# Patient Record
Sex: FEMALE | Race: BLACK OR AFRICAN AMERICAN | Employment: UNEMPLOYED | URBAN - METROPOLITAN AREA
[De-identification: names, ages, dates, MRNs, and addresses within clinical notes are randomized per-mention and may not be internally consistent; named-entity substitution may affect disease eponyms.]

---

## 2017-10-10 ENCOUNTER — HOSPITAL ENCOUNTER (OUTPATIENT)
Dept: MAMMOGRAPHY | Age: 57
Discharge: HOME | End: 2017-10-10
Attending: FAMILY MEDICINE
Payer: COMMERCIAL

## 2017-10-10 DIAGNOSIS — Z12.31 VISIT FOR SCREENING MAMMOGRAM: ICD-10-CM

## 2017-10-10 PROCEDURE — 77067 SCR MAMMO BI INCL CAD: CPT

## 2017-10-20 ENCOUNTER — HOSPITAL ENCOUNTER (OUTPATIENT)
Dept: ULTRASOUND IMAGING | Age: 57
Discharge: HOME | End: 2017-10-20
Attending: FAMILY MEDICINE
Payer: COMMERCIAL

## 2017-10-20 DIAGNOSIS — D11.9: ICD-10-CM

## 2017-10-20 PROCEDURE — 76536 US EXAM OF HEAD AND NECK: CPT

## 2019-12-16 ENCOUNTER — HOSPITAL ENCOUNTER (OUTPATIENT)
Dept: MAMMOGRAPHY | Age: 59
Discharge: HOME | End: 2019-12-16
Attending: FAMILY MEDICINE
Payer: COMMERCIAL

## 2019-12-16 DIAGNOSIS — Z12.31 VISIT FOR SCREENING MAMMOGRAM: ICD-10-CM

## 2019-12-16 PROCEDURE — 77067 SCR MAMMO BI INCL CAD: CPT

## 2019-12-26 ENCOUNTER — HOSPITAL ENCOUNTER (OUTPATIENT)
Dept: MAMMOGRAPHY | Age: 59
Discharge: HOME | End: 2019-12-26
Attending: FAMILY MEDICINE
Payer: COMMERCIAL

## 2019-12-26 DIAGNOSIS — R92.8 ABNORMAL MAMMOGRAM: ICD-10-CM

## 2019-12-26 PROCEDURE — 77065 DX MAMMO INCL CAD UNI: CPT

## 2020-07-16 ENCOUNTER — HOSPITAL ENCOUNTER (OUTPATIENT)
Dept: ULTRASOUND IMAGING | Age: 60
Discharge: HOME | End: 2020-07-16
Attending: OTOLARYNGOLOGY
Payer: MEDICAID

## 2020-07-16 DIAGNOSIS — R22.0 HEAD SWELLING: ICD-10-CM

## 2020-07-16 DIAGNOSIS — D49.0 PAROTID TUMOR: ICD-10-CM

## 2020-07-16 PROCEDURE — 10005 FNA BX W/US GDN 1ST LES: CPT

## 2020-07-16 PROCEDURE — 88305 TISSUE EXAM BY PATHOLOGIST: CPT

## 2020-07-16 PROCEDURE — 76536 US EXAM OF HEAD AND NECK: CPT

## 2020-07-16 PROCEDURE — 88173 CYTOPATH EVAL FNA REPORT: CPT

## 2020-07-16 PROCEDURE — 88172 CYTP DX EVAL FNA 1ST EA SITE: CPT

## 2020-07-16 RX ORDER — LIDOCAINE HYDROCHLORIDE 10 MG/ML
8 INJECTION, SOLUTION EPIDURAL; INFILTRATION; INTRACAUDAL; PERINEURAL
Status: DISCONTINUED | OUTPATIENT
Start: 2020-07-16 | End: 2020-07-17 | Stop reason: HOSPADM

## 2020-08-09 ENCOUNTER — HOSPITAL ENCOUNTER (OUTPATIENT)
Dept: PREADMISSION TESTING | Age: 60
Discharge: HOME | End: 2020-08-09
Payer: MEDICAID

## 2020-08-09 DIAGNOSIS — Z20.822 ENCOUNTER FOR LABORATORY TESTING FOR COVID-19 VIRUS: ICD-10-CM

## 2020-08-09 PROCEDURE — 87635 SARS-COV-2 COVID-19 AMP PRB: CPT

## 2020-08-10 LAB — SARS-COV-2, COV2NT: NOT DETECTED

## 2020-08-12 ENCOUNTER — ANESTHESIA EVENT (OUTPATIENT)
Dept: MEDSURG UNIT | Age: 60
End: 2020-08-12
Payer: MEDICAID

## 2020-08-12 RX ORDER — GUAIFENESIN 100 MG/5ML
81 LIQUID (ML) ORAL DAILY
COMMUNITY

## 2020-08-12 RX ORDER — ALPRAZOLAM 0.25 MG/1
0.25 TABLET ORAL
Status: ON HOLD | COMMUNITY
End: 2022-05-05

## 2020-08-12 RX ORDER — LOSARTAN POTASSIUM 100 MG/1
100 TABLET ORAL DAILY
COMMUNITY

## 2020-08-12 RX ORDER — SIMVASTATIN 20 MG/1
20 TABLET, FILM COATED ORAL
COMMUNITY

## 2020-08-12 RX ORDER — ESCITALOPRAM OXALATE 20 MG/1
20 TABLET ORAL DAILY
COMMUNITY

## 2020-08-12 RX ORDER — METFORMIN HYDROCHLORIDE 850 MG/1
850 TABLET ORAL 2 TIMES DAILY WITH MEALS
COMMUNITY

## 2020-08-13 ENCOUNTER — HOSPITAL ENCOUNTER (OUTPATIENT)
Age: 60
Setting detail: OUTPATIENT SURGERY
Discharge: HOME | End: 2020-08-13
Attending: OTOLARYNGOLOGY | Admitting: OTOLARYNGOLOGY
Payer: MEDICAID

## 2020-08-13 ENCOUNTER — ANESTHESIA (OUTPATIENT)
Dept: MEDSURG UNIT | Age: 60
End: 2020-08-13
Payer: MEDICAID

## 2020-08-13 VITALS
TEMPERATURE: 98.2 F | RESPIRATION RATE: 22 BRPM | BODY MASS INDEX: 34.86 KG/M2 | HEIGHT: 68 IN | DIASTOLIC BLOOD PRESSURE: 69 MMHG | OXYGEN SATURATION: 94 % | WEIGHT: 230 LBS | SYSTOLIC BLOOD PRESSURE: 136 MMHG | HEART RATE: 73 BPM

## 2020-08-13 DIAGNOSIS — L76.82 PAIN AT SURGICAL INCISION: Primary | ICD-10-CM

## 2020-08-13 LAB
GLUCOSE BLD STRIP.AUTO-MCNC: 120 MG/DL (ref 65–100)
GLUCOSE BLD STRIP.AUTO-MCNC: 178 MG/DL (ref 65–100)
SERVICE CMNT-IMP: ABNORMAL
SERVICE CMNT-IMP: ABNORMAL

## 2020-08-13 PROCEDURE — 76060000067 HC AMB SURG ANES 3.5 TO 4 HR: Performed by: OTOLARYNGOLOGY

## 2020-08-13 PROCEDURE — 77030019600 HC DRN EAR POPE MEDT -A: Performed by: OTOLARYNGOLOGY

## 2020-08-13 PROCEDURE — 77030040356 HC CORD BPLR FRCP COVD -A: Performed by: OTOLARYNGOLOGY

## 2020-08-13 PROCEDURE — 76030000024 HC AMB SURG 3.5 TO 4 HR INTENSV-TIER 1: Performed by: OTOLARYNGOLOGY

## 2020-08-13 PROCEDURE — 74011250636 HC RX REV CODE- 250/636: Performed by: NURSE ANESTHETIST, CERTIFIED REGISTERED

## 2020-08-13 PROCEDURE — 77030040503 HC DRN WND PENRS MDII -A: Performed by: OTOLARYNGOLOGY

## 2020-08-13 PROCEDURE — 77030031139 HC SUT VCRL2 J&J -A: Performed by: OTOLARYNGOLOGY

## 2020-08-13 PROCEDURE — 77030008684 HC TU ET CUF COVD -B: Performed by: ANESTHESIOLOGY

## 2020-08-13 PROCEDURE — 77030011640 HC PAD GRND REM COVD -A: Performed by: OTOLARYNGOLOGY

## 2020-08-13 PROCEDURE — 82962 GLUCOSE BLOOD TEST: CPT

## 2020-08-13 PROCEDURE — 77030002996 HC SUT SLK J&J -A: Performed by: OTOLARYNGOLOGY

## 2020-08-13 PROCEDURE — 77030040361 HC SLV COMPR DVT MDII -B: Performed by: OTOLARYNGOLOGY

## 2020-08-13 PROCEDURE — 77030040506 HC DRN WND MDII -A: Performed by: OTOLARYNGOLOGY

## 2020-08-13 PROCEDURE — 74011000258 HC RX REV CODE- 258: Performed by: NURSE ANESTHETIST, CERTIFIED REGISTERED

## 2020-08-13 PROCEDURE — 88307 TISSUE EXAM BY PATHOLOGIST: CPT

## 2020-08-13 PROCEDURE — 74011000250 HC RX REV CODE- 250: Performed by: NURSE ANESTHETIST, CERTIFIED REGISTERED

## 2020-08-13 PROCEDURE — 77030040922 HC BLNKT HYPOTHRM STRY -A

## 2020-08-13 PROCEDURE — 76210000037 HC AMBSU PH I REC 2 TO 2.5 HR: Performed by: OTOLARYNGOLOGY

## 2020-08-13 PROCEDURE — 77030010516 HC APPL HEMA CLP TELE -B: Performed by: OTOLARYNGOLOGY

## 2020-08-13 PROCEDURE — 74011250637 HC RX REV CODE- 250/637: Performed by: ANESTHESIOLOGY

## 2020-08-13 PROCEDURE — 77030002974 HC SUT PLN J&J -A: Performed by: OTOLARYNGOLOGY

## 2020-08-13 PROCEDURE — 77030019615 HC ELCTRD EMG NDL MEDT -B: Performed by: OTOLARYNGOLOGY

## 2020-08-13 PROCEDURE — 77030002916 HC SUT ETHLN J&J -A: Performed by: OTOLARYNGOLOGY

## 2020-08-13 PROCEDURE — 77030008462 HC STPLR SKN PROX J&J -A: Performed by: OTOLARYNGOLOGY

## 2020-08-13 PROCEDURE — 77030018836 HC SOL IRR NACL ICUM -A: Performed by: OTOLARYNGOLOGY

## 2020-08-13 PROCEDURE — 74011250637 HC RX REV CODE- 250/637: Performed by: OTOLARYNGOLOGY

## 2020-08-13 PROCEDURE — 77030011266 HC ELECTRD BLD INSL COVD -A: Performed by: OTOLARYNGOLOGY

## 2020-08-13 PROCEDURE — 77030014012 HC STIM NRV DISP ADLR -B: Performed by: OTOLARYNGOLOGY

## 2020-08-13 PROCEDURE — 74011000250 HC RX REV CODE- 250: Performed by: OTOLARYNGOLOGY

## 2020-08-13 RX ORDER — MIDAZOLAM HYDROCHLORIDE 1 MG/ML
0.5 INJECTION, SOLUTION INTRAMUSCULAR; INTRAVENOUS
Status: DISCONTINUED | OUTPATIENT
Start: 2020-08-13 | End: 2020-08-13 | Stop reason: HOSPADM

## 2020-08-13 RX ORDER — GLYCOPYRROLATE 0.2 MG/ML
0.2 INJECTION INTRAMUSCULAR; INTRAVENOUS
Status: DISCONTINUED | OUTPATIENT
Start: 2020-08-13 | End: 2020-08-13 | Stop reason: HOSPADM

## 2020-08-13 RX ORDER — ONDANSETRON 2 MG/ML
INJECTION INTRAMUSCULAR; INTRAVENOUS AS NEEDED
Status: DISCONTINUED | OUTPATIENT
Start: 2020-08-13 | End: 2020-08-13 | Stop reason: HOSPADM

## 2020-08-13 RX ORDER — CEPHALEXIN 500 MG/1
500 CAPSULE ORAL 2 TIMES DAILY
Qty: 20 CAP | Refills: 2 | Status: SHIPPED | OUTPATIENT
Start: 2020-08-13 | End: 2020-08-23

## 2020-08-13 RX ORDER — ROPIVACAINE HYDROCHLORIDE 5 MG/ML
30 INJECTION, SOLUTION EPIDURAL; INFILTRATION; PERINEURAL AS NEEDED
Status: DISCONTINUED | OUTPATIENT
Start: 2020-08-13 | End: 2020-08-13 | Stop reason: HOSPADM

## 2020-08-13 RX ORDER — SODIUM CHLORIDE, SODIUM LACTATE, POTASSIUM CHLORIDE, CALCIUM CHLORIDE 600; 310; 30; 20 MG/100ML; MG/100ML; MG/100ML; MG/100ML
INJECTION, SOLUTION INTRAVENOUS
Status: DISCONTINUED | OUTPATIENT
Start: 2020-08-13 | End: 2020-08-13 | Stop reason: HOSPADM

## 2020-08-13 RX ORDER — LIDOCAINE HYDROCHLORIDE 10 MG/ML
0.1 INJECTION, SOLUTION EPIDURAL; INFILTRATION; INTRACAUDAL; PERINEURAL AS NEEDED
Status: DISCONTINUED | OUTPATIENT
Start: 2020-08-13 | End: 2020-08-13 | Stop reason: HOSPADM

## 2020-08-13 RX ORDER — MIDAZOLAM HYDROCHLORIDE 1 MG/ML
1 INJECTION, SOLUTION INTRAMUSCULAR; INTRAVENOUS AS NEEDED
Status: DISCONTINUED | OUTPATIENT
Start: 2020-08-13 | End: 2020-08-13 | Stop reason: HOSPADM

## 2020-08-13 RX ORDER — SODIUM CHLORIDE, SODIUM LACTATE, POTASSIUM CHLORIDE, CALCIUM CHLORIDE 600; 310; 30; 20 MG/100ML; MG/100ML; MG/100ML; MG/100ML
1000 INJECTION, SOLUTION INTRAVENOUS CONTINUOUS
Status: DISCONTINUED | OUTPATIENT
Start: 2020-08-13 | End: 2020-08-13 | Stop reason: HOSPADM

## 2020-08-13 RX ORDER — FENTANYL CITRATE 50 UG/ML
INJECTION, SOLUTION INTRAMUSCULAR; INTRAVENOUS AS NEEDED
Status: DISCONTINUED | OUTPATIENT
Start: 2020-08-13 | End: 2020-08-13 | Stop reason: HOSPADM

## 2020-08-13 RX ORDER — ALBUTEROL SULFATE 0.83 MG/ML
2.5 SOLUTION RESPIRATORY (INHALATION) AS NEEDED
Status: DISCONTINUED | OUTPATIENT
Start: 2020-08-13 | End: 2020-08-13 | Stop reason: HOSPADM

## 2020-08-13 RX ORDER — LIDOCAINE HYDROCHLORIDE 20 MG/ML
INJECTION, SOLUTION EPIDURAL; INFILTRATION; INTRACAUDAL; PERINEURAL AS NEEDED
Status: DISCONTINUED | OUTPATIENT
Start: 2020-08-13 | End: 2020-08-13 | Stop reason: HOSPADM

## 2020-08-13 RX ORDER — ROCURONIUM BROMIDE 10 MG/ML
INJECTION, SOLUTION INTRAVENOUS AS NEEDED
Status: DISCONTINUED | OUTPATIENT
Start: 2020-08-13 | End: 2020-08-13 | Stop reason: HOSPADM

## 2020-08-13 RX ORDER — FENTANYL CITRATE 50 UG/ML
50 INJECTION, SOLUTION INTRAMUSCULAR; INTRAVENOUS AS NEEDED
Status: DISCONTINUED | OUTPATIENT
Start: 2020-08-13 | End: 2020-08-13 | Stop reason: HOSPADM

## 2020-08-13 RX ORDER — DIPHENHYDRAMINE HYDROCHLORIDE 50 MG/ML
12.5 INJECTION, SOLUTION INTRAMUSCULAR; INTRAVENOUS AS NEEDED
Status: DISCONTINUED | OUTPATIENT
Start: 2020-08-13 | End: 2020-08-13 | Stop reason: HOSPADM

## 2020-08-13 RX ORDER — EPHEDRINE SULFATE/0.9% NACL/PF 50 MG/5 ML
SYRINGE (ML) INTRAVENOUS AS NEEDED
Status: DISCONTINUED | OUTPATIENT
Start: 2020-08-13 | End: 2020-08-13 | Stop reason: HOSPADM

## 2020-08-13 RX ORDER — EPINEPHRINE NASAL SOLUTION 1 MG/ML
SOLUTION NASAL AS NEEDED
Status: DISCONTINUED | OUTPATIENT
Start: 2020-08-13 | End: 2020-08-13 | Stop reason: HOSPADM

## 2020-08-13 RX ORDER — HYDROCODONE BITARTRATE AND ACETAMINOPHEN 5; 325 MG/1; MG/1
1 TABLET ORAL AS NEEDED
Status: DISCONTINUED | OUTPATIENT
Start: 2020-08-13 | End: 2020-08-13 | Stop reason: HOSPADM

## 2020-08-13 RX ORDER — SUCCINYLCHOLINE CHLORIDE 20 MG/ML
INJECTION INTRAMUSCULAR; INTRAVENOUS AS NEEDED
Status: DISCONTINUED | OUTPATIENT
Start: 2020-08-13 | End: 2020-08-13 | Stop reason: HOSPADM

## 2020-08-13 RX ORDER — CEPHALEXIN 500 MG/1
500 CAPSULE ORAL 2 TIMES DAILY
Qty: 20 CAP | Refills: 1 | Status: SHIPPED | OUTPATIENT
Start: 2020-08-13 | End: 2020-08-23

## 2020-08-13 RX ORDER — SODIUM CHLORIDE 9 MG/ML
25 INJECTION, SOLUTION INTRAVENOUS CONTINUOUS
Status: DISCONTINUED | OUTPATIENT
Start: 2020-08-13 | End: 2020-08-13 | Stop reason: HOSPADM

## 2020-08-13 RX ORDER — MIDAZOLAM HYDROCHLORIDE 1 MG/ML
INJECTION, SOLUTION INTRAMUSCULAR; INTRAVENOUS AS NEEDED
Status: DISCONTINUED | OUTPATIENT
Start: 2020-08-13 | End: 2020-08-13 | Stop reason: HOSPADM

## 2020-08-13 RX ORDER — LIDOCAINE HYDROCHLORIDE AND EPINEPHRINE 10; 10 MG/ML; UG/ML
INJECTION, SOLUTION INFILTRATION; PERINEURAL AS NEEDED
Status: DISCONTINUED | OUTPATIENT
Start: 2020-08-13 | End: 2020-08-13 | Stop reason: HOSPADM

## 2020-08-13 RX ORDER — HYDROMORPHONE HYDROCHLORIDE 1 MG/ML
0.2 INJECTION, SOLUTION INTRAMUSCULAR; INTRAVENOUS; SUBCUTANEOUS
Status: DISCONTINUED | OUTPATIENT
Start: 2020-08-13 | End: 2020-08-13 | Stop reason: HOSPADM

## 2020-08-13 RX ORDER — ONDANSETRON 2 MG/ML
4 INJECTION INTRAMUSCULAR; INTRAVENOUS AS NEEDED
Status: DISCONTINUED | OUTPATIENT
Start: 2020-08-13 | End: 2020-08-13 | Stop reason: HOSPADM

## 2020-08-13 RX ORDER — FENTANYL CITRATE 50 UG/ML
25 INJECTION, SOLUTION INTRAMUSCULAR; INTRAVENOUS
Status: DISCONTINUED | OUTPATIENT
Start: 2020-08-13 | End: 2020-08-13 | Stop reason: HOSPADM

## 2020-08-13 RX ORDER — CEFAZOLIN SODIUM 1 G/3ML
INJECTION, POWDER, FOR SOLUTION INTRAMUSCULAR; INTRAVENOUS AS NEEDED
Status: DISCONTINUED | OUTPATIENT
Start: 2020-08-13 | End: 2020-08-13 | Stop reason: HOSPADM

## 2020-08-13 RX ORDER — GLYCOPYRROLATE 0.2 MG/ML
INJECTION INTRAMUSCULAR; INTRAVENOUS AS NEEDED
Status: DISCONTINUED | OUTPATIENT
Start: 2020-08-13 | End: 2020-08-13 | Stop reason: HOSPADM

## 2020-08-13 RX ORDER — ACETAMINOPHEN 325 MG/1
650 TABLET ORAL ONCE
Status: COMPLETED | OUTPATIENT
Start: 2020-08-13 | End: 2020-08-13

## 2020-08-13 RX ORDER — PHENYLEPHRINE HCL IN 0.9% NACL 0.4MG/10ML
SYRINGE (ML) INTRAVENOUS AS NEEDED
Status: DISCONTINUED | OUTPATIENT
Start: 2020-08-13 | End: 2020-08-13 | Stop reason: HOSPADM

## 2020-08-13 RX ORDER — ONDANSETRON 8 MG/1
8 TABLET, ORALLY DISINTEGRATING ORAL
Qty: 8 TAB | Refills: 1 | Status: SHIPPED | OUTPATIENT
Start: 2020-08-13 | End: 2020-08-20

## 2020-08-13 RX ORDER — METHYLPREDNISOLONE 4 MG/1
TABLET ORAL
Qty: 1 DOSE PACK | Refills: 1 | Status: ON HOLD | OUTPATIENT
Start: 2020-08-13 | End: 2022-05-05

## 2020-08-13 RX ORDER — DEXAMETHASONE SODIUM PHOSPHATE 4 MG/ML
INJECTION, SOLUTION INTRA-ARTICULAR; INTRALESIONAL; INTRAMUSCULAR; INTRAVENOUS; SOFT TISSUE AS NEEDED
Status: DISCONTINUED | OUTPATIENT
Start: 2020-08-13 | End: 2020-08-13 | Stop reason: HOSPADM

## 2020-08-13 RX ORDER — PROPOFOL 10 MG/ML
INJECTION, EMULSION INTRAVENOUS AS NEEDED
Status: DISCONTINUED | OUTPATIENT
Start: 2020-08-13 | End: 2020-08-13 | Stop reason: HOSPADM

## 2020-08-13 RX ORDER — MORPHINE SULFATE 10 MG/ML
2 INJECTION, SOLUTION INTRAMUSCULAR; INTRAVENOUS
Status: DISCONTINUED | OUTPATIENT
Start: 2020-08-13 | End: 2020-08-13 | Stop reason: HOSPADM

## 2020-08-13 RX ORDER — HYDROCODONE BITARTRATE AND ACETAMINOPHEN 7.5; 325 MG/1; MG/1
1-2 TABLET ORAL
Qty: 28 TAB | Refills: 0 | Status: SHIPPED | OUTPATIENT
Start: 2020-08-13 | End: 2020-08-20

## 2020-08-13 RX ADMIN — Medication 80 MCG: at 09:17

## 2020-08-13 RX ADMIN — SUCCINYLCHOLINE CHLORIDE 140 MG: 20 INJECTION, SOLUTION INTRAMUSCULAR; INTRAVENOUS at 07:47

## 2020-08-13 RX ADMIN — Medication 10 MG: at 09:14

## 2020-08-13 RX ADMIN — DEXMEDETOMIDINE HYDROCHLORIDE 5 MCG: 100 INJECTION, SOLUTION, CONCENTRATE INTRAVENOUS at 10:27

## 2020-08-13 RX ADMIN — MEPERIDINE HYDROCHLORIDE 10 MG: 50 INJECTION INTRAMUSCULAR; INTRAVENOUS; SUBCUTANEOUS at 10:57

## 2020-08-13 RX ADMIN — FENTANYL CITRATE 100 MCG: 50 INJECTION, SOLUTION INTRAMUSCULAR; INTRAVENOUS at 08:19

## 2020-08-13 RX ADMIN — MIDAZOLAM 2 MG: 1 INJECTION INTRAMUSCULAR; INTRAVENOUS at 07:37

## 2020-08-13 RX ADMIN — GLYCOPYRROLATE 0.3 MG: 0.2 INJECTION, SOLUTION INTRAMUSCULAR; INTRAVENOUS at 08:32

## 2020-08-13 RX ADMIN — Medication 10 MG: at 08:28

## 2020-08-13 RX ADMIN — SODIUM CHLORIDE, POTASSIUM CHLORIDE, SODIUM LACTATE AND CALCIUM CHLORIDE: 600; 310; 30; 20 INJECTION, SOLUTION INTRAVENOUS at 10:54

## 2020-08-13 RX ADMIN — DEXMEDETOMIDINE HYDROCHLORIDE 5 MCG: 100 INJECTION, SOLUTION, CONCENTRATE INTRAVENOUS at 09:28

## 2020-08-13 RX ADMIN — ACETAMINOPHEN 650 MG: 325 TABLET ORAL at 06:54

## 2020-08-13 RX ADMIN — DEXMEDETOMIDINE HYDROCHLORIDE 5 MCG: 100 INJECTION, SOLUTION, CONCENTRATE INTRAVENOUS at 08:20

## 2020-08-13 RX ADMIN — TRANEXAMIC ACID 1 G: 100 INJECTION, SOLUTION INTRAVENOUS at 10:27

## 2020-08-13 RX ADMIN — LIDOCAINE HYDROCHLORIDE 50 MG: 20 INJECTION, SOLUTION EPIDURAL; INFILTRATION; INTRACAUDAL; PERINEURAL at 07:45

## 2020-08-13 RX ADMIN — ONDANSETRON HYDROCHLORIDE 4 MG: 2 INJECTION, SOLUTION INTRAMUSCULAR; INTRAVENOUS at 10:50

## 2020-08-13 RX ADMIN — CEFAZOLIN 2 G: 330 INJECTION, POWDER, FOR SOLUTION INTRAMUSCULAR; INTRAVENOUS at 08:10

## 2020-08-13 RX ADMIN — PROPOFOL 80 MG: 10 INJECTION, EMULSION INTRAVENOUS at 09:25

## 2020-08-13 RX ADMIN — Medication 10 MG: at 08:15

## 2020-08-13 RX ADMIN — DEXMEDETOMIDINE HYDROCHLORIDE 5 MCG: 100 INJECTION, SOLUTION, CONCENTRATE INTRAVENOUS at 09:31

## 2020-08-13 RX ADMIN — DEXAMETHASONE SODIUM PHOSPHATE 8 MG: 4 INJECTION, SOLUTION INTRAMUSCULAR; INTRAVENOUS at 08:12

## 2020-08-13 RX ADMIN — SODIUM CHLORIDE, POTASSIUM CHLORIDE, SODIUM LACTATE AND CALCIUM CHLORIDE: 600; 310; 30; 20 INJECTION, SOLUTION INTRAVENOUS at 07:36

## 2020-08-13 RX ADMIN — PROPOFOL 120 MG: 10 INJECTION, EMULSION INTRAVENOUS at 07:45

## 2020-08-13 RX ADMIN — ROCURONIUM BROMIDE 5 MG: 10 SOLUTION INTRAVENOUS at 07:43

## 2020-08-13 RX ADMIN — FENTANYL CITRATE 100 MCG: 50 INJECTION, SOLUTION INTRAMUSCULAR; INTRAVENOUS at 07:45

## 2020-08-13 RX ADMIN — PROPOFOL 30 MG: 10 INJECTION, EMULSION INTRAVENOUS at 07:37

## 2020-08-13 RX ADMIN — PROPOFOL 100 MG: 10 INJECTION, EMULSION INTRAVENOUS at 08:16

## 2020-08-13 NOTE — ROUTINE PROCESS
Patient: Nirmala Yañez MRN: [id number]  SSN: [id number]   YOB: 1960  Age: 63 y.o. Sex: female     Patient is status post Procedure(s):  REMOVAL RIGHT PAROTID GLAND/TUMOR WITH FACIAL NERVE MONITORING.     Surgeon(s) and Role:     * Helen Rubalcava MD - Primary     * Shelby Anguiano MD - Assisting    Local/Dose/Irrigation:  See mar                  Peripheral IV 08/13/20 Left Wrist (Active)          Rodriguez-Pat Drain 08/13/20 Right Other (comment) (Active)      Airway - Endotracheal Tube 08/13/20 Oral (Active)                   Dressing/Packing:  Wound Face Right-Dressing Type: 4 x 4;Fluffs (08/13/20 0900)    Splint/Cast: Wound Face Right-Splint Type/Material: Other(Comment)(jobst facial support)]    Other: none

## 2020-08-13 NOTE — DISCHARGE INSTRUCTIONS
[facility]    Head and Neck Post Operative Instructions  To right sided parotid tumor removal    Follow up Friday with dr Macarena Raza at 9 am [place] office at [facility] office   1. DIET  Start a soft diet and progress to usual diet as tolerated, unless otherwise directed. It is important to remember that good overall diet and health promotes healing. 2.  ACTIVITY  No heavy exertion or heavy lifting for 10 days. Light activities are permitted but, avoid any stretching or bending of the neck for 10 days. 3.  WOUND CARE          * leave stocking on as tolerated till 24 hour follow up , after start below   A. Apply bacitracinantibiotic ointment twice daily to suture or staple sites and continue for 2-3 days following removal. And same to the drain site   B. May shower or bathe following surgery but make sure to apply antibiotic ointment prior to doing so and do not submerge the area in water. C. If you have a paper tape dressing, make every attempt to keep it dry until it is removed. 4.  THINGS TO BE CONCERNED ABOUT  Please call the office for any of these changes  A. Increasing pain  B. New red discoloration  C. New drainage of any description  D. Increasing warmth of wound    5. DRAIN MANAGEMENT  A. Follow nursing instructions  B. Generally, your doctor will plan to have drains removed between 1 and 4 days. 6. LONG-TERM WOUND HEALING  A. Expect the wound to have a slight ridge for up to 6 weeks. This is usually by design for optimal wound healing. B. Local numbness is usual around wound sites, and can last up to and beyond 3 months. C. To minimize the prolonged redness or pigmentation around a wound, we recommend use of sunscreen for 3 months or longer on healed wounds.       If you have any questions or concerns following your surgery, do not hesitate to contact our office at    Office Phone:  [phone] office hours are 8: 00 a.m. to 4:30 p.m. You should be able to reach us after hours by calling the regular office number. If for some reason you are not able to reach our [address] [place] [address] service through this main number you may call them directly at [phone]. DISCHARGE SUMMARY from Nurse    You received 650 mg of tylenol (acetaminophen) during your procedure today at 7:00 AM. Please do not have any additional tylenol (acetaminophen) or tylenol containing products for 6 hours, or no sooner than 1:00 PM.    PATIENT INSTRUCTIONS:    After general anesthesia or intravenous sedation, for 24 hours or while taking prescription Narcotics:  · Limit your activities  · Do not drive and operate hazardous machinery  · Do not make important personal or business decisions  · Do  not drink alcoholic beverages  · If you have not urinated within 8 hours after discharge, please contact your surgeon on call. Report the following to your surgeon:  · Excessive pain, swelling, redness or odor of or around the surgical area  · Temperature over 100.5  · Nausea and vomiting lasting longer than 4 hours or if unable to take medications  · Any signs of decreased circulation or nerve impairment to extremity: change in color, persistent  numbness, tingling, coldness or increase pain  · Any questions    What to do at Home:  Recommended activity: See surgical instructions. If you experience any of the following symptoms as noted above, please follow up with Dr. Bailee Banegas. *  Please give a list of your current medications to your Primary Care Provider. *  Please update this list whenever your medications are discontinued, doses are      changed, or new medications (including over-the-counter products) are added. *  Please carry medication information at all times in case of emergency situations.     These are general instructions for a healthy lifestyle:    No smoking/ No tobacco products/ Avoid exposure to second hand smoke  Surgeon General's Warning: Quitting smoking now greatly reduces serious risk to your health. Obesity, smoking, and sedentary lifestyle greatly increases your risk for illness    A healthy diet, regular physical exercise & weight monitoring are important for maintaining a healthy lifestyle    You may be retaining fluid if you have a history of heart failure or if you experience any of the following symptoms:  Weight gain of 3 pounds or more overnight or 5 pounds in a week, increased swelling in our hands or feet or shortness of breath while lying flat in bed. Please call your doctor as soon as you notice any of these symptoms; do not wait until your next office visit. The discharge information has been reviewed with the patient and caregiver. The patient and caregiver verbalized understanding. Discharge medications reviewed with the patient and caregiver and appropriate educational materials and side effects teaching were provided.   ___________________________________________________________________________________________________________________________________

## 2020-08-13 NOTE — OP NOTES
Patient Name: Libia Charles  MRN: [id number]  : 1960  DOS: 20    OPERATIVE REPORT     PREOPERATIVE DIAGNOSIS:   1. Rt sided  parotid mass    POSTOPERATIVE DIAGNOSIS:   1. Right sided  parotid mass    PROCEDURE:  1. Right sided  superficial parotidectomy  2. EMG NIM monitoring 3.0 hours     ATTENDING SURGEON: Omer Roberts MD     Assistant : dr Rajan Blackman MD     ANESTHESIA: Dr UCHE Valenzuela Mon: None    ESTIMATED BLOOD LOSS: Minimal    FINDINGS:  1.  A large  mass was removed from the right  parotid as part of a superficial parotidectomy specimen. 2. The facial nerve was identified at the main trunk and followed distally. It was preserved. and checked with stimulation probe during and at end of case     SPECIMENS: one large parotid tumor est size 8cm by 5 cm and node or attached mass     INDICATIONS: This a 63 y.o. female who has a right  parotid mass. FNA has demonstrated Warthin's tumor like . The risks including bleeding, infection, facial nerve paresis/paralysis, gustatory sweating, and first bite syndrome were discussed with the patient and they have agreed to proceed. PROCEDURE IN DETAIL: The patient was identified in the preoperative area and informed consent was obtained. The patient was brought into the operating room and laid in the supine position. General anesthesia was induced without the use of long-acting paralytic. The patient was then prepped and draped in the standard sterile fashion. A surgeon-initiated pre-procedural time out was then performed. A modified Sandip incision was marked out extending preauricularly, inferior to the lobule and into a transverse neck crease. The marked incision was incised and the dissection was deepened through the platysma anteriorly. Superior and inferior subplatysmal flaps were elevated, this plane of dissection was continued superiorly and a sub-SMAS and supra-parotid fascia flap was elevated over the face.  The anterior aspect of the sternocleidomastoid muscle was skeletonized up to the mastoid. The posterior branch of the great auricular nerve was preserved. The external jugular vein was ligated and divided. The posterior belly of the digastric was skeletonized up to the sternocleidomastoid. The plane between cartilage of the ear and parotid was developed down to a level slightly lateral to the digastric. At this point, fine dissection was used to divide the fascial bands between the tympanomastoid suture and digastric until the main trunk of the facial nerve was identified. The nerve was noted to be thin mostly   This facial nerve was traced out to the pes anserinus which had been pushed superiorly by the tumor . The branches of the facial nerve were then sequentially dissected from inferior to superior with removal of the overlying or nearby  parotid gland and mass. At this point the mass was removed as part of a superficial parotidectomy specimen. Hemostasis was obtained. The wound was irrigated. A 10Fr round PARMINDER drain was placed and secured. The parotid fascia was then closed to the SCM fascia with 3-0 vicryl. The platysma was then reapproximated with 3-0 vicryl. The subcutaneous tissues  And skin were brought together with a running 5 -0 fast absorbing chromic. The patient tolerated the procedure well. Sponge needle and instrument count was correct . End of case stimulation was good for all branches of the nerve . Next, The patient was turned over to anesthesia for awakening.

## 2020-08-13 NOTE — ANESTHESIA PREPROCEDURE EVALUATION
Relevant Problems   No relevant active problems       Anesthetic History   No history of anesthetic complications            Review of Systems / Medical History  Patient summary reviewed, nursing notes reviewed and pertinent labs reviewed    Pulmonary        Sleep apnea  Undiagnosed apnea         Neuro/Psych   Within defined limits           Cardiovascular    Hypertension: well controlled              Exercise tolerance: >4 METS     GI/Hepatic/Renal  Within defined limits              Endo/Other    Diabetes: well controlled, type 2         Other Findings            Physical Exam    Airway  Mallampati: II  TM Distance: > 6 cm  Neck ROM: normal range of motion   Mouth opening: Normal     Cardiovascular  Regular rate and rhythm,  S1 and S2 normal,  no murmur, click, rub, or gallop             Dental  No notable dental hx       Pulmonary  Breath sounds clear to auscultation               Abdominal  GI exam deferred       Other Findings            Anesthetic Plan    ASA: 2  Anesthesia type: general          Induction: Intravenous  Anesthetic plan and risks discussed with: Patient

## 2020-08-13 NOTE — ANESTHESIA POSTPROCEDURE EVALUATION
Post-Anesthesia Evaluation and Assessment    Patient: Rodney Pelaez MRN: [id number]  SSN: [id number]    YOB: 1960  Age: 63 y.o. Sex: female      I have evaluated the patient and they are stable and ready for discharge from the PACU. Cardiovascular Function/Vital Signs  Visit Vitals  /69   Pulse 76   Temp 36.8 °C (98.2 °F)   Resp 23   Ht 5' 7.5\" (1.715 m)   Wt 104.3 kg (230 lb)   SpO2 98%   BMI 35.49 kg/m²       Patient is status post General anesthesia for Procedure(s):  REMOVAL RIGHT PAROTID GLAND/TUMOR WITH FACIAL NERVE MONITORING. Nausea/Vomiting: None    Postoperative hydration reviewed and adequate. Pain:  Pain Scale 1: Numeric (0 - 10) (08/13/20 1119)  Pain Intensity 1: 0 (08/13/20 1119)   Managed    Neurological Status:   Neuro (WDL): Exceptions to WDL (08/13/20 1119)  Neuro  Neurologic State: Anesthetized (08/13/20 1119)   At baseline    Mental Status, Level of Consciousness: Alert and  oriented to person, place, and time    Pulmonary Status:   O2 Device: CO2 nasal cannula (08/13/20 1124)   Adequate oxygenation and airway patent    Complications related to anesthesia: None    Post-anesthesia assessment completed. No concerns    Signed By: Janet Seth MD     August 13, 2020              Procedure(s):  REMOVAL RIGHT PAROTID GLAND/TUMOR WITH FACIAL NERVE MONITORING. general    <BSHSIANPOST>    INITIAL Post-op Vital signs:   Vitals Value Taken Time   /69 8/13/2020 11:30 AM   Temp 36.8 °C (98.2 °F) 8/13/2020 11:24 AM   Pulse 78 8/13/2020 11:36 AM   Resp 23 8/13/2020 11:36 AM   SpO2 99 % 8/13/2020 11:36 AM   Vitals shown include unvalidated device data.

## 2020-08-13 NOTE — H&P
[facility]      The history and physical is reviewed by me and updated today. There are no changes from the previous history and physical.  This file should be an external document in the notes section or could be in the media portion of the chart. Here for rt parotid tumor removal . See office eval and treatment rec's The risks of the procedure including nerve injury to facial and auricular branches in particular , temporary or permanent , lucy syndrome , pain , salivary leakage and in general , bleeding, infection, problems with anesthesia, need for further procedures, and death have been discussed with the patient. We also discussed the fact that symptoms may not improve or potentially could worsen. Also discussed the alternatives of continued medical management. The patient desires to proceed.     Fred Hayden MD

## 2020-08-17 NOTE — OP NOTES
[address]   OPERATIVE REPORT    Name:  Jhonny Brandon  MR#:  [id number]  :  1960  ACCOUNT #:  [de-identified]  DATE OF SERVICE:  2020    PREOPERATIVE DIAGNOSIS:  Right parotid tumor. POSTOPERATIVE DIAGNOSIS:  Right parotid tumor. PROCEDURE PERFORMED:  Electromyography monitoring of the facial nerve on the right side from 07:40 a.m. to 11:10 a.m. SURGEON:  Mag Mendez MD    ASSISTANT:  Marilu Cardoso MD    ANESTHESIA:  General endotracheal.    COMPLICATIONS:  None. SPECIMENS REMOVED:  Parotid mass. IMPLANTS:  None. ESTIMATED BLOOD LOSS:  See Dr. Michell Thomas operative note for surgery. FINDINGS:  The patient has a large 8-cm parotid tumor on the right side. She had continuous EMG monitoring of her facial nerve, the stimulation is low. Bridge of facial nerve stimulated with identification at the conclusion of the operation. INDICATIONS FOR OPERATION:  This patient is a 78-year-old lady with a gradually enlarging parotid tumor, FNA has .demonstrated Warthin's tumor  She presents for surgery of continuous EMG monitoring of facial nerve. PROCEDURE:  The patient was brought back to the operating room and placed in supine position on the operative table. After induction of anesthesia, the NIMs EMG needles were inserted and secured into orbicularis oris and orbicularis oculi muscles. Two additional  grounding and stimulating electrodes were placed in the shoulder and the chest.  They were connected to the [employer] NIMs monitoring with good placement of it. Continuous EMG monitoring was done throughout the operation for the time indicated above.       MD PEGGY Solomon/[email or web address]  D:  2020 21:14  T:  2020 4:19  JOB #:  [id number]

## 2020-12-15 ENCOUNTER — TRANSCRIBE ORDER (OUTPATIENT)
Dept: SCHEDULING | Age: 60
End: 2020-12-15

## 2020-12-15 DIAGNOSIS — Z12.31 VISIT FOR SCREENING MAMMOGRAM: Primary | ICD-10-CM

## 2021-01-23 ENCOUNTER — HOSPITAL ENCOUNTER (OUTPATIENT)
Dept: PREADMISSION TESTING | Age: 61
Discharge: HOME | End: 2021-01-23
Attending: INTERNAL MEDICINE
Payer: MEDICAID

## 2021-01-23 ENCOUNTER — TRANSCRIBE ORDER (OUTPATIENT)
Dept: REGISTRATION | Age: 61
End: 2021-01-23

## 2021-01-23 DIAGNOSIS — Z01.812 PRE-PROCEDURE LAB EXAM: Primary | ICD-10-CM

## 2021-01-23 DIAGNOSIS — Z01.812 PRE-PROCEDURE LAB EXAM: ICD-10-CM

## 2021-01-23 PROCEDURE — U0003 INFECTIOUS AGENT DETECTION BY NUCLEIC ACID (DNA OR RNA); SEVERE ACUTE RESPIRATORY SYNDROME CORONAVIRUS 2 (SARS-COV-2) (CORONAVIRUS DISEASE [COVID-19]), AMPLIFIED PROBE TECHNIQUE, MAKING USE OF HIGH THROUGHPUT TECHNOLOGIES AS DESCRIBED BY CMS-2020-01-R: HCPCS

## 2021-01-24 LAB — SARS-COV-2, COV2NT: NOT DETECTED

## 2021-02-20 ENCOUNTER — HOSPITAL ENCOUNTER (OUTPATIENT)
Dept: PREADMISSION TESTING | Age: 61
Discharge: HOME | End: 2021-02-20
Attending: INTERNAL MEDICINE
Payer: MEDICAID

## 2021-02-20 ENCOUNTER — TRANSCRIBE ORDER (OUTPATIENT)
Dept: REGISTRATION | Age: 61
End: 2021-02-20

## 2021-02-20 DIAGNOSIS — Z01.812 PRE-PROCEDURE LAB EXAM: Primary | ICD-10-CM

## 2021-02-20 DIAGNOSIS — Z01.812 PRE-PROCEDURE LAB EXAM: ICD-10-CM

## 2021-02-20 PROCEDURE — U0003 INFECTIOUS AGENT DETECTION BY NUCLEIC ACID (DNA OR RNA); SEVERE ACUTE RESPIRATORY SYNDROME CORONAVIRUS 2 (SARS-COV-2) (CORONAVIRUS DISEASE [COVID-19]), AMPLIFIED PROBE TECHNIQUE, MAKING USE OF HIGH THROUGHPUT TECHNOLOGIES AS DESCRIBED BY CMS-2020-01-R: HCPCS

## 2021-02-21 LAB — SARS-COV-2, COV2NT: NOT DETECTED

## 2021-02-24 ENCOUNTER — HOSPITAL ENCOUNTER (OUTPATIENT)
Age: 61
Setting detail: OUTPATIENT SURGERY
Discharge: HOME | End: 2021-02-24
Attending: INTERNAL MEDICINE | Admitting: INTERNAL MEDICINE
Payer: MEDICAID

## 2021-02-24 ENCOUNTER — ANESTHESIA (OUTPATIENT)
Dept: ENDOSCOPY | Age: 61
End: 2021-02-24
Payer: MEDICAID

## 2021-02-24 ENCOUNTER — ANESTHESIA EVENT (OUTPATIENT)
Dept: ENDOSCOPY | Age: 61
End: 2021-02-24
Payer: MEDICAID

## 2021-02-24 VITALS
TEMPERATURE: 97.8 F | DIASTOLIC BLOOD PRESSURE: 79 MMHG | WEIGHT: 237 LBS | RESPIRATION RATE: 21 BRPM | HEART RATE: 58 BPM | HEIGHT: 68 IN | OXYGEN SATURATION: 98 % | SYSTOLIC BLOOD PRESSURE: 124 MMHG | BODY MASS INDEX: 35.92 KG/M2

## 2021-02-24 PROCEDURE — 76040000019: Performed by: INTERNAL MEDICINE

## 2021-02-24 PROCEDURE — 77030013992 HC SNR POLYP ENDOSC BSC -B: Performed by: INTERNAL MEDICINE

## 2021-02-24 PROCEDURE — 74011250636 HC RX REV CODE- 250/636: Performed by: NURSE ANESTHETIST, CERTIFIED REGISTERED

## 2021-02-24 PROCEDURE — 2709999900 HC NON-CHARGEABLE SUPPLY: Performed by: INTERNAL MEDICINE

## 2021-02-24 PROCEDURE — 76060000031 HC ANESTHESIA FIRST 0.5 HR: Performed by: INTERNAL MEDICINE

## 2021-02-24 PROCEDURE — 88305 TISSUE EXAM BY PATHOLOGIST: CPT

## 2021-02-24 RX ORDER — SODIUM CHLORIDE 9 MG/ML
75 INJECTION, SOLUTION INTRAVENOUS CONTINUOUS
Status: DISCONTINUED | OUTPATIENT
Start: 2021-02-24 | End: 2021-02-24 | Stop reason: HOSPADM

## 2021-02-24 RX ORDER — DEXTROMETHORPHAN/PSEUDOEPHED 2.5-7.5/.8
1.2 DROPS ORAL
Status: DISCONTINUED | OUTPATIENT
Start: 2021-02-24 | End: 2021-02-24 | Stop reason: HOSPADM

## 2021-02-24 RX ORDER — SODIUM CHLORIDE 9 MG/ML
INJECTION, SOLUTION INTRAVENOUS
Status: DISCONTINUED | OUTPATIENT
Start: 2021-02-24 | End: 2021-02-24 | Stop reason: HOSPADM

## 2021-02-24 RX ORDER — PROPOFOL 10 MG/ML
INJECTION, EMULSION INTRAVENOUS AS NEEDED
Status: DISCONTINUED | OUTPATIENT
Start: 2021-02-24 | End: 2021-02-24 | Stop reason: HOSPADM

## 2021-02-24 RX ORDER — FENTANYL CITRATE 50 UG/ML
12.5-2 INJECTION, SOLUTION INTRAMUSCULAR; INTRAVENOUS
Status: DISCONTINUED | OUTPATIENT
Start: 2021-02-24 | End: 2021-02-24 | Stop reason: HOSPADM

## 2021-02-24 RX ORDER — SODIUM CHLORIDE 0.9 % (FLUSH) 0.9 %
5-40 SYRINGE (ML) INJECTION AS NEEDED
Status: DISCONTINUED | OUTPATIENT
Start: 2021-02-24 | End: 2021-02-24 | Stop reason: HOSPADM

## 2021-02-24 RX ORDER — NALOXONE HYDROCHLORIDE 0.4 MG/ML
0.4 INJECTION, SOLUTION INTRAMUSCULAR; INTRAVENOUS; SUBCUTANEOUS
Status: DISCONTINUED | OUTPATIENT
Start: 2021-02-24 | End: 2021-02-24 | Stop reason: HOSPADM

## 2021-02-24 RX ORDER — FLUMAZENIL 0.1 MG/ML
0.2 INJECTION INTRAVENOUS
Status: DISCONTINUED | OUTPATIENT
Start: 2021-02-24 | End: 2021-02-24 | Stop reason: HOSPADM

## 2021-02-24 RX ORDER — EPINEPHRINE 0.1 MG/ML
1 INJECTION INTRACARDIAC; INTRAVENOUS
Status: DISCONTINUED | OUTPATIENT
Start: 2021-02-24 | End: 2021-02-24 | Stop reason: HOSPADM

## 2021-02-24 RX ORDER — ATROPINE SULFATE 0.1 MG/ML
0.5 INJECTION INTRAVENOUS
Status: DISCONTINUED | OUTPATIENT
Start: 2021-02-24 | End: 2021-02-24 | Stop reason: HOSPADM

## 2021-02-24 RX ORDER — SODIUM CHLORIDE 0.9 % (FLUSH) 0.9 %
5-40 SYRINGE (ML) INJECTION EVERY 8 HOURS
Status: DISCONTINUED | OUTPATIENT
Start: 2021-02-24 | End: 2021-02-24 | Stop reason: HOSPADM

## 2021-02-24 RX ORDER — MIDAZOLAM HYDROCHLORIDE 1 MG/ML
.25-5 INJECTION, SOLUTION INTRAMUSCULAR; INTRAVENOUS
Status: DISCONTINUED | OUTPATIENT
Start: 2021-02-24 | End: 2021-02-24 | Stop reason: HOSPADM

## 2021-02-24 RX ADMIN — PROPOFOL 100 MG: 10 INJECTION, EMULSION INTRAVENOUS at 10:01

## 2021-02-24 RX ADMIN — PROPOFOL 50 MG: 10 INJECTION, EMULSION INTRAVENOUS at 10:14

## 2021-02-24 RX ADMIN — PROPOFOL 50 MG: 10 INJECTION, EMULSION INTRAVENOUS at 10:06

## 2021-02-24 RX ADMIN — PROPOFOL 50 MG: 10 INJECTION, EMULSION INTRAVENOUS at 10:18

## 2021-02-24 RX ADMIN — SODIUM CHLORIDE: 900 INJECTION, SOLUTION INTRAVENOUS at 09:55

## 2021-02-24 RX ADMIN — PROPOFOL 50 MG: 10 INJECTION, EMULSION INTRAVENOUS at 10:10

## 2021-02-24 RX ADMIN — PROPOFOL 50 MG: 10 INJECTION, EMULSION INTRAVENOUS at 10:03

## 2021-02-24 NOTE — H&P
[address]  [address]  Andrew Subramanian  [phone]        History and Physical     NAME: Yonny Arias   :  1960   MRN:  [id number]         HPI:  Yonny Arias is a 63 y.o. female here for screening colonoscopy. Last colonoscopy 10 years back. Denies any complaints today. Past Surgical History:   Procedure Laterality Date    HX ACL RECONSTRUCTION      HX COLONOSCOPY      HX HYSTERECTOMY       Past Medical History:   Diagnosis Date    Diabetes mellitus type 2, noninsulin dependent ([facility] [address]     Generalized anxiety disorder     Hypercholesterolemia     Hypertension     Neoplasm of major salivary gland      Social History     Tobacco Use    Smoking status: Current Every Day Smoker     Packs/day: 0.50     Years: 20.00     Pack years: 10.00    Smokeless tobacco: Never Used   Substance Use Topics    Alcohol use: Yes     Comment: occ    Drug use: Not on file     Allergies   Allergen Reactions    Erythromycin Other (comments)     Stomach cramps    Penicillins Hives     History reviewed. No pertinent family history.   Current Facility-Administered Medications   Medication Dose Route Frequency    0.9% sodium chloride infusion  75 mL/hr IntraVENous CONTINUOUS    sodium chloride (NS) flush 5-40 mL  5-40 mL IntraVENous Q8H    sodium chloride (NS) flush 5-40 mL  5-40 mL IntraVENous PRN    midazolam (VERSED) injection 0.25-5 mg  0.25-5 mg IntraVENous Multiple    fentaNYL citrate (PF) injection 12.5-200 mcg  12.5-200 mcg IntraVENous Multiple    naloxone (NARCAN) injection 0.4 mg  0.4 mg IntraVENous Multiple    flumazeniL (ROMAZICON) 0.1 mg/mL injection 0.2 mg  0.2 mg IntraVENous Multiple    simethicone (MYLICON) 23DY/1.4ND oral drops 80 mg  1.2 mL Oral Multiple    atropine injection 0.5 mg  0.5 mg IntraVENous ONCE PRN    EPINEPHrine (ADRENALIN) 0.1 mg/mL syringe 1 mg  1 mg Endoscopically ONCE PRN     Facility-Administered Medications Ordered in Other Encounters   Medication Dose Route Frequency    0.9% sodium chloride infusion   IntraVENous CONTINUOUS       PHYSICAL EXAM:    /72   Pulse 66   Temp 97.8 °F (36.6 °C)   Resp 22   Ht 5' 7.5\" (1.715 m)   Wt 107.5 kg (237 lb)   SpO2 96%   BMI 36.57 kg/m²      General: WD, WN. Alert, cooperative, no acute distress    HEENT: NC, Atraumatic. PERRLA, EOMI. Anicteric sclerae. Lungs:  CTA Bilaterally. No Wheezing/Rhonchi/Rales. Heart:  Regular  rhythm,  No murmur, No Rubs, No Gallops  Abdomen: Soft, Non distended, Non tender. +Bowel sounds, no HSM  Extremities: No c/c/e  Neurologic:  CN 2-12 gi, Alert and oriented X 3. No acute neurological distress   Psych:   Good insight. Not anxious nor agitated.        Assessment:   · Average risk screening, last colonoscopy 10 yrs back    Plan:   · Endoscopic procedure: Colonoscopy  · Anesthesia plan: Monitored Anesthesia Care

## 2021-02-24 NOTE — ROUTINE PROCESS
Yonny Arias 1960 
[id number] Situation: 
Verbal report received from: Somareubenlindsey Procedure: Procedure(s): 
. COLONOSCOPY  :- 
ENDOSCOPIC POLYPECTOMY Background: 
 
Preoperative diagnosis: SCREENING Postoperative diagnosis: Colon Polyp :  Dr. Raul Lopez Assistant(s): Endoscopy Technician-1: Agustin Dawkins 
Endoscopy RN-1: Myrtle Moore RN Specimens:  
ID Type Source Tests Collected by Time Destination 1 : ascending colon polyp Preservative Colon, Ascending  Akilah Deal MD 2/24/2021 1016 Pathology H. Pylori  no Assessment: 
Intra-procedure medications Anesthesia gave intra-procedure sedation and medications, see anesthesia flow sheet yes Intravenous fluids: NS@ Karen Gills Vital signs stable Abdominal assessment: round and soft Recommendation: 
Discharge patient per MD order. Family or Friend spouse Permission to share finding with family or friend yes

## 2021-02-24 NOTE — PROCEDURES
[address]  [address]  [phone]               Colonoscopy Operative Report      Indications:  Average risk screening, negative colonoscopy 10 yrs back    :  Kristian Riojas MD    Staff: Endoscopy Technician-1: Jose Mcgee  Endoscopy RN-1: Michelle Evans RN     Referring Provider: Nadeen Gann MD    Sedation:  MAC anesthesia    Procedure Details:  After informed consent was obtained with all risks and benefits of procedure explained and preoperative exam completed, the patient was taken to the endoscopy suite and placed in the left lateral decubitus position. Upon sequential sedation as per above, a digital rectal exam was performed  And was normal.  The Olympus videocolonoscope  was inserted in the rectum and carefully advanced to the cecum, which was identified by the ileocecal valve and appendiceal orifice. The quality of preparation was good. The colonoscope was slowly withdrawn with careful evaluation between folds. Retroflexion in the rectum was performed and was normal..     Findings:   Rectum: normal  Sigmoid: normal  Descending Colon: normal  Transverse Colon: normal  Ascending Colon: 1  Sessile polyp(s), the largest 10 mm in size;  Cecum: normal  Terminal Ileum: not intubated    Interventions:  1 complete polypectomy were performed using hot snare  and the polyps were  retrieved    Specimen Removed:   ID Type Source Tests Collected by Time Destination   1 : ascending colon polyp Preservative Colon, Ascending  Mary Hoang MD 2/24/2021 1016 Pathology       Complications: None. EBL:  None. Impression:  -Single 10 mm ascending colon polyp, removed and sent for pathology  -Otherwise normal exam.     Recommendations:   -Await pathology. -If adenoma is present, repeat colonoscopy in 3 years.  -Resume regular diet.  -Follow up with primary care physician. Resume normal medication(s).      Discharge Disposition: Home in the company of a  when able to ambulate.     Michell Emerson MD  2/24/2021  10:23 AM

## 2021-02-24 NOTE — PERIOP NOTES

## 2021-02-24 NOTE — DISCHARGE INSTRUCTIONS
[address]  [address] [facility], [address]  [id number]  1960    COLON DISCHARGE INSTRUCTIONS    DISCOMFORT:  Redness at IV site- apply warm compress to area; if redness or soreness persist- contact your physician  There may be a slight amount of blood passed from the rectum  Gaseous discomfort- walking, belching will help relieve any discomfort    DIET:   Regular diet. ACTIVITY:  You may resume your normal daily activities it is recommended that you spend the remainder of the day resting -  avoid any strenuous activity. You may not operate a vehicle for 12 hours  You may not engage in an occupation involving machinery or appliances for rest of today  You may not drink alcoholic beverages for at least 12 hours  Avoid making any critical decisions for at least 24 hour    CALL M.D. ANY SIGN OF:   Increasing pain, nausea, vomiting  Abdominal distension (swelling)  New increased bleeding (oral or rectal)  Fever (chills)  Pain in chest area  Bloody discharge from nose or mouth  Shortness of breath     Follow-up Instructions:   Call Dr. Breanna Franklin for any questions or problems. Telephone # [phone]  Biopsy results will be available in  5 to 7 days    Impression:  -Single 10 mm ascending colon polyp, removed and sent for pathology  -Otherwise normal exam.     Recommendations:   -Await pathology. -If adenoma is present, repeat colonoscopy in 3 years.  -Resume regular diet.  -Follow up with primary care physician. Resume normal medication(s). Breanna Franklin MD      Learning About Coronavirus [phone])  Coronavirus [phone]): Overview  What is coronavirus (COVID-19)? The coronavirus disease (COVID-19) is caused by a virus. It is an illness that was first found in [place], [place], in December 2019. It has since spread worldwide. The virus can cause fever, cough, and trouble breathing.  In severe cases, it can cause pneumonia and make it hard to breathe without help. It can cause death. Coronaviruses are a large group of viruses. They cause the common cold. They also cause more serious illnesses like Middle East respiratory syndrome (MERS) and severe acute respiratory syndrome (SARS). COVID-19 is caused by a novel coronavirus. That means it's a new type that has not been seen in people before. This virus spreads person-to-person through droplets from coughing and sneezing. It can also spread when you are close to someone who is infected. And it can spread when you touch something that has the virus on it, such as a doorknob or a tabletop. What can you do to protect yourself from coronavirus (COVID-19)? The best way to protect yourself from getting sick is to:  · Avoid areas where there is an outbreak. · Avoid contact with people who may be infected. · Wash your hands often with soap or alcohol-based hand sanitizers. · Avoid crowds and try to stay at least 6 feet away from other people. · Wash your hands often, especially after you cough or sneeze. Use soap and water, and scrub for at least 20 seconds. If soap and water aren't available, use an alcohol-based hand . · Avoid touching your mouth, nose, and eyes. What can you do to avoid spreading the virus to others? To help avoid spreading the virus to others:  · Cover your mouth with a tissue when you cough or sneeze. Then throw the tissue in the trash. · Use a disinfectant to clean things that you touch often. · Stay home if you are sick or have been exposed to the virus. Don't go to school, work, or public areas. And don't use public transportation. · If you are sick:  ? Leave your home only if you need to get medical care. But call the doctor's office first so they know you're coming. And wear a face mask, if you have one.  ? If you have a face mask, wear it whenever you're around other people. It can help stop the spread of the virus when you cough or sneeze. ?  Clean and disinfect your home every day. Use household  and disinfectant wipes or sprays. Take special care to clean things that you grab with your hands. These include doorknobs, remote controls, phones, and handles on your refrigerator and microwave. And don't forget countertops, tabletops, bathrooms, and computer keyboards. When to call for help  Call 911 anytime you think you may need emergency care. For example, call if:  · You have severe trouble breathing. (You can't talk at all.)  · You have constant chest pain or pressure. · You are severely dizzy or lightheaded. · You are confused or can't think clearly. · Your face and lips have a blue color. · You pass out (lose consciousness) or are very hard to wake up. Call your doctor now if you develop symptoms such as:  · Shortness of breath. · Fever. · Cough. If you need to get care, call ahead to the doctor's office for instructions before you go. Make sure you wear a face mask, if you have one, to prevent exposing other people to the virus. Where can you get the latest information? The following health organizations are tracking and studying this virus. Their websites contain the most up-to-date information. Jair Tejada also learn what to do if you think you may have been exposed to the virus. · U.S. Centers for Disease Control and Prevention (CDC): The CDC provides updated news about the disease and travel advice. The website also tells you how to prevent the spread of infection. www.cdc.gov  · World Health Organization [facility]): WHO offers information about the virus outbreaks. WHO also has travel advice. www.who.int  Current as of: April 1, 2020               Content Version: 12.4  © 4010-4449 [employer], [employer]orporated.    Care instructions adapted under license by your healthcare professional. If you have questions about a medical condition or this instruction, always ask your healthcare professional. Jeanie Marina any warranty or liability for your use of this information.

## 2021-02-24 NOTE — ANESTHESIA POSTPROCEDURE EVALUATION
Post-Anesthesia Evaluation and Assessment    Patient: Yee Griffith MRN: [id number]  SSN: [id number]    YOB: 1960  Age: 63 y.o. Sex: female      I have evaluated the patient and they are stable and ready for discharge from the PACU. Cardiovascular Function/Vital Signs  Visit Vitals  /80   Pulse 68   Temp 36.5 °C (97.7 °F)   Resp 24   Ht 5' 7.5\" (1.715 m)   Wt 107.5 kg (237 lb)   SpO2 96%   BMI 36.57 kg/m²       Patient is status post MAC anesthesia for Procedure(s):  . COLONOSCOPY  :-  ENDOSCOPIC POLYPECTOMY. Nausea/Vomiting: None    Postoperative hydration reviewed and adequate. Pain:  Pain Scale 1: Numeric (0 - 10) (02/24/21 0975)  Pain Intensity 1: 0 (02/24/21 2835)   Managed    Neurological Status: At baseline    Mental Status, Level of Consciousness: Alert and  oriented to person, place, and time    Pulmonary Status:   O2 Device: Nasal cannula (02/24/21 1021)   Adequate oxygenation and airway patent    Complications related to anesthesia: None    Post-anesthesia assessment completed. No concerns    Signed By: Drake Martinez MD     February 24, 2021              Procedure(s):  . COLONOSCOPY  :-  ENDOSCOPIC POLYPECTOMY. MAC    <BSHSIANPOST>    INITIAL Post-op Vital signs:   Vitals Value Taken Time   BP     Temp     Pulse 65 02/24/21 1027   Resp 9 02/24/21 1027   SpO2 98 % 02/24/21 1027   Vitals shown include unvalidated device data.

## 2021-08-24 ENCOUNTER — TRANSCRIBE ORDER (OUTPATIENT)
Dept: SCHEDULING | Age: 61
End: 2021-08-24

## 2021-08-24 DIAGNOSIS — Z12.31 SCREENING MAMMOGRAM FOR HIGH-RISK PATIENT: Primary | ICD-10-CM

## 2021-09-07 ENCOUNTER — HOSPITAL ENCOUNTER (OUTPATIENT)
Dept: MAMMOGRAPHY | Age: 61
Discharge: HOME | End: 2021-09-07
Attending: FAMILY MEDICINE
Payer: MEDICAID

## 2021-09-07 DIAGNOSIS — Z12.31 SCREENING MAMMOGRAM FOR HIGH-RISK PATIENT: ICD-10-CM

## 2021-09-07 PROCEDURE — 77067 SCR MAMMO BI INCL CAD: CPT

## 2022-05-04 ENCOUNTER — APPOINTMENT (OUTPATIENT)
Dept: GENERAL RADIOLOGY | Age: 62
DRG: 143 | End: 2022-05-04
Attending: EMERGENCY MEDICINE
Payer: MEDICAID

## 2022-05-04 ENCOUNTER — APPOINTMENT (OUTPATIENT)
Dept: CT IMAGING | Age: 62
DRG: 143 | End: 2022-05-04
Attending: EMERGENCY MEDICINE
Payer: MEDICAID

## 2022-05-04 ENCOUNTER — APPOINTMENT (OUTPATIENT)
Dept: ULTRASOUND IMAGING | Age: 62
DRG: 143 | End: 2022-05-04
Attending: EMERGENCY MEDICINE
Payer: MEDICAID

## 2022-05-04 ENCOUNTER — HOSPITAL ENCOUNTER (INPATIENT)
Age: 62
LOS: 2 days | Discharge: HOME | DRG: 143 | End: 2022-05-06
Attending: EMERGENCY MEDICINE | Admitting: FAMILY MEDICINE
Payer: MEDICAID

## 2022-05-04 DIAGNOSIS — R06.02 SOB (SHORTNESS OF BREATH): ICD-10-CM

## 2022-05-04 DIAGNOSIS — R91.8 MASS OF UPPER LOBE OF LEFT LUNG: Primary | ICD-10-CM

## 2022-05-04 DIAGNOSIS — J90 PLEURAL EFFUSION, LEFT: ICD-10-CM

## 2022-05-04 LAB
ALBUMIN FLD-MCNC: 2.8 G/DL
ALBUMIN SERPL-MCNC: 3.6 G/DL (ref 3.5–5)
ALBUMIN/GLOB SERPL: 0.9 {RATIO} (ref 1.1–2.2)
ALP SERPL-CCNC: 81 U/L (ref 45–117)
ALT SERPL-CCNC: 16 U/L (ref 12–78)
ANION GAP SERPL CALC-SCNC: 6 MMOL/L (ref 5–15)
APPEARANCE FLD: ABNORMAL
AST SERPL-CCNC: 14 U/L (ref 15–37)
ATRIAL RATE: 79 BPM
BASOPHILS # BLD: 0 K/UL (ref 0–0.1)
BASOPHILS NFR BLD: 1 % (ref 0–1)
BILIRUB SERPL-MCNC: 1.1 MG/DL (ref 0.2–1)
BNP SERPL-MCNC: 22 PG/ML
BODY FLD TYPE: NORMAL
BUN SERPL-MCNC: 9 MG/DL (ref 6–20)
BUN/CREAT SERPL: 13 (ref 12–20)
CALCIUM SERPL-MCNC: 9 MG/DL (ref 8.5–10.1)
CALCULATED P AXIS, ECG09: 52 DEGREES
CALCULATED R AXIS, ECG10: 20 DEGREES
CALCULATED T AXIS, ECG11: 43 DEGREES
CHLORIDE SERPL-SCNC: 108 MMOL/L (ref 97–108)
CO2 SERPL-SCNC: 26 MMOL/L (ref 21–32)
COLOR FLD: YELLOW
COMMENT, HOLDF: NORMAL
CREAT FLD-MCNC: 0.54 MG/DL
CREAT SERPL-MCNC: 0.72 MG/DL (ref 0.55–1.02)
D DIMER PPP FEU-MCNC: 1.85 MG/L FEU (ref 0–0.65)
DIAGNOSIS, 93000: NORMAL
DIFFERENTIAL METHOD BLD: NORMAL
EOSINOPHIL # BLD: 0.2 K/UL (ref 0–0.4)
EOSINOPHIL NFR BLD: 3 % (ref 0–7)
ERYTHROCYTE [DISTWIDTH] IN BLOOD BY AUTOMATED COUNT: 14 % (ref 11.5–14.5)
EST. AVERAGE GLUCOSE BLD GHB EST-MCNC: 128 MG/DL
GLOBULIN SER CALC-MCNC: 4.2 G/DL (ref 2–4)
GLUCOSE BLD STRIP.AUTO-MCNC: 102 MG/DL (ref 65–117)
GLUCOSE BLD STRIP.AUTO-MCNC: 117 MG/DL (ref 65–117)
GLUCOSE FLD-MCNC: 84 MG/DL
GLUCOSE SERPL-MCNC: 103 MG/DL (ref 65–100)
HBA1C MFR BLD: 6.1 % (ref 4–5.6)
HCT VFR BLD AUTO: 41.8 % (ref 35–47)
HGB BLD-MCNC: 13.8 G/DL (ref 11.5–16)
IMM GRANULOCYTES # BLD AUTO: 0 K/UL (ref 0–0.04)
IMM GRANULOCYTES NFR BLD AUTO: 0 % (ref 0–0.5)
LACTATE SERPL-SCNC: 1.1 MMOL/L (ref 0.4–2)
LDH FLD L TO P-CCNC: 233 U/L
LDH SERPL L TO P-CCNC: 444 U/L (ref 81–246)
LYMPHOCYTES # BLD: 1.2 K/UL (ref 0.8–3.5)
LYMPHOCYTES NFR BLD: 19 % (ref 12–49)
LYMPHOCYTES NFR FLD: 10 %
MAGNESIUM SERPL-MCNC: 1.8 MG/DL (ref 1.6–2.4)
MCH RBC QN AUTO: 30 PG (ref 26–34)
MCHC RBC AUTO-ENTMCNC: 33 G/DL (ref 30–36.5)
MCV RBC AUTO: 90.9 FL (ref 80–99)
MESOTHL CELL NFR FLD: 16 %
MONOCYTES # BLD: 0.5 K/UL (ref 0–1)
MONOCYTES NFR BLD: 7 % (ref 5–13)
MONOS+MACROS NFR FLD: 66 %
NEUTROPHILS NFR FLD: 8 %
NEUTS SEG # BLD: 4.7 K/UL (ref 1.8–8)
NEUTS SEG NFR BLD: 70 % (ref 32–75)
NRBC # BLD: 0 K/UL (ref 0–0.01)
NRBC BLD-RTO: 0 PER 100 WBC
NUC CELL # FLD: 2644 /CU MM
P-R INTERVAL, ECG05: 144 MS
PH FLD: 6.6 [PH]
PLATELET # BLD AUTO: 312 K/UL (ref 150–400)
PMV BLD AUTO: 9.7 FL (ref 8.9–12.9)
POTASSIUM SERPL-SCNC: 3.6 MMOL/L (ref 3.5–5.1)
PROT FLD-MCNC: 5.3 G/DL
PROT SERPL-MCNC: 7.8 G/DL (ref 6.4–8.2)
Q-T INTERVAL, ECG07: 380 MS
QRS DURATION, ECG06: 80 MS
QTC CALCULATION (BEZET), ECG08: 435 MS
RBC # BLD AUTO: 4.6 M/UL (ref 3.8–5.2)
RBC # FLD: >100 /CU MM
SAMPLES BEING HELD,HOLD: NORMAL
SERVICE CMNT-IMP: NORMAL
SERVICE CMNT-IMP: NORMAL
SODIUM SERPL-SCNC: 140 MMOL/L (ref 136–145)
SPECIMEN SOURCE FLD: ABNORMAL
SPECIMEN SOURCE FLD: NORMAL
TROPONIN-HIGH SENSITIVITY: 4 NG/L (ref 0–51)
VENTRICULAR RATE, ECG03: 79 BPM
WBC # BLD AUTO: 6.6 K/UL (ref 3.6–11)

## 2022-05-04 PROCEDURE — 74011000250 HC RX REV CODE- 250: Performed by: FAMILY MEDICINE

## 2022-05-04 PROCEDURE — 99285 EMERGENCY DEPT VISIT HI MDM: CPT

## 2022-05-04 PROCEDURE — 83880 ASSAY OF NATRIURETIC PEPTIDE: CPT

## 2022-05-04 PROCEDURE — 88360 TUMOR IMMUNOHISTOCHEM/MANUAL: CPT

## 2022-05-04 PROCEDURE — 65270000046 HC RM TELEMETRY

## 2022-05-04 PROCEDURE — 74011250636 HC RX REV CODE- 250/636: Performed by: NURSE PRACTITIONER

## 2022-05-04 PROCEDURE — 93005 ELECTROCARDIOGRAM TRACING: CPT

## 2022-05-04 PROCEDURE — 74011250637 HC RX REV CODE- 250/637: Performed by: EMERGENCY MEDICINE

## 2022-05-04 PROCEDURE — 83605 ASSAY OF LACTIC ACID: CPT

## 2022-05-04 PROCEDURE — 88305 TISSUE EXAM BY PATHOLOGIST: CPT

## 2022-05-04 PROCEDURE — 84484 ASSAY OF TROPONIN QUANT: CPT

## 2022-05-04 PROCEDURE — 88341 IMHCHEM/IMCYTCHM EA ADD ANTB: CPT

## 2022-05-04 PROCEDURE — 74011000250 HC RX REV CODE- 250: Performed by: RADIOLOGY

## 2022-05-04 PROCEDURE — 80053 COMPREHEN METABOLIC PANEL: CPT

## 2022-05-04 PROCEDURE — 88377 M/PHMTRC ALYS ISHQUANT/SEMIQ: CPT

## 2022-05-04 PROCEDURE — 81210 BRAF GENE: CPT

## 2022-05-04 PROCEDURE — 88112 CYTOPATH CELL ENHANCE TECH: CPT

## 2022-05-04 PROCEDURE — 0W9B3ZX DRAINAGE OF LEFT PLEURAL CAVITY, PERCUTANEOUS APPROACH, DIAGNOSTIC: ICD-10-PCS | Performed by: RADIOLOGY

## 2022-05-04 PROCEDURE — 71275 CT ANGIOGRAPHY CHEST: CPT

## 2022-05-04 PROCEDURE — 85379 FIBRIN DEGRADATION QUANT: CPT

## 2022-05-04 PROCEDURE — 74011000636 HC RX REV CODE- 636: Performed by: EMERGENCY MEDICINE

## 2022-05-04 PROCEDURE — 89050 BODY FLUID CELL COUNT: CPT

## 2022-05-04 PROCEDURE — 88374 M/PHMTRC ALYS ISHQUANT/SEMIQ: CPT

## 2022-05-04 PROCEDURE — 94640 AIRWAY INHALATION TREATMENT: CPT

## 2022-05-04 PROCEDURE — 87040 BLOOD CULTURE FOR BACTERIA: CPT

## 2022-05-04 PROCEDURE — 82945 GLUCOSE OTHER FLUID: CPT

## 2022-05-04 PROCEDURE — 81235 EGFR GENE COM VARIANTS: CPT

## 2022-05-04 PROCEDURE — 32555 ASPIRATE PLEURA W/ IMAGING: CPT

## 2022-05-04 PROCEDURE — 82042 OTHER SOURCE ALBUMIN QUAN EA: CPT

## 2022-05-04 PROCEDURE — 88342 IMHCHEM/IMCYTCHM 1ST ANTB: CPT

## 2022-05-04 PROCEDURE — 82570 ASSAY OF URINE CREATININE: CPT

## 2022-05-04 PROCEDURE — 83036 HEMOGLOBIN GLYCOSYLATED A1C: CPT

## 2022-05-04 PROCEDURE — 83615 LACTATE (LD) (LDH) ENZYME: CPT

## 2022-05-04 PROCEDURE — 74011250637 HC RX REV CODE- 250/637: Performed by: FAMILY MEDICINE

## 2022-05-04 PROCEDURE — 83986 ASSAY PH BODY FLUID NOS: CPT

## 2022-05-04 PROCEDURE — 87205 SMEAR GRAM STAIN: CPT

## 2022-05-04 PROCEDURE — 74011000250 HC RX REV CODE- 250: Performed by: NURSE PRACTITIONER

## 2022-05-04 PROCEDURE — 83735 ASSAY OF MAGNESIUM: CPT

## 2022-05-04 PROCEDURE — 85025 COMPLETE CBC W/AUTO DIFF WBC: CPT

## 2022-05-04 PROCEDURE — 71046 X-RAY EXAM CHEST 2 VIEWS: CPT

## 2022-05-04 PROCEDURE — 82962 GLUCOSE BLOOD TEST: CPT

## 2022-05-04 PROCEDURE — 87015 SPECIMEN INFECT AGNT CONCNTJ: CPT

## 2022-05-04 PROCEDURE — 81479 UNLISTED MOLECULAR PATHOLOGY: CPT

## 2022-05-04 PROCEDURE — 84157 ASSAY OF PROTEIN OTHER: CPT

## 2022-05-04 PROCEDURE — 36415 COLL VENOUS BLD VENIPUNCTURE: CPT

## 2022-05-04 PROCEDURE — 74011250636 HC RX REV CODE- 250/636: Performed by: FAMILY MEDICINE

## 2022-05-04 PROCEDURE — 74011000250 HC RX REV CODE- 250

## 2022-05-04 RX ORDER — IPRATROPIUM BROMIDE AND ALBUTEROL SULFATE 2.5; .5 MG/3ML; MG/3ML
3 SOLUTION RESPIRATORY (INHALATION)
Status: DISCONTINUED | OUTPATIENT
Start: 2022-05-04 | End: 2022-05-06 | Stop reason: HOSPADM

## 2022-05-04 RX ORDER — HEPARIN SODIUM 5000 [USP'U]/ML
5000 INJECTION, SOLUTION INTRAVENOUS; SUBCUTANEOUS EVERY 8 HOURS
Status: DISCONTINUED | OUTPATIENT
Start: 2022-05-04 | End: 2022-05-06 | Stop reason: HOSPADM

## 2022-05-04 RX ORDER — ALPRAZOLAM 0.25 MG/1
0.25 TABLET ORAL
Status: DISCONTINUED | OUTPATIENT
Start: 2022-05-04 | End: 2022-05-06 | Stop reason: HOSPADM

## 2022-05-04 RX ORDER — ATORVASTATIN CALCIUM 10 MG/1
10 TABLET, FILM COATED ORAL
Status: DISCONTINUED | OUTPATIENT
Start: 2022-05-04 | End: 2022-05-06 | Stop reason: HOSPADM

## 2022-05-04 RX ORDER — LEVOFLOXACIN 5 MG/ML
750 INJECTION, SOLUTION INTRAVENOUS ONCE
Status: COMPLETED | OUTPATIENT
Start: 2022-05-04 | End: 2022-05-04

## 2022-05-04 RX ORDER — SODIUM CHLORIDE 0.9 % (FLUSH) 0.9 %
5-40 SYRINGE (ML) INJECTION EVERY 8 HOURS
Status: DISCONTINUED | OUTPATIENT
Start: 2022-05-04 | End: 2022-05-06 | Stop reason: HOSPADM

## 2022-05-04 RX ORDER — SODIUM BICARBONATE 42 MG/ML
2 INJECTION, SOLUTION INTRAVENOUS
Status: COMPLETED | OUTPATIENT
Start: 2022-05-04 | End: 2022-05-04

## 2022-05-04 RX ORDER — MAGNESIUM SULFATE 100 %
4 CRYSTALS MISCELLANEOUS AS NEEDED
Status: DISCONTINUED | OUTPATIENT
Start: 2022-05-04 | End: 2022-05-06 | Stop reason: HOSPADM

## 2022-05-04 RX ORDER — LOSARTAN POTASSIUM 50 MG/1
100 TABLET ORAL DAILY
Status: DISCONTINUED | OUTPATIENT
Start: 2022-05-05 | End: 2022-05-06 | Stop reason: HOSPADM

## 2022-05-04 RX ORDER — INSULIN LISPRO 100 [IU]/ML
INJECTION, SOLUTION INTRAVENOUS; SUBCUTANEOUS
Status: DISCONTINUED | OUTPATIENT
Start: 2022-05-04 | End: 2022-05-06 | Stop reason: HOSPADM

## 2022-05-04 RX ORDER — ACETAMINOPHEN 325 MG/1
650 TABLET ORAL
Status: DISCONTINUED | OUTPATIENT
Start: 2022-05-04 | End: 2022-05-06 | Stop reason: HOSPADM

## 2022-05-04 RX ORDER — SODIUM CHLORIDE 9 MG/ML
75 INJECTION, SOLUTION INTRAVENOUS CONTINUOUS
Status: DISCONTINUED | OUTPATIENT
Start: 2022-05-04 | End: 2022-05-06

## 2022-05-04 RX ORDER — LEVOFLOXACIN 5 MG/ML
750 INJECTION, SOLUTION INTRAVENOUS EVERY 24 HOURS
Status: DISCONTINUED | OUTPATIENT
Start: 2022-05-05 | End: 2022-05-05

## 2022-05-04 RX ORDER — ALBUTEROL SULFATE 90 UG/1
4 AEROSOL, METERED RESPIRATORY (INHALATION)
Status: COMPLETED | OUTPATIENT
Start: 2022-05-04 | End: 2022-05-04

## 2022-05-04 RX ORDER — LIDOCAINE HYDROCHLORIDE 10 MG/ML
10 INJECTION, SOLUTION EPIDURAL; INFILTRATION; INTRACAUDAL; PERINEURAL
Status: ACTIVE | OUTPATIENT
Start: 2022-05-04 | End: 2022-05-05

## 2022-05-04 RX ORDER — ESCITALOPRAM OXALATE 10 MG/1
20 TABLET ORAL DAILY
Status: DISCONTINUED | OUTPATIENT
Start: 2022-05-05 | End: 2022-05-06 | Stop reason: HOSPADM

## 2022-05-04 RX ORDER — LIDOCAINE HYDROCHLORIDE 10 MG/ML
INJECTION INFILTRATION; PERINEURAL
Status: COMPLETED
Start: 2022-05-04 | End: 2022-05-04

## 2022-05-04 RX ORDER — GUAIFENESIN 100 MG/5ML
81 LIQUID (ML) ORAL DAILY
Status: DISCONTINUED | OUTPATIENT
Start: 2022-05-05 | End: 2022-05-06 | Stop reason: HOSPADM

## 2022-05-04 RX ORDER — SODIUM CHLORIDE 0.9 % (FLUSH) 0.9 %
5-40 SYRINGE (ML) INJECTION AS NEEDED
Status: DISCONTINUED | OUTPATIENT
Start: 2022-05-04 | End: 2022-05-06 | Stop reason: HOSPADM

## 2022-05-04 RX ORDER — DIPHENHYDRAMINE HYDROCHLORIDE 50 MG/ML
25 INJECTION, SOLUTION INTRAMUSCULAR; INTRAVENOUS ONCE
Status: COMPLETED | OUTPATIENT
Start: 2022-05-04 | End: 2022-05-04

## 2022-05-04 RX ADMIN — SODIUM CHLORIDE 500 ML: 9 INJECTION, SOLUTION INTRAVENOUS at 19:53

## 2022-05-04 RX ADMIN — ALBUTEROL SULFATE 4 PUFF: 90 AEROSOL, METERED RESPIRATORY (INHALATION) at 11:14

## 2022-05-04 RX ADMIN — LEVOFLOXACIN 750 MG: 5 INJECTION, SOLUTION INTRAVENOUS at 18:53

## 2022-05-04 RX ADMIN — SODIUM CHLORIDE, PRESERVATIVE FREE 10 ML: 5 INJECTION INTRAVENOUS at 18:54

## 2022-05-04 RX ADMIN — SODIUM CHLORIDE, PRESERVATIVE FREE 20 MG: 5 INJECTION INTRAVENOUS at 19:52

## 2022-05-04 RX ADMIN — LIDOCAINE HYDROCHLORIDE 10 ML: 10 INJECTION, SOLUTION INFILTRATION; PERINEURAL at 15:47

## 2022-05-04 RX ADMIN — SODIUM BICARBONATE 2 MG: 42 INJECTION, SOLUTION INTRAVENOUS at 15:48

## 2022-05-04 RX ADMIN — IOPAMIDOL 90 ML: 755 INJECTION, SOLUTION INTRAVENOUS at 12:09

## 2022-05-04 RX ADMIN — ATORVASTATIN CALCIUM 10 MG: 10 TABLET, FILM COATED ORAL at 21:12

## 2022-05-04 RX ADMIN — DIPHENHYDRAMINE HYDROCHLORIDE 25 MG: 50 INJECTION INTRAMUSCULAR; INTRAVENOUS at 19:52

## 2022-05-04 RX ADMIN — SODIUM CHLORIDE 75 ML/HR: 9 INJECTION, SOLUTION INTRAVENOUS at 18:17

## 2022-05-04 RX ADMIN — SODIUM CHLORIDE, PRESERVATIVE FREE 10 ML: 5 INJECTION INTRAVENOUS at 21:12

## 2022-05-04 NOTE — H&P
[address] [facility] Adult  Hospitalist Group  History and Physical    Date of Service:  5/4/2022  Primary Care Provider: Tiff Nolan MD  Source of information: The patient and Chart review    Chief Complaint: Shortness of Breath      History of Presenting Illness:   Young Mansfield is a 57 y.o. female who presents with shortness of breath    History was probably obtained from the patient clinically patient reports that she has been getting worsening shortness of breath that started a few weeks back, patient reports that shortness of breath has gotten worse in the last few days, has mild cough, patient is a lifelong smoker, will reports that she got concerned and decided to come to the hospital, in the ER patient was found to have large pleural effusion and was requested to be admitted to the hospitalist service, patient denies any other complaints or problems. The patient denies any headache, blurry vision, sore throat, trouble swallowing, trouble with speech, chest pain,  fever, chills, N/V/D, abd pain, urinary symptoms, constipation, recent travels, sick contacts, focal or generalized neurological symptoms, falls, injuries, rashes, contact with COVID-19 diagnosed patients, hematemesis, melena, hemoptysis, hematuria, rashes, denies starting any new medications and denies any other concerns or problems besides as mentioned above. REVIEW OF SYSTEMS:  A comprehensive review of systems was negative except for that written in the History of Present Illness. Past Medical History:   Diagnosis Date    Diabetes mellitus type 2, noninsulin dependent ([address]     Generalized anxiety disorder     Hypercholesterolemia     Hypertension     Neoplasm of major salivary gland       Past Surgical History:   Procedure Laterality Date    COLONOSCOPY N/A 2/24/2021    . COLONOSCOPY  :- [facility]COPY    HX ACL RECONSTRUCTION      HX COLONOSCOPY      HX HYSTERECTOMY       Prior to Admission medications    Medication Sig Start Date End Date Taking? Authorizing Provider   methylPREDNISolone (MEDROL DOSEPACK) 4 mg tablet Take one 6 day dose pack as directed 8/13/20   Braulio Rubio MD   metFORMIN (GLUCOPHAGE) 850 mg tablet Take 850 mg by mouth two (2) times daily (with meals). Provider, Historical   ALPRAZolam (XANAX) 0.25 mg tablet Take 0.25 mg by mouth. Provider, Historical   escitalopram oxalate (Lexapro) 20 mg tablet Take 20 mg by mouth daily. Provider, Historical   simvastatin (ZOCOR) 20 mg tablet Take 20 mg by mouth nightly. Provider, Historical   losartan (COZAAR) 100 mg tablet Take 100 mg by mouth daily. Provider, Historical   aspirin 81 mg chewable tablet Take 81 mg by mouth daily. Provider, Historical   acetaminophen (TYLENOL PO) Take 500 mg by mouth. Provider, Historical     Allergies   Allergen Reactions    Erythromycin Other (comments)     Stomach cramps    Penicillins Hives      No family history on file. Social History:  reports that she has been smoking. She has a 10.00 pack-year smoking history. She has never used smokeless tobacco. She reports current alcohol use. Family and social history were personally reviewed, all pertinent and relevant details are outlined as above.     Objective:     Visit Vitals  BP (!) 114/101   Pulse 91   Temp 98.7 °F (37.1 °C)   Resp 24   SpO2 100%      O2 Device: None (Room air)    PHYSICAL EXAM:   General: Alert x oriented x 3, awake, no acute distress, resting in bed, pleasant /female, appears to be stated age  HEENT: PEERL, EOMI, moist mucus membranes  Neck: Supple, no JVD, no meningeal signs  Chest: Decreased basal breath sounds right greater than left  CVS: RRR, S1 S2 heard, no murmurs/rubs/gallops  Abd: Soft, non-tender, non-distended, +bowel sounds   Ext: No clubbing, no cyanosis, no edema  Neuro/Psych: No focal neurological deficit  Cap refill: Brisk, less than 3 seconds  Pulses: 2+, symmetric in all extremities  Skin: Warm, dry, without rashes or lesions    Data Review: All diagnostic labs and studies have been reviewed. Abnormal Labs Reviewed   METABOLIC PANEL, COMPREHENSIVE - Abnormal; Notable for the following components:       Result Value    Glucose 103 (*)     Bilirubin, total 1.1 (*)     AST (SGOT) 14 (*)     Globulin 4.2 (*)     A-G Ratio 0.9 (*)     All other components within normal limits   D DIMER - Abnormal; Notable for the following components:    D-dimer 1.85 (*)     All other components within normal limits       All Micro Results     Procedure Component Value Units Date/Time    CULTURE, BODY FLUID [[id number]]     Order Status: Sent Specimen: Body Fluid from Pleural Fluid           IMAGING:   CTA CHEST W OR W WO CONT   Final Result   There is no pulmonary embolism. There is no aortic aneurysm or dissection. Moderate to large left-sided pleural effusion with left apical mass or masslike   lesion measuring at least 28 x 20 mm in size. There are small pulmonary nodules demonstrated on the right. Possible hepatic hypodensity. Neoplastic etiology is a primary differential consideration. Thoracentesis with sampling for cytology is recommended. Contrast enhanced CT scan of abdomen and pelvis when clinically feasible to   evaluate for evidence of neoplastic process in the peritoneum also recommended. XR CHEST PA LAT   Final Result      Large left pleural effusion with left basilar opacity and nodular opacity in the   left lung apex suspicious for a lung nodule.          [address]    (Results Pending)        ECG/ECHO:    Results for orders placed or performed during the hospital encounter of 05/04/22   EKG, 12 LEAD, INITIAL   Result Value Ref Range    Ventricular Rate 79 BPM    Atrial Rate 79 BPM    P-R Interval 144 ms    QRS Duration 80 ms    Q-T Interval 380 ms    QTC Calculation (Bezet) 435 ms    Calculated P Axis 52 degrees Calculated R Axis 20 degrees    Calculated T Axis 43 degrees    Diagnosis       Normal sinus rhythm  Nonspecific ST abnormality    No previous ECGs available  Confirmed by Nazario Shelton M.D., [facility] ([place] ([id number]) on 5/4/2022 3:10:46 PM          Assessment:   Given the patient's current clinical presentation, there is a high level of concern for decompensation if discharged from the emergency department. Complex decision making was performed, which includes reviewing the patient's available past medical records, laboratory results, and imaging studies. Left-sided pleural effusion  Pulmonary mass  Hypertension  Diabetes mellitus type 2  Tobacco abuse  Plan:   Patient will be admitted on a telemetry bed, thoracentesis in progress, fluid for culture and cytology, empirical IV antibiotics, pulmonary consult, may need oncology consult, oxygen support, pulse ox monitoring, further intervention per hospital course  Ordered blood pressure control  Sliding-scale insulin, Accu-Cheks, diet control, close monitoring  Patient was counseled        DIET: No diet orders on file   ISOLATION PRECAUTIONS: There are currently no Active Isolations  CODE STATUS: No Order   DVT PROPHYLAXIS: Heparin  FUNCTIONAL STATUS PRIOR TO HOSPITALIZATION: Fully active and ambulatory; able to carry on all self-care without restriction. EARLY MOBILITY ASSESSMENT: Recommend a consult to the Mobility Team  ANTICIPATED DISCHARGE: 24-48 hours. Signed By: Alyssa Camarillo MD     May 4, 2022         Please note that this dictation may have been completed with Dragon, the computer voice recognition software. Quite often unanticipated grammatical, syntax, homophones, and other interpretive errors are inadvertently transcribed by the computer software. Please disregard these errors. Please excuse any errors that have escaped final proofreading.

## 2022-05-04 NOTE — ED NOTES
TRANSFER - OUT REPORT:    Verbal report given to Margie(name) on [address]  being transferred to (unit) for routine progression of care       Report consisted of patients Situation, Background, Assessment and   Recommendations(SBAR). Information from the following report(s) SBAR, Kardex, ED Summary and MAR was reviewed with the receiving nurse. Lines:   Peripheral IV 05/04/22 Right Antecubital (Active)        Opportunity for questions and clarification was provided.

## 2022-05-04 NOTE — ED PROVIDER NOTES
The history is provided by the patient. Shortness of Breath  This is a new problem. The average episode lasts 10 days. The problem occurs continuously. The current episode started more than 1 week ago. The problem has been gradually worsening. Pertinent negatives include no fever, no cough, no chest pain, no abdominal pain and no leg swelling. It is unknown what precipitated the problem. She has tried nothing for the symptoms. She has had no prior hospitalizations. Past Medical History:   Diagnosis Date    Diabetes mellitus type 2, noninsulin dependent ([address]     Generalized anxiety disorder     Hypercholesterolemia     Hypertension     Neoplasm of major salivary gland        Past Surgical History:   Procedure Laterality Date    COLONOSCOPY N/A 2/24/2021    . COLONOSCOPY  :- [facility]COPY    HX ACL RECONSTRUCTION      HX COLONOSCOPY      HX HYSTERECTOMY           No family history on file. Social History     Socioeconomic History    Marital status:      Spouse name: Not on file    Number of children: Not on file    Years of education: Not on file    Highest education level: Not on file   Occupational History    Not on file   Tobacco Use    Smoking status: Current Every Day Smoker     Packs/day: 0.50     Years: 20.00     Pack years: 10.00    Smokeless tobacco: Never Used   Substance and Sexual Activity    Alcohol use: Yes     Comment: occ    Drug use: Not on file    Sexual activity: Not on file   Other Topics Concern    Not on file   Social History Narrative    Not on file     Social Determinants of Health     Financial Resource Strain:     Difficulty of Paying Living Expenses: Not on file   Food Insecurity:     Worried About Running Out of Food in the Last Year: Not on file    Cheli of Food in the Last Year: Not on file   Transportation Needs:     Lack of Transportation (Medical): Not on file    Lack of Transportation (Non-Medical):  Not on file   Physical Activity:     Days of Exercise per Week: Not on file    Minutes of Exercise per Session: Not on file   Stress:     Feeling of Stress : Not on file   Social Connections:     Frequency of Communication with Friends and Family: Not on file    Frequency of Social Gatherings with Friends and Family: Not on file    Attends Religion Services: Not on file    Active Member of [address] [place] [address] or Organizations: Not on file    Attends Club or Organization Meetings: Not on file    Marital Status: Not on file   Intimate Partner Violence:     Fear of Current or Ex-Partner: Not on file    Emotionally Abused: Not on file    Physically Abused: Not on file    Sexually Abused: Not on file   Housing Stability:     Unable to Pay for Housing in the Last Year: Not on file    Number of [place] in the Last Year: Not on file    Unstable Housing in the Last Year: Not on file         ALLERGIES: Erythromycin and Penicillins    Review of Systems   Constitutional: Negative for fever. Respiratory: Positive for shortness of breath. Negative for cough. Cardiovascular: Negative for chest pain and leg swelling. Gastrointestinal: Negative for abdominal pain. All other systems reviewed and are negative. Vitals:    05/04/22 0942 05/04/22 1041 05/04/22 1130 05/04/22 1251   BP: (!) 146/84 131/84 127/76 (!) 155/107   Pulse: 82 87 70 81   Resp: 26 15 26    Temp: 98.1 °F (36.7 °C)      SpO2: 93% 94% 93%             Physical Exam  Vitals and nursing note reviewed. Constitutional:       General: She is not in acute distress. Appearance: She is well-developed. HENT:      Head: Normocephalic and atraumatic. Eyes:      Conjunctiva/sclera: Conjunctivae normal.   Cardiovascular:      Rate and Rhythm: Normal rate and regular rhythm. Heart sounds: Normal heart sounds. Pulmonary:      Effort: Pulmonary effort is normal. No respiratory distress.       Breath sounds: Examination of the left-lower field reveals decreased breath sounds. Decreased breath sounds present. Abdominal:      General: There is no distension. Musculoskeletal:         General: No deformity. Normal range of motion. Cervical back: Neck supple. Skin:     General: Skin is warm and dry. Neurological:      Mental Status: She is alert. Cranial Nerves: No cranial nerve deficit. Psychiatric:         Behavior: Behavior normal.          MDM  Number of Diagnoses or Management Options    ED Course as of 05/04/22 1257   Wed May 04, 2022   0954 EKG 0951: Rate 79, Normal sinus rhythm, No ST segment or T wave abnormalities. Poor R wave progression. Low voltage. Otherwise normal EKG. No previous tracings available for review. [DK]      ED Course User Index  [DK] Wild Amador MD     14-year-old female presents with a week and a half of shortness of breath that came on suddenly and progressively worsened including today. She has notable large left pleural effusion with underlying apical mass likely relating to her extensive smoking history. She did quit smoking 2 weeks ago. Will need diagnostic/therapeutic thoracentesis and needs to establish care with oncology to complete staging. I explained findings to patient who expressed understanding and need to establish ongoing care. Hospitalist was consulted for admission and will see the patient in the emergency department. Procedures    Perfect Serve Consult for Admission  12:58 PM    ED Room Number: ER09/09  Patient Name and age:  Gerhardt Kaufmann 57 y.o.  female  Working Diagnosis:   1. Mass of upper lobe of left lung    2. Pleural effusion, left    3.  SOB (shortness of breath)        COVID-19 Suspicion:  no  Sepsis present:  no  Reassessment needed: no  Code Status:  Full Code  Readmission: no  Isolation Requirements:  no  Recommended Level of Care:  med/surg  Department:[facility] Adult ED - [phone]

## 2022-05-05 ENCOUNTER — APPOINTMENT (OUTPATIENT)
Dept: CT IMAGING | Age: 62
DRG: 143 | End: 2022-05-05
Attending: HOSPITALIST
Payer: MEDICAID

## 2022-05-05 LAB
ALBUMIN SERPL-MCNC: 3 G/DL (ref 3.5–5)
ALBUMIN/GLOB SERPL: 0.9 {RATIO} (ref 1.1–2.2)
ALP SERPL-CCNC: 73 U/L (ref 45–117)
ALT SERPL-CCNC: 11 U/L (ref 12–78)
ANION GAP SERPL CALC-SCNC: 8 MMOL/L (ref 5–15)
AST SERPL-CCNC: 12 U/L (ref 15–37)
BASOPHILS # BLD: 0 K/UL (ref 0–0.1)
BASOPHILS NFR BLD: 0 % (ref 0–1)
BILIRUB SERPL-MCNC: 1.1 MG/DL (ref 0.2–1)
BUN SERPL-MCNC: 6 MG/DL (ref 6–20)
BUN/CREAT SERPL: 11 (ref 12–20)
CALCIUM SERPL-MCNC: 8 MG/DL (ref 8.5–10.1)
CHLORIDE SERPL-SCNC: 108 MMOL/L (ref 97–108)
CO2 SERPL-SCNC: 24 MMOL/L (ref 21–32)
CREAT SERPL-MCNC: 0.55 MG/DL (ref 0.55–1.02)
DIFFERENTIAL METHOD BLD: ABNORMAL
EOSINOPHIL # BLD: 0.1 K/UL (ref 0–0.4)
EOSINOPHIL NFR BLD: 1 % (ref 0–7)
ERYTHROCYTE [DISTWIDTH] IN BLOOD BY AUTOMATED COUNT: 13.7 % (ref 11.5–14.5)
GLOBULIN SER CALC-MCNC: 3.5 G/DL (ref 2–4)
GLUCOSE BLD STRIP.AUTO-MCNC: 116 MG/DL (ref 65–117)
GLUCOSE BLD STRIP.AUTO-MCNC: 88 MG/DL (ref 65–117)
GLUCOSE BLD STRIP.AUTO-MCNC: 92 MG/DL (ref 65–117)
GLUCOSE BLD STRIP.AUTO-MCNC: 98 MG/DL (ref 65–117)
GLUCOSE SERPL-MCNC: 92 MG/DL (ref 65–100)
HCT VFR BLD AUTO: 39 % (ref 35–47)
HGB BLD-MCNC: 13 G/DL (ref 11.5–16)
IMM GRANULOCYTES # BLD AUTO: 0 K/UL (ref 0–0.04)
IMM GRANULOCYTES NFR BLD AUTO: 0 % (ref 0–0.5)
LYMPHOCYTES # BLD: 1.2 K/UL (ref 0.8–3.5)
LYMPHOCYTES NFR BLD: 16 % (ref 12–49)
MCH RBC QN AUTO: 29.9 PG (ref 26–34)
MCHC RBC AUTO-ENTMCNC: 33.3 G/DL (ref 30–36.5)
MCV RBC AUTO: 89.7 FL (ref 80–99)
MONOCYTES # BLD: 0.4 K/UL (ref 0–1)
MONOCYTES NFR BLD: 6 % (ref 5–13)
NEUTS SEG # BLD: 5.6 K/UL (ref 1.8–8)
NEUTS SEG NFR BLD: 77 % (ref 32–75)
NRBC # BLD: 0 K/UL (ref 0–0.01)
NRBC BLD-RTO: 0 PER 100 WBC
PLATELET # BLD AUTO: 279 K/UL (ref 150–400)
PMV BLD AUTO: 10 FL (ref 8.9–12.9)
POTASSIUM SERPL-SCNC: 3.6 MMOL/L (ref 3.5–5.1)
PROT SERPL-MCNC: 6.5 G/DL (ref 6.4–8.2)
RBC # BLD AUTO: 4.35 M/UL (ref 3.8–5.2)
SERVICE CMNT-IMP: NORMAL
SODIUM SERPL-SCNC: 140 MMOL/L (ref 136–145)
WBC # BLD AUTO: 7.4 K/UL (ref 3.6–11)

## 2022-05-05 PROCEDURE — 74011000636 HC RX REV CODE- 636: Performed by: RADIOLOGY

## 2022-05-05 PROCEDURE — 74011250636 HC RX REV CODE- 250/636: Performed by: FAMILY MEDICINE

## 2022-05-05 PROCEDURE — 85025 COMPLETE CBC W/AUTO DIFF WBC: CPT

## 2022-05-05 PROCEDURE — 71260 CT THORAX DX C+: CPT

## 2022-05-05 PROCEDURE — 74011000250 HC RX REV CODE- 250: Performed by: FAMILY MEDICINE

## 2022-05-05 PROCEDURE — 82962 GLUCOSE BLOOD TEST: CPT

## 2022-05-05 PROCEDURE — 80053 COMPREHEN METABOLIC PANEL: CPT

## 2022-05-05 PROCEDURE — 74011000250 HC RX REV CODE- 250: Performed by: NURSE PRACTITIONER

## 2022-05-05 PROCEDURE — 65270000046 HC RM TELEMETRY

## 2022-05-05 PROCEDURE — 74011250636 HC RX REV CODE- 250/636: Performed by: NURSE PRACTITIONER

## 2022-05-05 PROCEDURE — 74011250637 HC RX REV CODE- 250/637: Performed by: FAMILY MEDICINE

## 2022-05-05 PROCEDURE — 36415 COLL VENOUS BLD VENIPUNCTURE: CPT

## 2022-05-05 RX ADMIN — LOSARTAN POTASSIUM 100 MG: 50 TABLET, FILM COATED ORAL at 08:45

## 2022-05-05 RX ADMIN — SODIUM CHLORIDE, PRESERVATIVE FREE 20 MG: 5 INJECTION INTRAVENOUS at 21:20

## 2022-05-05 RX ADMIN — SODIUM CHLORIDE 75 ML/HR: 9 INJECTION, SOLUTION INTRAVENOUS at 00:46

## 2022-05-05 RX ADMIN — ESCITALOPRAM OXALATE 20 MG: 10 TABLET ORAL at 08:45

## 2022-05-05 RX ADMIN — SODIUM CHLORIDE, PRESERVATIVE FREE 20 MG: 5 INJECTION INTRAVENOUS at 09:37

## 2022-05-05 RX ADMIN — IOPAMIDOL 100 ML: 755 INJECTION, SOLUTION INTRAVENOUS at 11:52

## 2022-05-05 RX ADMIN — SODIUM CHLORIDE, PRESERVATIVE FREE 10 ML: 5 INJECTION INTRAVENOUS at 05:00

## 2022-05-05 RX ADMIN — ATORVASTATIN CALCIUM 10 MG: 10 TABLET, FILM COATED ORAL at 21:20

## 2022-05-05 RX ADMIN — SODIUM CHLORIDE 75 ML/HR: 9 INJECTION, SOLUTION INTRAVENOUS at 16:12

## 2022-05-05 RX ADMIN — ASPIRIN 81 MG 81 MG: 81 TABLET ORAL at 11:24

## 2022-05-05 NOTE — PROGRESS NOTES
[facility]  Hospitalist Group                                                                                          Hospitalist Progress Note  Krish Meade MD  Answering service: [phone] from in house phone        Date of Service:  2022  NAME:  Chasity Downey  :  1960  MRN:  [id number]      Admission Summary:     Chasity Downey is a 57 y.o. female with PMH of T2DM and HTN who presents with shortness of breath. Shortness of breath that started a few weeks back, patient reports that shortness of breath has gotten worse in the last few days, has mild cough, patient is a lifelong smoker, will reports that she got concerned and decided to come to the hospital, in the ER patient was found to have large pleural effusion and was requested to be admitted to the hospitalist service, patient denies any other complaints or problems. The patient denies any headache, blurry vision, sore throat, trouble swallowing, trouble with speech, chest pain,  fever, chills, N/V/D, abd pain, urinary symptoms, constipation, recent travels, sick contacts, focal or generalized neurological symptoms, falls, injuries, rashes, contact with COVID-19 diagnosed patients, hematemesis, melena, hemoptysis, hematuria, rashes, denies starting any new medications and denies any other concerns or problems besides as mentioned above. Interval history / Subjective:     She said she feels the same, no left side chest pain     Assessment & Plan:     Left-sided pleural effusion  -s/p thoracentesis and removed 1.3 L on  by IR  -pleural fluid exudative, culture no growth so far, cytology result pending   -afebrile, no leukocytosis   -SpO2 99% on RA  -CT chest diminished left sided pleural effusion    Left apical mass, pleural carcinomatosis, peritoneal carcinomatosis on CT  -CT chest abdomen pelvis mass lesion demonstrated at the left apex is not amenable to percutaneous sampling. There are numerous additional nodular densities along the pleural margin consistent with pleural carcinomatosis. There are additional small nodular densities demonstrated in the peritoneum, these most consistent with peritoneal carcinomatosis. Small adrenal nodule on the left likely an additional metastatic focus  -Pulmonologist on board    HTN  -BP normal, on losartan, and monitor BP    T2DM  -check A1c 6.1  -continue sliding scale, monitor finger stick glucose    Tobacco abuse  -has stopped 2 weeks  -advised to continue stopping smoking           Code status: Full Code   Prophylaxis: SCD  Care Plan discussed with: Patient, Nurse and CM  Anticipated Disposition: Home with family      Hospital Problems  Never Reviewed          Codes Class Noted POA    Lung mass ICD-10-CM: R91.8  ICD-9-CM: 786.6  5/4/2022 Unknown                Vital Signs:    Last 24hrs VS reviewed since prior progress note. Most recent are:  Visit Vitals  /73 (BP 1 Location: Left upper arm, BP Patient Position: At rest)   Pulse 70   Temp 98.6 °F (37 °C)   Resp 18   Ht 5' 7\" (1.702 m)   Wt 101.3 kg (223 lb 5.2 oz)   SpO2 99%   BMI 34.98 kg/m²         Intake/Output Summary (Last 24 hours) at 5/5/2022 1518  Last data filed at 5/5/2022 0745  Gross per 24 hour   Intake 1010 ml   Output --   Net 1010 ml        Physical Examination:     I had a face to face encounter with this patient and independently examined them on 5/5/2022 as outlined below:          Constitutional:  No acute distress, cooperative, pleasant    ENT:  Oral mucosa moist, oropharynx benign. Resp:  Decrease bronchial breath sound bilaterally. No wheezing/rhonchi/rales. No accessory muscle use. CV:  Regular rhythm, normal rate, no murmurs, gallops, rubs    GI:  Soft, non distended, non tender. normoactive bowel sounds, no hepatosplenomegaly     Musculoskeletal:  No edema     Neurologic:  Moves all extremities.   AAOx3, CN II-XII reviewed            Data Review:    Review and/or order of clinical lab test  Review and/or order of tests in the radiology section of CPT  Review and/or order of tests in the medicine section of CPT      Labs:     Recent Labs     05/05/22  0506 05/04/22  1006   WBC 7.4 6.6   HGB 13.0 13.8   HCT 39.0 41.8    312     Recent Labs     05/05/22  0506 05/04/22  1006    140   K 3.6 3.6    108   CO2 24 26   BUN 6 9   CREA 0.55 0.72   GLU 92 103*   CA 8.0* 9.0   MG  --  1.8     Recent Labs     05/05/22  0506 05/04/22  1006   ALT 11* 16   AP 73 81   TBILI 1.1* 1.1*   TP 6.5 7.8   ALB 3.0* 3.6   GLOB 3.5 4.2*     No results for input(s): INR, PTP, APTT, INREXT in the last 72 hours. No results for input(s): FE, TIBC, PSAT, FERR in the last 72 hours. No results found for: FOL, RBCF   No results for input(s): PH, PCO2, PO2 in the last 72 hours. No results for input(s): CPK, CKNDX, TROIQ in the last 72 hours.     No lab exists for component: CPKMB  No results found for: CHOL, CHOLX, CHLST, CHOLV, HDL, HDLP, LDL, LDLC, DLDLP, TGLX, TRIGL, TRIGP, CHHD, CHHDX  Lab Results   Component Value Date/Time    Glucose (POC) 116 05/05/2022 03:03 PM    Glucose (POC) 92 05/05/2022 10:57 AM    Glucose (POC) 88 05/05/2022 06:50 AM    Glucose (POC) 102 05/04/2022 09:11 PM    Glucose (POC) 117 05/04/2022 06:21 PM     No results found for: COLOR, APPRN, SPGRU, REFSG, IFTIKHAR, PROTU, GLUCU, KETU, BILU, UROU, JENNIE, LEUKU, GLUKE, EPSU, BACTU, WBCU, RBCU, CASTS, UCRY      Medications Reviewed:     Current Facility-Administered Medications   Medication Dose Route Frequency    ALPRAZolam (XANAX) tablet 0.25 mg  0.25 mg Oral QHS PRN    aspirin chewable tablet 81 mg  81 mg Oral DAILY    escitalopram oxalate (LEXAPRO) tablet 20 mg  20 mg Oral DAILY    losartan (COZAAR) tablet 100 mg  100 mg Oral DAILY    atorvastatin (LIPITOR) tablet 10 mg  10 mg Oral QHS    sodium chloride (NS) flush 5-40 mL  5-40 mL IntraVENous Q8H    sodium chloride (NS) flush 5-40 mL  5-40 mL IntraVENous PRN    0.9% sodium chloride infusion  75 mL/hr IntraVENous CONTINUOUS    levoFLOXacin (LEVAQUIN) 750 mg in D5W IVPB  750 mg IntraVENous Q24H    acetaminophen (TYLENOL) tablet 650 mg  650 mg Oral Q4H PRN    [Held by provider] heparin (porcine) injection 5,000 Units  5,000 Units SubCUTAneous Q8H    albuterol-ipratropium (DUO-NEB) 2.5 MG-0.5 MG/3 ML  3 mL Nebulization Q4H PRN    glucose chewable tablet 16 g  4 Tablet Oral PRN    dextrose 10 % infusion 0-250 mL  0-250 mL IntraVENous PRN    glucagon (GLUCAGEN) injection 1 mg  1 mg IntraMUSCular PRN    insulin lispro (HUMALOG) injection   SubCUTAneous AC&HS    famotidine (PF) (PEPCID) 20 mg in sodium chloride (NS) 10 mL injection  20 mg IntraVENous Q12H     ______________________________________________________________________  EXPECTED LENGTH OF STAY: - - -  ACTUAL LENGTH OF STAY:          1                 Loy Whyte MD

## 2022-05-05 NOTE — CONSULTS
Pulmonary, Critical Care, and Sleep Medicine~Consult Note    Name: Juliano Lin MRN: [id number]   : 1960 Hospital: . [facility]    Date: 2022 10:14 AM Admission: 2022     Impression Plan   1. Acute on chronic dyspnea  2. Left pleural effusion, 1.3L of exudative effusion removed   3. 2.8cm left apical lesion with right lung nodules, largest 1cm. Possible left hepatic hypodensity and left adrenal lesion, likely an adenoma   4. Active smoker until recently. No formal dx of COPD. Has not see pulmonologist   5. DM II 1. Since the left apical lesion is peripheral we will seek CT FNA bx   2. suspect the pleural effusion will not yield the suspect dx. We discussed this and she was in agreement with moving forward with CT FNA  3. Will need outpatient PFTs  4. PRN duonebs  5. O2 titration above 90%   6. Encouraged smoking cessation   7. She is aware of the suspicion of neoplasia. Daily Progression:    Consult Note requested by hospitalist.    Patient presented for admission secondary to worsening dyspnea over the past few weeks that progressed to a point that she came to the ER for further evaluation. Imaging revealed a Left apical mass and left pleural effusion. No prior images for comparison. The effusion was drained and she is breathing better today. She has a family hx of several different types of cancers. Denies hemoptysis or unexplainable weight loss. No over dyspnea prior to these recent events. No signs of infection or bronchospasms. I have reviewed the labs and previous days notes. Pertinent items are noted in HPI. Past Medical History:   Diagnosis Date    Diabetes mellitus type 2, noninsulin dependent ([facility] [address]     Generalized anxiety disorder     Hypercholesterolemia     Hypertension     Neoplasm of major salivary gland       Past Surgical History:   Procedure Laterality Date    COLONOSCOPY N/A 2021    . COLONOSCOPY  :- performed by Melina Michaels [facility] at [facility] ENDOSCOPY    HX ACL RECONSTRUCTION      HX COLONOSCOPY      HX HYSTERECTOMY        Prior to Admission medications    Medication Sig Start Date End Date Taking? Authorizing Provider   methylPREDNISolone (MEDROL DOSEPACK) 4 mg tablet Take one 6 day dose pack as directed 20   Marlys Shah MD   metFORMIN (GLUCOPHAGE) 850 mg tablet Take 850 mg by mouth two (2) times daily (with meals). Provider, Historical   ALPRAZolam (XANAX) 0.25 mg tablet Take 0.25 mg by mouth. Provider, Historical   escitalopram oxalate (Lexapro) 20 mg tablet Take 20 mg by mouth daily. Provider, Historical   simvastatin (ZOCOR) 20 mg tablet Take 20 mg by mouth nightly. Provider, Historical   losartan (COZAAR) 100 mg tablet Take 100 mg by mouth daily. Provider, Historical   aspirin 81 mg chewable tablet Take 81 mg by mouth daily. Provider, Historical   acetaminophen (TYLENOL PO) Take 500 mg by mouth. Provider, Historical     Allergies   Allergen Reactions    Erythromycin Other (comments)     Stomach cramps    Penicillins Hives      Social History     Tobacco Use    Smoking status: Current Every Day Smoker     Packs/day: 0.50     Years: 20.00     Pack years: 10.00    Smokeless tobacco: Never Used   Substance Use Topics    Alcohol use: Yes     Comment: occ      No family history on file.   OBJECTIVE:     Vital Signs:       Visit Vitals  /82 (BP 1 Location: Left arm, BP Patient Position: At rest)   Pulse 73   Temp 99.3 °F (37.4 °C)   Resp 18   Ht 5' 7\" (1.702 m)   Wt 101.3 kg (223 lb 5.2 oz)   SpO2 92%   BMI 34.98 kg/m²      Temp (24hrs), Av.8 °F (37.1 °C), Min:98 °F (36.7 °C), Max:99.8 °F (37.7 °C)     Intake/Output:     Last shift: 701 - 1900  In: 581.3 [I.V.:581.3]  Out: -     Last 3 shifts: 1901 -  07  In: 428.8 [I.V.:428.8]  Out: -           Intake/Output Summary (Last 24 hours) at 2022 1014  Last data filed at 2022 0745  Gross per 24 hour Intake 1010 ml   Output --   Net 1010 ml       Physical Exam:                                        Exam Findings Other   General: No resp distress noted, appears stated age    [de-identified]:  No ulcers, JVD not elevated, no cervical LAD    Chest: No pectus deformity, normal chest rise b/l    HEART:  RRR, no murmurs/rubs/gallops    Lungs:  CTA b/l, no rhonchi/crackles/wheeze, diminished BS at bases    ABD: Soft/NT, non rigid mildly distended    EXT: No cyanosis/clubbing/edema, normal peripheral pulses    Skin: No rashes or ulcers, no mottling    Neuro: A/O x 3        Medications:  Current Facility-Administered Medications   Medication Dose Route Frequency    ALPRAZolam (XANAX) tablet 0.25 mg  0.25 mg Oral QHS PRN    aspirin chewable tablet 81 mg  81 mg Oral DAILY    escitalopram oxalate (LEXAPRO) tablet 20 mg  20 mg Oral DAILY    losartan (COZAAR) tablet 100 mg  100 mg Oral DAILY    atorvastatin (LIPITOR) tablet 10 mg  10 mg Oral QHS    sodium chloride (NS) flush 5-40 mL  5-40 mL IntraVENous Q8H    sodium chloride (NS) flush 5-40 mL  5-40 mL IntraVENous PRN    0.9% sodium chloride infusion  75 mL/hr IntraVENous CONTINUOUS    levoFLOXacin (LEVAQUIN) 750 mg in D5W IVPB  750 mg IntraVENous Q24H    acetaminophen (TYLENOL) tablet 650 mg  650 mg Oral Q4H PRN    [Held by provider] heparin (porcine) injection 5,000 Units  5,000 Units SubCUTAneous Q8H    albuterol-ipratropium (DUO-NEB) 2.5 MG-0.5 MG/3 ML  3 mL Nebulization Q4H PRN    glucose chewable tablet 16 g  4 Tablet Oral PRN    dextrose 10 % infusion 0-250 mL  0-250 mL IntraVENous PRN    glucagon (GLUCAGEN) injection 1 mg  1 mg IntraMUSCular PRN    insulin lispro (HUMALOG) injection   SubCUTAneous AC&HS    famotidine (PF) (PEPCID) 20 mg in sodium chloride (NS) 10 mL injection  20 mg IntraVENous Q12H       Labs:  ABG No results for input(s): PHI, PCO2I, PO2I, HCO3I, SO2I, FIO2I in the last 72 hours.      CBC Recent Labs     05/05/22  0506 05/04/22  1006   WBC 7.4 6.6   HGB 13.0 13.8   HCT 39.0 41.8    312   MCV 89.7 90.9   MCH 29.9 46.8        Metabolic  Panel Recent Labs     05/05/22  0506 05/04/22  1006    140   K 3.6 3.6    108   CO2 24 26   GLU 92 103*   BUN 6 9   CREA 0.55 0.72   CA 8.0* 9.0   MG  --  1.8   ALB 3.0* 3.6   ALT 11* 16        Pertinent Labs                Leopoldo Smith PA-C  5/5/2022

## 2022-05-06 ENCOUNTER — TELEPHONE (OUTPATIENT)
Dept: ONCOLOGY | Age: 62
End: 2022-05-06

## 2022-05-06 VITALS
HEART RATE: 74 BPM | BODY MASS INDEX: 35.05 KG/M2 | TEMPERATURE: 97.9 F | OXYGEN SATURATION: 98 % | WEIGHT: 223.33 LBS | DIASTOLIC BLOOD PRESSURE: 89 MMHG | SYSTOLIC BLOOD PRESSURE: 130 MMHG | RESPIRATION RATE: 18 BRPM | HEIGHT: 67 IN

## 2022-05-06 LAB
ALBUMIN SERPL-MCNC: 3 G/DL (ref 3.5–5)
ALBUMIN/GLOB SERPL: 0.8 {RATIO} (ref 1.1–2.2)
ALP SERPL-CCNC: 64 U/L (ref 45–117)
ALT SERPL-CCNC: 12 U/L (ref 12–78)
ANION GAP SERPL CALC-SCNC: 9 MMOL/L (ref 5–15)
AST SERPL-CCNC: 15 U/L (ref 15–37)
BILIRUB SERPL-MCNC: 0.8 MG/DL (ref 0.2–1)
BUN SERPL-MCNC: 6 MG/DL (ref 6–20)
BUN/CREAT SERPL: 10 (ref 12–20)
CALCIUM SERPL-MCNC: 8.6 MG/DL (ref 8.5–10.1)
CHLORIDE SERPL-SCNC: 109 MMOL/L (ref 97–108)
CO2 SERPL-SCNC: 22 MMOL/L (ref 21–32)
CREAT SERPL-MCNC: 0.6 MG/DL (ref 0.55–1.02)
ERYTHROCYTE [DISTWIDTH] IN BLOOD BY AUTOMATED COUNT: 13.9 % (ref 11.5–14.5)
GLOBULIN SER CALC-MCNC: 3.9 G/DL (ref 2–4)
GLUCOSE BLD STRIP.AUTO-MCNC: 106 MG/DL (ref 65–117)
GLUCOSE BLD STRIP.AUTO-MCNC: 107 MG/DL (ref 65–117)
GLUCOSE SERPL-MCNC: 71 MG/DL (ref 65–100)
HCT VFR BLD AUTO: 41.9 % (ref 35–47)
HGB BLD-MCNC: 13.4 G/DL (ref 11.5–16)
MAGNESIUM SERPL-MCNC: 1.9 MG/DL (ref 1.6–2.4)
MCH RBC QN AUTO: 29.8 PG (ref 26–34)
MCHC RBC AUTO-ENTMCNC: 32 G/DL (ref 30–36.5)
MCV RBC AUTO: 93.3 FL (ref 80–99)
NRBC # BLD: 0 K/UL (ref 0–0.01)
NRBC BLD-RTO: 0 PER 100 WBC
PLATELET # BLD AUTO: 263 K/UL (ref 150–400)
PMV BLD AUTO: 9.9 FL (ref 8.9–12.9)
POTASSIUM SERPL-SCNC: 3.4 MMOL/L (ref 3.5–5.1)
PROT SERPL-MCNC: 6.9 G/DL (ref 6.4–8.2)
RBC # BLD AUTO: 4.49 M/UL (ref 3.8–5.2)
SERVICE CMNT-IMP: NORMAL
SERVICE CMNT-IMP: NORMAL
SODIUM SERPL-SCNC: 140 MMOL/L (ref 136–145)
WBC # BLD AUTO: 5.8 K/UL (ref 3.6–11)

## 2022-05-06 PROCEDURE — 74011000250 HC RX REV CODE- 250: Performed by: NURSE PRACTITIONER

## 2022-05-06 PROCEDURE — 74011250637 HC RX REV CODE- 250/637: Performed by: FAMILY MEDICINE

## 2022-05-06 PROCEDURE — 74011250636 HC RX REV CODE- 250/636: Performed by: FAMILY MEDICINE

## 2022-05-06 PROCEDURE — 82962 GLUCOSE BLOOD TEST: CPT

## 2022-05-06 PROCEDURE — 80053 COMPREHEN METABOLIC PANEL: CPT

## 2022-05-06 PROCEDURE — 85027 COMPLETE CBC AUTOMATED: CPT

## 2022-05-06 PROCEDURE — 74011250636 HC RX REV CODE- 250/636: Performed by: NURSE PRACTITIONER

## 2022-05-06 PROCEDURE — 36415 COLL VENOUS BLD VENIPUNCTURE: CPT

## 2022-05-06 PROCEDURE — 99222 1ST HOSP IP/OBS MODERATE 55: CPT | Performed by: INTERNAL MEDICINE

## 2022-05-06 PROCEDURE — 83735 ASSAY OF MAGNESIUM: CPT

## 2022-05-06 RX ADMIN — ESCITALOPRAM OXALATE 20 MG: 10 TABLET ORAL at 09:36

## 2022-05-06 RX ADMIN — SODIUM CHLORIDE, PRESERVATIVE FREE 20 MG: 5 INJECTION INTRAVENOUS at 09:39

## 2022-05-06 RX ADMIN — SODIUM CHLORIDE 75 ML/HR: 9 INJECTION, SOLUTION INTRAVENOUS at 04:33

## 2022-05-06 RX ADMIN — ASPIRIN 81 MG 81 MG: 81 TABLET ORAL at 09:36

## 2022-05-06 RX ADMIN — LOSARTAN POTASSIUM 100 MG: 50 TABLET, FILM COATED ORAL at 09:37

## 2022-05-06 NOTE — PROGRESS NOTES
[address] [facility]  Hospitalist Group                                                                                          Hospitalist Progress Note  Elio Meigs, MD  Answering service: [phone] from in house phone        Date of Service:  2022  NAME:  Chase Figueroa  :  1960  MRN:  [id number]      Admission Summary:     Chase Figueroa is a 57 y.o. female with PMH of T2DM and HTN who presents with shortness of breath. Shortness of breath that started a few weeks back, patient reports that shortness of breath has gotten worse in the last few days, has mild cough, patient is a lifelong smoker, will reports that she got concerned and decided to come to the hospital, in the ER patient was found to have large pleural effusion and was requested to be admitted to the hospitalist service, patient denies any other complaints or problems. The patient denies any headache, blurry vision, sore throat, trouble swallowing, trouble with speech, chest pain,  fever, chills, N/V/D, abd pain, urinary symptoms, constipation, recent travels, sick contacts, focal or generalized neurological symptoms, falls, injuries, rashes, contact with COVID-19 diagnosed patients, hematemesis, melena, hemoptysis, hematuria, rashes, denies starting any new medications and denies any other concerns or problems besides as mentioned above.          Interval history / Subjective:     She said she feels the same, no left side chest pain     Assessment & Plan:     Left-sided pleural effusion  -s/p thoracentesis and removed 1.3 L on  by IR  -pleural fluid exudative, culture no growth so far, cytology result pending   -afebrile, no leukocytosis   -SpO2 99% on RA  -CT chest diminished left sided pleural effusion    Left apical mass, pleural carcinomatosis, peritoneal carcinomatosis on CT  Possible metastatic lesion  -CT chest abdomen pelvis mass lesion demonstrated at the left apex is not amenable to percutaneous sampling. There are numerous additional nodular densities along the pleural margin consistent with pleural carcinomatosis. There are additional small nodular densities demonstrated in the peritoneum, these most consistent with peritoneal carcinomatosis. Small adrenal nodule on the left likely an additional metastatic focus  -per radiologist left apex is not amenable to percutaneous sampling.   -Pulmonologist on board, planning outpatient navigation bronchoscopy  -Seen by oncologist, outpatient follow up    HTN  -BP normal, on losartan, and monitor BP    T2DM  -check A1c 6.1  -continue sliding scale, monitor finger stick glucose    Tobacco abuse  -has stopped 2 weeks  -advised to continue stopping smoking           Code status: Full Code   Prophylaxis: SCD  Care Plan discussed with: Patient, Nurse and CM  Anticipated Disposition: Home with family      Hospital Problems  Never Reviewed          Codes Class Noted POA    Lung mass ICD-10-CM: R91.8  ICD-9-CM: 786.6  5/4/2022 Unknown                Vital Signs:    Last 24hrs VS reviewed since prior progress note. Most recent are:  Visit Vitals  /81 (BP 1 Location: Left upper arm, BP Patient Position: Sitting)   Pulse 73   Temp 98.3 °F (36.8 °C)   Resp 18   Ht 5' 7\" (1.702 m)   Wt 101.3 kg (223 lb 5.2 oz)   SpO2 100%   BMI 34.98 kg/m²       No intake or output data in the 24 hours ending 05/06/22 1058     Physical Examination:     I had a face to face encounter with this patient and independently examined them on 5/6/2022 as outlined below:          Constitutional:  No acute distress, cooperative, pleasant    ENT:  Oral mucosa moist, oropharynx benign. Resp:  Decrease bronchial breath sound bilaterally. No wheezing/rhonchi/rales. No accessory muscle use. CV:  Regular rhythm, normal rate, no murmurs, gallops, rubs    GI:  Soft, non distended, non tender.  normoactive bowel sounds, no hepatosplenomegaly     Musculoskeletal:  No edema     Neurologic: Moves all extremities. AAOx3, CN II-XII reviewed            Data Review:    Review and/or order of clinical lab test  Review and/or order of tests in the radiology section of CPT  Review and/or order of tests in the medicine section of CPT      Labs:     Recent Labs     05/05/22  0506 05/04/22  1006   WBC 7.4 6.6   HGB 13.0 13.8   HCT 39.0 41.8    312     Recent Labs     05/05/22  0506 05/04/22  1006    140   K 3.6 3.6    108   CO2 24 26   BUN 6 9   CREA 0.55 0.72   GLU 92 103*   CA 8.0* 9.0   MG  --  1.8     Recent Labs     05/05/22  0506 05/04/22  1006   ALT 11* 16   AP 73 81   TBILI 1.1* 1.1*   TP 6.5 7.8   ALB 3.0* 3.6   GLOB 3.5 4.2*     No results for input(s): INR, PTP, APTT, INREXT, INREXT in the last 72 hours. No results for input(s): FE, TIBC, PSAT, FERR in the last 72 hours. No results found for: FOL, RBCF   No results for input(s): PH, PCO2, PO2 in the last 72 hours. No results for input(s): CPK, CKNDX, TROIQ in the last 72 hours.     No lab exists for component: CPKMB  No results found for: CHOL, CHOLX, CHLST, CHOLV, HDL, HDLP, LDL, LDLC, DLDLP, TGLX, TRIGL, TRIGP, CHHD, CHHDX  Lab Results   Component Value Date/Time    Glucose (POC) 107 05/06/2022 06:51 AM    Glucose (POC) 98 05/05/2022 09:20 PM    Glucose (POC) 116 05/05/2022 03:03 PM    Glucose (POC) 92 05/05/2022 10:57 AM    Glucose (POC) 88 05/05/2022 06:50 AM     No results found for: COLOR, APPRN, SPGRU, REFSG, IFTIKHAR, PROTU, GLUCU, KETU, BILU, UROU, JENNIE, LEUKU, GLUKE, EPSU, BACTU, WBCU, RBCU, CASTS, UCRY      Medications Reviewed:     Current Facility-Administered Medications   Medication Dose Route Frequency    ALPRAZolam (XANAX) tablet 0.25 mg  0.25 mg Oral QHS PRN    aspirin chewable tablet 81 mg  81 mg Oral DAILY    escitalopram oxalate (LEXAPRO) tablet 20 mg  20 mg Oral DAILY    losartan (COZAAR) tablet 100 mg  100 mg Oral DAILY    atorvastatin (LIPITOR) tablet 10 mg  10 mg Oral QHS    sodium chloride (NS) flush 5-40 mL  5-40 mL IntraVENous Q8H    sodium chloride (NS) flush 5-40 mL  5-40 mL IntraVENous PRN    acetaminophen (TYLENOL) tablet 650 mg  650 mg Oral Q4H PRN    [Held by provider] heparin (porcine) injection 5,000 Units  5,000 Units SubCUTAneous Q8H    albuterol-ipratropium (DUO-NEB) 2.5 MG-0.5 MG/3 ML  3 mL Nebulization Q4H PRN    glucose chewable tablet 16 g  4 Tablet Oral PRN    dextrose 10 % infusion 0-250 mL  0-250 mL IntraVENous PRN    glucagon (GLUCAGEN) injection 1 mg  1 mg IntraMUSCular PRN    insulin lispro (HUMALOG) injection   SubCUTAneous AC&HS     ______________________________________________________________________  EXPECTED LENGTH OF STAY: - - -  ACTUAL LENGTH OF STAY:          2                 Elizabeth Rowe MD

## 2022-05-06 NOTE — DISCHARGE SUMMARY
Discharge Summary       PATIENT ID: Bailey Larson  MRN: [id number]   YOB: 1960    DATE OF ADMISSION: 5/4/2022  9:47 AM    DATE OF DISCHARGE: 5/6/2022   PRIMARY CARE PROVIDER: Ventura Wall MD     ATTENDING PHYSICIAN: Mac Downey MD   DISCHARGING PROVIDER: Melissa Reed MD    To contact this individual call [phone] and ask the  to page. If unavailable ask to be transferred the Adult Hospitalist Department. CONSULTATIONS: IP CONSULT TO PULMONOLOGY  IP CONSULT TO ONCOLOGY  IP CONSULT TO ONCOLOGY    PROCEDURES/SURGERIES: * No surgery found *    ADMISSION SUMMARY AND HOSPITAL COURSE:     Waldo Dakin Covington is a 57 y. o. female with PMH of T2DM and HTN who presents with shortness of breath.  Shortness of breath that started a few weeks back, patient reports that shortness of breath has gotten worse in the last few days, has mild cough, patient is a lifelong smoker, will reports that she got concerned and decided to come to the hospital, in the ER patient was found to have large pleural effusion and was requested to be admitted to the hospitalist service, patient denies any other complaints or problems.      The patient denies any headache, blurry vision, sore throat, trouble swallowing, trouble with speech, chest pain,  fever, chills, N/V/D, abd pain, urinary symptoms, constipation, recent travels, sick contacts, focal or generalized neurological symptoms, falls, injuries, rashes, contact with COVID-19 diagnosed patients, hematemesis, melena, hemoptysis, hematuria, rashes, denies starting any new medications and denies any other concerns or problems besides as mentioned above.       Left-sided pleural effusion  -s/p thoracentesis and removed 1.3 L on 5/4 by IR  -pleural fluid exudative, culture no growth so far,   -cytology result show adenocarcinoma  -afebrile, no leukocytosis   -SpO2 99% on RA  -CT chest diminished left sided pleural effusion  Left apical mass, pleural carcinomatosis, peritoneal carcinomatosis on CT  Metastatic lesion  -CT chest abdomen pelvis mass lesion demonstrated at the left apex is not amenable to percutaneous sampling. There are numerous additional nodular densities along the pleural margin consistent with pleural carcinomatosis. There are additional small nodular densities demonstrated in the peritoneum, these most consistent with peritoneal carcinomatosis. Small adrenal nodule on the left likely an additional metastatic focus  -per radiologist left apex is not amenable to percutaneous sampling.   -outpatient follow up with pulmonologist   -outpatient follow up with oncologist  HTN  -BP normal, continue on losartan, and monitor BP  T2DM  -A1c 6.1  -continue diabetic diet, continue sliding scale, monitor finger stick glucose  Tobacco abuse  -has stopped 2 weeks  -advised to continue stopping smoking            Code status: Full Code   Prophylaxis: SCD    DISCHARGE DIAGNOSES / PLAN:      Left-sided pleural effusion possible malignant effusion  -s/p thoracentesis and removed 1.3 L on 5/4 by IR  -cytology result show adenocarcinoma  -SpO2 99% on RA  Left apical mass, pleural carcinomatosis, peritoneal carcinomatosis on CT  Metastatic lesion  -outpatient follow up with pulmonologist and oncologist  HTN  -continue on losartan, and monitor BP  T2DM  -continue diabetic diet   Tobacco abuse  -has stopped 2 weeks  -advised to continue stopping smoking        NOTIFY YOUR PHYSICIAN FOR ANY OF THE FOLLOWING:   Fever over 101 degrees for 24 hours. Chest pain, shortness of breath, fever, chills, nausea, vomiting, diarrhea, change in mentation, falling, weakness, bleeding. Severe pain or pain not relieved by medications, as well as any other signs or symptoms that you may have questions about. FOLLOW UP APPOINTMENTS:    Follow-up Information     Follow up With Specialties Details Why Contact Info   1.  Robbie Lake MD Family Medicine On 5/11/2022 Hospital follow up primary care appointment Wednesday, 5/11/22 at 12:00 p.m.  [address] Rosangela Casey [address] [place] [address] [phone]        2. Shantelle Garcia MD, Hematology/Oncology in 1-2 weeks  3. Tessa Wall MD, Pulmonologist in 1-2 weeks  DIET: Diabetic Diet    ACTIVITY: Activity as tolerated    EQUIPMENT needed: None    DISCHARGE MEDICATIONS:  Discharge Medication List as of 5/6/2022  3:11 PM      CONTINUE these medications which have NOT CHANGED    Details   metFORMIN (GLUCOPHAGE) 850 mg tablet Take 850 mg by mouth two (2) times daily (with meals). , Historical Med      escitalopram oxalate (Lexapro) 20 mg tablet Take 20 mg by mouth daily. , Historical Med      simvastatin (ZOCOR) 20 mg tablet Take 20 mg by mouth nightly., Historical Med      losartan (COZAAR) 100 mg tablet Take 100 mg by mouth daily. , Historical Med      aspirin 81 mg chewable tablet Take 81 mg by mouth daily. , Historical Med      acetaminophen (TYLENOL PO) Take 500 mg by mouth every six (6) hours as needed for Pain., Historical Med         STOP taking these medications       ALPRAZolam (XANAX) 0.25 mg tablet Comments:   Reason for Stopping:               DISPOSITION:    Home With:   OT  PT  HH  RN       Long term SNF/Inpatient Rehab   x Independent/assisted living    Hospice    Other:       PATIENT CONDITION AT DISCHARGE:     Functional status    Poor     Deconditioned    x Independent      Cognition   x  Lucid     Forgetful     Dementia      Catheters/lines (plus indication)    [place]     PICC     PEG    x None      Code status   x  Full code     DNR      PHYSICAL EXAMINATION AT DISCHARGE:  Patient Vitals for the past 24 hrs:   Temp Pulse Resp BP SpO2   05/06/22 1400 -- 74 -- -- --   05/06/22 1226 97.9 °F (36.6 °C) 66 18 130/89 98 %   05/06/22 1200 -- 74 -- -- --   05/06/22 1000 -- 73 -- -- --   05/06/22 0910 98.3 °F (36.8 °C) 78 18 138/81 100 %   05/06/22 0424 98.7 °F (37.1 °C) 63 18 (!) 143/83 92 %   05/06/22 0101 98.4 °F (36.9 °C) 67 19 136/84 92 %   05/05/22 2035 98.4 °F (36.9 °C) 67 18 137/83 93 %   05/05/22 2005 -- -- 16 -- --     General:          Alert, cooperative, no distress, appears stated age. HEENT:           Atraumatic, anicteric sclerae, pink conjunctivae                          No oral ulcers, mucosa moist, throat clear, dentition fair  Neck:               Supple, symmetrical  Lungs:             Clear to auscultation bilaterally. No Wheezing or Rhonchi. No rales. Chest wall:      No tenderness  No Accessory muscle use. Heart:              Regular  rhythm,  No  murmur   No edema  Abdomen:        Soft, non-tender. Not distended. Bowel sounds normal  Extremities:     No cyanosis. No clubbing,                            Skin turgor normal, Capillary refill normal  Skin:                Not pale. Not Jaundiced  No rashes   Psych:             Not anxious or agitated.   Neurologic:      Alert, moves all extremities, answers questions appropriately and responds to commands       Recent Results (from the past 24 hour(s))   GLUCOSE, POC    Collection Time: 05/05/22  9:20 PM   Result Value Ref Range    Glucose (POC) 98 65 - 117 mg/dL    Performed by NEENA PYLE    CBC W/O DIFF    Collection Time: 05/06/22  3:27 AM   Result Value Ref Range    WBC 5.8 3.6 - 11.0 K/uL    RBC 4.49 3.80 - 5.20 M/uL    HGB 13.4 11.5 - 16.0 g/dL    HCT 41.9 35.0 - 47.0 %    MCV 93.3 80.0 - 99.0 FL    MCH 29.8 26.0 - 34.0 PG    MCHC 32.0 30.0 - 36.5 g/dL    RDW 13.9 11.5 - 14.5 %    PLATELET 464 666 - 880 K/uL    MPV 9.9 8.9 - 12.9 FL    NRBC 0.0 0  WBC    ABSOLUTE NRBC 0.00 0.00 - 2.18 K/uL   METABOLIC PANEL, COMPREHENSIVE    Collection Time: 05/06/22  3:27 AM   Result Value Ref Range    Sodium 140 136 - 145 mmol/L    Potassium 3.4 (L) 3.5 - 5.1 mmol/L    Chloride 109 (H) 97 - 108 mmol/L    CO2 22 21 - 32 mmol/L    Anion gap 9 5 - 15 mmol/L    Glucose 71 65 - 100 mg/dL    BUN 6 6 - 20 MG/DL    Creatinine 0.60 0.55 - 1.02 MG/DL BUN/Creatinine ratio 10 (L) 12 - 20      GFR est AA >60 >60 ml/min/1.73m2    GFR est non-AA >60 >60 ml/min/1.73m2    Calcium 8.6 8.5 - 10.1 MG/DL    Bilirubin, total 0.8 0.2 - 1.0 MG/DL    ALT (SGPT) 12 12 - 78 U/L    AST (SGOT) 15 15 - 37 U/L    Alk.  phosphatase 64 45 - 117 U/L    Protein, total 6.9 6.4 - 8.2 g/dL    Albumin 3.0 (L) 3.5 - 5.0 g/dL    Globulin 3.9 2.0 - 4.0 g/dL    A-G Ratio 0.8 (L) 1.1 - 2.2     MAGNESIUM    Collection Time: 05/06/22  3:27 AM   Result Value Ref Range    Magnesium 1.9 1.6 - 2.4 mg/dL   GLUCOSE, POC    Collection Time: 05/06/22  6:51 AM   Result Value Ref Range    Glucose (POC) 107 65 - 117 mg/dL    Performed by Rosaline Grimaldo, POC    Collection Time: 05/06/22 12:03 PM   Result Value Ref Range    Glucose (POC) 106 65 - 117 mg/dL    Performed by [id number] [place] [address] Drive[address]:  Problem List as of 5/6/2022 Never Reviewed          Codes Class Noted - Resolved    Lung mass ICD-10-CM: R91.8  ICD-9-CM: 786.6  5/4/2022 - Present              Greater than 45 minutes were spent with the patient on counseling and coordination of care    Signed:   Monty Guzman MD  5/6/2022  2:27 PM

## 2022-05-06 NOTE — PROGRESS NOTES
Clinical Pharmacy Note: Stress Ulcer Prophylaxis Protocol (Non-ICU patients)    Please note that stress ulcer prophylaxis is not indicated for patients outside of the ICU. Sawyer Pelletier has an order for Pepcid (famotidine) 20 mg IV Q12H that has been ordered with an indication of stress ulcer prophylaxis.   No other indication for this medication has been noted in the patients chart and therefore it has been discontinued per the [facility] Stress Ulcer Prophylaxis Protocol for eligible patients. Please call with any questions.

## 2022-05-06 NOTE — PROGRESS NOTES
0800 Bedside shift change report given to   Adelaida Presley(oncoming nurse) by John Sparks (offgoing nurse).  Report included the following information SBAR, Kardex, ED Summary, , Procedure Summary, Intake/Output, MAR,, Recent Results, Med Rec Status, and Cardiac Rhythm SR

## 2022-05-06 NOTE — PROGRESS NOTES
Pulmonary, Critical Care, and Sleep Medicine~Progress Note    Name: Nadeem Arellano MRN: [id number]   : 1960 Hospital: . [address]   Date: 2022 10:14 AM Admission: 2022     Impression Plan   1. Acute on chronic dyspnea  2. Left pleural effusion, 1.3L of exudative effusion removed   3. 2.8cm left apical lesion with right lung nodules, largest 1cm. Possible left hepatic hypodensity and left adrenal lesion, likely an adenoma   4. Active smoker until recently. No formal dx of COPD. Has not see pulmonologist   5. DM II 1. Noted radiologist's opinion that the left apex lesion is not amenable to CT fna   2. We will await pleural fluid path; further decisions will be directed as an outpatient basis  3. I discussed with Dr Rosangela Woodard MA and we will follow up next week; discussed in detail with hospitalist and patient. 4. Will need outpatient PFTs  5. PRN duonebs  6. O2 titration above 90%   7. Encouraged smoking cessation   8. She is aware of the suspicion of neoplasia. 9. Can go home from pul aspect      Daily Progression:     CT abdomen  IMPRESSION  Diminished left-sided pleural effusion, status post thoracentesis with stable mass lesion at the left apex. Mass lesion demonstrated at the left apex is not amenable to percutaneous sampling. There are numerous additional nodular densities along the pleural margin consistent with pleural carcinomatosis. There are additional small nodular densities demonstrated in the peritoneum, these most consistent with peritoneal carcinomatosis. Small adrenal nodule on the left likely an additional metastatic focus. No definitive target for percutaneous sampling. Currently awaiting results of pleural fluid analysis/cytology and cellblock.       Consult Note requested by hospitalist.    Patient presented for admission secondary to worsening dyspnea over the past few weeks that progressed to a point that she came to the ER for further evaluation. Imaging revealed a Left apical mass and left pleural effusion. No prior images for comparison. The effusion was drained and she is breathing better today. She has a family hx of several different types of cancers. Denies hemoptysis or unexplainable weight loss. No over dyspnea prior to these recent events. No signs of infection or bronchospasms. I have reviewed the labs and previous days notes. Pertinent items are noted in HPI. Past Medical History:   Diagnosis Date    Diabetes mellitus type 2, noninsulin dependent ([address]     Generalized anxiety disorder     Hypercholesterolemia     Hypertension     Neoplasm of major salivary gland       Past Surgical History:   Procedure Laterality Date    COLONOSCOPY N/A 2/24/2021    . COLONOSCOPY  :- [facility] Mel[facility]els [facility] ENDOSCOPY    HX ACL RECONSTRUCTION      HX COLONOSCOPY      HX HYSTERECTOMY        Prior to Admission medications    Medication Sig Start Date End Date Taking? Authorizing Provider   metFORMIN (GLUCOPHAGE) 850 mg tablet Take 850 mg by mouth two (2) times daily (with meals). Yes Provider, Historical   escitalopram oxalate (Lexapro) 20 mg tablet Take 20 mg by mouth daily. Yes Provider, Historical   simvastatin (ZOCOR) 20 mg tablet Take 20 mg by mouth nightly. Yes Provider, Historical   losartan (COZAAR) 100 mg tablet Take 100 mg by mouth daily. Yes Provider, Historical   aspirin 81 mg chewable tablet Take 81 mg by mouth daily. Yes Provider, Historical   acetaminophen (TYLENOL PO) Take 500 mg by mouth every six (6) hours as needed for Pain. Yes Provider, Historical     Allergies   Allergen Reactions    Erythromycin Other (comments)     Stomach cramps    Penicillins Hives      Social History     Tobacco Use    Smoking status: Current Every Day Smoker     Packs/day: 0.50     Years: 20.00     Pack years: 10.00    Smokeless tobacco: Never Used   Substance Use Topics    Alcohol use:  Yes Comment: occ      No family history on file. OBJECTIVE:     Vital Signs:       Visit Vitals  /81 (BP 1 Location: Left upper arm, BP Patient Position: Sitting)   Pulse 73   Temp 98.3 °F (36.8 °C)   Resp 18   Ht 5' 7\" (1.702 m)   Wt 101.3 kg (223 lb 5.2 oz)   SpO2 100%   BMI 34.98 kg/m²      Temp (24hrs), Av.5 °F (36.9 °C), Min:98.3 °F (36.8 °C), Max:98.7 °F (37.1 °C)     Intake/Output:     Last shift: No intake/output data recorded.     Last 3 shifts:  1901 -  0700  In: 1010 [I.V.:1010]  Out: -         No intake or output data in the 24 hours ending 22 1052    Physical Exam:                                        Exam Findings Other   General: No resp distress noted, appears stated age    [de-identified]:  No ulcers, JVD not elevated, no cervical LAD    Chest: No pectus deformity, normal chest rise b/l    HEART:  RRR, no murmurs/rubs/gallops    Lungs:  CTA b/l, no rhonchi/crackles/wheeze, diminished BS at bases    ABD: Soft/NT, non rigid mildly distended    EXT: No cyanosis/clubbing/edema, normal peripheral pulses    Skin: No rashes or ulcers, no mottling    Neuro: A/O x 3        Medications:  Current Facility-Administered Medications   Medication Dose Route Frequency    ALPRAZolam (XANAX) tablet 0.25 mg  0.25 mg Oral QHS PRN    aspirin chewable tablet 81 mg  81 mg Oral DAILY    escitalopram oxalate (LEXAPRO) tablet 20 mg  20 mg Oral DAILY    losartan (COZAAR) tablet 100 mg  100 mg Oral DAILY    atorvastatin (LIPITOR) tablet 10 mg  10 mg Oral QHS    sodium chloride (NS) flush 5-40 mL  5-40 mL IntraVENous Q8H    sodium chloride (NS) flush 5-40 mL  5-40 mL IntraVENous PRN    acetaminophen (TYLENOL) tablet 650 mg  650 mg Oral Q4H PRN    [Held by provider] heparin (porcine) injection 5,000 Units  5,000 Units SubCUTAneous Q8H    albuterol-ipratropium (DUO-NEB) 2.5 MG-0.5 MG/3 ML  3 mL Nebulization Q4H PRN    glucose chewable tablet 16 g  4 Tablet Oral PRN    dextrose 10 % infusion 0-250 mL 0-250 mL IntraVENous PRN    glucagon (GLUCAGEN) injection 1 mg  1 mg IntraMUSCular PRN    insulin lispro (HUMALOG) injection   SubCUTAneous AC&HS       Labs:  ABG No results for input(s): PHI, PCO2I, PO2I, HCO3I, SO2I, FIO2I in the last 72 hours.      CBC Recent Labs     05/05/22  0506 05/04/22  1006   WBC 7.4 6.6   HGB 13.0 13.8   HCT 39.0 41.8    312   MCV 89.7 90.9   MCH 29.9 98.9        Metabolic  Panel Recent Labs     05/05/22  0506 05/04/22  1006    140   K 3.6 3.6    108   CO2 24 26   GLU 92 103*   BUN 6 9   CREA 0.55 0.72   CA 8.0* 9.0   MG  --  1.8   ALB 3.0* 3.6   ALT 11* 16        Pertinent Labs                Leopoldo Reynoso PA-C  5/6/2022

## 2022-05-06 NOTE — CONSULTS
[facility] [place] at [address], [address] [place], [address]  W: [phone]  F: [phone]    Reason for e   Chris Witt is a 57 y.o. female who is seen in consultation at the request of Dr. Garrick Olson for evaluation of Metastatic malignancy- probable     Seen by synchronous (real-time) audio-video technology on 5/4/2022     Treatment History:   · 5/4/2022: Left thoracentecis    History of Present Illness:   Patient is a 59-year-old female with diabetes mellitus, hypertension who presented to the [facility] emergency room on 5/4/2022 with complaints of shortness of breath which has been progressively getting worse over several weeks with mild cough. Denies any headaches, vision changes, falls trouble swallowing, chest pain, fevers, weight loss, hemoptysis. CTA on admission showed no evidence of pulmonary embolism but she was noted to have a moderate to large left-sided pleural effusion with a left apical mass measuring 28 x 20 mm, she had small pulmonary nodules on the right, possible hepatic hypodensity. She had ultrasound-guided thoracentesis of the left side on 5/4/2022 and 1300 cc of fluid was aspirated. This was sent for cytology which is pending. Pleural fluid cytology showed more than 100 RBCs, total protein of 5.3, albumin 2.8, . Labs were noted for a bilirubin of 1.1  She states that sob was worse over the last 10-14 days. She has a long h/o smoking. Has tried to quit and stopped 3 weeks ago. She is uptodate with colonoscopy and mammograms. She has no abdominal pain, has some nausea, no HA, she has improved SOB, no hemoptysis. Past Medical History:   Diagnosis Date    Diabetes mellitus type 2, noninsulin dependent [address] [address]     Generalized anxiety disorder     Hypercholesterolemia     Hypertension     Neoplasm of major salivary gland       Past Surgical History:   Procedure Laterality Date    COLONOSCOPY N/A 2/24/2021    . COLONOSCOPY  :- [facility] at [facility] ENDOSCOPY    HX ACL RECONSTRUCTION      HX COLONOSCOPY      HX HYSTERECTOMY        Social History     Tobacco Use    Smoking status: Current Every Day Smoker     Packs/day: 0.50     Years: 20.00     Pack years: 10.00    Smokeless tobacco: Never Used   Substance Use Topics    Alcohol use: Yes     Comment: occ      No family history on file. Current Facility-Administered Medications   Medication Dose Route Frequency    ALPRAZolam (XANAX) tablet 0.25 mg  0.25 mg Oral QHS PRN    aspirin chewable tablet 81 mg  81 mg Oral DAILY    escitalopram oxalate (LEXAPRO) tablet 20 mg  20 mg Oral DAILY    losartan (COZAAR) tablet 100 mg  100 mg Oral DAILY    atorvastatin (LIPITOR) tablet 10 mg  10 mg Oral QHS    sodium chloride (NS) flush 5-40 mL  5-40 mL IntraVENous Q8H    sodium chloride (NS) flush 5-40 mL  5-40 mL IntraVENous PRN    acetaminophen (TYLENOL) tablet 650 mg  650 mg Oral Q4H PRN    [Held by provider] heparin (porcine) injection 5,000 Units  5,000 Units SubCUTAneous Q8H    albuterol-ipratropium (DUO-NEB) 2.5 MG-0.5 MG/3 ML  3 mL Nebulization Q4H PRN    glucose chewable tablet 16 g  4 Tablet Oral PRN    dextrose 10 % infusion 0-250 mL  0-250 mL IntraVENous PRN    glucagon (GLUCAGEN) injection 1 mg  1 mg IntraMUSCular PRN    insulin lispro (HUMALOG) injection   SubCUTAneous AC&HS      Allergies   Allergen Reactions    Erythromycin Other (comments)     Stomach cramps    Penicillins Hives        Review of Systems: A complete review of systems was obtained, negative except as described above.     Physical Exam:     Visit Vitals  /81 (BP 1 Location: Left upper arm, BP Patient Position: Sitting)   Pulse 73   Temp 98.3 °F (36.8 °C)   Resp 18   Ht 5' 7\" (1.702 m)   Wt 223 lb 5.2 oz (101.3 kg)   SpO2 100%   BMI 34.98 kg/m²     ECOG PS: 1  General: No distress  Eyes: PERRLA, anicteric sclerae  HENT: Atraumatic  Neck: Supple  Lymphatic: No visible neck nodes  Respiratory:  normal respiratory effort  CV: no peripheral edema  GI:nondistended  Skin: No rashes  Psych: Alert, oriented,     Results:     Lab Results   Component Value Date/Time    WBC 7.4 05/05/2022 05:06 AM    HGB 13.0 05/05/2022 05:06 AM    HCT 39.0 05/05/2022 05:06 AM    PLATELET 945 52/33/1309 05:06 AM    MCV 89.7 05/05/2022 05:06 AM    ABS. NEUTROPHILS 5.6 05/05/2022 05:06 AM     Lab Results   Component Value Date/Time    Sodium 140 05/05/2022 05:06 AM    Potassium 3.6 05/05/2022 05:06 AM    Chloride 108 05/05/2022 05:06 AM    CO2 24 05/05/2022 05:06 AM    Glucose 92 05/05/2022 05:06 AM    BUN 6 05/05/2022 05:06 AM    Creatinine 0.55 05/05/2022 05:06 AM    GFR est AA >60 05/05/2022 05:06 AM    GFR est non-AA >60 05/05/2022 05:06 AM    Calcium 8.0 (L) 05/05/2022 05:06 AM    Glucose (POC) 107 05/06/2022 06:51 AM    Creatinine (POC) 0.9 12/28/2012 06:39 AM     Lab Results   Component Value Date/Time    Bilirubin, total 1.1 (H) 05/05/2022 05:06 AM    ALT (SGPT) 11 (L) 05/05/2022 05:06 AM    Alk. phosphatase 73 05/05/2022 05:06 AM    Protein, total 6.5 05/05/2022 05:06 AM    Albumin 3.0 (L) 05/05/2022 05:06 AM    Globulin 3.5 05/05/2022 05:06 AM         Records reviewed and summarized above. Pathology report(s) reviewed above. Radiology report(s) reviewed above. CT chest abdomen pelvis 5/5/2022    IMPRESSION  Diminished left-sided pleural effusion, status post thoracentesis with stable  mass lesion at the left apex. Mass lesion demonstrated at the left apex is not amenable to percutaneous  sampling. There are numerous additional nodular densities along the pleural margin  consistent with pleural carcinomatosis. There are additional small nodular densities demonstrated in the peritoneum,  these most consistent with peritoneal carcinomatosis. Small adrenal nodule on  the left likely an additional metastatic focus. No definitive target for percutaneous sampling.  Currently awaiting results of  pleural fluid analysis/cytology and cellblock. Assessment:   1) Metastatic malignancy  CT chest abdomen and pelvis from 5/5/2022 was reviewed. Notable for large left-sided pleural effusion, concern for pleural carcinomatosis, left apical lung nodule measuring 20 x 26 mm, left adrenal nodule 19 x 18 mm, peritoneal nodularity concerning for carcinomatosis. Status post left-sided thoracentesis with pending cytology  Bloody effusion  H/o Smoking    Suspicious for Lung cancer  Cytology pending  Other sites not amenable to a bx  Will get an OP PET/\Molecular studies  Understands that this looks like advanced malignancy and treatments will be palliative          2) Left pleural effusion  S/p thoracentesis    3) H/P R parotidectomy for Warthin's tumor     4) Depression  On Lexapro    Plan:     · Await Cytology  · OP PET  · OK to d/c  · Will follow in clinic with me next week    I appreciate the opportunity to participate in Ms. Addi Canada's care. Signed By: Ariel Turner MD      The patient was evaluated through a synchronous (real-time) audio-video encounter. The patient (or guardian if applicable) is aware that this is a billable service, which includes applicable co-pays. This Virtual Visit was conducted with patient's (and/or legal guardian's) consent. The visit was conducted pursuant to the emergency declaration under the [facility], [address] [place] [address] waiver authority and the [employer] General Act. Patient identification was verified, and a caregiver was present when appropriate. The patient was located in a state where the provider was licensed to provide care.

## 2022-05-06 NOTE — PROGRESS NOTES
Hospital follow-up PCP transitional care appointment has been scheduled with Dr. Catarina Lucio for Wednesday, 5/11/22 at 12:00 p.m. Pending patient discharge. Isabella Gaines, Care Management Assistant.

## 2022-05-06 NOTE — DISCHARGE INSTRUCTIONS
Discharge Instructions       PATIENT ID: Kelby Barrientos  MRN: [id number]   YOB: 1960    DATE OF ADMISSION: 5/4/2022  9:47 AM    DATE OF DISCHARGE: 5/6/2022    PRIMARY CARE PROVIDER: Laura Dwyer MD     ATTENDING PHYSICIAN: Niels Ibarra MD  DISCHARGING PROVIDER: Esperanza Spaulding MD    To contact this individual call [phone] and ask the  to page. If unavailable ask to be transferred the Adult Hospitalist Department. DISCHARGE DIAGNOSES   Left-sided pleural effusion  Left apical mass, pleural carcinomatosis, peritoneal carcinomatosis on CT  HTN  T2DM  Tobacco abuse    CONSULTATIONS: IP CONSULT TO PULMONOLOGY  IP CONSULT TO ONCOLOGY  IP CONSULT TO ONCOLOGY    PROCEDURES/SURGERIES: * No surgery found *    PENDING TEST RESULTS:   At the time of discharge the following test results are still pending: Pleural fluid cytology    FOLLOW UP APPOINTMENTS:   Follow-up Information     Follow up With Specialties Details Why Contact Info   1. Laura Dwyer MD Family Medicine In one week  [address] [place] [address] [phone]        2. Miesha Orlando MD, [facility] [place] on 5/11/2022 at 2:30 pm  3. Follow up with Antoinette Espitia MD, Hematology/Oncology    ADDITIONAL CARE RECOMMENDATIONS:     DIET: Diabetic Diet     ACTIVITY: Activity as tolerated    WOUND CARE: None    EQUIPMENT needed: None        DISCHARGE MEDICATIONS:   See Medication Reconciliation Form    · It is important that you take the medication exactly as they are prescribed. · Keep your medication in the bottles provided by the pharmacist and keep a list of the medication names, dosages, and times to be taken in your wallet. · Do not take other medications without consulting your doctor. NOTIFY YOUR PHYSICIAN FOR ANY OF THE FOLLOWING:   Fever over 101 degrees for 24 hours.    Chest pain, shortness of breath, fever, chills, nausea, vomiting, diarrhea, change in mentation, falling, weakness, bleeding. Severe pain or pain not relieved by medications. Or, any other signs or symptoms that you may have questions about.       DISPOSITION:    Home With:   OT  PT  HH  RN       SNF/Inpatient Rehab/LTAC   x Independent/assisted living    Hospice    Other:     CDMP Checked:   Yes x     PROBLEM LIST Updated:  Yes x       Signed:   Deanna Avalos MD  5/6/2022  2:23 PM

## 2022-05-08 LAB
BACTERIA SPEC CULT: NORMAL
GRAM STN SPEC: NORMAL
GRAM STN SPEC: NORMAL
SERVICE CMNT-IMP: NORMAL

## 2022-05-09 LAB
BACTERIA SPEC CULT: NORMAL
SERVICE CMNT-IMP: NORMAL

## 2022-05-10 ENCOUNTER — TELEPHONE (OUTPATIENT)
Dept: ONCOLOGY | Age: 62
End: 2022-05-10

## 2022-05-13 ENCOUNTER — OFFICE VISIT (OUTPATIENT)
Dept: ONCOLOGY | Age: 62
End: 2022-05-13
Payer: MEDICAID

## 2022-05-13 ENCOUNTER — HOSPITAL ENCOUNTER (OUTPATIENT)
Dept: INFUSION THERAPY | Age: 62
Discharge: HOME | End: 2022-05-13
Payer: MEDICAID

## 2022-05-13 VITALS
DIASTOLIC BLOOD PRESSURE: 81 MMHG | SYSTOLIC BLOOD PRESSURE: 135 MMHG | HEART RATE: 98 BPM | OXYGEN SATURATION: 96 % | TEMPERATURE: 98.3 F | WEIGHT: 228 LBS | RESPIRATION RATE: 22 BRPM | BODY MASS INDEX: 35.71 KG/M2

## 2022-05-13 VITALS
SYSTOLIC BLOOD PRESSURE: 135 MMHG | HEART RATE: 98 BPM | DIASTOLIC BLOOD PRESSURE: 81 MMHG | RESPIRATION RATE: 22 BRPM | TEMPERATURE: 98.3 F

## 2022-05-13 DIAGNOSIS — I31.31 MALIGNANT PERICARDIAL EFFUSION: ICD-10-CM

## 2022-05-13 DIAGNOSIS — C34.90 NSCLC METASTATIC TO ADRENAL GLAND (HCC): Primary | ICD-10-CM

## 2022-05-13 DIAGNOSIS — C79.70 NSCLC METASTATIC TO ADRENAL GLAND (HCC): Primary | ICD-10-CM

## 2022-05-13 PROCEDURE — 99215 OFFICE O/P EST HI 40 MIN: CPT | Performed by: INTERNAL MEDICINE

## 2022-05-13 PROCEDURE — 36415 COLL VENOUS BLD VENIPUNCTURE: CPT

## 2022-05-13 RX ORDER — AMLODIPINE BESYLATE 5 MG/1
5 TABLET ORAL DAILY
COMMUNITY

## 2022-05-13 RX ORDER — ALPRAZOLAM 0.25 MG/1
TABLET ORAL
COMMUNITY

## 2022-05-13 NOTE — PROGRESS NOTES
[facility] Progress Note    Date: May 13, 2022        1515: Pt arrived ambulatory to [facility] for Lab Work in stable condition. Accompanied by Salima Gardner RN. Labs drawn peripherally from right [facility] and sent for processing. Patient Vitals for the past 12 hrs:   Temp Pulse Resp BP   05/13/22 1520 98.3 °F (36.8 °C) 98 22 135/81       1530: 2x2 and coban placed. D/Cd from [facility] ambulatory and in no distress.      Future Appointments   Date Time Provider Raymnod Gonzales   6/2/2022  9:45 AM Jose James  N Anju Fonseca BS AMB           Nam Araiza, RN  May 13, 2022

## 2022-05-13 NOTE — PROGRESS NOTES
Pablo Platt is a 57 y.o. female    Chief Complaint   Patient presents with    New Patient    Lung Cancer     1. Have you been to the ER, urgent care clinic since your last visit? Hospitalized since your last visit? No    2. Have you seen or consulted any other health care providers outside of the [address] [place] [address] since your last visit? Include any pap smears or colon screening.  No

## 2022-05-13 NOTE — PROGRESS NOTES
[facility], [id number] [place] [address] Road, [address] [place] [address]  W: [phone]  F: [phone]    Reason for visit   Mariela Galo is a 57 y.o. female who is seen as a new patient for stage IV NSCLC      Treatment History:   · 5/4/2022: Left thoracentecis- Adenocarcinoma     History of Present Illness:   Patient is a 62-year-old female with diabetes mellitus, hypertension who presented to the [facility] emergency room on 5/4/2022 with complaints of shortness of breath x 14 days which has been progressively getting worse over several weeks with mild cough. Denies any headaches, vision changes, falls trouble swallowing, chest pain, fevers, weight loss, hemoptysis. CTA on admission showed no evidence of pulmonary embolism but she was noted to have a moderate to large left-sided pleural effusion with a left apical mass measuring 28 x 20 mm, she had small pulmonary nodules on the right, possible hepatic hypodensity. She had ultrasound-guided thoracentesis of the left side on 5/4/2022 and 1300 cc of fluid was aspirated. This was sent for cytology which is pending. Pleural fluid cytology showed more than 100 RBCs, total protein of 5.3, albumin 2.8, . Labs were noted for a bilirubin of 1.1    She has a long h/o smoking. Has tried to quit and stopped 3 weeks ago. She is uptodate with colonoscopy and mammograms. Jay her  is with her. She states that she has had some evening coughing spells. She has SOB with moderate activity but this is markedly improved. She has seen her Pulmonologist Dr. Cris Saucedo recently who told her the diagnosis. She used to work for [employer].        Past Medical History:   Diagnosis Date    Diabetes mellitus type 2, noninsulin dependent ([facility] [address]     Generalized anxiety disorder     Hypercholesterolemia     Hypertension     Neoplasm of major salivary gland       Past Surgical History:   Procedure Laterality Date    COLONOSCOPY N/A 2/24/2021    . COLONOSCOPY  :- performed by Alex Manriquez MD at [facility] ENDOSCOPY    HX ACL RECONSTRUCTION      HX COLONOSCOPY      HX HYSTERECTOMY        Social History     Tobacco Use    Smoking status: Current Every Day Smoker     Packs/day: 0.50     Years: 20.00     Pack years: 10.00    Smokeless tobacco: Never Used   Substance Use Topics    Alcohol use: Yes     Comment: occ      No family history on file. Current Outpatient Medications   Medication Sig    metFORMIN (GLUCOPHAGE) 850 mg tablet Take 850 mg by mouth two (2) times daily (with meals).  escitalopram oxalate (Lexapro) 20 mg tablet Take 20 mg by mouth daily.  simvastatin (ZOCOR) 20 mg tablet Take 20 mg by mouth nightly.  losartan (COZAAR) 100 mg tablet Take 100 mg by mouth daily.  aspirin 81 mg chewable tablet Take 81 mg by mouth daily.  acetaminophen (TYLENOL PO) Take 500 mg by mouth every six (6) hours as needed for Pain. No current facility-administered medications for this visit. Allergies   Allergen Reactions    Erythromycin Other (comments)     Stomach cramps    Penicillins Hives        Review of Systems: A complete review of systems was obtained, negative except as described above. Physical Exam:     Visit Vitals  /81 (BP 1 Location: Right upper arm)   Pulse 98   Temp 98.3 °F (36.8 °C) (Temporal)   Resp 22   Wt 228 lb (103.4 kg)   SpO2 96%   BMI 35.71 kg/m²     ECOG PS: 1  General: No distress  Eyes: PERRLA, anicteric sclerae  HENT: Atraumatic  Neck: Supple  Lymphatic: No visible neck nodes  Respiratory:  normal respiratory effort  CV: no peripheral edema  GI:nondistended  Skin: No rashes  Psych: Alert, oriented,     Results:     Lab Results   Component Value Date/Time    WBC 5.8 05/06/2022 03:27 AM    HGB 13.4 05/06/2022 03:27 AM    HCT 41.9 05/06/2022 03:27 AM    PLATELET 956 63/76/6981 03:27 AM    MCV 93.3 05/06/2022 03:27 AM    ABS.  NEUTROPHILS 5.6 05/05/2022 05:06 AM     Lab Results   Component Value Date/Time    Sodium 140 05/06/2022 03:27 AM    Potassium 3.4 (L) 05/06/2022 03:27 AM    Chloride 109 (H) 05/06/2022 03:27 AM    CO2 22 05/06/2022 03:27 AM    Glucose 71 05/06/2022 03:27 AM    BUN 6 05/06/2022 03:27 AM    Creatinine 0.60 05/06/2022 03:27 AM    GFR est AA >60 05/06/2022 03:27 AM    GFR est non-AA >60 05/06/2022 03:27 AM    Calcium 8.6 05/06/2022 03:27 AM    Glucose (POC) 106 05/06/2022 12:03 PM    Creatinine (POC) 0.9 12/28/2012 06:39 AM     Lab Results   Component Value Date/Time    Bilirubin, total 0.8 05/06/2022 03:27 AM    ALT (SGPT) 12 05/06/2022 03:27 AM    Alk. phosphatase 64 05/06/2022 03:27 AM    Protein, total 6.9 05/06/2022 03:27 AM    Albumin 3.0 (L) 05/06/2022 03:27 AM    Globulin 3.9 05/06/2022 03:27 AM         Records reviewed and summarized above. Pathology report(s) reviewed above. Radiology report(s) reviewed above. CT chest abdomen pelvis 5/5/2022    IMPRESSION  Diminished left-sided pleural effusion, status post thoracentesis with stable  mass lesion at the left apex. Mass lesion demonstrated at the left apex is not amenable to percutaneous  sampling. There are numerous additional nodular densities along the pleural margin  consistent with pleural carcinomatosis. There are additional small nodular densities demonstrated in the peritoneum,  these most consistent with peritoneal carcinomatosis. Small adrenal nodule on  the left likely an additional metastatic focus. No definitive target for percutaneous sampling. Currently awaiting results of  pleural fluid analysis/cytology and cellblock. Assessment:   1) Non small cell Lung cancer- adenocarcinoma stage IV  CT chest abdomen and pelvis from 5/5/2022 was reviewed. Notable for large left-sided pleural effusion, concern for pleural carcinomatosis, left apical lung nodule measuring 20 x 26 mm, left adrenal nodule 19 x 18 mm, peritoneal nodularity concerning for carcinomatosis.   Status post left-sided thoracentesis H/o Smoking    Understands that this looks like advanced malignancy and treatments will be palliative  Dictated my molecular studies which are pending  Will get an MRI and obtain a liquid bx today        2) Left pleural effusion  S/p thoracentesis  Counseled on symptoms to call for    3) H/P R parotidectomy for Warthin's tumor     4) Depression  On Lexapro    5) Psychosocial  Coping well   Supportive      Plan:     · Await Moleculars  · Guardant today   · MRI brain , PET CT   RTC 2 weeks  I appreciate the opportunity to participate in Ms. Pat[facility]'s care.     Signed By: Lewis Quesada MD

## 2022-05-19 ENCOUNTER — APPOINTMENT (OUTPATIENT)
Dept: GENERAL RADIOLOGY | Age: 62
DRG: 136 | End: 2022-05-19
Attending: EMERGENCY MEDICINE
Payer: MEDICAID

## 2022-05-19 ENCOUNTER — HOSPITAL ENCOUNTER (INPATIENT)
Age: 62
LOS: 5 days | Discharge: HOME | DRG: 136 | End: 2022-05-24
Attending: EMERGENCY MEDICINE | Admitting: STUDENT IN AN ORGANIZED HEALTH CARE EDUCATION/TRAINING PROGRAM
Payer: MEDICAID

## 2022-05-19 ENCOUNTER — APPOINTMENT (OUTPATIENT)
Dept: CT IMAGING | Age: 62
DRG: 136 | End: 2022-05-19
Attending: EMERGENCY MEDICINE
Payer: MEDICAID

## 2022-05-19 ENCOUNTER — TELEPHONE (OUTPATIENT)
Dept: ONCOLOGY | Age: 62
End: 2022-05-19

## 2022-05-19 DIAGNOSIS — C34.90 NSCLC METASTATIC TO ADRENAL GLAND (HCC): ICD-10-CM

## 2022-05-19 DIAGNOSIS — J96.01 ACUTE RESPIRATORY FAILURE WITH HYPOXIA (HCC): Primary | ICD-10-CM

## 2022-05-19 DIAGNOSIS — J90 RECURRENT PLEURAL EFFUSION ON LEFT: ICD-10-CM

## 2022-05-19 DIAGNOSIS — R06.00 ACUTE DYSPNEA: ICD-10-CM

## 2022-05-19 DIAGNOSIS — C34.90 NON-SMALL CELL LUNG CANCER, UNSPECIFIED LATERALITY (HCC): ICD-10-CM

## 2022-05-19 DIAGNOSIS — C79.70 NSCLC METASTATIC TO ADRENAL GLAND (HCC): ICD-10-CM

## 2022-05-19 DIAGNOSIS — I31.31 MALIGNANT PERICARDIAL EFFUSION: ICD-10-CM

## 2022-05-19 DIAGNOSIS — R91.8 LUNG MASS: ICD-10-CM

## 2022-05-19 LAB
ALBUMIN SERPL-MCNC: 3.3 G/DL (ref 3.5–5)
ALBUMIN/GLOB SERPL: 0.8 {RATIO} (ref 1.1–2.2)
ALP SERPL-CCNC: 93 U/L (ref 45–117)
ALT SERPL-CCNC: 26 U/L (ref 12–78)
ANION GAP SERPL CALC-SCNC: 5 MMOL/L (ref 5–15)
AST SERPL-CCNC: 22 U/L (ref 15–37)
BASOPHILS # BLD: 0.1 K/UL (ref 0–0.1)
BASOPHILS NFR BLD: 1 % (ref 0–1)
BILIRUB SERPL-MCNC: 0.9 MG/DL (ref 0.2–1)
BNP SERPL-MCNC: 33 PG/ML
BUN SERPL-MCNC: 12 MG/DL (ref 6–20)
BUN/CREAT SERPL: 14 (ref 12–20)
CALCIUM SERPL-MCNC: 8.9 MG/DL (ref 8.5–10.1)
CHLORIDE SERPL-SCNC: 108 MMOL/L (ref 97–108)
CO2 SERPL-SCNC: 27 MMOL/L (ref 21–32)
COVID-19 RAPID TEST, COVR: NOT DETECTED
CREAT SERPL-MCNC: 0.86 MG/DL (ref 0.55–1.02)
DIFFERENTIAL METHOD BLD: NORMAL
EOSINOPHIL # BLD: 0.2 K/UL (ref 0–0.4)
EOSINOPHIL NFR BLD: 2 % (ref 0–7)
ERYTHROCYTE [DISTWIDTH] IN BLOOD BY AUTOMATED COUNT: 13.6 % (ref 11.5–14.5)
GLOBULIN SER CALC-MCNC: 4.4 G/DL (ref 2–4)
GLUCOSE SERPL-MCNC: 120 MG/DL (ref 65–100)
HCT VFR BLD AUTO: 40.4 % (ref 35–47)
HGB BLD-MCNC: 13.1 G/DL (ref 11.5–16)
IMM GRANULOCYTES # BLD AUTO: 0 K/UL (ref 0–0.04)
IMM GRANULOCYTES NFR BLD AUTO: 0 % (ref 0–0.5)
LYMPHOCYTES # BLD: 1.4 K/UL (ref 0.8–3.5)
LYMPHOCYTES NFR BLD: 17 % (ref 12–49)
MCH RBC QN AUTO: 29.8 PG (ref 26–34)
MCHC RBC AUTO-ENTMCNC: 32.4 G/DL (ref 30–36.5)
MCV RBC AUTO: 91.8 FL (ref 80–99)
MONOCYTES # BLD: 0.7 K/UL (ref 0–1)
MONOCYTES NFR BLD: 8 % (ref 5–13)
NEUTS SEG # BLD: 5.9 K/UL (ref 1.8–8)
NEUTS SEG NFR BLD: 72 % (ref 32–75)
NRBC # BLD: 0 K/UL (ref 0–0.01)
NRBC BLD-RTO: 0 PER 100 WBC
PLATELET # BLD AUTO: 329 K/UL (ref 150–400)
PMV BLD AUTO: 9.9 FL (ref 8.9–12.9)
POTASSIUM SERPL-SCNC: 3.7 MMOL/L (ref 3.5–5.1)
PROT SERPL-MCNC: 7.7 G/DL (ref 6.4–8.2)
RBC # BLD AUTO: 4.4 M/UL (ref 3.8–5.2)
SODIUM SERPL-SCNC: 140 MMOL/L (ref 136–145)
SOURCE, COVRS: NORMAL
TROPONIN-HIGH SENSITIVITY: 5 NG/L (ref 0–51)
WBC # BLD AUTO: 8.3 K/UL (ref 3.6–11)

## 2022-05-19 PROCEDURE — 71275 CT ANGIOGRAPHY CHEST: CPT

## 2022-05-19 PROCEDURE — 65270000046 HC RM TELEMETRY

## 2022-05-19 PROCEDURE — 93005 ELECTROCARDIOGRAM TRACING: CPT

## 2022-05-19 PROCEDURE — 74011000636 HC RX REV CODE- 636: Performed by: EMERGENCY MEDICINE

## 2022-05-19 PROCEDURE — 77010033678 HC OXYGEN DAILY

## 2022-05-19 PROCEDURE — 84484 ASSAY OF TROPONIN QUANT: CPT

## 2022-05-19 PROCEDURE — 94761 N-INVAS EAR/PLS OXIMETRY MLT: CPT

## 2022-05-19 PROCEDURE — 80053 COMPREHEN METABOLIC PANEL: CPT

## 2022-05-19 PROCEDURE — 83880 ASSAY OF NATRIURETIC PEPTIDE: CPT

## 2022-05-19 PROCEDURE — 85025 COMPLETE CBC W/AUTO DIFF WBC: CPT

## 2022-05-19 PROCEDURE — 87635 SARS-COV-2 COVID-19 AMP PRB: CPT

## 2022-05-19 PROCEDURE — 99285 EMERGENCY DEPT VISIT HI MDM: CPT

## 2022-05-19 PROCEDURE — 36415 COLL VENOUS BLD VENIPUNCTURE: CPT

## 2022-05-19 PROCEDURE — 71046 X-RAY EXAM CHEST 2 VIEWS: CPT

## 2022-05-19 RX ORDER — INSULIN LISPRO 100 [IU]/ML
INJECTION, SOLUTION INTRAVENOUS; SUBCUTANEOUS
Status: DISCONTINUED | OUTPATIENT
Start: 2022-05-19 | End: 2022-05-24 | Stop reason: HOSPADM

## 2022-05-19 RX ORDER — DEXTROSE MONOHYDRATE 100 MG/ML
0-250 INJECTION, SOLUTION INTRAVENOUS AS NEEDED
Status: DISCONTINUED | OUTPATIENT
Start: 2022-05-19 | End: 2022-05-24 | Stop reason: HOSPADM

## 2022-05-19 RX ORDER — LOSARTAN POTASSIUM 100 MG/1
100 TABLET ORAL DAILY
Status: DISCONTINUED | OUTPATIENT
Start: 2022-05-20 | End: 2022-05-24 | Stop reason: HOSPADM

## 2022-05-19 RX ORDER — ONDANSETRON 4 MG/1
4 TABLET, ORALLY DISINTEGRATING ORAL
Status: DISCONTINUED | OUTPATIENT
Start: 2022-05-19 | End: 2022-05-24 | Stop reason: HOSPADM

## 2022-05-19 RX ORDER — ONDANSETRON 2 MG/ML
4 INJECTION INTRAMUSCULAR; INTRAVENOUS
Status: DISCONTINUED | OUTPATIENT
Start: 2022-05-19 | End: 2022-05-24 | Stop reason: HOSPADM

## 2022-05-19 RX ORDER — AMLODIPINE BESYLATE 5 MG/1
5 TABLET ORAL DAILY
Status: DISCONTINUED | OUTPATIENT
Start: 2022-05-20 | End: 2022-05-24 | Stop reason: HOSPADM

## 2022-05-19 RX ORDER — SODIUM CHLORIDE 0.9 % (FLUSH) 0.9 %
5-40 SYRINGE (ML) INJECTION AS NEEDED
Status: DISCONTINUED | OUTPATIENT
Start: 2022-05-19 | End: 2022-05-24 | Stop reason: HOSPADM

## 2022-05-19 RX ORDER — ATORVASTATIN CALCIUM 10 MG/1
10 TABLET, FILM COATED ORAL DAILY
Status: DISCONTINUED | OUTPATIENT
Start: 2022-05-20 | End: 2022-05-19

## 2022-05-19 RX ORDER — ACETAMINOPHEN 325 MG/1
650 TABLET ORAL
Status: DISCONTINUED | OUTPATIENT
Start: 2022-05-19 | End: 2022-05-24 | Stop reason: HOSPADM

## 2022-05-19 RX ORDER — SODIUM CHLORIDE 0.9 % (FLUSH) 0.9 %
5-40 SYRINGE (ML) INJECTION EVERY 8 HOURS
Status: DISCONTINUED | OUTPATIENT
Start: 2022-05-19 | End: 2022-05-24 | Stop reason: HOSPADM

## 2022-05-19 RX ORDER — ESCITALOPRAM OXALATE 10 MG/1
20 TABLET ORAL DAILY
Status: DISCONTINUED | OUTPATIENT
Start: 2022-05-20 | End: 2022-05-24 | Stop reason: HOSPADM

## 2022-05-19 RX ORDER — ATORVASTATIN CALCIUM 10 MG/1
10 TABLET, FILM COATED ORAL
Status: DISCONTINUED | OUTPATIENT
Start: 2022-05-19 | End: 2022-05-24 | Stop reason: HOSPADM

## 2022-05-19 RX ORDER — MAGNESIUM SULFATE 100 %
4 CRYSTALS MISCELLANEOUS AS NEEDED
Status: DISCONTINUED | OUTPATIENT
Start: 2022-05-19 | End: 2022-05-24 | Stop reason: HOSPADM

## 2022-05-19 RX ORDER — ACETAMINOPHEN 650 MG/1
650 SUPPOSITORY RECTAL
Status: DISCONTINUED | OUTPATIENT
Start: 2022-05-19 | End: 2022-05-24 | Stop reason: HOSPADM

## 2022-05-19 RX ORDER — POLYETHYLENE GLYCOL 3350 17 G/17G
17 POWDER, FOR SOLUTION ORAL DAILY PRN
Status: DISCONTINUED | OUTPATIENT
Start: 2022-05-19 | End: 2022-05-24 | Stop reason: HOSPADM

## 2022-05-19 RX ADMIN — IOPAMIDOL 100 ML: 755 INJECTION, SOLUTION INTRAVENOUS at 18:09

## 2022-05-19 NOTE — TELEPHONE ENCOUNTER
Patient left voicemail that she is experiencing shortness of breath:  Walking from room to room and up & down stairs. Please follow up with the patient.

## 2022-05-19 NOTE — ED PROVIDER NOTES
EMERGENCY DEPARTMENT HISTORY AND PHYSICAL EXAM      Please note that this dictation was completed with the assistance of \"Dragon\", the computer voice recognition software. Quite often unanticipated grammatical, syntax, homophones, and other interpretive errors are inadvertently transcribed by the computer software. Please disregard these errors and any errors that have escaped final proofreading. Thank you. Patient: Zuly Cruz  DOS: 22  : 1960  MRN: [id number]  History of Presenting Illness     Chief Complaint   Patient presents with    Shortness of Breath     Pt ambulatory into triage with cc of SOB, pt dx with stage 4 lung CA on friday     History Provided By: Patient/family/EMS (if applicable)    HPI: Zuly Cruz, 57 y.o. female with past medical history as documented below presents to the ED with c/o of acute severe SOB for the past few days. Pt was recently admitted - and underwent left sided pleural thoracentesis with cytology showing adenocarcinoma. Pt states she's been having dry cough ever since since. Upon ED arrival, pt was hypoxia and placed on oxygen. Pt denies any other exacerbating or ameliorating factors. Additionally, pt specifically denies any recent fever, chills, headache, nausea, vomiting, abdominal pain, CP, lightheadedness, dizziness, numbness, weakness, lower extremity swelling, heart palpitations, urinary sxs, diarrhea, constipation, melena, hematochezia. There are no other complaints, changes or physical findings pertinent to the HPI at this time.     PCP: Janelle Whitney MD  Past History   Past Medical History:  Past Medical History:   Diagnosis Date    Diabetes mellitus type 2, noninsulin dependent ([facility] [address]     Generalized anxiety disorder     Hypercholesterolemia     Hypertension     Neoplasm of major salivary gland        Past Surgical History:  Past Surgical History:   Procedure Laterality Date    COLONOSCOPY N/A 2021 Mark[facility] Glad[facility]ume COLONOSCOPY  :- performed by Smiley Hurt MD at [facility] ENDOSCOPY    HX ACL RECONSTRUCTION      HX COLONOSCOPY      HX HYSTERECTOMY         Family History:   Family history reviewed and was non-contributory, unless specified below:  No family history on file. Social History:  Social History     Tobacco Use    Smoking status: Current Every Day Smoker     Packs/day: 0.50     Years: 20.00     Pack years: 10.00    Smokeless tobacco: Never Used   Substance Use Topics    Alcohol use: Yes     Comment: occ    Drug use: Not on file       Allergies: Allergies   Allergen Reactions    Erythromycin Other (comments)     Stomach cramps    Penicillins Hives       Current Medications:  No current facility-administered medications on file prior to encounter. Current Outpatient Medications on File Prior to Encounter   Medication Sig Dispense Refill    amLODIPine (NORVASC) 5 mg tablet Take 5 mg by mouth daily.  ALPRAZolam (XANAX) 0.25 mg tablet Take  by mouth.  metFORMIN (GLUCOPHAGE) 850 mg tablet Take 850 mg by mouth two (2) times daily (with meals).  escitalopram oxalate (Lexapro) 20 mg tablet Take 20 mg by mouth daily.  simvastatin (ZOCOR) 20 mg tablet Take 20 mg by mouth nightly.  losartan (COZAAR) 100 mg tablet Take 100 mg by mouth daily.  aspirin 81 mg chewable tablet Take 81 mg by mouth daily.  acetaminophen (TYLENOL PO) Take 500 mg by mouth every six (6) hours as needed for Pain. Review of Systems   A complete ROS was reviewed by me today and all other systems negative, unless otherwise specified below:  Review of Systems   Constitutional: Negative. Negative for chills and fever. HENT: Negative. Negative for congestion and sore throat. Eyes: Negative. Respiratory: Positive for cough and shortness of breath. Negative for chest tightness and wheezing. Cardiovascular: Negative. Negative for chest pain, palpitations and leg swelling.    Gastrointestinal: Negative. Negative for abdominal distention, abdominal pain, blood in stool, constipation, diarrhea, nausea and vomiting. Endocrine: Negative. Genitourinary: Negative. Negative for dysuria, flank pain, frequency, hematuria and urgency. Musculoskeletal: Negative. Negative for arthralgias, back pain and myalgias. Skin: Negative. Negative for color change and rash. Neurological: Negative. Negative for dizziness, syncope, speech difficulty, weakness, light-headedness, numbness and headaches. Hematological: Negative. Psychiatric/Behavioral: Negative. Negative for confusion and self-injury. The patient is not nervous/anxious. All other systems reviewed and are negative. Physical Exam   Physical Exam  Vitals and nursing note reviewed. Constitutional:       General: She is in acute distress. Appearance: She is well-developed. She is ill-appearing, toxic-appearing and diaphoretic. HENT:      Head: Normocephalic and atraumatic. Mouth/Throat:      Pharynx: No oropharyngeal exudate. Eyes:      Conjunctiva/sclera: Conjunctivae normal.   Cardiovascular:      Rate and Rhythm: Normal rate and regular rhythm. Heart sounds: Normal heart sounds. Pulmonary:      Effort: Tachypnea, accessory muscle usage and respiratory distress present. Breath sounds: Examination of the left-upper field reveals rales. Examination of the left-middle field reveals rales. Examination of the left-lower field reveals rales. Rales present. No wheezing. Chest:      Chest wall: No tenderness. Abdominal:      General: Bowel sounds are normal. There is no distension. Palpations: Abdomen is soft. There is no mass. Tenderness: There is no abdominal tenderness. There is no guarding or rebound. Musculoskeletal:         General: Normal range of motion. Cervical back: Normal range of motion. Skin:     General: Skin is warm.    Neurological:      Mental Status: She is alert and oriented to person, place, and time. Cranial Nerves: No cranial nerve deficit. Motor: No abnormal muscle tone. Diagnostic Study Results     Laboratory Data  I have personally reviewed and interpreted all available laboratory results. Recent Results (from the past 24 hour(s))   CBC WITH AUTOMATED DIFF    Collection Time: 05/19/22  3:23 PM   Result Value Ref Range    WBC 8.3 3.6 - 11.0 K/uL    RBC 4.40 3.80 - 5.20 M/uL    HGB 13.1 11.5 - 16.0 g/dL    HCT 40.4 35.0 - 47.0 %    MCV 91.8 80.0 - 99.0 FL    MCH 29.8 26.0 - 34.0 PG    MCHC 32.4 30.0 - 36.5 g/dL    RDW 13.6 11.5 - 14.5 %    PLATELET 370 748 - 330 K/uL    MPV 9.9 8.9 - 12.9 FL    NRBC 0.0 0  WBC    ABSOLUTE NRBC 0.00 0.00 - 0.01 K/uL    NEUTROPHILS 72 32 - 75 %    LYMPHOCYTES 17 12 - 49 %    MONOCYTES 8 5 - 13 %    EOSINOPHILS 2 0 - 7 %    BASOPHILS 1 0 - 1 %    IMMATURE GRANULOCYTES 0 0.0 - 0.5 %    ABS. NEUTROPHILS 5.9 1.8 - 8.0 K/UL    ABS. LYMPHOCYTES 1.4 0.8 - 3.5 K/UL    ABS. MONOCYTES 0.7 0.0 - 1.0 K/UL    ABS. EOSINOPHILS 0.2 0.0 - 0.4 K/UL    ABS. BASOPHILS 0.1 0.0 - 0.1 K/UL    ABS. IMM. GRANS. 0.0 0.00 - 0.04 K/UL    DF AUTOMATED     METABOLIC PANEL, COMPREHENSIVE    Collection Time: 05/19/22  3:23 PM   Result Value Ref Range    Sodium 140 136 - 145 mmol/L    Potassium 3.7 3.5 - 5.1 mmol/L    Chloride 108 97 - 108 mmol/L    CO2 27 21 - 32 mmol/L    Anion gap 5 5 - 15 mmol/L    Glucose 120 (H) 65 - 100 mg/dL    BUN 12 6 - 20 MG/DL    Creatinine 0.86 0.55 - 1.02 MG/DL    BUN/Creatinine ratio 14 12 - 20      GFR est AA >60 >60 ml/min/1.73m2    GFR est non-AA >60 >60 ml/min/1.73m2    Calcium 8.9 8.5 - 10.1 MG/DL    Bilirubin, total 0.9 0.2 - 1.0 MG/DL    ALT (SGPT) 26 12 - 78 U/L    AST (SGOT) 22 15 - 37 U/L    Alk.  phosphatase 93 45 - 117 U/L    Protein, total 7.7 6.4 - 8.2 g/dL    Albumin 3.3 (L) 3.5 - 5.0 g/dL    Globulin 4.4 (H) 2.0 - 4.0 g/dL    A-G Ratio 0.8 (L) 1.1 - 2.2     TROPONIN-HIGH SENSITIVITY    Collection Time: 05/19/22 3:23 PM   Result Value Ref Range    Troponin-High Sensitivity 5 0 - 51 ng/L   NT-PRO BNP    Collection Time: 05/19/22  3:23 PM   Result Value Ref Range    NT pro-BNP 33 <125 PG/ML   EKG, 12 LEAD, INITIAL    Collection Time: 05/19/22  3:25 PM   Result Value Ref Range    Ventricular Rate 88 BPM    Atrial Rate 88 BPM    P-R Interval 142 ms    QRS Duration 76 ms    Q-T Interval 396 ms    QTC Calculation (Bezet) 479 ms    Calculated P Axis 53 degrees    Calculated R Axis 32 degrees    Calculated T Axis 27 degrees    Diagnosis       Normal sinus rhythm  Septal infarct (cited on or before 19-MAY-2022)  When compared with ECG of 04-MAY-2022 09:51,  Questionable change in initial forces of Septal leads     COVID-19 RAPID TEST    Collection Time: 05/19/22  4:58 PM   Result Value Ref Range    Specimen source Nasopharyngeal      COVID-19 rapid test Not detected NOTD         Radiologic Studies   I have personally reviewed and interpreted all available imaging studies and agree with radiology interpretation. CTA CHEST W OR W WO CONT   Final Result      1. Interval enlargement of large left pleural effusion. Left apical mass   unchanged. No acute pulmonary embolus      XR CHEST PA LAT   Final Result   Left mid-lung and left basilar consolidation and/or left pleural   effusion. Cardiomegaly. IR THORACENTESIS CATH W IMAGE    (Results Pending)     CT Results  (Last 48 hours)               05/19/22 1809  CTA CHEST W OR W WO CONT Final result    Impression:      1. Interval enlargement of large left pleural effusion. Left apical mass   unchanged. No acute pulmonary embolus       Narrative:  INDICATION: Lung cancer. Hypoxia       COMPARISON: Earlier same day and 5/5/2022       TECHNIQUE:  2.5 mm axial images were obtained from the bases to the lung apices   after the intravenous administration of 100 cc of Isovue-370. Three-dimensional   postprocessing was performed by the technologist with MIP reconstructions.  CT   dose reduction was achieved through use of a standardized protocol tailored for   this examination and automatic exposure control for dose modulation. FINDINGS:       THYROID: No nodule. MEDIASTINUM: No mass or lymphadenopathy. Borderline right axillary nodes. WHIT: No mass or lymphadenopathy. THORACIC AORTA: No dissection or aneurysm. MAIN PULMONARY ARTERY: No acute pulmonary embolus   TRACHEA/BRONCHI: Patent. ESOPHAGUS: No wall thickening or dilatation. HEART: Normal in size. Small pericardial effusion   PLEURA: Large left pleural effusion has enlarged in the interval   LUNGS: Near-complete collapse of the left lung. Left upper lobe mass measuring   2.4 x 3.2 cm unchanged   INCIDENTALLY IMAGED UPPER ABDOMEN: Bilateral adrenal nodules partially imaged   BONES: No destructive bone lesion. CXR Results  (Last 48 hours)               05/19/22 1546  XR CHEST PA LAT Final result    Impression:  Left mid-lung and left basilar consolidation and/or left pleural   effusion. Cardiomegaly. Narrative: Indication:  Shortness of Breath        Exam: PA and lateral views of the chest.       Direct comparison is made to prior CXR dated 5/4/2022. Findings: Cardiomediastinal silhouette is enlarged but partially obscured by   left mid-lung and left basilar opacification consistent with consolidation   and/or left pleural effusion. There is no pneumothorax. Right lung is clear. Medical Decision Making   I am the first and primary ED physician for this patient's ED visit today. I reviewed our electronic medical record system for any past medical records that may contribute to the patient's current condition, including their past medical history, surgical history, social and family history. This also includes their most recent ED visits, previous hospitalizations and prior diagnostic data. I have reviewed and summarized the most pertinent findings in my HPI and MDM.     Vital Signs Reviewed:  Patient Vitals for the past 24 hrs:   Temp Pulse Resp BP SpO2   05/19/22 1818 -- 71 26 123/83 100 %   05/19/22 1520 -- -- -- -- 97 %   05/19/22 1518 98 °F (36.7 °C) (!) 107 30 (!) 143/70 91 %     Pulse Oximetry Analysis: 91% on 2L NC    Cardiac Monitor:   Rate: 107 bpm  The cardiac monitor revealed the following rhythm as interpreted by me: sinus tachycardia  Cardiac monitoring was ordered to monitor patient for signs of cardiac dysrhythmia, which they are at risk for based on their history and/or risk for cardiovascular disease and/or metabolic abnormalities. EKG interpretation:   Billable EKG reviewed by ED Physician in the absence of a cardiologist: Yes  Rhythm: normal sinus rhythm; and regular . Rate (approx.): 88; Axis: normal; P wave: normal; QRS interval: normal ; ST/T wave: normal; Other findings: normal. This EKG was interpreted by Apryl Chandra MD    Records Reviewed: Nursing Notes, Old Medical Records, Previous electrocardiograms, Previous Radiology Studies and Previous Laboratory Studies, EMS reports    Provider Notes (Medical Decision Making):   Patient presents with acute dyspnea. DDx: asthma, copd, pna, pulmonary edema, acute bronchitis, ACS, ptx, pna. Will obtain EKG, labs, CXR, provide O2 as needed for hypoxia, treat symptomatically and reassess. Will continue to monitor closely in ED. ED Course:   Initial assessment performed. I discussed presenting problems and concerns, and my formulated plan for today's visit with the patient and any available family members. I have encouraged them to ask questions as they arise throughout the visit.    Social History     Tobacco Use    Smoking status: Current Every Day Smoker     Packs/day: 0.50     Years: 20.00     Pack years: 10.00    Smokeless tobacco: Never Used   Substance Use Topics    Alcohol use: Yes     Comment: occ    Drug use: Not on file     TOBACCO COUNSELING:  Upon evaluation, pt expressed that they are a current tobacco user. For approximately 3-5 mins, pt has been counseled on the dangers of smoking and was encouraged to quit as soon as possible in order to decrease further risks to their health. Pt has conveyed their understanding of the risks involved should they continue to use tobacco products. ED Orders Placed:  Orders Placed This Encounter    COVID-19 RAPID TEST    XR CHEST PA LAT    CTA CHEST W OR W WO CONT    IR THORACENTESIS CATH W IMAGE    CBC WITH AUTOMATED DIFF    METABOLIC PANEL, COMPREHENSIVE    TROPONIN-HIGH SENSITIVITY    NT-PRO BNP    METABOLIC PANEL, BASIC    CBC W/O DIFF    DIET NPO    SOB PANEL TRACKING (DO NOT DESELECT)    OXYGEN CANNULA (To maintain O2 sat greater than 92%) Consult MD if Hx. of COPD    PULSE OXIMETRY SPOT CHECK    CARDIAC MONITORING    VITAL SIGNS    ACTIVITY AS TOLERATED W/ASSIST    APPLY/MAINTAIN SEQUENTIAL COMPRESSION DEVICE    FULL CODE    IP CONSULT TO ONCOLOGY    EKG, 12 LEAD, INITIAL    SALINE LOCK IV ONE TIME STAT    INSERT PERIPHERAL IV ONE TIME STAT    iopamidoL (ISOVUE-370) 76 % injection 100 mL    amLODIPine (NORVASC) tablet 5 mg    escitalopram oxalate (LEXAPRO) tablet 20 mg    losartan (COZAAR) tablet 100 mg    DISCONTD: atorvastatin (LIPITOR) tablet 10 mg    sodium chloride (NS) flush 5-40 mL    sodium chloride (NS) flush 5-40 mL    OR Linked Order Group     acetaminophen (TYLENOL) tablet 650 mg     acetaminophen (TYLENOL) suppository 650 mg    polyethylene glycol (MIRALAX) packet 17 g    OR Linked Order Group     ondansetron (ZOFRAN ODT) tablet 4 mg     ondansetron (ZOFRAN) injection 4 mg    insulin lispro (HUMALOG) injection    glucose chewable tablet 16 g    glucagon (GLUCAGEN) injection 1 mg    dextrose 10% infusion 0-250 mL    atorvastatin (LIPITOR) tablet 10 mg    IP CONSULT TO PULMONOLOGY    INITIAL PHYSICIAN ORDER: INPATIENT Remote Telemetry; Yes; 3.  Patient receiving treatment that can only be provided in an inpatient setting (further clarification in H&P documentation)       ED Medications Administered During ED Course:  Medications   amLODIPine (NORVASC) tablet 5 mg (has no administration in time range)   escitalopram oxalate (LEXAPRO) tablet 20 mg (has no administration in time range)   losartan (COZAAR) tablet 100 mg (has no administration in time range)   sodium chloride (NS) flush 5-40 mL (has no administration in time range)   sodium chloride (NS) flush 5-40 mL (has no administration in time range)   acetaminophen (TYLENOL) tablet 650 mg (has no administration in time range)     Or   acetaminophen (TYLENOL) suppository 650 mg (has no administration in time range)   polyethylene glycol (MIRALAX) packet 17 g (has no administration in time range)   ondansetron (ZOFRAN ODT) tablet 4 mg (has no administration in time range)     Or   ondansetron (ZOFRAN) injection 4 mg (has no administration in time range)   insulin lispro (HUMALOG) injection (has no administration in time range)   glucose chewable tablet 16 g (has no administration in time range)   glucagon (GLUCAGEN) injection 1 mg (has no administration in time range)   dextrose 10% infusion 0-250 mL (has no administration in time range)   atorvastatin (LIPITOR) tablet 10 mg (has no administration in time range)   iopamidoL (ISOVUE-370) 76 % injection 100 mL (100 mL IntraVENous Given 5/19/22 1809)         Progress Note:  Concern for recurrent effusion; CXR with evidence of large left sided pleural effusion    Progress Note:  Will consult Pulmonary, may need repeat thoracentesis    Progress Note:  Sats remain 88% on RA, continuing airway management    Progress Note:  Trop 5, BNP 33, CTA with large left pleural effusion    Progress Note:  I have just re-evaluated the patient. Patient reports improvement of sx's.   I have reviewed Her vital signs and determined there is currently no worsening in their condition or physical exam. Results have been reviewed with them and their questions have been answered. We will continue to review further results as they come available. Consult Note:  Paula Oleary MD spoke with Veryl Lesches, MD  Specialty: Pulmonary  Discussed pt's hx, disposition, and available diagnostic and imaging results. Reviewed care plans. Agree with management and plan thus far. Consultant will evaluate pt. Agree we can hold off on thoracentesis til AM; may need drain placed. Consult Note:  Paula Oleary MD spoke with Dr. Amber Nugent  Specialty: Hospitalist  Discussed pt's hx, disposition, and available diagnostic and imaging results. Reviewed care plans. Agree with management and plan thus far. Consultant will evaluate pt. CRITICAL CARE NOTE :  IMPENDING DETERIORATION -Airway, Respiratory, Cardiovascular, Metabolic and Renal  ASSOCIATED RISK FACTORS - Hypotension, Shock, Hypoxia, Dysrhythmia, Metabolic changes and Dehydration  MANAGEMENT- Bedside Assessment and Supervision of Care  INTERPRETATION -  Xrays, CT Scan, Blood Gases, ECG and Blood Pressure  INTERVENTIONS - hemodynamic mngmt and Metobolic interventions  CASE REVIEW - Hospitalist/Intensivist, Medical Sub-Specialist, Nursing and Family  TREATMENT RESPONSE -Improved  PERFORMED BY - Self    NOTES   :  I personally spent 55 minutes of critical care time with this patient. This is time spent at this critically ill patient's bedside actively involved in patient care as well as the coordination of care and discussions with the patient's family. This includes time involved in ordering and reviewing of laboratory studies, pulse oximetry, re-evaluation of patient's condition, examination of patient, evaluation of patient's response to treatment, ordering and performing treatments and interventions, review of old charts, consultations with specialist, discussions with family regarding pertinent collateral history and plan of care, bedside attention and documentation.  During this entire length of time I was immediately available to the patient. This does not include time spent on separately reported billable procedures. Critical Care: The reason for providing this level of medical care for this critically-ill patient was due to a critical illness that impaired one or more vital organ systems, such that there was a high probability of imminent or life-threatening deterioration in the patient's condition. This care involved the highest level of preparedness to intervene urgently. This care involved high complexity decision making to assess, manipulate, and support vital system functions, to treat this degree of vital organ system failure, and to prevent further life threatening deterioration of the patients condition requiring frequent assessments and interventions. Tyra Kaur MD    ADMIT  Given the patient's current clinical presentation, I have a high level of concern for decompensation if discharged from the emergency department. Patient is being admitted to the hospital.  The results of their tests and reasons for their admission have been discussed with them and/or available family. They convey agreement and understanding for the need to be admitted and for their admission diagnosis. Consultation has been made with the inpatient physician specialist for hospitalization. Diagnosis   Clinical Impression:  1. Acute respiratory failure with hypoxia ([address]    2. Recurrent pleural effusion on left    3. 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 Lung mass    5. Non-small cell lung cancer, unspecified laterality ([address]    6. Acute dyspnea      Attestation:  Theo Graves MD, am the attending of record for this patient. I personally performed the services described in this documentation on this date, 5/19/2022 for patient, Leida Christiansen. I have reviewed the chart and verified that the record is accurate and complete.

## 2022-05-19 NOTE — TELEPHONE ENCOUNTER
1358  Returned pts call, HIPAA verified x2. Pt stated that she has been experiencing SOB with activity such as going up and down stairs and walking from room to room. Pt stated it has been more noticeable since yesterday. She also has a cough and feels like \"fluid is coming back\". Denies fever, body aches/chills. Stated that there is no pain, but discomfort around the left breast/rib area. I spoke to the team and we recommend pt go to the ER to be evaluated. Pt voiced understanding and has not further questions or concerns at this time.

## 2022-05-20 ENCOUNTER — APPOINTMENT (OUTPATIENT)
Dept: INTERVENTIONAL RADIOLOGY/VASCULAR | Age: 62
DRG: 136 | End: 2022-05-20
Attending: INTERNAL MEDICINE
Payer: MEDICAID

## 2022-05-20 ENCOUNTER — APPOINTMENT (OUTPATIENT)
Dept: ULTRASOUND IMAGING | Age: 62
DRG: 136 | End: 2022-05-20
Attending: INTERNAL MEDICINE
Payer: MEDICAID

## 2022-05-20 ENCOUNTER — APPOINTMENT (OUTPATIENT)
Dept: GENERAL RADIOLOGY | Age: 62
DRG: 136 | End: 2022-05-20
Attending: STUDENT IN AN ORGANIZED HEALTH CARE EDUCATION/TRAINING PROGRAM
Payer: MEDICAID

## 2022-05-20 DIAGNOSIS — C34.90 NSCLC METASTATIC TO ADRENAL GLAND (HCC): Primary | ICD-10-CM

## 2022-05-20 DIAGNOSIS — C79.70 NSCLC METASTATIC TO ADRENAL GLAND (HCC): Primary | ICD-10-CM

## 2022-05-20 LAB
ANION GAP SERPL CALC-SCNC: 4 MMOL/L (ref 5–15)
ATRIAL RATE: 88 BPM
BUN SERPL-MCNC: 10 MG/DL (ref 6–20)
BUN/CREAT SERPL: 15 (ref 12–20)
CALCIUM SERPL-MCNC: 8.8 MG/DL (ref 8.5–10.1)
CALCULATED P AXIS, ECG09: 53 DEGREES
CALCULATED R AXIS, ECG10: 32 DEGREES
CALCULATED T AXIS, ECG11: 27 DEGREES
CHLORIDE SERPL-SCNC: 108 MMOL/L (ref 97–108)
CO2 SERPL-SCNC: 28 MMOL/L (ref 21–32)
COMMENT, HOLDF: NORMAL
CREAT SERPL-MCNC: 0.65 MG/DL (ref 0.55–1.02)
DIAGNOSIS, 93000: NORMAL
ERYTHROCYTE [DISTWIDTH] IN BLOOD BY AUTOMATED COUNT: 13.8 % (ref 11.5–14.5)
GLUCOSE BLD STRIP.AUTO-MCNC: 113 MG/DL (ref 65–117)
GLUCOSE BLD STRIP.AUTO-MCNC: 148 MG/DL (ref 65–117)
GLUCOSE BLD STRIP.AUTO-MCNC: 98 MG/DL (ref 65–117)
GLUCOSE SERPL-MCNC: 116 MG/DL (ref 65–100)
HCT VFR BLD AUTO: 38.4 % (ref 35–47)
HGB BLD-MCNC: 12.4 G/DL (ref 11.5–16)
MCH RBC QN AUTO: 29.4 PG (ref 26–34)
MCHC RBC AUTO-ENTMCNC: 32.3 G/DL (ref 30–36.5)
MCV RBC AUTO: 91 FL (ref 80–99)
NRBC # BLD: 0 K/UL (ref 0–0.01)
NRBC BLD-RTO: 0 PER 100 WBC
P-R INTERVAL, ECG05: 142 MS
PLATELET # BLD AUTO: 296 K/UL (ref 150–400)
PMV BLD AUTO: 9.7 FL (ref 8.9–12.9)
POTASSIUM SERPL-SCNC: 3.6 MMOL/L (ref 3.5–5.1)
Q-T INTERVAL, ECG07: 396 MS
QRS DURATION, ECG06: 76 MS
QTC CALCULATION (BEZET), ECG08: 479 MS
RBC # BLD AUTO: 4.22 M/UL (ref 3.8–5.2)
SAMPLES BEING HELD,HOLD: NORMAL
SERVICE CMNT-IMP: ABNORMAL
SERVICE CMNT-IMP: NORMAL
SERVICE CMNT-IMP: NORMAL
SODIUM SERPL-SCNC: 140 MMOL/L (ref 136–145)
VENTRICULAR RATE, ECG03: 88 BPM
WBC # BLD AUTO: 7.4 K/UL (ref 3.6–11)

## 2022-05-20 PROCEDURE — 0W9B30Z DRAINAGE OF LEFT PLEURAL CAVITY WITH DRAINAGE DEVICE, PERCUTANEOUS APPROACH: ICD-10-PCS | Performed by: STUDENT IN AN ORGANIZED HEALTH CARE EDUCATION/TRAINING PROGRAM

## 2022-05-20 PROCEDURE — 65270000029 HC RM PRIVATE

## 2022-05-20 PROCEDURE — 77010033678 HC OXYGEN DAILY

## 2022-05-20 PROCEDURE — 2709999900 HC NON-CHARGEABLE SUPPLY

## 2022-05-20 PROCEDURE — 99223 1ST HOSP IP/OBS HIGH 75: CPT | Performed by: INTERNAL MEDICINE

## 2022-05-20 PROCEDURE — 82962 GLUCOSE BLOOD TEST: CPT

## 2022-05-20 PROCEDURE — 74011000250 HC RX REV CODE- 250: Performed by: INTERNAL MEDICINE

## 2022-05-20 PROCEDURE — 74011000250 HC RX REV CODE- 250

## 2022-05-20 PROCEDURE — 74011250637 HC RX REV CODE- 250/637: Performed by: STUDENT IN AN ORGANIZED HEALTH CARE EDUCATION/TRAINING PROGRAM

## 2022-05-20 PROCEDURE — 36415 COLL VENOUS BLD VENIPUNCTURE: CPT

## 2022-05-20 PROCEDURE — 80048 BASIC METABOLIC PNL TOTAL CA: CPT

## 2022-05-20 PROCEDURE — 74011250636 HC RX REV CODE- 250/636: Performed by: INTERNAL MEDICINE

## 2022-05-20 PROCEDURE — 85027 COMPLETE CBC AUTOMATED: CPT

## 2022-05-20 PROCEDURE — 74011250636 HC RX REV CODE- 250/636: Performed by: NURSE PRACTITIONER

## 2022-05-20 PROCEDURE — 74011250637 HC RX REV CODE- 250/637: Performed by: NURSE PRACTITIONER

## 2022-05-20 PROCEDURE — 74011250637 HC RX REV CODE- 250/637: Performed by: INTERNAL MEDICINE

## 2022-05-20 PROCEDURE — 77030018870 US THORACENTESIS LT NDL W IMAGE

## 2022-05-20 PROCEDURE — 71045 X-RAY EXAM CHEST 1 VIEW: CPT

## 2022-05-20 RX ORDER — LIDOCAINE HYDROCHLORIDE 20 MG/ML
10 INJECTION, SOLUTION INFILTRATION; PERINEURAL ONCE
Status: ACTIVE | OUTPATIENT
Start: 2022-05-20 | End: 2022-05-20

## 2022-05-20 RX ORDER — HEPARIN SODIUM 200 [USP'U]/100ML
400 INJECTION, SOLUTION INTRAVENOUS ONCE
Status: ACTIVE | OUTPATIENT
Start: 2022-05-20 | End: 2022-05-20

## 2022-05-20 RX ORDER — LIDOCAINE HYDROCHLORIDE 10 MG/ML
INJECTION INFILTRATION; PERINEURAL
Status: COMPLETED
Start: 2022-05-20 | End: 2022-05-20

## 2022-05-20 RX ORDER — LIDOCAINE HYDROCHLORIDE 10 MG/ML
20 INJECTION, SOLUTION EPIDURAL; INFILTRATION; INTRACAUDAL; PERINEURAL
Status: ACTIVE | OUTPATIENT
Start: 2022-05-20 | End: 2022-05-20

## 2022-05-20 RX ORDER — BENZONATATE 100 MG/1
100 CAPSULE ORAL
Status: DISCONTINUED | OUTPATIENT
Start: 2022-05-20 | End: 2022-05-24 | Stop reason: HOSPADM

## 2022-05-20 RX ORDER — MORPHINE SULFATE 2 MG/ML
2 INJECTION, SOLUTION INTRAMUSCULAR; INTRAVENOUS
Status: DISCONTINUED | OUTPATIENT
Start: 2022-05-20 | End: 2022-05-24 | Stop reason: HOSPADM

## 2022-05-20 RX ADMIN — ACETAMINOPHEN 650 MG: 325 TABLET ORAL at 15:54

## 2022-05-20 RX ADMIN — SODIUM CHLORIDE, PRESERVATIVE FREE 10 ML: 5 INJECTION INTRAVENOUS at 15:55

## 2022-05-20 RX ADMIN — LIDOCAINE HYDROCHLORIDE 5 ML: 10 INJECTION, SOLUTION INFILTRATION; PERINEURAL at 09:11

## 2022-05-20 RX ADMIN — MORPHINE SULFATE 2 MG: 2 INJECTION, SOLUTION INTRAMUSCULAR; INTRAVENOUS at 22:42

## 2022-05-20 RX ADMIN — BENZONATATE 100 MG: 100 CAPSULE ORAL at 16:01

## 2022-05-20 RX ADMIN — ONDANSETRON 4 MG: 2 INJECTION INTRAMUSCULAR; INTRAVENOUS at 11:13

## 2022-05-20 RX ADMIN — ATORVASTATIN CALCIUM 10 MG: 10 TABLET, FILM COATED ORAL at 22:35

## 2022-05-20 RX ADMIN — SODIUM CHLORIDE, PRESERVATIVE FREE 10 ML: 5 INJECTION INTRAVENOUS at 22:36

## 2022-05-20 RX ADMIN — LOSARTAN POTASSIUM 100 MG: 100 TABLET, FILM COATED ORAL at 12:49

## 2022-05-20 RX ADMIN — AMLODIPINE BESYLATE 5 MG: 5 TABLET ORAL at 11:13

## 2022-05-20 RX ADMIN — BENZONATATE 100 MG: 100 CAPSULE ORAL at 22:35

## 2022-05-20 RX ADMIN — ESCITALOPRAM OXALATE 20 MG: 10 TABLET ORAL at 16:01

## 2022-05-20 NOTE — PROGRESS NOTES
Transition of Care Plan:    RUR: 13% low risk for readmission. Pt is a readmit. Disposition: Home w/ spouse  Follow up appointments: PCP, Pulmonologist, Oncologist?  DME needed: Pt owns no DME. Pt currently on O2, will need to evaluate to determine if home O2 is needed for d/c  Transportation at Discharge: Pt's spouse  Keys or means to access home: Pt has access       IM Medicare Letter: N/A; Medicaid coverage only   Is patient a BCPI-A Bundle:N/A         If yes, was Bundle Letter given?:    Is patient a [place] and connected with the [address]? N/A                If yes, was [employer] transfer form completed and [facility] notified? Caregiver Contact: Pt would like for her sister to be main contact during hospitalization: Deanachadwick Todd [phone]  Discharge Caregiver contacted prior to discharge? To be contacted   Care Conference needed?: Not indicated                    Reason for Readmission:   Metastatic adenocarcinoma, recurrent left-sided pleural effusion due to malignancy            RUR Score/Risk Level:    13% low risk for readmission     PCP: First and Last name:  Mendel Martin   Name of Practice: [facility]   Are you a current patient: Yes/No: Yes   Approximate date of last visit: 4/28/22   Can you participate in a virtual visit with your PCP: No    Is a Care Conference indicated: Not indicated      Did you attend your follow up appointment (s): If not, why not:  Yes, pt attended f/u with pulmonologist and oncologist outpatient        Resources/supports as identified by patient/family: Spouse, family (sister, nieces)         Top Challenges facing patient (as identified by patient/family and CM): Finances/Medication cost? Denies concerns, prefers [address] [place] on [address]. Transportation   Spouse typically transports pt to hospitals      Support system or lack thereof? Family       Living arrangements?    Resides with spouse and mother; pt shares she is her mother's caregiver         Self-care/ADLs/Cognition? Pt is alert and oriented. Pt is independent at baseline with ADLs. Current Advanced Directive/Advance Care Plan:  Advance Care Planning   Healthcare Decision Maker:       Primary Decision Maker: Lucy Conrad - [phone]    Today we documented Decision Maker(s) consistent with Legal Next of Kin hierarchy. Pt has no ACP documents. Education provided re: AMD. Pt is aware that AMD can be completed during hospitalization if desired. Reviewed legal next of kin hierarchy with pt who verbalized understanding. Pt's spouse is listed as primary healthcare decision maker. Plan for utilizing home health: Pending hospital course, no HH hx reported              Transition of Care Plan:    Based on readmission, the patient's previous Plan of Care   has been evaluated and/or modified. The current Transition of Care Plan is: Home with spouse, outpatient follow up appts    CM reviewed chart. CM completed assessment with pt at bedside. CM introduced self, role of CM, verified demographics, and discussed transition of care planning. Pt previously hospitalized at [facility] from 5/4-5/6/2022 for lung mass. Pt returned to hospital due to shortness of breath. Transition plan is for pt to return home with spouse and outpatient follow up. Pt's spouse will transport. Pt established with Pulmonologist - Dr. Yuko Veras ([facility]) and Dr. Wilian Fay [facility]). Pt's sister and two nieces added to emergency contacts list as requested by pt. Pt denies concerns with transition of care at this time. Unit care management will continue to follow for transition of care planning needs. Care Management Interventions  PCP Verified by CM: Yes  Mode of Transport at Discharge:  Other (see comment) (Spouse)  Transition of Care Consult (CM Consult): Discharge Planning  Discharge Durable Medical Equipment: No (No DME)  Physical Therapy Consult: No  Occupational Therapy Consult: No  Speech Therapy Consult: No  Support Systems: Spouse/Significant Other,Parent(s),Other Family Member(s) (Pt identifies her spouse, sister, and two nieces as her support system.  Pt shares that she is the caregiver to her mother who resides with her.)  Confirm Follow Up Transport: Family (Spouse)  Discharge Location  Patient Expects to be Discharged to[de-identified] Home with outpatient services (Home with spouse, outpatient follow up appts)'    Readmission Assessment  Number of days since last admission?: 8-30 days  Previous disposition: Home with Family  Who is being interviewed?: Patient  What was the patient's/caregiver's perception as to why they think they needed to return back to the hospital?: Other (Comment) (Shortness of breath)  Did you visit your Primary Care Physician after you left the hospital, before you returned this time?: No  Why weren't you able to visit your PCP?: Other (Comment) (Had follow up appts with Pulmonologist and oncologist)  Did you see a specialist, such as Cardiac, Pulmonary, Orthopedic Physician, etc. after you left the hospital?: Yes (Pulmonologist - Dr. Mayelin Rae ([facility]); Dr. Joel Rao [facility]))  Who advised the patient to return to the hospital?: Self-referral  Does the patient report anything that got in the way of taking their medications?: No (Pt does have questions about which inhaler will be recommended for her, states she had issues obtaining what pulmonologist previously recommended through her insurance)  In our efforts to provide the best possible care to you and others like you, can you think of anything that we could have done to help you after you left the hospital the first time, so that you might not have needed to return so soon?: Other (Comment) (None voiced, pt returned to hospital for medical evaluation due to shortness of breath)    Laurence Archuleta [address]30, [address]

## 2022-05-20 NOTE — H&P
Hospitalist Admission Note    NAME: Juliano Lin   :  1960   MRN:  [id number]     Date/Time:  2022 10:21 PM    Patient PCP: Pasquale Roberts MD  ________________________________________________________________________    My assessment of this patient's clinical condition and my plan of care is as follows. Assessment / Plan:  Metastatic adenocarcinoma  Recurrent left-sided pleural effusion due to malignancy  -Consult IR for thoracentesis in AM.  She may need a Pleurx catheter if there is recurrence of effusion. Consult pulmonary. Consult oncology.  -Keep n.p.o. from midnight  -She is currently on 2 L nasal cannula and saturating at about 100%    Diabetes mellitus type 2  -Start insulin sliding scale with blood sugar checks. Hold home metformin. Hypertension  Depression  Dyslipidemia  -Continue home Norvasc, Lexapro, Lipitor          Code Status: Full code  Surrogate Decision Maker: Spouse Alexis    DVT Prophylaxis: Lovenox  GI Prophylaxis: not indicated    Baseline: From home, independent of ADLs        Subjective:   CHIEF COMPLAINT: Shortness of breath    HISTORY OF PRESENT ILLNESS:     Juliano Lin is a 57 y.o.   female who presents with recent diagnosis of metastatic adenocarcinoma, hypertension, diabetes mellitus is coming the hospital chief complaint of shortness of breath. Patient was recently admitted to the hospital and had an abnormal CT and was noted to have pleural effusion for which he underwent thoracentesis and cytology showed evidence of adenocarcinoma and was advised to have outpatient follow-up with oncology and also pulmonary. Patient reports continued to have shortness of breath along with some cough and also some chest pain for which he presented back to the emergency department. Does not report any fever or chills. On arrival to ED, vital signs are normal.  On labs CBC is normal.  BMP is normal.  LFTs are normal.  Troponin is 5. proBNP is 33. CTA shows large left pleural effusion. We were asked to admit for work up and evaluation of the above problems. Past Medical History:   Diagnosis Date    Diabetes mellitus type 2, noninsulin dependent ([address]     Generalized anxiety disorder     Hypercholesterolemia     Hypertension     Neoplasm of major salivary gland         Past Surgical History:   Procedure Laterality Date    COLONOSCOPY N/A 2/24/2021    . COLONOSCOPY  :- performed by Lennox Ort, [facility] at [facility] END[facility]COPY    HX ACL RECONSTRUCTION      HX COLONOSCOPY      HX HYSTERECTOMY         Social History     Tobacco Use    Smoking status: Current Every Day Smoker     Packs/day: 0.50     Years: 20.00     Pack years: 10.00    Smokeless tobacco: Never Used   Substance Use Topics    Alcohol use: Yes     Comment: occ        Family history  No CAD in the family    Allergies   Allergen Reactions    Erythromycin Other (comments)     Stomach cramps    Penicillins Hives        Prior to Admission medications    Medication Sig Start Date End Date Taking? Authorizing Provider   amLODIPine (NORVASC) 5 mg tablet Take 5 mg by mouth daily. Provider, Historical   ALPRAZolam (XANAX) 0.25 mg tablet Take  by mouth. Provider, Historical   metFORMIN (GLUCOPHAGE) 850 mg tablet Take 850 mg by mouth two (2) times daily (with meals). Provider, Historical   escitalopram oxalate (Lexapro) 20 mg tablet Take 20 mg by mouth daily. Provider, Historical   simvastatin (ZOCOR) 20 mg tablet Take 20 mg by mouth nightly. Provider, Historical   losartan (COZAAR) 100 mg tablet Take 100 mg by mouth daily. Provider, Historical   aspirin 81 mg chewable tablet Take 81 mg by mouth daily. Provider, Historical   acetaminophen (TYLENOL PO) Take 500 mg by mouth every six (6) hours as needed for Pain. Provider, Historical       REVIEW OF SYSTEMS:     I am not able to complete the review of systems because:    The patient is intubated and sedated    The patient has altered mental status due to his acute medical problems    The patient has baseline aphasia from prior stroke(s)    The patient has baseline dementia and is not reliable historian    The patient is in acute medical distress and unable to provide information           Total of 12 systems reviewed as follows:       POSITIVE= underlined text  Negative = text not underlined  General:  fever, chills, sweats, generalized weakness, weight loss/gain,      loss of appetite   Eyes:    blurred vision, eye pain, loss of vision, double vision  ENT:    rhinorrhea, pharyngitis   Respiratory:   cough, sputum production, SOB, CASTILLO, wheezing, pleuritic pain   Cardiology:   chest pain, palpitations, orthopnea, PND, edema, syncope   Gastrointestinal:  abdominal pain , N/V, diarrhea, dysphagia, constipation, bleeding   Genitourinary:  frequency, urgency, dysuria, hematuria, incontinence   Muskuloskeletal :  arthralgia, myalgia, back pain  Hematology:  easy bruising, nose or gum bleeding, lymphadenopathy   Dermatological: rash, ulceration, pruritis, color change / jaundice  Endocrine:   hot flashes or polydipsia   Neurological:  headache, dizziness, confusion, focal weakness, paresthesia,     Speech difficulties, memory loss, gait difficulty  Psychological: Feelings of anxiety, depression, agitation    Objective:   VITALS:    Visit Vitals  /83   Pulse 71   Temp 98 °F (36.7 °C)   Resp 26   Ht 5' 7\" (1.702 m)   Wt 103.4 kg (228 lb)   SpO2 100%   BMI 35.71 kg/m²       PHYSICAL EXAM:    General:    Alert, cooperative, no distress, appears stated age. HEENT: Atraumatic, anicteric sclerae, pink conjunctivae     No oral ulcers, mucosa moist, throat clear, dentition fair  Neck:  Supple, symmetrical,  thyroid: non tender  Lungs:   Air entry is diminished on the left, crackles present, no wheezing  Chest wall:  No tenderness  No Accessory muscle use.   Heart:   Regular  rhythm,  No  murmur   No edema  Abdomen:   Soft, non-tender. Not distended. Bowel sounds normal  Extremities: No cyanosis. No clubbing,      Skin turgor normal, Capillary refill normal, Radial dial pulse 2+  Skin:     Not pale. Not Jaundiced  No rashes   Psych:  Not anxious or agitated. Neurologic: EOMs intact. No facial asymmetry. No aphasia or slurred speech. Symmetrical strength, Sensation grossly intact. Alert and oriented X 4.     _______________________________________________________________________  Care Plan discussed with:    Comments   Patient y    Family      RN y    Care Manager                    Consultant:      _______________________________________________________________________  Expected  Disposition:   Home with Family y   HH/PT/OT/RN    SNF/LTC    MAYUR    ________________________________________________________________________  TOTAL TIME: 61 Minutes    Critical Care Provided     Minutes non procedure based      Comments    y Reviewed previous records   >50% of visit spent in counseling and coordination of care y Discussion with patient and/or family and questions answered       ________________________________________________________________________  Signed: Cherylene Police, MD    Procedures: see electronic medical records for all procedures/Xrays and details which were not copied into this note but were reviewed prior to creation of Plan.     LAB DATA REVIEWED:    Recent Results (from the past 24 hour(s))   CBC WITH AUTOMATED DIFF    Collection Time: 05/19/22  3:23 PM   Result Value Ref Range    WBC 8.3 3.6 - 11.0 K/uL    RBC 4.40 3.80 - 5.20 M/uL    HGB 13.1 11.5 - 16.0 g/dL    HCT 40.4 35.0 - 47.0 %    MCV 91.8 80.0 - 99.0 FL    MCH 29.8 26.0 - 34.0 PG    MCHC 32.4 30.0 - 36.5 g/dL    RDW 13.6 11.5 - 14.5 %    PLATELET 586 298 - 104 K/uL    MPV 9.9 8.9 - 12.9 FL    NRBC 0.0 0  WBC    ABSOLUTE NRBC 0.00 0.00 - 0.01 K/uL    NEUTROPHILS 72 32 - 75 %    LYMPHOCYTES 17 12 - 49 %    MONOCYTES 8 5 - 13 %    EOSINOPHILS 2 0 - 7 % BASOPHILS 1 0 - 1 %    IMMATURE GRANULOCYTES 0 0.0 - 0.5 %    ABS. NEUTROPHILS 5.9 1.8 - 8.0 K/UL    ABS. LYMPHOCYTES 1.4 0.8 - 3.5 K/UL    ABS. MONOCYTES 0.7 0.0 - 1.0 K/UL    ABS. EOSINOPHILS 0.2 0.0 - 0.4 K/UL    ABS. BASOPHILS 0.1 0.0 - 0.1 K/UL    ABS. IMM. GRANS. 0.0 0.00 - 0.04 K/UL    DF AUTOMATED     METABOLIC PANEL, COMPREHENSIVE    Collection Time: 05/19/22  3:23 PM   Result Value Ref Range    Sodium 140 136 - 145 mmol/L    Potassium 3.7 3.5 - 5.1 mmol/L    Chloride 108 97 - 108 mmol/L    CO2 27 21 - 32 mmol/L    Anion gap 5 5 - 15 mmol/L    Glucose 120 (H) 65 - 100 mg/dL    BUN 12 6 - 20 MG/DL    Creatinine 0.86 0.55 - 1.02 MG/DL    BUN/Creatinine ratio 14 12 - 20      GFR est AA >60 >60 ml/min/1.73m2    GFR est non-AA >60 >60 ml/min/1.73m2    Calcium 8.9 8.5 - 10.1 MG/DL    Bilirubin, total 0.9 0.2 - 1.0 MG/DL    ALT (SGPT) 26 12 - 78 U/L    AST (SGOT) 22 15 - 37 U/L    Alk.  phosphatase 93 45 - 117 U/L    Protein, total 7.7 6.4 - 8.2 g/dL    Albumin 3.3 (L) 3.5 - 5.0 g/dL    Globulin 4.4 (H) 2.0 - 4.0 g/dL    A-G Ratio 0.8 (L) 1.1 - 2.2     TROPONIN-HIGH SENSITIVITY    Collection Time: 05/19/22  3:23 PM   Result Value Ref Range    Troponin-High Sensitivity 5 0 - 51 ng/L   NT-PRO BNP    Collection Time: 05/19/22  3:23 PM   Result Value Ref Range    NT pro-BNP 33 <125 PG/ML   EKG, 12 LEAD, INITIAL    Collection Time: 05/19/22  3:25 PM   Result Value Ref Range    Ventricular Rate 88 BPM    Atrial Rate 88 BPM    P-R Interval 142 ms    QRS Duration 76 ms    Q-T Interval 396 ms    QTC Calculation (Bezet) 479 ms    Calculated P Axis 53 degrees    Calculated R Axis 32 degrees    Calculated T Axis 27 degrees    Diagnosis       Normal sinus rhythm  Septal infarct (cited on or before 19-MAY-2022)  When compared with ECG of 04-MAY-2022 09:51,  Questionable change in initial forces of Septal leads     COVID-19 RAPID TEST    Collection Time: 05/19/22  4:58 PM   Result Value Ref Range    Specimen source Nasopharyngeal COVID-19 rapid test Not detected NOTD

## 2022-05-20 NOTE — ED NOTES
Pt called nurse and asked her regarding what would happen next; if she's going to get admitted and when will be her expected discharge. Nurse spent 15 minutes answering pt's questions and let her know that she is going to get admitted, hospitalist will see patient, and that it will be up to the pulmonologist when she is ok to go home. Pt verbalized understanding of nurse update/education and states no further questions.

## 2022-05-20 NOTE — CONSULTS
[facility] [place] [facility] [place] [address] [address], [address]  W: [phone]  F: [phone]    Reason for visit   Young Mansfield is a 57 y.o. female who is seen as a new patient for stage IV NSCLC      Treatment History:   · 5/4/2022: Left thoracentecis- Adenocarcinoma     History of Present Illness:   Patient is a 25-year-old female with diabetes mellitus, hypertension who presented to the [facility] emergency room on 5/4/2022 with complaints of shortness of breath x 14 days which has been progressively getting worse over several weeks with mild cough. Denies any headaches, vision changes, falls trouble swallowing, chest pain, fevers, weight loss, hemoptysis. CTA on admission showed no evidence of pulmonary embolism but she was noted to have a moderate to large left-sided pleural effusion with a left apical mass measuring 28 x 20 mm, she had small pulmonary nodules on the right, possible hepatic hypodensity. She had ultrasound-guided thoracentesis of the left side on 5/4/2022 and 1300 cc of fluid was aspirated. This was sent for cytology which is pending. Pleural fluid cytology showed more than 100 RBCs, total protein of 5.3, albumin 2.8, . Labs were noted for a bilirubin of 1.1    She has a long h/o smoking. Has tried to quit and stopped 3 weeks ago. She is uptodate with colonoscopy and mammograms. Rondy her  is with her. She states that she has had some evening coughing spells. She has SOB with moderate activity but this is markedly improved. She has seen her Pulmonologist Dr. Pastor Robbins recently who told her the diagnosis. She used to work for [employer]. Interval History:     5/20/2022 Patient was admitted to [facility] for recurrent pleural effusion and respiratory failure. She current has a CT. Patient seen at bedside with  in the ED.  She has been following OP with Dr. Yanique Stevenson for new diagnosis of NSCLC and has not started treatment yet.       Past Medical History:   Diagnosis Date    Diabetes mellitus type 2, noninsulin dependent ([address]     Generalized anxiety disorder     Hypercholesterolemia     Hypertension     Neoplasm of major salivary gland       Past Surgical History:   Procedure Laterality Date    COLONOSCOPY N/A 2/24/2021    . COLONOSCOPY  :- performed by Angeles [facility] at [facility] ENDOSCOPY    HX ACL RECONSTRUCTION      HX COLONOSCOPY      HX HYSTERECTOMY        Social History     Tobacco Use    Smoking status: Current Every Day Smoker     Packs/day: 0.50     Years: 20.00     Pack years: 10.00    Smokeless tobacco: Never Used   Substance Use Topics    Alcohol use: Yes     Comment: occ      No family history on file.   Current Facility-Administered Medications   Medication Dose Route Frequency    heparinized saline 2 units/mL infusion 800 Units  400 mL Irrigation ONCE    lidocaine (XYLOCAINE) 20 mg/mL (2 %) injection 200 mg  10 mL SubCUTAneous ONCE    lidocaine (PF) (XYLOCAINE) 10 mg/mL (1 %) injection 20 mL  20 mL SubCUTAneous RAD ONCE    morphine injection 2 mg  2 mg IntraVENous Q4H PRN    benzonatate (TESSALON) capsule 100 mg  100 mg Oral TID PRN    amLODIPine (NORVASC) tablet 5 mg  5 mg Oral DAILY    escitalopram oxalate (LEXAPRO) tablet 20 mg  20 mg Oral DAILY    losartan (COZAAR) tablet 100 mg  100 mg Oral DAILY    sodium chloride (NS) flush 5-40 mL  5-40 mL IntraVENous Q8H    sodium chloride (NS) flush 5-40 mL  5-40 mL IntraVENous PRN    acetaminophen (TYLENOL) tablet 650 mg  650 mg Oral Q6H PRN    Or    acetaminophen (TYLENOL) suppository 650 mg  650 mg Rectal Q6H PRN    polyethylene glycol (MIRALAX) packet 17 g  17 g Oral DAILY PRN    ondansetron (ZOFRAN ODT) tablet 4 mg  4 mg Oral Q8H PRN    Or    ondansetron (ZOFRAN) injection 4 mg  4 mg IntraVENous Q6H PRN    insulin lispro (HUMALOG) injection   SubCUTAneous AC&HS    glucose chewable tablet 16 g  4 Tablet Oral PRN    glucagon (GLUCAGEN) injection 1 mg  1 mg IntraMUSCular PRN    dextrose 10% infusion 0-250 mL  0-250 mL IntraVENous PRN    atorvastatin (LIPITOR) tablet 10 mg  10 mg Oral QHS     Current Outpatient Medications   Medication Sig    amLODIPine (NORVASC) 5 mg tablet Take 5 mg by mouth daily.  ALPRAZolam (XANAX) 0.25 mg tablet Take  by mouth.  metFORMIN (GLUCOPHAGE) 850 mg tablet Take 850 mg by mouth two (2) times daily (with meals).  escitalopram oxalate (Lexapro) 20 mg tablet Take 20 mg by mouth daily.  simvastatin (ZOCOR) 20 mg tablet Take 20 mg by mouth nightly.  losartan (COZAAR) 100 mg tablet Take 100 mg by mouth daily.  aspirin 81 mg chewable tablet Take 81 mg by mouth daily.  acetaminophen (TYLENOL PO) Take 500 mg by mouth every six (6) hours as needed for Pain. Allergies   Allergen Reactions    Erythromycin Other (comments)     Stomach cramps    Penicillins Hives        Review of Systems: A complete review of systems was obtained, negative except as described above. Physical Exam:     Visit Vitals  /85   Pulse 80   Temp 98.8 °F (37.1 °C)   Resp 24   Ht 5' 7\" (1.702 m)   Wt 228 lb (103.4 kg)   SpO2 90%   BMI 35.71 kg/m²     ECOG PS: 1  General: No distress  Eyes: PERRLA, anicteric sclerae  HENT: Atraumatic  Neck: Supple  Lymphatic: No visible neck nodes  Respiratory: On 2LPM NC, SOB, cough, LS diminished   CV: no peripheral edema  GI:nondistended  Skin: No rashes  Psych: Alert, oriented,     Results:     Lab Results   Component Value Date/Time    WBC 7.4 05/20/2022 03:09 AM    HGB 12.4 05/20/2022 03:09 AM    HCT 38.4 05/20/2022 03:09 AM    PLATELET 067 45/52/0071 03:09 AM    MCV 91.0 05/20/2022 03:09 AM    ABS.  NEUTROPHILS 5.9 05/19/2022 03:23 PM     Lab Results   Component Value Date/Time    Sodium 140 05/20/2022 03:09 AM    Potassium 3.6 05/20/2022 03:09 AM    Chloride 108 05/20/2022 03:09 AM    CO2 28 05/20/2022 03:09 AM    Glucose 116 (H) 05/20/2022 03:09 AM    BUN 10 05/20/2022 03:09 AM    Creatinine 0.65 05/20/2022 03:09 AM    GFR est AA >60 05/20/2022 03:09 AM    GFR est non-AA >60 05/20/2022 03:09 AM    Calcium 8.8 05/20/2022 03:09 AM    Glucose (POC) 98 05/20/2022 11:17 AM    Creatinine (POC) 0.9 12/28/2012 06:39 AM     Lab Results   Component Value Date/Time    Bilirubin, total 0.9 05/19/2022 03:23 PM    ALT (SGPT) 26 05/19/2022 03:23 PM    Alk. phosphatase 93 05/19/2022 03:23 PM    Protein, total 7.7 05/19/2022 03:23 PM    Albumin 3.3 (L) 05/19/2022 03:23 PM    Globulin 4.4 (H) 05/19/2022 03:23 PM         Records reviewed and summarized above. Pathology report(s) reviewed above. Radiology report(s) reviewed above. CT chest abdomen pelvis 5/5/2022    IMPRESSION  Diminished left-sided pleural effusion, status post thoracentesis with stable  mass lesion at the left apex. Mass lesion demonstrated at the left apex is not amenable to percutaneous  sampling. There are numerous additional nodular densities along the pleural margin  consistent with pleural carcinomatosis. There are additional small nodular densities demonstrated in the peritoneum,  these most consistent with peritoneal carcinomatosis. Small adrenal nodule on  the left likely an additional metastatic focus. No definitive target for percutaneous sampling. Currently awaiting results of  pleural fluid analysis/cytology and cellblock. Assessment:   1) Non small cell Lung cancer- adenocarcinoma stage IV  CT chest abdomen and pelvis from 5/5/2022 was reviewed. Notable for large left-sided pleural effusion, concern for pleural carcinomatosis, left apical lung nodule measuring 20 x 26 mm, left adrenal nodule 19 x 18 mm, peritoneal nodularity concerning for carcinomatosis.   Status post left-sided thoracentesis   H/o Smoking    Understands that this looks like advanced malignancy and treatments will be palliative  Dictated my molecular studies which are pending    2) Recurrent Left pleural effusion  S/p thoracentesis OP  Admitted to hospital for recurrent pleural effusion and SOB   S/p CT insertion  Pulmonology following     3) H/P R parotidectomy for Warthin's tumor     4) Depression  On Lexapro    5) Psychosocial  Coping well   Supportive      Plan:     > Patient is following with Dr. Adrianne Sanders OP for new malignancy work up, she has not started treatment yet pending tumor markers   > Agree with CT   > Appreciate Pulmonology input - Plan to leave in CT over the weekend and reassess on Monday. If lung fully aerates, will pull Chest tube and send to see Thoracic Surgery next week as  outpt to arrange for pleurodesis. > Add morphine and tessalon pearls with cough and pain around CT   > Will need to follow up with primary oncologist at [facility]    I appreciate the opportunity to participate in Ms. Juan M Peña Dread's care. Will see PRN over the weekend.      Signed By: Quinton Mcdermott NP

## 2022-05-20 NOTE — CONSULTS
Pulmonary, Critical Care, and Sleep Medicine      Name: Pablo Platt MRN: [id number]   : 1960 Hospital: [facility]   Date: 2022  Admission date: 2022 Hospital Day: 2       History:   Pt is unstable and acutely ill. Medical records and data reviewed. Pt seen in Consultation    Hospital Problems  Date Reviewed: 2022          Codes Class Noted POA    Pleural effusion ICD-10-CM: J90  ICD-9-CM: 511.9  2022 Unknown        Primary lung adenocarcinoma ([facility] ICD-10-CM: C34.90  ICD-9-CM: 162.9  2022 Unknown        Acute hypoxemic respiratory failure ([facility] ICD-10-CM: J96.01  ICD-9-CM: 518.81  2022 Unknown              IMPRESSION:   1. Acute respiratory failure with Hypoxia   2. Large recurrent left malignant pleural effusion; thoracentesis 22; rapid return and symptomatic  3. Metastatic Adenocarcinoma lung to pleura with Left apical mass; possible adrenal, peritoneal implants? Diagnosed from pleural fluid; PDL1 positive 15% with 2+ intensity; EGFR, ROS1, BRAF not detected; ALK??; seeing Dr. Yamel Parnell; blood biopsies drawn last week1  4. HTN  5. T2 DM  6. Tobacco Use- quit month ago  7. Body mass index is 35.71 kg/m². RECOMMENDATIONS/PLAN:   1. Spoke with Radiology and asked that they place left chest tube to drain dry over the weekend  2. CXR Monday and hope to see full reexpansion  3. If lung fully aerates, will pull Chest tube and send to see Thoracic Surgery next week as  outpt to arrange for pleurodesis  4. I notified Lacy Tai at [address] [employer] and he is on standby  5. Discussed above with patient and she agrees  6. If lung does not fully reinflate, will need to reassess with chest CT   7. Oncology to see  8. Supplemental O2 to keep sats > 90%  9. DVT prophylaxis  10. Smoking cessation counseling done   11.  Prescription drug management with home med reconciliation reviewed          [x] High complexity decision making was performed  [x] See my orders for details      Subjective/Initial History:     I was asked by David Herrera MD to see Leida Christiansen  a 57 y.o.  female in consultation for a chief complaint of large recurrent malignant pleural effusion    Excerpts from admission 5/19/2022 or consult notes as follows:     Leida Christiansen is a 57 y.o. female, recent reformed cigarette smoker with PMH of T2DM and HTN who presented to [facility] 4th with SOB, cough. In the ER patient was found to have large pleural effusion. Pt seen by my partners and had thoracentesis by IR. Over 1.3 L removed 5/4. Pleural fluid exudative. Cytology result showed adenocarcinoma. CT chest post tap showed diminished left sided pleural effusion. Also noted was a lLeft apical mass, pleural carcinomatosis, peritoneal carcinomatosis on CT. CT chest abdomen pelvis mass lesion demonstrated at the left apex is not amenable to percutaneous sampling. There are numerous additional nodular densities along the pleural margin consistent with pleural carcinomatosis. There are additional small nodular densities demonstrated in the peritoneum, these most consistent with peritoneal carcinomatosis. Small adrenal nodule on the left likely an additional metastatic focus    Pt discharged to home on 5/6. Did well until couple days ago. Saw [facility] and more blood tests drawn. Came to ER last night. Placed on O2. Helped her cough and SOB. No pain. Chart reviewed. The patient denies any headache, blurry vision, sore throat, trouble swallowing, trouble with speech, chest pain,  fever, chills, N/V/D, abd pain, urinary symptoms, constipation, recent travels, sick contacts, focal or generalized neurological symptoms, falls, injuries, rashes, contact with COVID-19 diagnosed patients, hematemesis, melena, hemoptysis, hematuria, rashes, denies starting any new medications and denies any other concerns or problems besides as mentioned above.          Allergies   Allergen Reactions  Erythromycin Other (comments)     Stomach cramps    Penicillins Hives        MAR reviewed and pertinent medications noted or modified as needed     Current Facility-Administered Medications   Medication    heparinized saline 2 units/mL infusion 800 Units    lidocaine (XYLOCAINE) 20 mg/mL (2 %) injection 200 mg    lidocaine (PF) (XYLOCAINE) 10 mg/mL (1 %) injection 20 mL    morphine injection 2 mg    benzonatate (TESSALON) capsule 100 mg    amLODIPine (NORVASC) tablet 5 mg    escitalopram oxalate (LEXAPRO) tablet 20 mg    losartan (COZAAR) tablet 100 mg    sodium chloride (NS) flush 5-40 mL    sodium chloride (NS) flush 5-40 mL    acetaminophen (TYLENOL) tablet 650 mg    Or    acetaminophen (TYLENOL) suppository 650 mg    polyethylene glycol (MIRALAX) packet 17 g    ondansetron (ZOFRAN ODT) tablet 4 mg    Or    ondansetron (ZOFRAN) injection 4 mg    insulin lispro (HUMALOG) injection    glucose chewable tablet 16 g    glucagon (GLUCAGEN) injection 1 mg    dextrose 10% infusion 0-250 mL    atorvastatin (LIPITOR) tablet 10 mg     Current Outpatient Medications   Medication Sig    amLODIPine (NORVASC) 5 mg tablet Take 5 mg by mouth daily.  ALPRAZolam (XANAX) 0.25 mg tablet Take  by mouth.  metFORMIN (GLUCOPHAGE) 850 mg tablet Take 850 mg by mouth two (2) times daily (with meals).  escitalopram oxalate (Lexapro) 20 mg tablet Take 20 mg by mouth daily.  simvastatin (ZOCOR) 20 mg tablet Take 20 mg by mouth nightly.  losartan (COZAAR) 100 mg tablet Take 100 mg by mouth daily.  aspirin 81 mg chewable tablet Take 81 mg by mouth daily.  acetaminophen (TYLENOL PO) Take 500 mg by mouth every six (6) hours as needed for Pain. Patient PCP: Vandana Mejias MD  PMH:  has a past medical history of Diabetes mellitus type 2, noninsulin dependent ([address] Generalized anxiety disorder, Hypercholesterolemia, Hypertension, and Neoplasm of major salivary gland.   PSH:   has a past surgical history that includes hx acl reconstruction; hx colonoscopy; hx hysterectomy; and colonoscopy (N/A, 2021). FHX: family history is not on file. SHX:  reports that she has been smoking. She has a 10.00 pack-year smoking history. She has never used smokeless tobacco. She reports current alcohol use. ROS:A comprehensive review of systems was negative except for that written in the HPI. Objective:     Vital Signs: Telemetry:    normal sinus rhythm Intake/Output:   Visit Vitals  BP (!) 140/97   Pulse 82   Temp 98.8 °F (37.1 °C)   Resp 20   Ht 5' 7\" (1.702 m)   Wt 103.4 kg (228 lb)   SpO2 94%   BMI 35.71 kg/m²       Temp (24hrs), Av.6 °F (37 °C), Min:98 °F (36.7 °C), Max:98.9 °F (37.2 °C)        O2 Device: Nasal cannula O2 Flow Rate (L/min): 2 l/min       Wt Readings from Last 4 Encounters:   22 103.4 kg (228 lb)   22 103.4 kg (228 lb)   22 101.3 kg (223 lb 5.2 oz)   21 107.5 kg (237 lb)        No intake or output data in the 24 hours ending 22 1102    Last shift:      No intake/output data recorded. Last 3 shifts: No intake/output data recorded. Physical Exam:   General:  female; alert, oriented times 3, cooperative and moderately ill;  NC;  HEENT: NCAT, good dentition, lips and mucosa dry  Eyes: anicteric; conjunctiva clear  Neck: no nodes, no accessory MM use. Chest: no deformity,   Cardiac: regular rate, rhythm; no murmur  Lungs: no breath sounds on left; no wheezes, no rales, no rhonchi  Abd: soft, NT, hypoactive BS, protuberant,  Ext: no edema; no joint swelling;  No clubbing  : No riggins,   Neuro: fluent,  follows commands;   Psych- no agitation, oriented to person;   Skin: warm, dry, no cyanosis;   Pulses: 1-2+ Bilateral pedal, radial  Capillary: brisk;     Labs:    Recent Labs     22  0309 22  1523   WBC 7.4 8.3   HGB 12.4 13.1    329     Recent Labs     22  0309 22  1523    140   K 3.6 3.7   CL 108 108   CO2 28 27   * 120*   BUN 10 12   CREA 0.65 0.86   CA 8.8 8.9   ALB  --  3.3*   ALT  --  26     No results for input(s): PH, PCO2, PO2, HCO3, FIO2 in the last 72 hours. No results for input(s): CPK, CKNDX, TROIQ in the last 72 hours. No lab exists for component: CPKMB  No results found for: BNPP, BNP   Lab Results   Component Value Date/Time    Culture result: NO GROWTH 4 DAYS 05/04/2022 06:00 PM    Culture result: NO GROWTH 5 DAYS 05/04/2022 04:32 PM   No results found for: TSH, TSHEXT    Imaging:    CXR Results  (Last 48 hours)               05/19/22 1546  XR CHEST PA LAT Final result    Impression:  Left mid-lung and left basilar consolidation and/or left pleural   effusion. Cardiomegaly. Narrative: Indication:  Shortness of Breath        Exam: PA and lateral views of the chest.       Direct comparison is made to prior CXR dated 5/4/2022. Findings: Cardiomediastinal silhouette is enlarged but partially obscured by   left mid-lung and left basilar opacification consistent with consolidation   and/or left pleural effusion. There is no pneumothorax. Right lung is clear. Results from [place] encounter on 05/19/22    CTA CHEST W OR W WO CONT    Narrative  INDICATION: Lung cancer. Hypoxia    COMPARISON: Earlier same day and 5/5/2022    TECHNIQUE:  2.5 mm axial images were obtained from the bases to the lung apices  after the intravenous administration of 100 cc of Isovue-370. Three-dimensional  postprocessing was performed by the technologist with MIP reconstructions. CT  dose reduction was achieved through use of a standardized protocol tailored for  this examination and automatic exposure control for dose modulation. FINDINGS:    THYROID: No nodule. MEDIASTINUM: No mass or lymphadenopathy. Borderline right axillary nodes. WHIT: No mass or lymphadenopathy. THORACIC AORTA: No dissection or aneurysm.   MAIN PULMONARY ARTERY: No acute pulmonary embolus  TRACHEA/BRONCHI: Patent. ESOPHAGUS: No wall thickening or dilatation. HEART: Normal in size. Small pericardial effusion  PLEURA: Large left pleural effusion has enlarged in the interval  LUNGS: Near-complete collapse of the left lung. Left upper lobe mass measuring  2.4 x 3.2 cm unchanged  INCIDENTALLY IMAGED UPPER ABDOMEN: Bilateral adrenal nodules partially imaged  BONES: No destructive bone lesion. Impression  1. Interval enlargement of large left pleural effusion. Left apical mass  unchanged.  No acute pulmonary embolus      This care involved high complexity medical decision making: I personally:  · Reviewed the flowsheet and previous days notes  · Reviewed and summarized records or history from previous days note or discussions with staff, family  · High Risk Drug therapy requiring intensive monitoring for toxicity: eg steroids, antibiotics  · Reviewed and/or ordered Clinical lab tests  · Reviewed images and/or ordered Radiology tests  · Reviewed the patients / Telemetry  · discussed my assessment/management with : Nursing, Hospitalist for coordination of care    Juliette Montalvo MD

## 2022-05-20 NOTE — PROGRESS NOTES
CM reviewed chart, noted readmission to hospital. Pt previously hospitalized at [facility] from 5/4-5/6/2022 for lung mass. Pt returned to hospital due to shortness of breath. CM attempted to meet with pt to complete readmission assessment at bedside, care team currently at bedside, CM will re-attempt. Pt does have inpatient room assignment and is awaiting transport to inpatient unit.     Laurence Iglesias [facility], [address]

## 2022-05-21 ENCOUNTER — APPOINTMENT (OUTPATIENT)
Dept: GENERAL RADIOLOGY | Age: 62
DRG: 136 | End: 2022-05-21
Attending: INTERNAL MEDICINE
Payer: MEDICAID

## 2022-05-21 LAB
GLUCOSE BLD STRIP.AUTO-MCNC: 103 MG/DL (ref 65–117)
GLUCOSE BLD STRIP.AUTO-MCNC: 113 MG/DL (ref 65–117)
GLUCOSE BLD STRIP.AUTO-MCNC: 89 MG/DL (ref 65–117)
GLUCOSE BLD STRIP.AUTO-MCNC: 92 MG/DL (ref 65–117)
SERVICE CMNT-IMP: NORMAL

## 2022-05-21 PROCEDURE — 74011250637 HC RX REV CODE- 250/637: Performed by: INTERNAL MEDICINE

## 2022-05-21 PROCEDURE — 74011250637 HC RX REV CODE- 250/637: Performed by: STUDENT IN AN ORGANIZED HEALTH CARE EDUCATION/TRAINING PROGRAM

## 2022-05-21 PROCEDURE — 82962 GLUCOSE BLOOD TEST: CPT

## 2022-05-21 PROCEDURE — 74011250636 HC RX REV CODE- 250/636: Performed by: NURSE PRACTITIONER

## 2022-05-21 PROCEDURE — 74011000250 HC RX REV CODE- 250: Performed by: INTERNAL MEDICINE

## 2022-05-21 PROCEDURE — 71045 X-RAY EXAM CHEST 1 VIEW: CPT

## 2022-05-21 PROCEDURE — 65270000029 HC RM PRIVATE

## 2022-05-21 RX ADMIN — ESCITALOPRAM OXALATE 20 MG: 10 TABLET ORAL at 09:36

## 2022-05-21 RX ADMIN — ATORVASTATIN CALCIUM 10 MG: 10 TABLET, FILM COATED ORAL at 21:58

## 2022-05-21 RX ADMIN — SODIUM CHLORIDE, PRESERVATIVE FREE 10 ML: 5 INJECTION INTRAVENOUS at 21:59

## 2022-05-21 RX ADMIN — SODIUM CHLORIDE, PRESERVATIVE FREE 10 ML: 5 INJECTION INTRAVENOUS at 06:50

## 2022-05-21 RX ADMIN — SODIUM CHLORIDE, PRESERVATIVE FREE 5 ML: 5 INJECTION INTRAVENOUS at 14:00

## 2022-05-21 RX ADMIN — AMLODIPINE BESYLATE 5 MG: 5 TABLET ORAL at 09:36

## 2022-05-21 RX ADMIN — LOSARTAN POTASSIUM 100 MG: 100 TABLET, FILM COATED ORAL at 09:36

## 2022-05-21 RX ADMIN — ACETAMINOPHEN 650 MG: 325 TABLET ORAL at 09:40

## 2022-05-21 RX ADMIN — MORPHINE SULFATE 2 MG: 2 INJECTION, SOLUTION INTRAMUSCULAR; INTRAVENOUS at 20:15

## 2022-05-21 NOTE — PROGRESS NOTES
Hospitalist Progress Note    NAME: Annabelle December   :  1960   MRN:  [id number]       Assessment / Plan:    Metastatic adenocarcinoma  Recurrent left-sided pleural effusion due to malignancy  -seen by pulm  -getting chest tube  -drain over the weekend. -o2 if needed         Diabetes mellitus type 2  -Start insulin sliding scale with blood sugar checks. Hold home metformin.     Hypertension  Depression  Dyslipidemia  -Continue home Norvasc, Lexapro, Lipitor              Code Status: Full code  Surrogate Decision Maker: Spouse Alexis     DVT Prophylaxis: Lovenox  GI Prophylaxis: not indicated     Baseline: From home, independent of ADLs     Subjective:     Chief Complaint / Reason for Physician Visit  Getting chest tube. Discussed with RN events overnight. Review of Systems:  Symptom Y/N Comments  Symptom Y/N Comments   Fever/Chills    Chest Pain     Poor Appetite    Edema     Cough    Abdominal Pain     Sputum    Joint Pain     SOB/CASTILLO    Pruritis/Rash     Nausea/vomit    Tolerating PT/OT     Diarrhea    Tolerating Diet     Constipation    Other       Could NOT obtain due to:      Objective:     VITALS:   Last 24hrs VS reviewed since prior progress note.  Most recent are:  Patient Vitals for the past 24 hrs:   Temp Pulse Resp BP SpO2   22 2045 98.3 °F (36.8 °C) 78 18 131/74 93 %   22 1600 99.6 °F (37.6 °C) 88 19 127/72 96 %   22 1353 98.9 °F (37.2 °C) 84 22 135/73 92 %   22 1156 98.8 °F (37.1 °C) 80 24 126/85 90 %   22 0824 98.8 °F (37.1 °C) 82 20 (!) 140/97 94 %   22 0400 98.5 °F (36.9 °C) 76 27 (!) 141/88 93 %   22 0117 98.9 °F (37.2 °C) 85 23 (!) 133/90 95 %   22 0045 -- 86 (!) 35 (!) 155/77 91 %   22 0030 -- 83 (!) 32 (!) 158/102 95 %   22 0015 -- 81 26 (!) 150/95 94 %       Intake/Output Summary (Last 24 hours) at 2022  Last data filed at 2022 1220  Gross per 24 hour   Intake --   Output 2000 ml   Net -2000 ml        I had a face to face encounter and independently examined this patient on 5/20/2022, as outlined below:  PHYSICAL EXAM:  General: WD, WN. Alert, cooperative, no acute distress    EENT:  EOMI. Anicteric sclerae. MMM  Resp:  no wheezing or rales. No accessory muscle use  CV:  Regular  rhythm,    GI:  Soft, Non distended, Non tender. +Bowel sounds  Neurologic:  Alert and oriented X 3, normal speech,   Psych:   Good insight. Not anxious nor agitated  Skin:  No rashes. No jaundice    Reviewed most current lab test results and cultures  YES  Reviewed most current radiology test results   YES  Review and summation of old records today    NO  Reviewed patient's current orders and MAR    YES  PMH/SH reviewed - no change compared to H&P  ________________________________________________________________________  Care Plan discussed with:    Comments   Patient     Family      RN x    Care Manager     Consultant                        Multidiciplinary team rounds were held today with , nursing, pharmacist and clinical coordinator. Patient's plan of care was discussed; medications were reviewed and discharge planning was addressed. ________________________________________________________________________  Total NON critical care TIME:  5   Minutes    Total CRITICAL CARE TIME Spent:   Minutes non procedure based      Comments   >50% of visit spent in counseling and coordination of care     ________________________________________________________________________  Dameon Simpson MD     Procedures: see electronic medical records for all procedures/Xrays and details which were not copied into this note but were reviewed prior to creation of Plan. LABS:  I reviewed today's most current labs and imaging studies.   Pertinent labs include:  Recent Labs     05/20/22  0309 05/19/22  1523   WBC 7.4 8.3   HGB 12.4 13.1   HCT 38.4 40.4    329     Recent Labs     05/20/22  0309 05/19/22  1523    140   K 3.6 3.7  108   CO2 28 27   * 120*   BUN 10 12   CREA 0.65 0.86   CA 8.8 8.9   ALB  --  3.3*   TBILI  --  0.9   ALT  --  26       Signed: Jasbir Ybarra MD

## 2022-05-21 NOTE — PROGRESS NOTES
Pulmonary, Critical Care, and Sleep Medicine      Name: Luis Maynard MRN: [id number]   : 1960 Hospital: [facility]   Date: 2022  Admission date: 2022 Hospital Day: 3       History:   Pt is unstable and acutely ill. Medical records and data reviewed. Pt seen in Consultation    Hospital Problems  Date Reviewed: 2022          Codes Class Noted POA    Pleural effusion ICD-10-CM: J90  ICD-9-CM: 511.9  2022 Unknown        Primary lung adenocarcinoma ([facility] ICD-10-CM: C34.90  ICD-9-CM: 162.9  2022 Unknown        Acute hypoxemic respiratory failure ([facility] ICD-10-CM: J96.01  ICD-9-CM: 518.81  2022 Unknown            22 : Dyspnea much improved. Reviewed imaging. Reviewed plans w/ pt.  approx 2L blood tinged. No cough, cp. On o2 2LPM.    IMPRESSION:   1. Acute respiratory failure with Hypoxia   2. Large recurrent left malignant pleural effusion; thoracentesis 22; rapid return and symptomatic  3. Metastatic Adenocarcinoma lung to pleura with Left apical mass; possible adrenal, peritoneal implants? Diagnosed from pleural fluid; PDL1 positive 15% with 2+ intensity; EGFR, ROS1, BRAF not detected; ALK??; seeing Dr. Wiley Flores; blood biopsies drawn last week1  4. HTN  5. T2 DM  6. Tobacco Use- quit month ago  Body mass index is 35.71 kg/m². RECOMMENDATIONS/PLAN:   1. Continue left chest tube  2. Will check back on Monday w/ CXR and hope to see full reexpansion  3. If lung fully aerates, will pull Chest tube and send to see Thoracic Surgery next week as  outpt to arrange for pleurodesis  4. Robinson Glover at [facility] has been notified by Dr. Dexter Officer and he is on standby  5. Discussed above with patient and she agrees  6. If lung does not fully reinflate, will need to reassess with chest CT   7. Oncology to see  8. Supplemental O2 to keep sats > 90%  9. DVT prophylaxis  10. Smoking cessation counseling done   11.  Prescription drug management with home med reconciliation reviewed          [x] High complexity decision making was performed  [x] See my orders for details      Subjective/Initial History:     I was asked by Manuelito Mayer MD to see Mac Barrios  a 57 y.o.  female in consultation for a chief complaint of large recurrent malignant pleural effusion    Excerpts from admission 5/19/2022 or consult notes as follows:     Mac Barrios is a 57 y.o. female, recent reformed cigarette smoker with PMH of T2DM and HTN who presented to [facility] May 4th with SOB, cough. In the ER patient was found to have large pleural effusion. Pt seen by my partners and had thoracentesis by IR. Over 1.3 L removed 5/4. Pleural fluid exudative. Cytology result showed adenocarcinoma. CT chest post tap showed diminished left sided pleural effusion. Also noted was a lLeft apical mass, pleural carcinomatosis, peritoneal carcinomatosis on CT. CT chest abdomen pelvis mass lesion demonstrated at the left apex is not amenable to percutaneous sampling. There are numerous additional nodular densities along the pleural margin consistent with pleural carcinomatosis. There are additional small nodular densities demonstrated in the peritoneum, these most consistent with peritoneal carcinomatosis. Small adrenal nodule on the left likely an additional metastatic focus    Pt discharged to home on 5/6. Did well until couple days ago. Saw Governor Moreira and more blood tests drawn. Came to ER last night. Placed on O2. Helped her cough and SOB. No pain. Chart reviewed.       The patient denies any headache, blurry vision, sore throat, trouble swallowing, trouble with speech, chest pain,  fever, chills, N/V/D, abd pain, urinary symptoms, constipation, recent travels, sick contacts, focal or generalized neurological symptoms, falls, injuries, rashes, contact with COVID-19 diagnosed patients, hematemesis, melena, hemoptysis, hematuria, rashes, denies starting any new medications and denies any other concerns or problems besides as mentioned above. Allergies   Allergen Reactions    Erythromycin Other (comments)     Stomach cramps    Penicillins Hives        MAR reviewed and pertinent medications noted or modified as needed     Current Facility-Administered Medications   Medication    morphine injection 2 mg    benzonatate (TESSALON) capsule 100 mg    amLODIPine (NORVASC) tablet 5 mg    escitalopram oxalate (LEXAPRO) tablet 20 mg    losartan (COZAAR) tablet 100 mg    sodium chloride (NS) flush 5-40 mL    sodium chloride (NS) flush 5-40 mL    acetaminophen (TYLENOL) tablet 650 mg    Or    acetaminophen (TYLENOL) suppository 650 mg    polyethylene glycol (MIRALAX) packet 17 g    ondansetron (ZOFRAN ODT) tablet 4 mg    Or    ondansetron (ZOFRAN) injection 4 mg    insulin lispro (HUMALOG) injection    glucose chewable tablet 16 g    glucagon (GLUCAGEN) injection 1 mg    dextrose 10% infusion 0-250 mL    atorvastatin (LIPITOR) tablet 10 mg      Patient PCP: Gaby Dubose MD  PMH:  has a past medical history of Diabetes mellitus type 2, noninsulin dependent ([address] Generalized anxiety disorder, Hypercholesterolemia, Hypertension, and Neoplasm of major salivary gland. PSH:   has a past surgical history that includes hx acl reconstruction; hx colonoscopy; hx hysterectomy; and colonoscopy (N/A, 2/24/2021). FHX: family history is not on file. SHX:  reports that she has been smoking. She has a 10.00 pack-year smoking history. She has never used smokeless tobacco. She reports current alcohol use. ROS:A comprehensive review of systems was negative except for that written in the HPI.     Objective:     Vital Signs: Telemetry:    normal sinus rhythm Intake/Output:   Visit Vitals  /79 (BP 1 Location: Left upper arm, BP Patient Position: At rest)   Pulse 78   Temp 98.2 °F (36.8 °C)   Resp 18   Ht 5' 7\" (1.702 m)   Wt 103.4 kg (228 lb)   SpO2 91%   BMI 35.71 kg/m²       Temp (24hrs), Av.7 °F (37.1 °C), Min:98.2 °F (36.8 °C), Max:99.6 °F (37.6 °C)        O2 Device: None (Room air) O2 Flow Rate (L/min): 2 l/min       Wt Readings from Last 4 Encounters:   22 103.4 kg (228 lb)   22 103.4 kg (228 lb)   22 101.3 kg (223 lb 5.2 oz)   21 107.5 kg (237 lb)          Intake/Output Summary (Last 24 hours) at 2022 1456  Last data filed at 2022 0650  Gross per 24 hour   Intake --   Output 0 ml   Net - ml       Last shift:      No intake/output data recorded. Last 3 shifts:  190 -  07  In: -   Out:         Physical Exam:   General:  female; alert, oriented times 3, cooperative and moderately ill;  NC;  HEENT: NCAT, good dentition, lips and mucosa dry  Eyes: anicteric; conjunctiva clear  Neck: no nodes, no accessory MM use. Chest: no deformity,   Cardiac: regular rate, rhythm; no murmur  Lungs: no breath sounds on left; no wheezes, no rales, no rhonchi  Abd: soft, NT, hypoactive BS, protuberant,  Ext: no edema; no joint swelling; No clubbing  : No riggins,   Neuro: fluent,  follows commands;   Psych- no agitation, oriented to person;   Skin: warm, dry, no cyanosis;   Pulses: 1-2+ Bilateral pedal, radial  Capillary: brisk;     Labs:    Recent Labs     22  0309 22  1523   WBC 7.4 8.3   HGB 12.4 13.1    329     Recent Labs     22  0309 22  1523    140   K 3.6 3.7    108   CO2 28 27   * 120*   BUN 10 12   CREA 0.65 0.86   CA 8.8 8.9   ALB  --  3.3*   ALT  --  26     No results for input(s): PH, PCO2, PO2, HCO3, FIO2 in the last 72 hours. No results for input(s): CPK, CKNDX, TROIQ in the last 72 hours.     No lab exists for component: CPKMB  No results found for: BNPP, BNP   Lab Results   Component Value Date/Time    Culture result: NO GROWTH 4 DAYS 2022 06:00 PM    Culture result: NO GROWTH 5 DAYS 2022 04:32 PM   No results found for: TSH, TSHEXT, TSHEXT    Imaging:    CXR Results  (Last 48 hours)               22 0518  XR CHEST PORT Final result    Impression:      1. Decreasing left pleural effusion. Air lucency over the left lung base may   reflect a pneumothorax, likely related to chest tube placement. 2.  Grossly unchanged widespread interstitial opacities and patchy opacities   throughout the left lung. Narrative:  EXAM:  XR CHEST PORT       INDICATION: Respiratory failure       COMPARISON: Chest radiograph 2022       TECHNIQUE: Semiupright portable chest AP view       FINDINGS:        Cardiac mediastinal silhouette is stably mildly enlarged. Widespread prominent   interstitial opacities are similar to prior. There is an change patchy and hazy   opacity throughout the left lung. Decrease, now trace, left pleural effusion. An   approximately 7.0 cm x 2.5 cm ovoid air lucency projecting over the left lung   base may reflect a pneumothorax. Left chest tube. Bailey Dumontestefany 22 1145  XR CHEST PORT Final result    Impression:  Technically successful ultrasound guided left pleural drain placement yielding   approximately 100 ml of fluid. EXAM:  XR CHEST PORT, US THORACENTESIS LT NDL W IMAGE       INDICATION:  Left chest tube placement       COMPARISON: 2022. TECHNIQUE: AP portable chest radiograph. FINDINGS: Left pleural catheter in place. Extensive interstitial airspace   opacities left greater than right. No pneumothorax. IMPRESSION: Left pleural catheter placement. Narrative:  PATIENT NAME: Daniel Estevez                                               AGE, : 57 years, 1960   MRN: [id number]   DATE: 2022 12:15 PM       INDICATION:  Left pleural effusion       Supervising Physician: Madyson Cardona MD   Practicing Provider: Raheel Hastings PA-C       EXAM:  ULTRASOUND GUIDED THORACENTESIS       TECHNIQUE: Informed consent was obtained from patient/patient's consenting party for a pleural drain placement. Preliminary ultrasound imaging of the left   posterior thorax demonstrated a large amount of pleural fluid. An appropriate   site for the procedure was marked on the skin. The patient was prepped and draped in sterile fashion. 8cc of 1% lidocaine was   utilized for local anesthesia. A dermatotomy was made. A 8 Estonian centesis   catheter was then inserted into the pleural space using trocar technique. Thin,   yellow fluid was aspirated, and samples were sent as requested. Approximately   100 ml of fluid was obtained. The catheter was secure with 2-0 prolene suture    and connected to a Pleur-evac with waterseal. A sterile dressing was applied. The patient tolerated the procedure well. A postprocedural chest x-ray was   ordered. There were no immediate complications. 05/19/22 1546  XR CHEST PA LAT Final result    Impression:  Left mid-lung and left basilar consolidation and/or left pleural   effusion. Cardiomegaly. Narrative: Indication:  Shortness of Breath        Exam: PA and lateral views of the chest.       Direct comparison is made to prior CXR dated 5/4/2022. Findings: Cardiomediastinal silhouette is enlarged but partially obscured by   left mid-lung and left basilar opacification consistent with consolidation   and/or left pleural effusion. There is no pneumothorax. Right lung is clear. Results from [place] encounter on 05/19/22    CTA CHEST W OR W WO CONT    Narrative  INDICATION: Lung cancer. Hypoxia    COMPARISON: Earlier same day and 5/5/2022    TECHNIQUE:  2.5 mm axial images were obtained from the bases to the lung apices  after the intravenous administration of 100 cc of Isovue-370. Three-dimensional  postprocessing was performed by the technologist with MIP reconstructions.  CT  dose reduction was achieved through use of a standardized protocol tailored for  this examination and automatic exposure control for dose modulation. FINDINGS:    THYROID: No nodule. MEDIASTINUM: No mass or lymphadenopathy. Borderline right axillary nodes. WHIT: No mass or lymphadenopathy. THORACIC AORTA: No dissection or aneurysm. MAIN PULMONARY ARTERY: No acute pulmonary embolus  TRACHEA/BRONCHI: Patent. ESOPHAGUS: No wall thickening or dilatation. HEART: Normal in size. Small pericardial effusion  PLEURA: Large left pleural effusion has enlarged in the interval  LUNGS: Near-complete collapse of the left lung. Left upper lobe mass measuring  2.4 x 3.2 cm unchanged  INCIDENTALLY IMAGED UPPER ABDOMEN: Bilateral adrenal nodules partially imaged  BONES: No destructive bone lesion. Impression  1. Interval enlargement of large left pleural effusion. Left apical mass  unchanged.  No acute pulmonary embolus      This care involved high complexity medical decision making: I personally:  · Reviewed the flowsheet and previous days notes  · Reviewed and summarized records or history from previous days note or discussions with staff, family  · High Risk Drug therapy requiring intensive monitoring for toxicity: eg steroids, antibiotics  · Reviewed and/or ordered Clinical lab tests  · Reviewed images and/or ordered Radiology tests  · Reviewed the patients / Telemetry  · discussed my assessment/management with : Nursing, Hospitalist for coordination of care    Isma Rivera NP

## 2022-05-21 NOTE — PROGRESS NOTES
Hospitalist Progress Note    NAME: Chase Figueroa   :  1960   MRN:  [id number]       Assessment / Plan:    Metastatic adenocarcinoma  Recurrent left-sided pleural effusion due to malignancy  S/p left chest tube     -Continue left chest tube, unclamped  -Chest x-ray reviewed today, concern for mild pneumothorax on chest tube site. -CXR on Monday  -drain over the weekend. -o2 if needed         Diabetes mellitus type 2  -SSI     Hypertension  Depression  Dyslipidemia  -Continue home Norvasc, Lexapro, Lipitor              Code Status: Full code  Surrogate Decision Maker: Spouse Alexis     DVT Prophylaxis: Lovenox  GI Prophylaxis: not indicated     Baseline: From home, independent of ADLs     Subjective:     Chief Complaint / Reason for Physician Visit  Had a chest tube placed on left side yesterday, around 2 L drained so far. .  Reports she feels better    Review of Systems:  Symptom Y/N Comments  Symptom Y/N Comments   Fever/Chills n   Chest Pain n    Poor Appetite n   Edema n    Cough    Abdominal Pain     Sputum    Joint Pain     SOB/CASTILLO    Pruritis/Rash     Nausea/vomit    Tolerating PT/OT     Diarrhea    Tolerating Diet     Constipation    Other       Could NOT obtain due to:      Objective:     VITALS:   Last 24hrs VS reviewed since prior progress note.  Most recent are:  Patient Vitals for the past 24 hrs:   Temp Pulse Resp BP SpO2   22 0736 98.2 °F (36.8 °C) 78 18 136/79 91 %   22 0300 98.4 °F (36.9 °C) 77 18 130/72 94 %   22 2045 98.3 °F (36.8 °C) 78 18 131/74 93 %   22 1600 99.6 °F (37.6 °C) 88 19 127/72 96 %   22 1353 98.9 °F (37.2 °C) 84 22 135/73 92 %   22 1156 98.8 °F (37.1 °C) 80 24 126/85 90 %       Intake/Output Summary (Last 24 hours) at 2022 1038  Last data filed at 2022 7449  Gross per 24 hour   Intake --   Output 2050 ml   Net -2050 ml        I had a face to face encounter and independently examined this patient on 2022, as outlined below:  PHYSICAL EXAM:  General: WD, WN. Alert, cooperative, no acute distress    EENT:  EOMI. Anicteric sclerae. MMM  Resp:  Decreased air entry on left side, right lung clear  CV:  Regular  rhythm,    GI:  Soft, Non distended, Non tender. +Bowel sounds  Neurologic:  Alert and oriented X 3, normal speech,   Psych:   Good insight. Not anxious nor agitated  Skin:  No rashes. No jaundice    Reviewed most current lab test results and cultures  YES  Reviewed most current radiology test results   YES  Review and summation of old records today    NO  Reviewed patient's current orders and MAR    YES  PMH/SH reviewed - no change compared to H&P  ________________________________________________________________________  Care Plan discussed with:    Comments   Patient     Family      RN x    Care Manager     Consultant                        Multidiciplinary team rounds were held today with , nursing, pharmacist and clinical coordinator. Patient's plan of care was discussed; medications were reviewed and discharge planning was addressed. ________________________________________________________________________  Total NON critical care TIME:  12 Minutes    Total CRITICAL CARE TIME Spent:   Minutes non procedure based      Comments   >50% of visit spent in counseling and coordination of care     ________________________________________________________________________  Jeffery Bunn MD     Procedures: see electronic medical records for all procedures/Xrays and details which were not copied into this note but were reviewed prior to creation of Plan. LABS:  I reviewed today's most current labs and imaging studies.   Pertinent labs include:  Recent Labs     05/20/22  0309 05/19/22  1523   WBC 7.4 8.3   HGB 12.4 13.1   HCT 38.4 40.4    329     Recent Labs     05/20/22  0309 05/19/22  1523    140   K 3.6 3.7    108   CO2 28 27   * 120*   BUN 10 12   CREA 0.65 0.86   CA 8.8 8.9 ALB  --  3.3*   TBILI  --  0.9   ALT  --  26       Signed: Annette Daniel MD

## 2022-05-21 NOTE — PROGRESS NOTES
Bedside and Verbal shift change report given to Sil8 Destin Casey (oncoming nurse) by Jaimie Hernandez RN (offgoing nurse). Report included the following information SBAR, Kardex, Intake/Output, MAR, Accordion, Recent Results and Med Rec Status.

## 2022-05-22 LAB
GLUCOSE BLD STRIP.AUTO-MCNC: 102 MG/DL (ref 65–117)
GLUCOSE BLD STRIP.AUTO-MCNC: 118 MG/DL (ref 65–117)
GLUCOSE BLD STRIP.AUTO-MCNC: 125 MG/DL (ref 65–117)
GLUCOSE BLD STRIP.AUTO-MCNC: 204 MG/DL (ref 65–117)
SERVICE CMNT-IMP: ABNORMAL
SERVICE CMNT-IMP: NORMAL

## 2022-05-22 PROCEDURE — 74011000250 HC RX REV CODE- 250: Performed by: INTERNAL MEDICINE

## 2022-05-22 PROCEDURE — 74011636637 HC RX REV CODE- 636/637: Performed by: INTERNAL MEDICINE

## 2022-05-22 PROCEDURE — 65270000029 HC RM PRIVATE

## 2022-05-22 PROCEDURE — 74011250637 HC RX REV CODE- 250/637: Performed by: INTERNAL MEDICINE

## 2022-05-22 PROCEDURE — 82962 GLUCOSE BLOOD TEST: CPT

## 2022-05-22 PROCEDURE — 77010033678 HC OXYGEN DAILY

## 2022-05-22 PROCEDURE — 74011250637 HC RX REV CODE- 250/637: Performed by: STUDENT IN AN ORGANIZED HEALTH CARE EDUCATION/TRAINING PROGRAM

## 2022-05-22 RX ADMIN — ESCITALOPRAM OXALATE 20 MG: 10 TABLET ORAL at 08:26

## 2022-05-22 RX ADMIN — SODIUM CHLORIDE, PRESERVATIVE FREE 10 ML: 5 INJECTION INTRAVENOUS at 14:06

## 2022-05-22 RX ADMIN — SODIUM CHLORIDE, PRESERVATIVE FREE 10 ML: 5 INJECTION INTRAVENOUS at 23:57

## 2022-05-22 RX ADMIN — ATORVASTATIN CALCIUM 10 MG: 10 TABLET, FILM COATED ORAL at 23:57

## 2022-05-22 RX ADMIN — SODIUM CHLORIDE, PRESERVATIVE FREE 10 ML: 5 INJECTION INTRAVENOUS at 07:03

## 2022-05-22 RX ADMIN — Medication 2 UNITS: at 23:57

## 2022-05-22 RX ADMIN — POLYETHYLENE GLYCOL 3350 17 G: 17 POWDER, FOR SOLUTION ORAL at 00:46

## 2022-05-22 RX ADMIN — AMLODIPINE BESYLATE 5 MG: 5 TABLET ORAL at 08:26

## 2022-05-22 RX ADMIN — LOSARTAN POTASSIUM 100 MG: 100 TABLET, FILM COATED ORAL at 08:26

## 2022-05-22 NOTE — PROGRESS NOTES
End of Shift Note    Bedside shift change report given to Chilo (oncoming nurse) by Zayda Allen RN (offgoing nurse).   Report included the following information SBAR, Kardex, Intake/Output, MAR, Accordion, Recent Results and Med Rec Status    Shift worked:  7PM-7AM     Shift summary and any significant changes:     Pt  Given morphine X 1 for  Back pain and miralaxc for constipation      Concerns for physician to address:  As  above     Zone phone for oncoming shift:            Zayda Allen RN

## 2022-05-22 NOTE — PROGRESS NOTES
Hospitalist Progress Note    NAME: Beryle Birmingham   :  1960   MRN:  [id number]       Assessment / Plan:    Metastatic adenocarcinoma  Recurrent left-sided pleural effusion due to malignancy  S/p left chest tube     -Continue left chest tube, unclamped  -Chest x-ray reviewed today, concern for mild pneumothorax on chest tube site. -CXR on Monday  -drain over the weekend. -o2 if needed         Diabetes mellitus type 2  -SSI     Hypertension  Depression  Dyslipidemia  -Continue home Norvasc, Lexapro, Lipitor              Code Status: Full code  Surrogate Decision Maker: Spouse Alexis     DVT Prophylaxis: Lovenox  GI Prophylaxis: not indicated     Baseline: From home, independent of ADLs    -Anticipated discharge -, depending on CXR tomorrow     Subjective:     Chief Complaint / Reason for Physician Visit  As chest tube, unclamped, around 200 fluid output yesterday. She feels okay  Review of Systems:  Symptom Y/N Comments  Symptom Y/N Comments   Fever/Chills n   Chest Pain n    Poor Appetite n   Edema n    Cough    Abdominal Pain     Sputum    Joint Pain     SOB/CASTILLO    Pruritis/Rash     Nausea/vomit    Tolerating PT/OT     Diarrhea    Tolerating Diet     Constipation    Other       Could NOT obtain due to:      Objective:     VITALS:   Last 24hrs VS reviewed since prior progress note.  Most recent are:  Patient Vitals for the past 24 hrs:   Temp Pulse Resp BP SpO2   22 1141 98.1 °F (36.7 °C) 73 16 118/75 94 %   22 0905 98.6 °F (37 °C) 78 16 119/75 94 %   22 2331 98.4 °F (36.9 °C) 80 18 (!) 142/83 98 %   22 1937 98.4 °F (36.9 °C) 76 20 113/69 98 %   22 1500 97.6 °F (36.4 °C) 70 18 124/71 100 %       Intake/Output Summary (Last 24 hours) at 2022 1401  Last data filed at 2022 1500  Gross per 24 hour   Intake --   Output 200 ml   Net -200 ml        I had a face to face encounter and independently examined this patient on 2022, as outlined below:  PHYSICAL EXAM:  General: WD, WN. Alert, cooperative, no acute distress    EENT:  EOMI. Anicteric sclerae. MMM  Resp:  Improved air entry on lung, likely small residual pleural effusion on left  CV:  Regular  rhythm,    GI:  Soft, Non distended, Non tender. +Bowel sounds  Neurologic:  Alert and oriented X 3, normal speech,   Psych:   Good insight. Not anxious nor agitated  Skin:  No rashes. No jaundice    Reviewed most current lab test results and cultures  YES  Reviewed most current radiology test results   YES  Review and summation of old records today    NO  Reviewed patient's current orders and MAR    YES  PMH/SH reviewed - no change compared to H&P  ________________________________________________________________________  Care Plan discussed with:    Comments   Patient x    Family      RN x    Care Manager     Consultant                        Multidiciplinary team rounds were held today with , nursing, pharmacist and clinical coordinator. Patient's plan of care was discussed; medications were reviewed and discharge planning was addressed. ________________________________________________________________________  Total NON critical care TIME:  12 Minutes    Total CRITICAL CARE TIME Spent:   Minutes non procedure based      Comments   >50% of visit spent in counseling and coordination of care     ________________________________________________________________________  Victor Hugo Garner MD     Procedures: see electronic medical records for all procedures/Xrays and details which were not copied into this note but were reviewed prior to creation of Plan. LABS:  I reviewed today's most current labs and imaging studies.   Pertinent labs include:  Recent Labs     05/20/22 0309 05/19/22  1523   WBC 7.4 8.3   HGB 12.4 13.1   HCT 38.4 40.4    329     Recent Labs     05/20/22  0309 05/19/22  1523    140   K 3.6 3.7    108   CO2 28 27   * 120*   BUN 10 12   CREA 0.65 0. 86   CA 8.8 8.9   ALB  --  3.3*   TBILI  --  0.9   ALT  --  26       Signed: Anthony Sharma MD

## 2022-05-23 ENCOUNTER — APPOINTMENT (OUTPATIENT)
Dept: GENERAL RADIOLOGY | Age: 62
DRG: 136 | End: 2022-05-23
Attending: INTERNAL MEDICINE
Payer: MEDICAID

## 2022-05-23 LAB
GLUCOSE BLD STRIP.AUTO-MCNC: 114 MG/DL (ref 65–117)
GLUCOSE BLD STRIP.AUTO-MCNC: 140 MG/DL (ref 65–117)
GLUCOSE BLD STRIP.AUTO-MCNC: 152 MG/DL (ref 65–117)
GLUCOSE BLD STRIP.AUTO-MCNC: 80 MG/DL (ref 65–117)
SERVICE CMNT-IMP: ABNORMAL
SERVICE CMNT-IMP: ABNORMAL
SERVICE CMNT-IMP: NORMAL
SERVICE CMNT-IMP: NORMAL

## 2022-05-23 PROCEDURE — 74011250637 HC RX REV CODE- 250/637: Performed by: STUDENT IN AN ORGANIZED HEALTH CARE EDUCATION/TRAINING PROGRAM

## 2022-05-23 PROCEDURE — 65270000029 HC RM PRIVATE

## 2022-05-23 PROCEDURE — 71045 X-RAY EXAM CHEST 1 VIEW: CPT

## 2022-05-23 PROCEDURE — 99232 SBSQ HOSP IP/OBS MODERATE 35: CPT | Performed by: INTERNAL MEDICINE

## 2022-05-23 PROCEDURE — 74011250637 HC RX REV CODE- 250/637: Performed by: INTERNAL MEDICINE

## 2022-05-23 PROCEDURE — 74011000250 HC RX REV CODE- 250: Performed by: INTERNAL MEDICINE

## 2022-05-23 PROCEDURE — 82962 GLUCOSE BLOOD TEST: CPT

## 2022-05-23 RX ADMIN — AMLODIPINE BESYLATE 5 MG: 5 TABLET ORAL at 11:21

## 2022-05-23 RX ADMIN — LOSARTAN POTASSIUM 100 MG: 100 TABLET, FILM COATED ORAL at 11:20

## 2022-05-23 RX ADMIN — ATORVASTATIN CALCIUM 10 MG: 10 TABLET, FILM COATED ORAL at 21:54

## 2022-05-23 RX ADMIN — SODIUM CHLORIDE, PRESERVATIVE FREE 10 ML: 5 INJECTION INTRAVENOUS at 11:21

## 2022-05-23 RX ADMIN — SODIUM CHLORIDE, PRESERVATIVE FREE 10 ML: 5 INJECTION INTRAVENOUS at 21:54

## 2022-05-23 RX ADMIN — ESCITALOPRAM OXALATE 20 MG: 10 TABLET ORAL at 11:20

## 2022-05-23 NOTE — PROGRESS NOTES
0730  Change of shift report received at this time. 1730  No output chest tube during my shift. No complaints of shortness of breath or pain verbalized. Room air. Dressing intact. End of Shift Note    Bedside shift change report given to (oncoming nurse) by Pancho Gardner RN (offgoing nurse). Report included the following information SBAR, Intake/Output, MAR, Recent Results and Cardiac Rhythm NSr    Shift worked:  1363-1621       Shift summary and any significant changes:     no output from chest tube this shift       Concerns for physician to address:  none       Zone phone for oncoming shift:   [phone]         Activity:  Activity Level: Up with Assistance  Number times ambulated in hallways past shift: 0  Number of times OOB to chair past shift: 0    Cardiac:   Cardiac Monitoring: Yes      Cardiac Rhythm: Sinus Rhythm    Access:   Current line(s): PIV     Genitourinary:   Urinary status: voiding    Respiratory:   O2 Device: Nasal cannula  Chronic home O2 use?: N/A  Incentive spirometer at bedside: NO     GI:  Last Bowel Movement Date: 05/19/22  Current diet:  ADULT DIET Regular; 3 carb choices (45 gm/meal)  DIET ONE TIME MESSAGE  Passing flatus: YES  Tolerating current diet: NO       Pain Management:   Patient states pain is manageable on current regimen: Yes  Skin:  Román Score: 22  Interventions: increase time out of bed    Patient Safety:  Fall Score:  Total Score: 1  Interventions: pt to call before getting OOB       Length of Stay:  Expected LOS: 4d 21h  Actual LOS: 3      Pancho Gardner RN

## 2022-05-23 NOTE — PROGRESS NOTES
Transition of Care Plan:     RUR: 13% low risk for readmission. Pt is a readmit. Disposition: Home w/ spouse  Follow up appointments: PCP, Pulmonologist, Oncologist?  DME needed: Pt owns no DME. Pt currently on O2, will need to evaluate to determine if home O2 is needed for d/c  Transportation at Discharge: Pt's spouse  Keys or means to access home: Pt has access       IM Medicare Letter: N/A; Medicaid coverage only   Is patient a BCPI-A Bundle:N/A                    If yes, was Bundle Letter given?:    Is patient a  and connected with the [place]? N/A                If yes, was [employer] transfer form completed and [facility] notified? Caregiver Contact: Pt would like for her sister to be main contact during hospitalization: Yvon Blount [phone]  Discharge Caregiver contacted prior to discharge? To be contacted   Care Conference needed?: Not indicated       Initial Note: Chart reviewed. Discharge plan discussed during IDR. Pt not medically stable for d/c at this time. MARLON is 5/24. CM will continue to follow for dcp.     JAQUAN Boucher  Care Manager, [address] [place] [address]

## 2022-05-23 NOTE — PROGRESS NOTES
Problem: Falls - Risk of  Goal: *Absence of Falls  Description: Document Roxann Spray Fall Risk and appropriate interventions in the flowsheet.   Outcome: Progressing Towards Goal  Note: Fall Risk Interventions:            Medication Interventions: Patient to call before getting OOB                   Problem: Patient Education: Go to Patient Education Activity  Goal: Patient/Family Education  Outcome: Progressing Towards Goal

## 2022-05-23 NOTE — PROGRESS NOTES
Pulmonary, Critical Care, and Sleep Medicine      Name: Toni Neves MRN: [id number]   : 1960 Hospital: [facility]   Date: 2022  Admission date: 2022 Hospital Day: 5       IMPRESSION:   1. Acute respiratory failure with Hypoxia   2. Large recurrent left malignant pleural effusion; thoracentesis 22; rapid return and symptomatic  3. Metastatic Adenocarcinoma lung to pleura with Left apical mass; possible adrenal, peritoneal implants? Diagnosed from pleural fluid; PDL1 positive 15% with 2+ intensity; EGFR, ROS1, BRAF not detected; ALK??; seeing Dr. Michael Faye; blood biopsies drawn last week1  4. HTN  5. T2 DM  6. Tobacco Use- quit month ago  Body mass index is 35.71 kg/m². RECOMMENDATIONS/PLAN:   1. Continue left chest tube  2. Will place an aspira catheter today or tomorrow  3. Pt will start chemo this week and wants to be able to start it right away  4. Discussed above with patient and she agrees   5. Oncology following  6. Supplemental O2 to keep sats > 90%  7. DVT prophylaxis  8. Smoking cessation counseling done   9. Will ask IR to place Aspira catheter  10.  Will sign off          [x] High complexity decision making was performed  [x] See my orders for details      Subjective/Initial History:     No acute events overnight  No acute distress  No acute complaints   Pt was given the choice to get an Aspira catheter today and start chemo this week or to go to [facility] for possible pleurodesis lat this week maybe and delay chemo until nest week  She clearly stated that she wants to prioritize treatment so chose Aspira catheter placement    MAR reviewed and pertinent medications noted or modified as needed     Current Facility-Administered Medications   Medication    morphine injection 2 mg    benzonatate (TESSALON) capsule 100 mg    amLODIPine (NORVASC) tablet 5 mg    escitalopram oxalate (LEXAPRO) tablet 20 mg    losartan (COZAAR) tablet 100 mg    sodium chloride (NS) flush 5-40 mL    sodium chloride (NS) flush 5-40 mL    acetaminophen (TYLENOL) tablet 650 mg    Or    acetaminophen (TYLENOL) suppository 650 mg    polyethylene glycol (MIRALAX) packet 17 g    ondansetron (ZOFRAN ODT) tablet 4 mg    Or    ondansetron (ZOFRAN) injection 4 mg    insulin lispro (HUMALOG) injection    glucose chewable tablet 16 g    glucagon (GLUCAGEN) injection 1 mg    dextrose 10% infusion 0-250 mL    atorvastatin (LIPITOR) tablet 10 mg      Patient PCP: Molly Bernard MD  PMH:  has a past medical history of Diabetes mellitus type 2, noninsulin dependent ([address] Generalized anxiety disorder, Hypercholesterolemia, Hypertension, and Neoplasm of major salivary gland. PSH:   has a past surgical history that includes hx acl reconstruction; hx colonoscopy; hx hysterectomy; and colonoscopy (N/A, 2021). FHX: family history is not on file. SHX:  reports that she has been smoking. She has a 10.00 pack-year smoking history. She has never used smokeless tobacco. She reports current alcohol use. ROS:A comprehensive review of systems was negative except for that written in the HPI. Objective:     Vital Signs: Telemetry:    normal sinus rhythm Intake/Output:   Visit Vitals  /71   Pulse 77   Temp 98.1 °F (36.7 °C)   Resp 18   Ht 5' 7\" (1.702 m)   Wt 103.4 kg (228 lb)   SpO2 92%   BMI 35.71 kg/m²       Temp (24hrs), Av.3 °F (36.8 °C), Min:98.1 °F (36.7 °C), Max:98.6 °F (37 °C)        O2 Device: Nasal cannula O2 Flow Rate (L/min): 2 l/min       Wt Readings from Last 4 Encounters:   22 103.4 kg (228 lb)   22 103.4 kg (228 lb)   22 101.3 kg (223 lb 5.2 oz)   21 107.5 kg (237 lb)          Intake/Output Summary (Last 24 hours) at 2022 1022  Last data filed at 2022 1554  Gross per 24 hour   Intake --   Output 0 ml   Net 0 ml       Last shift:      No intake/output data recorded. Last 3 shifts: No intake/output data recorded.        Physical Exam:   General:  female; alert, oriented times 3, cooperative and moderately ill;  NC;  HEENT: NCAT, good dentition, lips and mucosa dry  Eyes: anicteric; conjunctiva clear  Neck: no nodes, no accessory MM use. Chest: no deformity,   Cardiac: regular rate, rhythm; no murmur  Lungs: no breath sounds on left; no wheezes, no rales, no rhonchi  Abd: soft, NT, hypoactive BS, protuberant,  Ext: no edema; no joint swelling; No clubbing  : No riggins,   Neuro: fluent,  follows commands;   Psych- no agitation, oriented to person;   Skin: warm, dry, no cyanosis;   Pulses: 1-2+ Bilateral pedal, radial  Capillary: brisk;     Labs:    No results for input(s): WBC, HGB, PLT, INR, APTT, HGBEXT, PLTEXT, INREXT, HGBEXT, PLTEXT, INREXT in the last 72 hours. No lab exists for component: FIB, DDMER  No results for input(s): NA, K, CL, CO2, GLU, BUN, CREA, CA, MG, PHOS, LAC, ALB, TBIL, ALT, AML, LPSE in the last 72 hours. No lab exists for component: SGOT  No results for input(s): PH, PCO2, PO2, HCO3, FIO2 in the last 72 hours. No results for input(s): CPK, CKNDX, TROIQ in the last 72 hours. No lab exists for component: CPKMB  No results found for: BNPP, BNP   Lab Results   Component Value Date/Time    Culture result: NO GROWTH 4 DAYS 05/04/2022 06:00 PM    Culture result: NO GROWTH 5 DAYS 05/04/2022 04:32 PM   No results found for: TSH, TSHEXT, TSHEXT    Imaging:         This care involved high complexity medical decision making: I personally:  · Reviewed the flowsheet and previous days notes  · Reviewed and summarized records or history from previous days note or discussions with staff, family  · High Risk Drug therapy requiring intensive monitoring for toxicity: eg steroids, antibiotics  · Reviewed and/or ordered Clinical lab tests  · Reviewed images and/or ordered Radiology tests  · Reviewed the patients / Telemetry  · discussed my assessment/management with : Nursing, Hospitalist for coordination of naiv Dos Santos MD

## 2022-05-23 NOTE — PROGRESS NOTES
[facility] [place] [address] [address], [address]  W: [phone]  F: [phone]    Reason for visit   Sawyer Pelletier is a 57 y.o. female who is seen as a new patient for stage IV NSCLC      Treatment History:   · 5/4/2022: Left thoracentecis- Adenocarcinoma     History of Present Illness:   Patient is a 39-year-old female with diabetes mellitus, hypertension who presented to the [facility] emergency room on 5/4/2022 with complaints of shortness of breath x 14 days which has been progressively getting worse over several weeks with mild cough. Denies any headaches, vision changes, falls trouble swallowing, chest pain, fevers, weight loss, hemoptysis. CTA on admission showed no evidence of pulmonary embolism but she was noted to have a moderate to large left-sided pleural effusion with a left apical mass measuring 28 x 20 mm, she had small pulmonary nodules on the right, possible hepatic hypodensity. She had ultrasound-guided thoracentesis of the left side on 5/4/2022 and 1300 cc of fluid was aspirated. This was sent for cytology which is pending. Pleural fluid cytology showed more than 100 RBCs, total protein of 5.3, albumin 2.8, . Labs were noted for a bilirubin of 1.1    She has a long h/o smoking. Has tried to quit and stopped 3 weeks ago. She is uptodate with colonoscopy and mammograms. Jay her  is with her. She states that she has had some evening coughing spells. She has SOB with moderate activity but this is markedly improved. She has seen her Pulmonologist Dr. Jt Isaacs recently who told her the diagnosis. She used to work for [employer]. Interval History:     5/20/2022 Patient was admitted to [facility] for recurrent pleural effusion and respiratory failure. She current has a CT. Patient seen at bedside with  in the ED.  She has been following OP with Dr. Rishi Ball for new diagnosis of NSCLC and has not started treatment yet.     5/23/2022 Patient seen at bedside today with Pulmonology. She wishes to have CT converted to drain and DC home. Has OP scans scheduled for 5/25 and f/u with Dr. Madelin Frances on 6/2. She is anxious to start chemotherapy. Past Medical History:   Diagnosis Date    Diabetes mellitus type 2, noninsulin dependent ([address]     Generalized anxiety disorder     Hypercholesterolemia     Hypertension     Neoplasm of major salivary gland       Past Surgical History:   Procedure Laterality Date    COLONOSCOPY N/A 2/24/2021    . COLONOSCOPY  :- performed by [facility] at [facility] ENDOSCOPY    HX ACL RECONSTRUCTION      HX COLONOSCOPY      HX HYSTERECTOMY        Social History     Tobacco Use    Smoking status: Current Every Day Smoker     Packs/day: 0.50     Years: 20.00     Pack years: 10.00    Smokeless tobacco: Never Used   Substance Use Topics    Alcohol use: Yes     Comment: occ      No family history on file.   Current Facility-Administered Medications   Medication Dose Route Frequency    morphine injection 2 mg  2 mg IntraVENous Q4H PRN    benzonatate (TESSALON) capsule 100 mg  100 mg Oral TID PRN    amLODIPine (NORVASC) tablet 5 mg  5 mg Oral DAILY    escitalopram oxalate (LEXAPRO) tablet 20 mg  20 mg Oral DAILY    losartan (COZAAR) tablet 100 mg  100 mg Oral DAILY    sodium chloride (NS) flush 5-40 mL  5-40 mL IntraVENous Q8H    sodium chloride (NS) flush 5-40 mL  5-40 mL IntraVENous PRN    acetaminophen (TYLENOL) tablet 650 mg  650 mg Oral Q6H PRN    Or    acetaminophen (TYLENOL) suppository 650 mg  650 mg Rectal Q6H PRN    polyethylene glycol (MIRALAX) packet 17 g  17 g Oral DAILY PRN    ondansetron (ZOFRAN ODT) tablet 4 mg  4 mg Oral Q8H PRN    Or    ondansetron (ZOFRAN) injection 4 mg  4 mg IntraVENous Q6H PRN    insulin lispro (HUMALOG) injection   SubCUTAneous AC&HS    glucose chewable tablet 16 g  4 Tablet Oral PRN    glucagon (GLUCAGEN) injection 1 mg  1 mg IntraMUSCular PRN    dextrose 10% infusion 0-250 mL  0-250 mL IntraVENous PRN    atorvastatin (LIPITOR) tablet 10 mg  10 mg Oral QHS      Allergies   Allergen Reactions    Erythromycin Other (comments)     Stomach cramps    Penicillins Hives        Review of Systems: A complete review of systems was obtained, negative except as described above. Physical Exam:     Visit Vitals  /77   Pulse 80   Temp 98 °F (36.7 °C)   Resp 18   Ht 5' 7\" (1.702 m)   Wt 228 lb (103.4 kg)   SpO2 94%   BMI 35.71 kg/m²     ECOG PS: 1  General: No distress  Eyes: PERRLA, anicteric sclerae  HENT: Atraumatic  Neck: Supple  Lymphatic: No visible neck nodes  Respiratory: On 2LPM NC, SOB, cough, LS diminished   CV: no peripheral edema  GI:nondistended  Skin: No rashes  Psych: Alert, oriented,     Results:     Lab Results   Component Value Date/Time    WBC 7.4 05/20/2022 03:09 AM    HGB 12.4 05/20/2022 03:09 AM    HCT 38.4 05/20/2022 03:09 AM    PLATELET 985 58/00/6706 03:09 AM    MCV 91.0 05/20/2022 03:09 AM    ABS. NEUTROPHILS 5.9 05/19/2022 03:23 PM     Lab Results   Component Value Date/Time    Sodium 140 05/20/2022 03:09 AM    Potassium 3.6 05/20/2022 03:09 AM    Chloride 108 05/20/2022 03:09 AM    CO2 28 05/20/2022 03:09 AM    Glucose 116 (H) 05/20/2022 03:09 AM    BUN 10 05/20/2022 03:09 AM    Creatinine 0.65 05/20/2022 03:09 AM    GFR est AA >60 05/20/2022 03:09 AM    GFR est non-AA >60 05/20/2022 03:09 AM    Calcium 8.8 05/20/2022 03:09 AM    Glucose (POC) 140 (H) 05/23/2022 10:42 AM    Creatinine (POC) 0.9 12/28/2012 06:39 AM     Lab Results   Component Value Date/Time    Bilirubin, total 0.9 05/19/2022 03:23 PM    ALT (SGPT) 26 05/19/2022 03:23 PM    Alk. phosphatase 93 05/19/2022 03:23 PM    Protein, total 7.7 05/19/2022 03:23 PM    Albumin 3.3 (L) 05/19/2022 03:23 PM    Globulin 4.4 (H) 05/19/2022 03:23 PM         Records reviewed and summarized above. Pathology report(s) reviewed above. Radiology report(s) reviewed above.   CT chest abdomen pelvis 5/5/2022    IMPRESSION  Diminished left-sided pleural effusion, status post thoracentesis with stable  mass lesion at the left apex. Mass lesion demonstrated at the left apex is not amenable to percutaneous  sampling. There are numerous additional nodular densities along the pleural margin  consistent with pleural carcinomatosis. There are additional small nodular densities demonstrated in the peritoneum,  these most consistent with peritoneal carcinomatosis. Small adrenal nodule on  the left likely an additional metastatic focus. No definitive target for percutaneous sampling. Currently awaiting results of  pleural fluid analysis/cytology and cellblock. Assessment:   1) Non small cell Lung cancer- adenocarcinoma stage IV  CT chest abdomen and pelvis from 5/5/2022 was reviewed. Notable for large left-sided pleural effusion, concern for pleural carcinomatosis, left apical lung nodule measuring 20 x 26 mm, left adrenal nodule 19 x 18 mm, peritoneal nodularity concerning for carcinomatosis. Status post left-sided thoracentesis   H/o Smoking    Understands that this looks like advanced malignancy and treatments will be palliative  Dictated my molecular studies which are pending    2) Recurrent Left pleural effusion  S/p thoracentesis OP  Admitted to hospital for recurrent pleural effusion and SOB   S/p CT insertion  Pulmonology following     3) H/P R parotidectomy for Warthin's tumor     4) Depression  On Lexapro    5) Psychosocial  Coping well   Supportive      Plan:     > Patient is following with Dr. Dinorah Lujan OP for new malignancy work up, she has not started treatment yet pending tumor markers. OP staging scans scheduled for 5/25 and f/u in the office on 6/2.   > Agree with CT and conversion to drain today and DC tomorrow   > Appreciate Pulmonology input - Hold off on pleurodesis for now.  Would require hospitalization at [facility] that may interfere with initiation of treatment    > Follow up with primary oncologist at [facility]    I appreciate the opportunity to participate in Ms. Addi Canada's care.      Signed By: Shaila Escamilla NP

## 2022-05-24 ENCOUNTER — APPOINTMENT (OUTPATIENT)
Dept: GENERAL RADIOLOGY | Age: 62
DRG: 136 | End: 2022-05-24
Attending: GENERAL ACUTE CARE HOSPITAL
Payer: MEDICAID

## 2022-05-24 ENCOUNTER — APPOINTMENT (OUTPATIENT)
Dept: INTERVENTIONAL RADIOLOGY/VASCULAR | Age: 62
DRG: 136 | End: 2022-05-24
Attending: NURSE PRACTITIONER
Payer: MEDICAID

## 2022-05-24 ENCOUNTER — TELEPHONE (OUTPATIENT)
Dept: ONCOLOGY | Age: 62
End: 2022-05-24

## 2022-05-24 VITALS
HEART RATE: 80 BPM | SYSTOLIC BLOOD PRESSURE: 115 MMHG | BODY MASS INDEX: 35.79 KG/M2 | RESPIRATION RATE: 16 BRPM | HEIGHT: 67 IN | OXYGEN SATURATION: 97 % | TEMPERATURE: 98.3 F | WEIGHT: 228 LBS | DIASTOLIC BLOOD PRESSURE: 77 MMHG

## 2022-05-24 LAB
GLUCOSE BLD STRIP.AUTO-MCNC: 104 MG/DL (ref 65–117)
GLUCOSE BLD STRIP.AUTO-MCNC: 96 MG/DL (ref 65–117)
SERVICE CMNT-IMP: NORMAL
SERVICE CMNT-IMP: NORMAL

## 2022-05-24 PROCEDURE — 0WPBX0Z REMOVAL OF DRAINAGE DEVICE FROM LEFT PLEURAL CAVITY, EXTERNAL APPROACH: ICD-10-PCS | Performed by: RADIOLOGY

## 2022-05-24 PROCEDURE — 94760 N-INVAS EAR/PLS OXIMETRY 1: CPT

## 2022-05-24 PROCEDURE — 82962 GLUCOSE BLOOD TEST: CPT

## 2022-05-24 PROCEDURE — 77010033678 HC OXYGEN DAILY

## 2022-05-24 PROCEDURE — 32999 UNLISTED PX LUNGS & PLEURA: CPT

## 2022-05-24 PROCEDURE — 71045 X-RAY EXAM CHEST 1 VIEW: CPT

## 2022-05-24 PROCEDURE — 99232 SBSQ HOSP IP/OBS MODERATE 35: CPT | Performed by: INTERNAL MEDICINE

## 2022-05-24 PROCEDURE — 74011000250 HC RX REV CODE- 250: Performed by: INTERNAL MEDICINE

## 2022-05-24 RX ADMIN — SODIUM CHLORIDE, PRESERVATIVE FREE 10 ML: 5 INJECTION INTRAVENOUS at 05:30

## 2022-05-24 NOTE — DISCHARGE SUMMARY
Hospitalist Discharge Summary     Patient ID:  Randell Stone  [id number]  9 y.o.  1960 5/19/2022    PCP on record: Judie Burroughs MD    Admit date: 5/19/2022  Discharge date and time: 5/24/2022    DISCHARGE DIAGNOSIS:  As below     CONSULTATIONS:  IP CONSULT TO PULMONOLOGY  IP CONSULT TO ONCOLOGY    Excerpted HPI from H&P of Doreen Johnson MD:  Randell Stone is a 57 y.o.   female who presents with recent diagnosis of metastatic adenocarcinoma, hypertension, diabetes mellitus is coming the hospital chief complaint of shortness of breath. Patient was recently admitted to the hospital and had an abnormal CT and was noted to have pleural effusion for which he underwent thoracentesis and cytology showed evidence of adenocarcinoma and was advised to have outpatient follow-up with oncology and also pulmonary. Patient reports continued to have shortness of breath along with some cough and also some chest pain for which he presented back to the emergency department. Does not report any fever or chills.     On arrival to ED, vital signs are normal.  On labs CBC is normal.  BMP is normal.  LFTs are normal.  Troponin is 5.  proBNP is 33. CTA shows large left pleural effusion. ____________________________________________________________________  DISCHARGE SUMMARY/HOSPITAL COURSE:  for full details see H&P, daily progress notes, labs, consult notes. Metastatic NSCLC  Recurrent left-sided pleural effusion due to malignancy  S/p left chest tube 5/21  -Chest x-ray, concern for mild pneumothorax on chest tube site. -left chest tube removed on 5/24  -Does not need oxygen on DC  -aspira cath rescheduled to Thursday as no enough pleural effusion for tunneled catheter insertion.  Pt understands that she needs to come sooner if she started having SOB or any unusual symptoms   -pt has staging w/u on 5/25 and needs to f/u with Onc in one week     Diabetes mellitus type 2  -resume home meds     Hypertension  Depression  Dyslipidemia  -Continue home Norvasc, Lexapro, Lipitor      _______________________________________________________________________  Patient seen and examined by me on discharge day. Pertinent Findings:  Gen:    Not in distress  Chest: Clear lungs  CVS:   Regular rhythm. No edema  Abd:  Soft, not distended, not tender  Neuro:  Alert, oriented  _______________________________________________________________________  DISCHARGE MEDICATIONS:   Current Discharge Medication List      CONTINUE these medications which have NOT CHANGED    Details   amLODIPine (NORVASC) 5 mg tablet Take 5 mg by mouth daily. ALPRAZolam (XANAX) 0.25 mg tablet Take  by mouth.      metFORMIN (GLUCOPHAGE) 850 mg tablet Take 850 mg by mouth two (2) times daily (with meals). escitalopram oxalate (Lexapro) 20 mg tablet Take 20 mg by mouth daily. simvastatin (ZOCOR) 20 mg tablet Take 20 mg by mouth nightly. losartan (COZAAR) 100 mg tablet Take 100 mg by mouth daily. aspirin 81 mg chewable tablet Take 81 mg by mouth daily. acetaminophen (TYLENOL PO) Take 500 mg by mouth every six (6) hours as needed for Pain. Patient Follow Up Instructions: Activity: Activity as tolerated  Diet: DIET ONE TIME MESSAGE  ADULT DIET Regular  Wound Care: Keep wound clean and dry  Follow-up with PCP in  1 week.   Follow-up tests/labs none       Follow-up Information    None       ________________________________________________________________    Risk of deterioration: Low    Condition at Discharge:  Stable  __________________________________________________________________    Disposition  Home with family, no needs    ____________________________________________________________________    Code Status: Full Code  ___________________________________________________________________      Total time in minutes spent coordinating this discharge (includes going over instructions, follow-up, prescriptions, and preparing report for sign off to her PCP) :  >30 minutes    Signed:  Yara Li MD

## 2022-05-24 NOTE — PROGRESS NOTES
Problem: Falls - Risk of  Goal: *Absence of Falls  Description: Document Bettye Gerard Fall Risk and appropriate interventions in the flowsheet.   Outcome: Progressing Towards Goal  Note: Fall Risk Interventions:            Medication Interventions: Patient to call before getting OOB,Teach patient to arise slowly

## 2022-05-24 NOTE — PROGRESS NOTES
[facility] [place] [facility] [place] [address] [address], [address]  W: [phone]  F: [phone]    Reason for visit   Danyelle Church is a 57 y.o. female who is seen as a new patient for stage IV NSCLC      Treatment History:   · 5/4/2022: Left thoracentecis- Adenocarcinoma     History of Present Illness:   Patient is a 79-year-old female with diabetes mellitus, hypertension who presented to the [facility] emergency room on 5/4/2022 with complaints of shortness of breath x 14 days which has been progressively getting worse over several weeks with mild cough. Denies any headaches, vision changes, falls trouble swallowing, chest pain, fevers, weight loss, hemoptysis. CTA on admission showed no evidence of pulmonary embolism but she was noted to have a moderate to large left-sided pleural effusion with a left apical mass measuring 28 x 20 mm, she had small pulmonary nodules on the right, possible hepatic hypodensity. She had ultrasound-guided thoracentesis of the left side on 5/4/2022 and 1300 cc of fluid was aspirated. This was sent for cytology which is pending. Pleural fluid cytology showed more than 100 RBCs, total protein of 5.3, albumin 2.8, . Labs were noted for a bilirubin of 1.1    She has a long h/o smoking. Has tried to quit and stopped 3 weeks ago. She is uptodate with colonoscopy and mammograms. Silviody her  is with her. She states that she has had some evening coughing spells. She has SOB with moderate activity but this is markedly improved. She has seen her Pulmonologist Dr. Virginie Lockett recently who told her the diagnosis. She used to work for [employer]. Interval History:     5/20/2022 Patient was admitted to [facility] for recurrent pleural effusion and respiratory failure. She current has a CT. Patient seen at bedside with  in the ED.  She has been following OP with Dr. Jesus Rosen for new diagnosis of NSCLC and has not started treatment yet.     5/23/2022 Patient seen at bedside today with Pulmonology. She wishes to have CT converted to drain and DC home. Has OP scans scheduled for 5/25 and f/u with Dr. Kailash Norris on 6/2. She is anxious to start chemotherapy. 5/24/2022 Patient seen at bedside, awaiting drain placement. Past Medical History:   Diagnosis Date    Diabetes mellitus type 2, noninsulin dependent ([address]     Generalized anxiety disorder     Hypercholesterolemia     Hypertension     Neoplasm of major salivary gland       Past Surgical History:   Procedure Laterality Date    COLONOSCOPY N/A 2/24/2021    . COLONOSCOPY  :- performed by Sean [facility] at [facility] ENDOSCOPY    HX ACL RECONSTRUCTION      HX COLONOSCOPY      HX HYSTERECTOMY        Social History     Tobacco Use    Smoking status: Current Every Day Smoker     Packs/day: 0.50     Years: 20.00     Pack years: 10.00    Smokeless tobacco: Never Used   Substance Use Topics    Alcohol use: Yes     Comment: occ      No family history on file.   Current Facility-Administered Medications   Medication Dose Route Frequency    morphine injection 2 mg  2 mg IntraVENous Q4H PRN    benzonatate (TESSALON) capsule 100 mg  100 mg Oral TID PRN    amLODIPine (NORVASC) tablet 5 mg  5 mg Oral DAILY    escitalopram oxalate (LEXAPRO) tablet 20 mg  20 mg Oral DAILY    losartan (COZAAR) tablet 100 mg  100 mg Oral DAILY    sodium chloride (NS) flush 5-40 mL  5-40 mL IntraVENous Q8H    sodium chloride (NS) flush 5-40 mL  5-40 mL IntraVENous PRN    acetaminophen (TYLENOL) tablet 650 mg  650 mg Oral Q6H PRN    Or    acetaminophen (TYLENOL) suppository 650 mg  650 mg Rectal Q6H PRN    polyethylene glycol (MIRALAX) packet 17 g  17 g Oral DAILY PRN    ondansetron (ZOFRAN ODT) tablet 4 mg  4 mg Oral Q8H PRN    Or    ondansetron (ZOFRAN) injection 4 mg  4 mg IntraVENous Q6H PRN    insulin lispro (HUMALOG) injection   SubCUTAneous AC&HS    glucose chewable tablet 16 g  4 Tablet Oral PRN    glucagon (GLUCAGEN) injection 1 mg  1 mg IntraMUSCular PRN    dextrose 10% infusion 0-250 mL  0-250 mL IntraVENous PRN    atorvastatin (LIPITOR) tablet 10 mg  10 mg Oral QHS      Allergies   Allergen Reactions    Erythromycin Other (comments)     Stomach cramps    Penicillins Hives        Review of Systems: A complete review of systems was obtained, negative except as described above. Physical Exam:     Visit Vitals  /69   Pulse 73   Temp 98.5 °F (36.9 °C)   Resp 16   Ht 5' 7\" (1.702 m)   Wt 228 lb (103.4 kg)   SpO2 99%   BMI 35.71 kg/m²     ECOG PS: 1  General: No distress  Eyes: PERRLA, anicteric sclerae  HENT: Atraumatic  Neck: Supple  Lymphatic: No visible neck nodes  Respiratory: On 2LPM NC, SOB, cough, LS diminished   CV: no peripheral edema  GI:nondistended  Skin: No rashes  Psych: Alert, oriented,     Results:     Lab Results   Component Value Date/Time    WBC 7.4 05/20/2022 03:09 AM    HGB 12.4 05/20/2022 03:09 AM    HCT 38.4 05/20/2022 03:09 AM    PLATELET 998 03/75/8368 03:09 AM    MCV 91.0 05/20/2022 03:09 AM    ABS. NEUTROPHILS 5.9 05/19/2022 03:23 PM     Lab Results   Component Value Date/Time    Sodium 140 05/20/2022 03:09 AM    Potassium 3.6 05/20/2022 03:09 AM    Chloride 108 05/20/2022 03:09 AM    CO2 28 05/20/2022 03:09 AM    Glucose 116 (H) 05/20/2022 03:09 AM    BUN 10 05/20/2022 03:09 AM    Creatinine 0.65 05/20/2022 03:09 AM    GFR est AA >60 05/20/2022 03:09 AM    GFR est non-AA >60 05/20/2022 03:09 AM    Calcium 8.8 05/20/2022 03:09 AM    Glucose (POC) 104 05/24/2022 08:23 AM    Creatinine (POC) 0.9 12/28/2012 06:39 AM     Lab Results   Component Value Date/Time    Bilirubin, total 0.9 05/19/2022 03:23 PM    ALT (SGPT) 26 05/19/2022 03:23 PM    Alk. phosphatase 93 05/19/2022 03:23 PM    Protein, total 7.7 05/19/2022 03:23 PM    Albumin 3.3 (L) 05/19/2022 03:23 PM    Globulin 4.4 (H) 05/19/2022 03:23 PM         Records reviewed and summarized above.   Pathology report(s) reviewed above. Radiology report(s) reviewed above. CT chest abdomen pelvis 5/5/2022    IMPRESSION  Diminished left-sided pleural effusion, status post thoracentesis with stable  mass lesion at the left apex. Mass lesion demonstrated at the left apex is not amenable to percutaneous  sampling. There are numerous additional nodular densities along the pleural margin  consistent with pleural carcinomatosis. There are additional small nodular densities demonstrated in the peritoneum,  these most consistent with peritoneal carcinomatosis. Small adrenal nodule on  the left likely an additional metastatic focus. No definitive target for percutaneous sampling. Currently awaiting results of  pleural fluid analysis/cytology and cellblock. Assessment:   1) Non small cell Lung cancer- adenocarcinoma stage IV  CT chest abdomen and pelvis from 5/5/2022 was reviewed. Notable for large left-sided pleural effusion, concern for pleural carcinomatosis, left apical lung nodule measuring 20 x 26 mm, left adrenal nodule 19 x 18 mm, peritoneal nodularity concerning for carcinomatosis. Status post left-sided thoracentesis   H/o Smoking    Understands that this looks like advanced malignancy and treatments will be palliative  Dictated my molecular studies which are pending    2) Recurrent Left pleural effusion  S/p thoracentesis OP  Admitted to hospital for recurrent pleural effusion and SOB   S/p CT insertion  Pulmonology following     3) H/P R parotidectomy for Warthin's tumor     4) Depression  On Lexapro    5) Psychosocial  Coping well   Supportive      Plan:     > Patient is following with Dr. Adrianne Doyle OP for new malignancy work up, she has not started treatment yet pending tumor markers.  OP staging scans scheduled for 5/25 and f/u in the office on 6/2.   > Hopefully DC later today or first thing in the AM so she can make OP scans scheduled for 11 am.   > Agree with CT and conversion to drain today   > Appreciate Pulmonology input - Hold off on pleurodesis for now. Would require hospitalization at [facility] that may interfere with initiation of treatment    > Follow up with primary oncologist at [place]    I appreciate the opportunity to participate in Ms. Santino Canada's care, she should continue to follow up with Dr. Fredrick Garcia on 6/2.     Signed By: Anny Pitts NP

## 2022-05-24 NOTE — TELEPHONE ENCOUNTER
Dhruv Lux w/[facility] called stating the patient's \"PET Scan\" has been denied. P2P is available w/Case #[id number], please call [phone].

## 2022-05-24 NOTE — PROGRESS NOTES
Transition of Care Plan:     RUR: 13% low risk for readmission. Pt is a readmit. Disposition: Home w/ spouse  Follow up appointments: PCP, Pulmonologist, Oncologist?  DME needed: Pt owns no DME. Pt currently on O2, will need to evaluate to determine if home O2 is needed for d/c  Transportation at Discharge: Pt's spouse  Keys or means to access home: Pt has access       IM Medicare Letter: N/A; Medicaid coverage only   Is patient a BCPI-A Bundle:N/A                    If yes, was Bundle Letter given?:    Is patient a  and connected with the [facility]? N/A                If yes, was [employer] transfer form completed and [facility] notified? Caregiver Contact: Pt would like for her sister to be main contact during hospitalization: Erinn Sanches [phone]  Discharge Caregiver contacted prior to discharge? To be contacted   Care Conference needed?: Not indicated       Initial Note: Chart reviewed. CM aware of discharge order. CM met with pt at bedside to review dc plan. Spouse at bedside to transport. Pt is agreeable to dc plan and expressed no additional questions or concerns. No further CM needs identified. Care Management Interventions  PCP Verified by CM: Yes  Palliative Care Criteria Met (RRAT>21 & CHF Dx)?: No  Mode of Transport at Discharge:  Other (see comment)  Transition of Care Consult (CM Consult): Discharge Planning  Discharge Durable Medical Equipment: No  Physical Therapy Consult: No  Occupational Therapy Consult: No  Speech Therapy Consult: No  Support Systems: Spouse/Significant Other  Confirm Follow Up Transport: Family (Spouse)  The Patient and/or Patient Representative was Provided with a Choice of Provider and Agrees with the Discharge Plan?: Yes  Freedom of Choice List was Provided with Basic Dialogue that Supports the Patient's Individualized Plan of Care/Goals, Treatment Preferences and Shares the Quality Data Associated with the Providers?: No  The [employer] Information Provided?: No  Discharge Location  Patient Expects to be Discharged to[de-identified] Home with family assistance    Freeman Molina, [address], [address] [place] [facility]  [phone]

## 2022-05-24 NOTE — TELEPHONE ENCOUNTER
1640:  Patient made of aware or PET scan denial   Has MRI scheduled for 5/25/22   Patient appreciated update

## 2022-05-24 NOTE — PROGRESS NOTES
Spoke with Karissa Kimball NP about chest tube removal and plan to place Aspira on Thursday as outpatient.

## 2022-05-24 NOTE — TELEPHONE ENCOUNTER
Called #[phone] to schedule P2P for PET scan. P2P scheduled for 330 PM today with Dr. Mary Marquis (23 min).

## 2022-05-24 NOTE — PROGRESS NOTES
Interventional Radiology Procedure Note        5/24/2022    Provider: May Pearl PA-C  Supervising Physician: Robert Joshua MD      Pt has left pleural drain for left malignant pleural effusion. Pt will likely need a tunneled pleural drain as her effusion will likely recur. Unfortunately, despite clamping drain overnight, effusion not large enough for tunneled drain placement today. Left pleural drain removed in radiology today. Plan for outpatient placement of left tunneled Aspira drain as outpatient later this week. Appointment provided to pt by IR nurse. Sedation: none  Specimens removed: none  Complications: none  Recomendations: Return as outpatient when fluid has re-accumulated for Aspira drain placement. Kellen Younger NP aware.     Discharge Disposition: fair        May Pearl PA-C  Interventional Radiology

## 2022-05-24 NOTE — PROGRESS NOTES
Patient has arrived to IR at this time from Med Tele room #6341. MD Lisa Schulz and [address]  at bedside to view pleural space at this time. Per MD Lisa Schulz and patients discussion. Pt will arrive to [facility] on Thursday 5/26/2022 at 09:30 a.m. to have left pleural aspira placed. Pt is aware that if she becomes severely SOB over next couple of days after chest tube is removed today, to come to the emergency room. Pt has approved this at this time. VIANCA Mueller from patients oncology office is aware of this at this time.  Primary Med Tele RN aware of this at this time as well

## 2022-05-24 NOTE — PROGRESS NOTES
Bedside shift change report given to Oliva (oncoming nurse) by Trent Miller (offgoing nurse). Report included the following information SBAR, Kardex, Procedure Summary, Intake/Output and MAR.

## 2022-05-24 NOTE — PROGRESS NOTES
Hospitalist Progress Note    NAME: Juliano Lin   :  1960   MRN:  [id number]       Assessment / Plan:    Metastatic adenocarcinoma  Recurrent left-sided pleural effusion due to malignancy  S/p left chest tube   -Continue left chest tube, unclamped  -Chest x-ray reviewed today, concern for mild pneumothorax on chest tube site. -o2 if needed. Oxygen challenge prior to DC  -aspira cath tmw    Diabetes mellitus type 2  -SSI     Hypertension  Depression  Dyslipidemia  -Continue home Norvasc, Lexapro, Lipitor     Code Status: Full code  Surrogate Decision Maker: Spouse Alexis     DVT Prophylaxis: Lovenox  GI Prophylaxis: not indicated     Baseline: From home, independent of ADLs    -Anticipated discharge -, depending on CXR tomorrow     Subjective:     Chief Complaint / Reason for Physician Visit  No acute events over night  No SOB  On 2 L/M    Review of Systems:  Symptom Y/N Comments  Symptom Y/N Comments   Fever/Chills n   Chest Pain n    Poor Appetite n   Edema n    Cough    Abdominal Pain     Sputum    Joint Pain     SOB/CASTILLO    Pruritis/Rash     Nausea/vomit    Tolerating PT/OT     Diarrhea    Tolerating Diet     Constipation    Other       Could NOT obtain due to:      Objective:     VITALS:   Last 24hrs VS reviewed since prior progress note. Most recent are:  Patient Vitals for the past 24 hrs:   Temp Pulse Resp BP SpO2   22 0007 97.3 °F (36.3 °C) 98 18 124/70 100 %   22 1532 98.3 °F (36.8 °C) 79 18 132/68 94 %   22 1101 98 °F (36.7 °C) 80 18 129/77 94 %       Intake/Output Summary (Last 24 hours) at 2022  Last data filed at 2022  Gross per 24 hour   Intake 350 ml   Output 0 ml   Net 350 ml        I had a face to face encounter and independently examined this patient on 2022, as outlined below:  PHYSICAL EXAM:  General: WD, WN. Alert, cooperative, no acute distress    EENT:  EOMI. Anicteric sclerae.  MMM  Resp:  Improved air entry on lung, likely small residual pleural effusion on left  CV:  Regular  rhythm,    GI:  Soft, Non distended, Non tender. +Bowel sounds  Neurologic:  Alert and oriented X 3, normal speech,   Psych:   Good insight. Not anxious nor agitated  Skin:  No rashes. No jaundice    Reviewed most current lab test results and cultures  YES  Reviewed most current radiology test results   YES  Review and summation of old records today    NO  Reviewed patient's current orders and MAR    YES  PMH/SH reviewed - no change compared to H&P  ________________________________________________________________________  Care Plan discussed with:    Comments   Patient x    Family  x    RN x    Care Manager     Consultant                        Multidiciplinary team rounds were held today with , nursing, pharmacist and clinical coordinator. Patient's plan of care was discussed; medications were reviewed and discharge planning was addressed. ________________________________________________________________________  Total NON critical care TIME: 31 Minutes    Total CRITICAL CARE TIME Spent:   Minutes non procedure based      Comments   >50% of visit spent in counseling and coordination of care     ________________________________________________________________________  Jose Manjarrez MD     Procedures: see electronic medical records for all procedures/Xrays and details which were not copied into this note but were reviewed prior to creation of Plan. LABS:  I reviewed today's most current labs and imaging studies. Pertinent labs include:  No results for input(s): WBC, HGB, HCT, PLT, HGBEXT, HCTEXT, PLTEXT, HGBEXT, HCTEXT, PLTEXT in the last 72 hours. No results for input(s): NA, K, CL, CO2, GLU, BUN, CREA, CA, MG, PHOS, ALB, TBIL, TBILI, ALT, INR, INREXT, INREXT in the last 72 hours.     No lab exists for component: SGOT    Signed: Jose Manjarrez MD

## 2022-05-24 NOTE — PROGRESS NOTES
Problem: Falls - Risk of  Goal: *Absence of Falls  Description: Document Cindia Neigh Fall Risk and appropriate interventions in the flowsheet.   Outcome: Resolved/Met  Note: Fall Risk Interventions:            Medication Interventions: Assess postural VS orthostatic hypotension                   Problem: Patient Education: Go to Patient Education Activity  Goal: Patient/Family Education  Outcome: Resolved/Met

## 2022-05-26 ENCOUNTER — HOSPITAL ENCOUNTER (OUTPATIENT)
Dept: INTERVENTIONAL RADIOLOGY/VASCULAR | Age: 62
Discharge: HOME | End: 2022-05-26
Attending: NURSE PRACTITIONER
Payer: MEDICAID

## 2022-05-26 ENCOUNTER — HOSPITAL ENCOUNTER (OUTPATIENT)
Dept: GENERAL RADIOLOGY | Age: 62
Discharge: HOME | End: 2022-05-26
Attending: PHYSICIAN ASSISTANT
Payer: MEDICAID

## 2022-05-26 VITALS
OXYGEN SATURATION: 97 % | HEART RATE: 70 BPM | DIASTOLIC BLOOD PRESSURE: 65 MMHG | SYSTOLIC BLOOD PRESSURE: 109 MMHG | TEMPERATURE: 98.4 F | WEIGHT: 228 LBS | BODY MASS INDEX: 35.79 KG/M2 | HEIGHT: 67 IN | RESPIRATION RATE: 18 BRPM

## 2022-05-26 PROCEDURE — 77030002996 HC SUT SLK J&J -A

## 2022-05-26 PROCEDURE — C1729 CATH, DRAINAGE: HCPCS

## 2022-05-26 PROCEDURE — 74011250636 HC RX REV CODE- 250/636: Performed by: STUDENT IN AN ORGANIZED HEALTH CARE EDUCATION/TRAINING PROGRAM

## 2022-05-26 PROCEDURE — C1892 INTRO/SHEATH,FIXED,PEEL-AWAY: HCPCS

## 2022-05-26 PROCEDURE — 32550 INSERT PLEURAL CATH: CPT

## 2022-05-26 PROCEDURE — 71045 X-RAY EXAM CHEST 1 VIEW: CPT

## 2022-05-26 PROCEDURE — 2709999900 HC NON-CHARGEABLE SUPPLY

## 2022-05-26 PROCEDURE — 77030010507 HC ADH SKN DERMBND J&J -B

## 2022-05-26 PROCEDURE — 74011000250 HC RX REV CODE- 250: Performed by: STUDENT IN AN ORGANIZED HEALTH CARE EDUCATION/TRAINING PROGRAM

## 2022-05-26 RX ORDER — CLINDAMYCIN PHOSPHATE 900 MG/50ML
900 INJECTION INTRAVENOUS ONCE
Status: COMPLETED | OUTPATIENT
Start: 2022-05-26 | End: 2022-05-26

## 2022-05-26 RX ORDER — FENTANYL CITRATE 50 UG/ML
25-100 INJECTION, SOLUTION INTRAMUSCULAR; INTRAVENOUS
Status: DISCONTINUED | OUTPATIENT
Start: 2022-05-26 | End: 2022-05-26

## 2022-05-26 RX ORDER — SODIUM CHLORIDE 9 MG/ML
25 INJECTION, SOLUTION INTRAVENOUS CONTINUOUS
Status: DISCONTINUED | OUTPATIENT
Start: 2022-05-26 | End: 2022-05-26

## 2022-05-26 RX ORDER — LIDOCAINE HYDROCHLORIDE 20 MG/ML
20 INJECTION, SOLUTION INFILTRATION; PERINEURAL ONCE
Status: COMPLETED | OUTPATIENT
Start: 2022-05-26 | End: 2022-05-26

## 2022-05-26 RX ORDER — MIDAZOLAM HYDROCHLORIDE 1 MG/ML
5 INJECTION, SOLUTION INTRAMUSCULAR; INTRAVENOUS
Status: DISCONTINUED | OUTPATIENT
Start: 2022-05-26 | End: 2022-05-26

## 2022-05-26 RX ORDER — HEPARIN SODIUM 200 [USP'U]/100ML
400 INJECTION, SOLUTION INTRAVENOUS ONCE
Status: COMPLETED | OUTPATIENT
Start: 2022-05-26 | End: 2022-05-26

## 2022-05-26 RX ADMIN — FENTANYL CITRATE 50 MCG: 50 INJECTION, SOLUTION INTRAMUSCULAR; INTRAVENOUS at 10:46

## 2022-05-26 RX ADMIN — CLINDAMYCIN IN 5 PERCENT DEXTROSE 900 MG: 18 INJECTION, SOLUTION INTRAVENOUS at 10:21

## 2022-05-26 RX ADMIN — MIDAZOLAM HYDROCHLORIDE 2 MG: 1 INJECTION, SOLUTION INTRAMUSCULAR; INTRAVENOUS at 10:45

## 2022-05-26 RX ADMIN — MIDAZOLAM HYDROCHLORIDE 1 MG: 1 INJECTION, SOLUTION INTRAMUSCULAR; INTRAVENOUS at 10:56

## 2022-05-26 RX ADMIN — SODIUM CHLORIDE 25 ML/HR: 9 INJECTION, SOLUTION INTRAVENOUS at 09:54

## 2022-05-26 RX ADMIN — HEPARIN SODIUM IN SODIUM CHLORIDE 100 UNITS: 200 INJECTION INTRAVENOUS at 11:30

## 2022-05-26 RX ADMIN — LIDOCAINE HYDROCHLORIDE 400 MG: 20 INJECTION, SOLUTION INFILTRATION; PERINEURAL at 11:00

## 2022-05-26 RX ADMIN — FENTANYL CITRATE 25 MCG: 50 INJECTION, SOLUTION INTRAMUSCULAR; INTRAVENOUS at 11:02

## 2022-05-26 RX ADMIN — FENTANYL CITRATE 25 MCG: 50 INJECTION, SOLUTION INTRAMUSCULAR; INTRAVENOUS at 10:56

## 2022-05-26 NOTE — PROGRESS NOTES
Pt arrives to ambulatory to IR department for scheduled OP pleural aspira placement. Pt is A&Ox4 with no c/o pain. She does report some dyspnea on exertion. She is in NAD, resting comfortably in stretcher, and awaiting procedural consent at this time.

## 2022-05-26 NOTE — PROGRESS NOTES
Name of procedure: Left pleural aspira insertion    Sedation medications given: Yes    Versed: 3 mg    Fentanyl: 100 mcg    Sedation tolerated: Well    Total Sedation time: 30 minutes    Sedation start:  1045  Sedation end:  1115    Vital Signs: Stable    Fluids removed: No    Samples sent to lab: No    Any complications related to procedure: unsuccessful insertion    Post Procedure Care Needed/order sets placed in [facility].

## 2022-05-26 NOTE — PROGRESS NOTES
Interventional Radiology Procedure Note        5/26/2022    Provider: Sahsa De Jesus PA-C  Supervising Physician: Jyoti Hennessy MD      Pre-Procedure Diagnosis: lung cancer, recurrent effusion  Post-Procedure Diagnosis: lung cancer, recurrent effusion    Informed consent obtained    Procedure(s): Unsuccessful attempt at placement of a left tunneled Aspira drain- not enough reacumulated fluid for safe percutaneous placement. CXR shows no pneumothorax. Sedation: moderate    Specimens removed: none    Complications: none    Recomendations: reschedule in approx. 2 weeks to give pleural fluid time to reacumulate.     Discharge Disposition: fair      Full dictated report to follow    Sasha De Jesus PA-C  Interventional Radiology

## 2022-05-26 NOTE — DISCHARGE INSTRUCTIONS
[facility]  Radiology Department  [phone]    Radiologist: Alyssa DURON    Date: 5/26/2022    ASPIRA Catheter Placement      Go home and rest and restrict your activity the next 24 hours. You have been given sedating medications, so do not drive or make important decisions today. Resume your previous diet and prescribed medications. Dressing around the catheter must remain dry and intact. You may take a bath as long as the catheter stays dry, no showers. You have been provided all the necessary supplies to drain at home and complete dressing kits when the site has become wet, soiled or more than a week old. Be sure to inspect your catheter site with each dressing change. Watch for signs of infection:  redness, swelling, pus, drainage, fever or chills. If you have any of these signs and symptoms call your physician immediately    Instructions are included in the supply box and may also be found on-line at [email or web address]    Additional supply kits can be ordered through you physicians office. If you have any questions regarding your procedure today please call and ask to speak to a radiology nurse. Robert Gay

## 2022-05-26 NOTE — H&P
Interventional Radiology History and Physical      Patient: Nii Beaulieu 57 y.o. female  [id number]    Consult Requested by:  Vonnie Lofton NP    Chief Complaint: recurrent malignant pleural effusion    History of Present Illness: 58 yo female with lung cancer and a recurrent left pleural effusion s/p thoracentesis x 2. Referred to IR for tunneled left pleural Aspira drainage catheter. History:  Past Medical History:   Diagnosis Date    Diabetes mellitus type 2, noninsulin dependent ([address]     Generalized anxiety disorder     Hypercholesterolemia     Hypertension     Neoplasm of major salivary gland      History reviewed. No pertinent family history. Social History     Socioeconomic History    Marital status:      Spouse name: Not on file    Number of children: Not on file    Years of education: Not on file    Highest education level: Not on file   Occupational History    Not on file   Tobacco Use    Smoking status: Current Every Day Smoker     Packs/day: 0.50     Years: 20.00     Pack years: 10.00    Smokeless tobacco: Never Used   Substance and Sexual Activity    Alcohol use: Yes     Comment: occ    Drug use: Not on file    Sexual activity: Not on file   Other Topics Concern    Not on file   Social History Narrative    Not on file     Social Determinants of Health     Financial Resource Strain:     Difficulty of Paying Living Expenses: Not on file   Food Insecurity:     Worried About Running Out of Food in the Last Year: Not on file    Cheli of Food in the Last Year: Not on file   Transportation Needs:     Lack of Transportation (Medical): Not on file    Lack of Transportation (Non-Medical):  Not on file   Physical Activity:     Days of Exercise per Week: Not on file    Minutes of Exercise per Session: Not on file   Stress:     Feeling of Stress : Not on file   Social Connections:     Frequency of Communication with Friends and Family: Not on file    Frequency of Social Gatherings with Friends and Family: Not on file    Attends Nondenominational Services: Not on file    Active Member of Clubs or Organizations: Not on file    Attends Club or Organization Meetings: Not on file    Marital Status: Not on file   Intimate Partner Violence:     Fear of Current or Ex-Partner: Not on file    Emotionally Abused: Not on file    Physically Abused: Not on file    Sexually Abused: Not on file   Housing Stability:     Unable to Pay for Housing in the Last Year: Not on file    Number of [place] in the Last Year: Not on file    Unstable Housing in the Last Year: Not on file       Allergies: Allergies   Allergen Reactions    Erythromycin Other (comments)     Stomach cramps    Penicillins Hives       Current Medications:  Current Outpatient Medications   Medication Sig    amLODIPine (NORVASC) 5 mg tablet Take 5 mg by mouth daily.  ALPRAZolam (XANAX) 0.25 mg tablet Take  by mouth.  metFORMIN (GLUCOPHAGE) 850 mg tablet Take 850 mg by mouth two (2) times daily (with meals).  escitalopram oxalate (Lexapro) 20 mg tablet Take 20 mg by mouth daily.  simvastatin (ZOCOR) 20 mg tablet Take 20 mg by mouth nightly.  losartan (COZAAR) 100 mg tablet Take 100 mg by mouth daily.  aspirin 81 mg chewable tablet Take 81 mg by mouth daily.  acetaminophen (TYLENOL PO) Take 500 mg by mouth every six (6) hours as needed for Pain. No current facility-administered medications for this encounter. Review of Systems:  Patient reports some dyspnea on exertion, which is her baseline. Patient denies fever, chills, cough, headache, vision changes, difficulty swallowing, chest pain, abdominal pain, nausea, vomiting, changes in bladder or bowel habits, extremity weakness, numbness, tingling or swelling. The remainder of the review of systems is negative for any additional contributing elements.         Physical Exam:  Blood pressure 115/69, pulse 73, temperature 98.4 °F (36.9 °C), resp. rate 19, height 5' 7\" (1.702 m), weight 103.4 kg (228 lb), SpO2 95 %, not currently breastfeeding. GENERAL: alert, cooperative, no distress, appears stated age,   LUNG: no pulmonary distress, normal work of breathing,   HEART: regular rate and rhythm,   ABDOMEN: soft, non-tender. Bowel sounds normal.   EXTREMITIES:  extremities normal, atraumatic, no cyanosis or edema,   NEUROLOGIC: AOx3. Cranial nerves 2-12 and sensation grossly intact. Laboratory:    No results for input(s): HGB, HGBEXT, HCT, HCTEXT, WBC, PLT, PLTEXT, INR, BUN, CREA, K, CRCLT, HGBEXT, HCTEXT, PLTEXT, INREXT in the last 72 hours. No lab exists for component: PTT, PT    Imaging:  No results found. Impression/Plan:  Patient has been evaluated and deemed an appropriate candidate for intravenous sedation. Based on history and presentation she is a candidate for image-guided tunneled left pleural drain placement. The above procedure was explained to the patient/consenting party. Benefits, risks and alternative therapies reviewed and all questions answered to her satisfaction. At this time she wishes to proceed. Please note that Dr. Naomie Baker participated in the provision of these services. We appreciate the kind consultation and the opportunity to participate in [facility].         Corazon Polk PA-C  Interventional Radiology  [facility].  [facility]  [phone]  [facility] [phone]      CC: Ion Syed NP

## 2022-05-27 ENCOUNTER — HOSPITAL ENCOUNTER (OUTPATIENT)
Dept: MRI IMAGING | Age: 62
Discharge: HOME | End: 2022-05-27
Attending: INTERNAL MEDICINE
Payer: MEDICAID

## 2022-05-27 ENCOUNTER — TELEPHONE (OUTPATIENT)
Dept: ONCOLOGY | Age: 62
End: 2022-05-27

## 2022-05-27 VITALS — WEIGHT: 228 LBS | BODY MASS INDEX: 35.71 KG/M2

## 2022-05-27 DIAGNOSIS — C34.90 NSCLC METASTATIC TO ADRENAL GLAND (HCC): ICD-10-CM

## 2022-05-27 DIAGNOSIS — C79.70 NSCLC METASTATIC TO ADRENAL GLAND (HCC): ICD-10-CM

## 2022-05-27 PROCEDURE — 74011250636 HC RX REV CODE- 250/636: Performed by: INTERNAL MEDICINE

## 2022-05-27 PROCEDURE — 70553 MRI BRAIN STEM W/O & W/DYE: CPT

## 2022-05-27 PROCEDURE — A9575 INJ GADOTERATE MEGLUMI 0.1ML: HCPCS | Performed by: INTERNAL MEDICINE

## 2022-05-27 RX ORDER — GADOTERATE MEGLUMINE 376.9 MG/ML
20 INJECTION INTRAVENOUS
Status: COMPLETED | OUTPATIENT
Start: 2022-05-27 | End: 2022-05-27

## 2022-05-27 RX ORDER — GADOTERATE MEGLUMINE 376.9 MG/ML
0.1 INJECTION INTRAVENOUS
Status: DISPENSED | OUTPATIENT
Start: 2022-05-27 | End: 2022-05-27

## 2022-05-27 RX ADMIN — GADOTERATE MEGLUMINE 20 ML: 376.9 INJECTION INTRAVENOUS at 10:20

## 2022-05-27 NOTE — TELEPHONE ENCOUNTER
1545:    Returned call to patient and confirmed x2 identifiers   Discomfort at site where they placed the tube   Tylenol 1000 mg has been taken and is not helping   Sore and hard to get comfortable   Denies chest pains, fevers, chills, or increased shortness of breath     Contact IR to discuss symptoms     Informed patient of the following plan of care:  Tylenol 1000 mg q8hrs   Can take ibuprofen 600 mg in between tylenol doses   Cold compress to site or use of OTC lidocaine patches   If shortness of breath, chest pain, or fever develops to call office and got ER     Patient verbalized understanding   No new questions or concerns at this time

## 2022-05-27 NOTE — TELEPHONE ENCOUNTER
Patient called stating yesterday's procedure did not completed due to \"unable to take fluid out\". Patient states she is now feeling discomfort. Pressure at the site, radiating to shoulder. Per nurse (AbR), patient advised to go to the hospital if chest pain persists. Patient would still like a nurse to give her a call to discuss what she can take to alleviate the pain. Please follow up with the patient.

## 2022-05-31 NOTE — TELEPHONE ENCOUNTER
5814 Patient identified x2. Informed patient of MRI results that were favorable as per Dr Adrianne Doyle. Patient appreciative of call and is looking forward to appt on Thursday and stated \" I am ready for this fight\".

## 2022-06-01 NOTE — PROGRESS NOTES
[facility] [place] [facility], [id number] [place] [address] Road, [address] [place] [address] Keiko  W: [phone]  F: [phone]    Reason for visit   Purvi Liu is a 57 y.o. female who is seen for follow up of stage IV NSCLC- PDL1- 15%, no  mutations       Treatment History:   · 5/4/2022: Left thoracentecis- Adenocarcinoma     History of Present Illness:   Patient is a 68-year-old female with diabetes mellitus, hypertension who presented to the [facility] emergency room on 5/4/2022 with complaints of shortness of breath x 14 days which has been progressively getting worse over several weeks with mild cough. Denies any headaches, vision changes, falls trouble swallowing, chest pain, fevers, weight loss, hemoptysis. CTA on admission showed no evidence of pulmonary embolism but she was noted to have a moderate to large left-sided pleural effusion with a left apical mass measuring 28 x 20 mm, she had small pulmonary nodules on the right, possible hepatic hypodensity. She had ultrasound-guided thoracentesis of the left side on 5/4/2022 and 1300 cc of fluid was aspirated. This was sent for cytology which was positive. Pleural fluid cytology showed more than 100 RBCs, total protein of 5.3, albumin 2.8, . Labs were noted for a bilirubin of 1.1. Comes to discuss management. She had thoracentesis again on 5/20/2022. Couldn't have a drain placed as there was not enough. Sob much better. Had pleuritic pain- on the left Post procedure. She took Tylenol 1000 mg q8hrs - resolved. She has a long h/o smoking. Has tried to quit and stopped 3 weeks ago. She is uptodate with colonoscopy and mammograms. Jay her  is with her. She used to work for [employer].        Past Medical History:   Diagnosis Date    Diabetes mellitus type 2, noninsulin dependent ([address]     Generalized anxiety disorder     Hypercholesterolemia     Hypertension     Neoplasm of major salivary gland Past Surgical History:   Procedure Laterality Date    COLONOSCOPY N/A 2/24/2021    . COLONOSCOPY  :- performed by [facility] at [facility] ENDOSCOPY    HX ACL RECONSTRUCTION      HX COLONOSCOPY      HX HYSTERECTOMY      IR INSERT CATH PLEURAL INDWELL  5/26/2022    IR REMOVE LUNG/PLEURAL DRAIN / CATHETER  5/24/2022      Social History     Tobacco Use    Smoking status: Current Every Day Smoker     Packs/day: 0.50     Years: 20.00     Pack years: 10.00    Smokeless tobacco: Never Used   Substance Use Topics    Alcohol use: Yes     Comment: occ      No family history on file. Current Outpatient Medications   Medication Sig    amLODIPine (NORVASC) 5 mg tablet Take 5 mg by mouth daily.  ALPRAZolam (XANAX) 0.25 mg tablet Take  by mouth.  metFORMIN (GLUCOPHAGE) 850 mg tablet Take 850 mg by mouth two (2) times daily (with meals).  escitalopram oxalate (Lexapro) 20 mg tablet Take 20 mg by mouth daily.  simvastatin (ZOCOR) 20 mg tablet Take 20 mg by mouth nightly.  losartan (COZAAR) 100 mg tablet Take 100 mg by mouth daily.  aspirin 81 mg chewable tablet Take 81 mg by mouth daily.  acetaminophen (TYLENOL PO) Take 500 mg by mouth every six (6) hours as needed for Pain. No current facility-administered medications for this visit. Allergies   Allergen Reactions    Erythromycin Other (comments)     Stomach cramps    Penicillins Hives        Review of Systems: A complete review of systems was obtained, negative except as described above.     Physical Exam:     Visit Vitals  /78 (BP 1 Location: Right arm, BP Patient Position: Sitting)   Pulse 74   Temp 97.3 °F (36.3 °C)   Resp 18   Wt 225 lb (102.1 kg)   SpO2 94%   BMI 35.24 kg/m²     ECOG PS: 1  General: No distress  Eyes: PERRLA, anicteric sclerae  HENT: Atraumatic  Neck: Supple  Lymphatic: No visible neck nodes  Respiratory:  normal respiratory effort  CV: no peripheral edema  GI:nondistended  Skin: No rashes  Psych: Alert, oriented,     Results:     Lab Results   Component Value Date/Time    WBC 7.4 05/20/2022 03:09 AM    HGB 12.4 05/20/2022 03:09 AM    HCT 38.4 05/20/2022 03:09 AM    PLATELET 914 82/91/6585 03:09 AM    MCV 91.0 05/20/2022 03:09 AM    ABS. NEUTROPHILS 5.9 05/19/2022 03:23 PM     Lab Results   Component Value Date/Time    Sodium 140 05/20/2022 03:09 AM    Potassium 3.6 05/20/2022 03:09 AM    Chloride 108 05/20/2022 03:09 AM    CO2 28 05/20/2022 03:09 AM    Glucose 116 (H) 05/20/2022 03:09 AM    BUN 10 05/20/2022 03:09 AM    Creatinine 0.65 05/20/2022 03:09 AM    GFR est AA >60 05/20/2022 03:09 AM    GFR est non-AA >60 05/20/2022 03:09 AM    Calcium 8.8 05/20/2022 03:09 AM    Glucose (POC) 96 05/24/2022 11:29 AM    Creatinine (POC) 0.9 12/28/2012 06:39 AM     Lab Results   Component Value Date/Time    Bilirubin, total 0.9 05/19/2022 03:23 PM    ALT (SGPT) 26 05/19/2022 03:23 PM    Alk. phosphatase 93 05/19/2022 03:23 PM    Protein, total 7.7 05/19/2022 03:23 PM    Albumin 3.3 (L) 05/19/2022 03:23 PM    Globulin 4.4 (H) 05/19/2022 03:23 PM         Records reviewed and summarized above. Pathology report(s) reviewed above. Radiology report(s) reviewed above. CT chest abdomen pelvis 5/5/2022    IMPRESSION  Diminished left-sided pleural effusion, status post thoracentesis with stable  mass lesion at the left apex. Mass lesion demonstrated at the left apex is not amenable to percutaneous  sampling. There are numerous additional nodular densities along the pleural margin  consistent with pleural carcinomatosis. There are additional small nodular densities demonstrated in the peritoneum,  these most consistent with peritoneal carcinomatosis. Small adrenal nodule on  the left likely an additional metastatic focus. No definitive target for percutaneous sampling. Currently awaiting results of  pleural fluid analysis/cytology and cellblock. MRI brain 5/2022      1. No evidence of intracranial metastases.   2. Minimal chronic microvascular ischemic disease. No acute intracranial  abnormality. 3. Enhancing lesions in the bilateral parotid glands, largest in the left  parotid gland measuring 3.6 cm. Lesions in the right parotid gland were  previously biopsied in 2020 and returned as Warthin tumors. These likely all  represent multiple Warthin tumors  Assessment:   1) Non small cell Lung cancer- adenocarcinoma stage IV    PDL1-15%, ALK/EGFR/ROS1/BRAF negative ( limited panel)  Liquid bx [id number], TMB low  CT chest abdomen and pelvis from 5/5/2022 was reviewed. Notable for large left-sided pleural effusion, concern for pleural carcinomatosis, left apical lung nodule measuring 20 x 26 mm, left adrenal nodule 19 x 18 mm, peritoneal nodularity concerning for carcinomatosis. Status post left-sided thoracentesis and + cytology   H/o Smoking    Understands that this is advanced malignancy and treatments will be palliative  Dictated my molecular studies  With the current molecular studies I have recommended Carboplatin+ Alimta+ Keytruda x 4 followed by Alimta and Keytruda maintenance    We discussed the chemotherapy regimen, it's logistics, and potential toxicities in detail. Potential side effects include, but are not limited to, nausea, vomiting, diarrhea, taste changes, myelosuppression, infection, fatigue, allergic reactions, rash, edema, neuropathy, and rarely, death. The patient asked several well thought out questions which I answered to the best of my ability and to their apparent satisfaction. The patient has given consent for chemotherapy.       If and when there is disease progression repeat expanded NGS is indicated    2) Left pleural effusion  S/p thoracentesis x 2, not enough for a drain  Hold off on Pleurx for now, hoping for resolution on systemic therapy    3) H/P R parotidectomy for Warthin's tumor   Has persistent multiple Warthin's tumors     4) Depression  On Lexapro    5) Encounter for high risk medications like chemotherapy and high risk diseas e    Supportive      Plan:     · Port  · Approval for carboplatin auc 5, alimta 500 mg/m2 and Keytruda every 3 weeks x 4 then alimta and keytruda maintenance  · Antiemetics, steroids, b12, FA per protocol  · Hold off on Pleurx  · Start ASAP    RTC D1 of each cycle   I appreciate the opportunity to participate in Ms. Rajinder Canada's care.     Signed By: Yulissa Lees MD

## 2022-06-02 ENCOUNTER — TELEPHONE (OUTPATIENT)
Dept: ONCOLOGY | Age: 62
End: 2022-06-02

## 2022-06-02 ENCOUNTER — OFFICE VISIT (OUTPATIENT)
Dept: ONCOLOGY | Age: 62
End: 2022-06-02
Payer: MEDICAID

## 2022-06-02 ENCOUNTER — DOCUMENTATION ONLY (OUTPATIENT)
Dept: ONCOLOGY | Age: 62
End: 2022-06-02

## 2022-06-02 VITALS
RESPIRATION RATE: 18 BRPM | BODY MASS INDEX: 35.24 KG/M2 | OXYGEN SATURATION: 94 % | WEIGHT: 225 LBS | TEMPERATURE: 97.3 F | HEART RATE: 74 BPM | SYSTOLIC BLOOD PRESSURE: 125 MMHG | DIASTOLIC BLOOD PRESSURE: 78 MMHG

## 2022-06-02 DIAGNOSIS — C79.70 NSCLC METASTATIC TO ADRENAL GLAND (HCC): Primary | ICD-10-CM

## 2022-06-02 DIAGNOSIS — C34.90 NSCLC METASTATIC TO ADRENAL GLAND (HCC): Primary | ICD-10-CM

## 2022-06-02 PROCEDURE — 99215 OFFICE O/P EST HI 40 MIN: CPT | Performed by: INTERNAL MEDICINE

## 2022-06-02 RX ORDER — HEPARIN 100 UNIT/ML
300-500 SYRINGE INTRAVENOUS AS NEEDED
Status: CANCELLED
Start: 2022-06-09

## 2022-06-02 RX ORDER — DIPHENHYDRAMINE HYDROCHLORIDE 50 MG/ML
50 INJECTION, SOLUTION INTRAMUSCULAR; INTRAVENOUS AS NEEDED
Status: CANCELLED
Start: 2022-06-09

## 2022-06-02 RX ORDER — PALONOSETRON 0.05 MG/ML
0.25 INJECTION, SOLUTION INTRAVENOUS ONCE
Status: CANCELLED | OUTPATIENT
Start: 2022-06-09 | End: 2022-06-09

## 2022-06-02 RX ORDER — SODIUM CHLORIDE 0.9 % (FLUSH) 0.9 %
10 SYRINGE (ML) INJECTION AS NEEDED
Status: CANCELLED | OUTPATIENT
Start: 2022-06-09

## 2022-06-02 RX ORDER — ACETAMINOPHEN 325 MG/1
650 TABLET ORAL AS NEEDED
Status: CANCELLED
Start: 2022-06-09

## 2022-06-02 RX ORDER — SODIUM CHLORIDE 9 MG/ML
10 INJECTION INTRAMUSCULAR; INTRAVENOUS; SUBCUTANEOUS AS NEEDED
Status: CANCELLED | OUTPATIENT
Start: 2022-06-09

## 2022-06-02 RX ORDER — ONDANSETRON 8 MG/1
8 TABLET, ORALLY DISINTEGRATING ORAL
Qty: 60 TABLET | Refills: 2 | Status: SHIPPED | OUTPATIENT
Start: 2022-06-02

## 2022-06-02 RX ORDER — FOLIC ACID 1 MG/1
1 TABLET ORAL DAILY
Qty: 30 TABLET | Refills: 5 | Status: SHIPPED | OUTPATIENT
Start: 2022-06-02 | End: 2022-09-01 | Stop reason: SDUPTHER

## 2022-06-02 RX ORDER — CYANOCOBALAMIN 1000 UG/ML
1000 INJECTION, SOLUTION INTRAMUSCULAR; SUBCUTANEOUS
Status: CANCELLED | OUTPATIENT
Start: 2022-06-09 | End: 2022-06-10

## 2022-06-02 RX ORDER — LIDOCAINE AND PRILOCAINE 25; 25 MG/G; MG/G
CREAM TOPICAL AS NEEDED
Qty: 30 G | Refills: 0 | Status: SHIPPED | OUTPATIENT
Start: 2022-06-02

## 2022-06-02 RX ORDER — SODIUM CHLORIDE 9 MG/ML
25 INJECTION, SOLUTION INTRAVENOUS CONTINUOUS
Status: CANCELLED | OUTPATIENT
Start: 2022-06-09

## 2022-06-02 RX ORDER — PROCHLORPERAZINE MALEATE 5 MG
5 TABLET ORAL
Qty: 30 TABLET | Refills: 1 | Status: SHIPPED | OUTPATIENT
Start: 2022-06-02

## 2022-06-02 RX ORDER — ONDANSETRON 2 MG/ML
8 INJECTION INTRAMUSCULAR; INTRAVENOUS AS NEEDED
Status: CANCELLED | OUTPATIENT
Start: 2022-06-09

## 2022-06-02 RX ORDER — ALBUTEROL SULFATE 0.83 MG/ML
2.5 SOLUTION RESPIRATORY (INHALATION) AS NEEDED
Status: CANCELLED
Start: 2022-06-09

## 2022-06-02 RX ORDER — DEXAMETHASONE 4 MG/1
TABLET ORAL
Qty: 32 TABLET | Refills: 0 | Status: SHIPPED | OUTPATIENT
Start: 2022-06-02 | End: 2022-10-13 | Stop reason: SDUPTHER

## 2022-06-02 RX ORDER — DIPHENHYDRAMINE HYDROCHLORIDE 50 MG/ML
25 INJECTION, SOLUTION INTRAMUSCULAR; INTRAVENOUS AS NEEDED
Status: CANCELLED
Start: 2022-06-09

## 2022-06-02 RX ORDER — HYDROCORTISONE SODIUM SUCCINATE 100 MG/2ML
100 INJECTION, POWDER, FOR SOLUTION INTRAMUSCULAR; INTRAVENOUS AS NEEDED
Status: CANCELLED | OUTPATIENT
Start: 2022-06-09

## 2022-06-02 RX ORDER — EPINEPHRINE 1 MG/ML
0.3 INJECTION, SOLUTION, CONCENTRATE INTRAVENOUS AS NEEDED
Status: CANCELLED | OUTPATIENT
Start: 2022-06-09

## 2022-06-02 NOTE — PROGRESS NOTES
Pharmacy Note- Chemotherapy Education    Young Mansfield is a  57 y. o.female  diagnosed with lung cancer here today for chemotherapy counseling and consent. Ms. Allison Bernard is being treated with CARBOplatin, PEMEtrexed, pembrolizumab. Provided education on CARBOplatin, PEMEtrexed, pembrolizumab. and premedications - fosaprepitant, palonosetron and dexamethasone. Reviewed vitamin support with folic acid and H09. Supportive care medications sent to pharmacy. Side effects of chemotherapy reviewed included s/s infection, anemia, appetite changes, thrombocytopenia, fatigue, hair loss/alopecia, bone pain, skin and nail changes, diarrhea/constipation and kidney changes. Ms. Allison Bernard was provided information regarding the risks of immune-mediated adverse reactions secondary to pembrolizumab that may require interruption/delay of treatment and possible use of corticosteroids. These reactions include, but are not limited to: colitis (diarrhea or severe abdominal pain); hepatitis (jaundice, severe nausea, or vomiting, easy bruising, and/or bleeding); hypophysitis (persistent or unusual headache, extreme weakness, dizziness/fainting, and/or vision changes); dermatitis (skin rash, mouth sores, skin blisters, and/or skin peeling); ocular toxicity (uveitis, iritis, and/or episcleritis); neuropathy (motor or sensory); hyper/hypothyroidism. Patient given ways to manage these side effects and when to contact office. [address] Handout of medications provided to patient. Ms. Allison Bernard verbalized understanding of the information presented and all of the patient's questions were answered. Florence Yin, PharmD, BCPS, BCOP    For Pharmacy Admin Tracking Only     CPA in place:  Yes   Recommendation Provided To: Patient/Caregiver: 2 via In person   Intervention Detail: New Rx: 5, reason: Needs Additional Therapy      Intervention Accepted By: Patient/Caregiver: 2   Time Spent (min): 27

## 2022-06-02 NOTE — PROGRESS NOTES
Chasity Downey is a 57 y.o. female    Chief Complaint   Patient presents with    Follow-up     stage IV NSCLC- PDL1- 15%, no  mutations        1. Have you been to the ER, urgent care clinic since your last visit? Hospitalized since your last visit? Yes, [address]    2. Have you seen or consulted any other health care providers outside of the [address] [place] [address] since your last visit? Include any pap smears or colon screening.  No

## 2022-06-06 ENCOUNTER — HOSPITAL ENCOUNTER (OUTPATIENT)
Dept: INTERVENTIONAL RADIOLOGY/VASCULAR | Age: 62
Discharge: HOME | End: 2022-06-06
Attending: STUDENT IN AN ORGANIZED HEALTH CARE EDUCATION/TRAINING PROGRAM | Admitting: STUDENT IN AN ORGANIZED HEALTH CARE EDUCATION/TRAINING PROGRAM
Payer: MEDICAID

## 2022-06-06 VITALS
OXYGEN SATURATION: 91 % | TEMPERATURE: 99.3 F | WEIGHT: 228 LBS | SYSTOLIC BLOOD PRESSURE: 137 MMHG | RESPIRATION RATE: 29 BRPM | BODY MASS INDEX: 35.79 KG/M2 | DIASTOLIC BLOOD PRESSURE: 78 MMHG | HEIGHT: 67 IN | HEART RATE: 84 BPM

## 2022-06-06 DIAGNOSIS — C79.70 NSCLC METASTATIC TO ADRENAL GLAND (HCC): ICD-10-CM

## 2022-06-06 DIAGNOSIS — C34.90 NSCLC METASTATIC TO ADRENAL GLAND (HCC): ICD-10-CM

## 2022-06-06 PROCEDURE — C1894 INTRO/SHEATH, NON-LASER: HCPCS

## 2022-06-06 PROCEDURE — 99152 MOD SED SAME PHYS/QHP 5/>YRS: CPT

## 2022-06-06 PROCEDURE — 99153 MOD SED SAME PHYS/QHP EA: CPT

## 2022-06-06 PROCEDURE — 77030011893 HC TY CUT DN TRIS -B

## 2022-06-06 PROCEDURE — 74011250636 HC RX REV CODE- 250/636: Performed by: RADIOLOGY

## 2022-06-06 PROCEDURE — 77030031139 HC SUT VCRL2 J&J -A

## 2022-06-06 PROCEDURE — 77030010507 HC ADH SKN DERMBND J&J -B

## 2022-06-06 PROCEDURE — 36561 INSERT TUNNELED CV CATH: CPT

## 2022-06-06 PROCEDURE — 2709999900 HC NON-CHARGEABLE SUPPLY

## 2022-06-06 PROCEDURE — C1788 PORT, INDWELLING, IMP: HCPCS

## 2022-06-06 PROCEDURE — 74011000250 HC RX REV CODE- 250: Performed by: RADIOLOGY

## 2022-06-06 RX ORDER — SODIUM BICARBONATE 42 MG/ML
5 INJECTION, SOLUTION INTRAVENOUS
Status: COMPLETED | OUTPATIENT
Start: 2022-06-06 | End: 2022-06-06

## 2022-06-06 RX ORDER — SODIUM CHLORIDE 9 MG/ML
50 INJECTION, SOLUTION INTRAVENOUS CONTINUOUS
Status: DISCONTINUED | OUTPATIENT
Start: 2022-06-06 | End: 2022-06-06 | Stop reason: ALTCHOICE

## 2022-06-06 RX ORDER — MIDAZOLAM HYDROCHLORIDE 1 MG/ML
5 INJECTION, SOLUTION INTRAMUSCULAR; INTRAVENOUS
Status: DISCONTINUED | OUTPATIENT
Start: 2022-06-06 | End: 2022-06-06 | Stop reason: ALTCHOICE

## 2022-06-06 RX ORDER — LIDOCAINE HYDROCHLORIDE 20 MG/ML
10 INJECTION, SOLUTION INFILTRATION; PERINEURAL ONCE
Status: COMPLETED | OUTPATIENT
Start: 2022-06-06 | End: 2022-06-06

## 2022-06-06 RX ORDER — FENTANYL CITRATE 50 UG/ML
200 INJECTION, SOLUTION INTRAMUSCULAR; INTRAVENOUS
Status: DISCONTINUED | OUTPATIENT
Start: 2022-06-06 | End: 2022-06-06 | Stop reason: ALTCHOICE

## 2022-06-06 RX ORDER — LIDOCAINE HYDROCHLORIDE AND EPINEPHRINE 10; 10 MG/ML; UG/ML
20 INJECTION, SOLUTION INFILTRATION; PERINEURAL ONCE
Status: COMPLETED | OUTPATIENT
Start: 2022-06-06 | End: 2022-06-06

## 2022-06-06 RX ORDER — HEPARIN 100 UNIT/ML
300 SYRINGE INTRAVENOUS
Status: COMPLETED | OUTPATIENT
Start: 2022-06-06 | End: 2022-06-06

## 2022-06-06 RX ORDER — NALOXONE HYDROCHLORIDE 0.4 MG/ML
0.4 INJECTION, SOLUTION INTRAMUSCULAR; INTRAVENOUS; SUBCUTANEOUS AS NEEDED
Status: DISCONTINUED | OUTPATIENT
Start: 2022-06-06 | End: 2022-06-06 | Stop reason: ALTCHOICE

## 2022-06-06 RX ORDER — FLUMAZENIL 0.1 MG/ML
0.5 INJECTION INTRAVENOUS ONCE
Status: DISCONTINUED | OUTPATIENT
Start: 2022-06-06 | End: 2022-06-06 | Stop reason: ALTCHOICE

## 2022-06-06 RX ADMIN — MIDAZOLAM HYDROCHLORIDE 1 MG: 1 INJECTION, SOLUTION INTRAMUSCULAR; INTRAVENOUS at 16:09

## 2022-06-06 RX ADMIN — LIDOCAINE HYDROCHLORIDE,EPINEPHRINE BITARTRATE 8 ML: 10; .01 INJECTION, SOLUTION INFILTRATION; PERINEURAL at 16:33

## 2022-06-06 RX ADMIN — SODIUM BICARBONATE 210 MG: 42 INJECTION, SOLUTION INTRAVENOUS at 16:33

## 2022-06-06 RX ADMIN — WATER 2 G: 1 INJECTION INTRAMUSCULAR; INTRAVENOUS; SUBCUTANEOUS at 15:52

## 2022-06-06 RX ADMIN — Medication 500 UNITS: at 16:28

## 2022-06-06 RX ADMIN — LIDOCAINE HYDROCHLORIDE 10 ML: 20 INJECTION, SOLUTION INFILTRATION; PERINEURAL at 16:32

## 2022-06-06 RX ADMIN — FENTANYL CITRATE 50 MCG: 50 INJECTION, SOLUTION INTRAMUSCULAR; INTRAVENOUS at 16:25

## 2022-06-06 RX ADMIN — MIDAZOLAM HYDROCHLORIDE 0.5 MG: 1 INJECTION, SOLUTION INTRAMUSCULAR; INTRAVENOUS at 16:25

## 2022-06-06 RX ADMIN — FENTANYL CITRATE 50 MCG: 50 INJECTION, SOLUTION INTRAMUSCULAR; INTRAVENOUS at 16:09

## 2022-06-06 RX ADMIN — SODIUM CHLORIDE 50 ML/HR: 9 INJECTION, SOLUTION INTRAVENOUS at 15:52

## 2022-06-06 RX ADMIN — MIDAZOLAM HYDROCHLORIDE 0.5 MG: 1 INJECTION, SOLUTION INTRAMUSCULAR; INTRAVENOUS at 16:16

## 2022-06-06 NOTE — H&P
Interventional and Vascular Radiology History and Physical    Patient: Jessica Lorenzo 57 y.o. female       Chief Complaint: No chief complaint on file. History of Present Illness: chemotherapy     History:    Past Medical History:   Diagnosis Date    Diabetes mellitus type 2, noninsulin dependent ([address]     Generalized anxiety disorder     Hypercholesterolemia     Hypertension     Neoplasm of major salivary gland      No family history on file. Social History     Socioeconomic History    Marital status:      Spouse name: Not on file    Number of children: Not on file    Years of education: Not on file    Highest education level: Not on file   Occupational History    Not on file   Tobacco Use    Smoking status: Current Every Day Smoker     Packs/day: 0.50     Years: 20.00     Pack years: 10.00    Smokeless tobacco: Never Used   Substance and Sexual Activity    Alcohol use: Yes     Comment: occ    Drug use: Not on file    Sexual activity: Not on file   Other Topics Concern    Not on file   Social History Narrative    Not on file     Social Determinants of Health     Financial Resource Strain:     Difficulty of Paying Living Expenses: Not on file   Food Insecurity:     Worried About Running Out of Food in the Last Year: Not on file    Cheli of Food in the Last Year: Not on file   Transportation Needs:     Lack of Transportation (Medical): Not on file    Lack of Transportation (Non-Medical):  Not on file   Physical Activity:     Days of Exercise per Week: Not on file    Minutes of Exercise per Session: Not on file   Stress:     Feeling of Stress : Not on file   Social Connections:     Frequency of Communication with Friends and Family: Not on file    Frequency of Social Gatherings with Friends and Family: Not on file    Attends Congregational Services: Not on file    Active Member of Clubs or Organizations: Not on file    Attends Club or Organization Meetings: Not on file   [facility] Marital Status: Not on file   Intimate Partner Violence:     Fear of Current or Ex-Partner: Not on file    Emotionally Abused: Not on file    Physically Abused: Not on file    Sexually Abused: Not on file   Housing Stability:     Unable to Pay for Housing in the Last Year: Not on file    Number of [place] in the Last Year: Not on file    Unstable Housing in the Last Year: Not on file       Allergies: Allergies   Allergen Reactions    Erythromycin Other (comments)     Stomach cramps    Penicillins Hives       Current Medications:  Current Facility-Administered Medications   Medication Dose Route Frequency    lidocaine (XYLOCAINE) 20 mg/mL (2 %) injection 200 mg  10 mL SubCUTAneous ONCE    lidocaine-EPINEPHrine (XYLOCAINE) 1 %-1:100,000 injection 200 mg  20 mL SubCUTAneous ONCE    sodium bicarbonate (4.2%) injection 210 mg  5 mL SubCUTAneous RAD ONCE    heparin (porcine) pf 300 Units  300 Units InterCATHeter RAD ONCE    0.9% sodium chloride infusion  50 mL/hr IntraVENous CONTINUOUS    fentaNYL citrate (PF) injection 200 mcg  200 mcg IntraVENous Rad Multiple    flumazeniL (ROMAZICON) 0.1 mg/mL injection 0.5 mg  0.5 mg IntraVENous ONCE    midazolam (VERSED) injection 5 mg  5 mg IntraVENous Rad Multiple    naloxone (NARCAN) injection 0.4 mg  0.4 mg IntraVENous PRN        Physical Exam:  Blood pressure (!) 141/85, pulse 78, temperature 99.3 °F (37.4 °C), resp. rate 27, height 5' 7\" (1.702 m), weight 103.4 kg (228 lb), SpO2 96 %, not currently breastfeeding. LUNG: clear to auscultation bilaterally, HEART: regular rate and rhythm, S1, S2 normal, no murmur, click, rub or gallop      Alerts:    Hospital Problems  Date Reviewed: 6/2/2022    None          Laboratory:    No results for input(s): HGB, HCT, WBC, PLT, INR, BUN, CREA, K, CRCLT, HGBEXT, HCTEXT, PLTEXT, INREXT in the last 72 hours.     No lab exists for component: PTT, PT      Plan of Care/Planned Procedure:  Risks, benefits, and alternatives reviewed with patient and she agrees to proceed with the procedure. Conscious sedation will be performed with IV fentanyl and versed.  Plan is for chest port placement       Joe Florez MD

## 2022-06-06 NOTE — DISCHARGE INSTRUCTIONS
You have received sedation medications today. YOU SHOULD NOT DRIVE FOR 24 HOURS, DO NOT OPERATE HEAVY MACHINERY, DO NOT MAKE ANY LEGAL DECISIONS OR SIGN LEGAL DOCUMENTS FOR 24 HOURS. DO NOT DRINK ALCOHOL, TAKE ANY MEDICATIONS UNLESS PRESCRIBED BY YOUR DOCTOR. IF YOU ARE A CAREGIVER, SOMEONE SHOULD TAKE THAT ROLE FOR 24 HOURS. Side effects of sedation medications and other medications used today have been reviewed  Those side effects can include but are not limited to: dizziness, drowsiness, poor balance, fatigue, sleepiness. Take precautions at home to prevent falls, such as assistance with walking or stairs if allowed and /or when needed or position changes. Allergic or adverse reactions could include nausea, itching, hives, dizziness, or anything else out of the ordinary. Should you experience any of these significant changes, please call [phone] between the hours of 7:30 am and 3:30 pm or [phone] after hours. After hours, ask the  to page the X-ray Technologist, and describe the problem to the technologist. If you are experiencing chest pain, shortness of breath, altered mental state, unusual bleeding or any other emergent symptom you should call 911 immediately.

## 2022-06-06 NOTE — PROGRESS NOTES
Pt arrives ambultory to angio department accompanied by  for port placement procedure. All assessments completed and consent was reviewed. Education given was regarding procedure, moderate sedation, post-procedure care and  management/follow-up. Opportunity for questions was provided and all questions and concerns were addressed. 1550: Bedside and Verbal shift change report given to Polly Miarnda RN (oncoming nurse) by Jacob Monson RN (offgoing nurse). Report included the following information SBAR and MAR.

## 2022-06-09 ENCOUNTER — HOSPITAL ENCOUNTER (OUTPATIENT)
Dept: INFUSION THERAPY | Age: 62
Discharge: HOME | End: 2022-06-09
Payer: MEDICAID

## 2022-06-09 ENCOUNTER — OFFICE VISIT (OUTPATIENT)
Dept: ONCOLOGY | Age: 62
End: 2022-06-09
Payer: MEDICAID

## 2022-06-09 ENCOUNTER — DOCUMENTATION ONLY (OUTPATIENT)
Dept: ONCOLOGY | Age: 62
End: 2022-06-09

## 2022-06-09 VITALS
BODY MASS INDEX: 35.33 KG/M2 | RESPIRATION RATE: 18 BRPM | DIASTOLIC BLOOD PRESSURE: 76 MMHG | TEMPERATURE: 97.3 F | WEIGHT: 225.6 LBS | SYSTOLIC BLOOD PRESSURE: 118 MMHG | HEART RATE: 77 BPM

## 2022-06-09 VITALS
DIASTOLIC BLOOD PRESSURE: 87 MMHG | OXYGEN SATURATION: 98 % | WEIGHT: 225 LBS | RESPIRATION RATE: 18 BRPM | SYSTOLIC BLOOD PRESSURE: 133 MMHG | HEART RATE: 77 BPM | BODY MASS INDEX: 35.24 KG/M2 | TEMPERATURE: 97.3 F

## 2022-06-09 DIAGNOSIS — C34.90 NSCLC METASTATIC TO ADRENAL GLAND (HCC): ICD-10-CM

## 2022-06-09 DIAGNOSIS — C79.70 NSCLC METASTATIC TO ADRENAL GLAND (HCC): ICD-10-CM

## 2022-06-09 DIAGNOSIS — C34.90 PRIMARY ADENOCARCINOMA OF LUNG, UNSPECIFIED LATERALITY (HCC): Primary | ICD-10-CM

## 2022-06-09 LAB
ALBUMIN SERPL-MCNC: 3.2 G/DL (ref 3.5–5)
ALBUMIN/GLOB SERPL: 0.7 {RATIO} (ref 1.1–2.2)
ALP SERPL-CCNC: 82 U/L (ref 45–117)
ALT SERPL-CCNC: 15 U/L (ref 12–78)
ANION GAP SERPL CALC-SCNC: 4 MMOL/L (ref 5–15)
AST SERPL-CCNC: 11 U/L (ref 15–37)
BASOPHILS # BLD: 0 K/UL (ref 0–0.1)
BASOPHILS NFR BLD: 0 % (ref 0–1)
BILIRUB SERPL-MCNC: 0.5 MG/DL (ref 0.2–1)
BUN SERPL-MCNC: 10 MG/DL (ref 6–20)
BUN/CREAT SERPL: 14 (ref 12–20)
CALCIUM SERPL-MCNC: 9.3 MG/DL (ref 8.5–10.1)
CHLORIDE SERPL-SCNC: 108 MMOL/L (ref 97–108)
CO2 SERPL-SCNC: 26 MMOL/L (ref 21–32)
CORTIS SERPL-MCNC: 0.7 UG/DL
CREAT SERPL-MCNC: 0.7 MG/DL (ref 0.55–1.02)
DIFFERENTIAL METHOD BLD: ABNORMAL
EOSINOPHIL # BLD: 0 K/UL (ref 0–0.4)
EOSINOPHIL NFR BLD: 0 % (ref 0–7)
ERYTHROCYTE [DISTWIDTH] IN BLOOD BY AUTOMATED COUNT: 13.1 % (ref 11.5–14.5)
GLOBULIN SER CALC-MCNC: 4.6 G/DL (ref 2–4)
GLUCOSE SERPL-MCNC: 92 MG/DL (ref 65–100)
HBV SURFACE AB SER QL: NONREACTIVE
HBV SURFACE AB SER-ACNC: 4.43 MIU/ML
HBV SURFACE AG SER QL: <0.1 INDEX
HBV SURFACE AG SER QL: NEGATIVE
HCT VFR BLD AUTO: 37.4 % (ref 35–47)
HGB BLD-MCNC: 12.4 G/DL (ref 11.5–16)
IMM GRANULOCYTES # BLD AUTO: 0 K/UL (ref 0–0.04)
IMM GRANULOCYTES NFR BLD AUTO: 0 % (ref 0–0.5)
LYMPHOCYTES # BLD: 1.3 K/UL (ref 0.8–3.5)
LYMPHOCYTES NFR BLD: 15 % (ref 12–49)
MCH RBC QN AUTO: 29.1 PG (ref 26–34)
MCHC RBC AUTO-ENTMCNC: 33.2 G/DL (ref 30–36.5)
MCV RBC AUTO: 87.8 FL (ref 80–99)
MONOCYTES # BLD: 0.6 K/UL (ref 0–1)
MONOCYTES NFR BLD: 7 % (ref 5–13)
NEUTS SEG # BLD: 6.7 K/UL (ref 1.8–8)
NEUTS SEG NFR BLD: 78 % (ref 32–75)
NRBC # BLD: 0 K/UL (ref 0–0.01)
NRBC BLD-RTO: 0 PER 100 WBC
PLATELET # BLD AUTO: 306 K/UL (ref 150–400)
PMV BLD AUTO: 9.9 FL (ref 8.9–12.9)
POTASSIUM SERPL-SCNC: 3.6 MMOL/L (ref 3.5–5.1)
PROT SERPL-MCNC: 7.8 G/DL (ref 6.4–8.2)
RBC # BLD AUTO: 4.26 M/UL (ref 3.8–5.2)
SODIUM SERPL-SCNC: 138 MMOL/L (ref 136–145)
TSH SERPL DL<=0.05 MIU/L-ACNC: 0.92 UIU/ML (ref 0.36–3.74)
WBC # BLD AUTO: 8.7 K/UL (ref 3.6–11)

## 2022-06-09 PROCEDURE — 85025 COMPLETE CBC W/AUTO DIFF WBC: CPT

## 2022-06-09 PROCEDURE — 86704 HEP B CORE ANTIBODY TOTAL: CPT

## 2022-06-09 PROCEDURE — 87340 HEPATITIS B SURFACE AG IA: CPT

## 2022-06-09 PROCEDURE — 82533 TOTAL CORTISOL: CPT

## 2022-06-09 PROCEDURE — 96417 CHEMO IV INFUS EACH ADDL SEQ: CPT

## 2022-06-09 PROCEDURE — 96375 TX/PRO/DX INJ NEW DRUG ADDON: CPT

## 2022-06-09 PROCEDURE — 96372 THER/PROPH/DIAG INJ SC/IM: CPT

## 2022-06-09 PROCEDURE — 74011250636 HC RX REV CODE- 250/636: Performed by: INTERNAL MEDICINE

## 2022-06-09 PROCEDURE — 96367 TX/PROPH/DG ADDL SEQ IV INF: CPT

## 2022-06-09 PROCEDURE — 84443 ASSAY THYROID STIM HORMONE: CPT

## 2022-06-09 PROCEDURE — 86706 HEP B SURFACE ANTIBODY: CPT

## 2022-06-09 PROCEDURE — 80053 COMPREHEN METABOLIC PANEL: CPT

## 2022-06-09 PROCEDURE — 96413 CHEMO IV INFUSION 1 HR: CPT

## 2022-06-09 PROCEDURE — 99214 OFFICE O/P EST MOD 30 MIN: CPT | Performed by: INTERNAL MEDICINE

## 2022-06-09 PROCEDURE — 96411 CHEMO IV PUSH ADDL DRUG: CPT

## 2022-06-09 PROCEDURE — 36415 COLL VENOUS BLD VENIPUNCTURE: CPT

## 2022-06-09 PROCEDURE — 77030012965 HC NDL HUBR BBMI -A

## 2022-06-09 PROCEDURE — 74011000258 HC RX REV CODE- 258: Performed by: INTERNAL MEDICINE

## 2022-06-09 PROCEDURE — 74011000250 HC RX REV CODE- 250: Performed by: INTERNAL MEDICINE

## 2022-06-09 RX ORDER — HEPARIN 100 UNIT/ML
300-500 SYRINGE INTRAVENOUS AS NEEDED
Status: ACTIVE | OUTPATIENT
Start: 2022-06-09 | End: 2022-06-09

## 2022-06-09 RX ORDER — CYANOCOBALAMIN 1000 UG/ML
1000 INJECTION, SOLUTION INTRAMUSCULAR; SUBCUTANEOUS
Status: COMPLETED | OUTPATIENT
Start: 2022-06-09 | End: 2022-06-09

## 2022-06-09 RX ORDER — PALONOSETRON 0.05 MG/ML
0.25 INJECTION, SOLUTION INTRAVENOUS ONCE
Status: COMPLETED | OUTPATIENT
Start: 2022-06-09 | End: 2022-06-09

## 2022-06-09 RX ORDER — SODIUM CHLORIDE 9 MG/ML
25 INJECTION, SOLUTION INTRAVENOUS CONTINUOUS
Status: DISPENSED | OUTPATIENT
Start: 2022-06-09 | End: 2022-06-09

## 2022-06-09 RX ORDER — SODIUM CHLORIDE 0.9 % (FLUSH) 0.9 %
10 SYRINGE (ML) INJECTION AS NEEDED
Status: DISPENSED | OUTPATIENT
Start: 2022-06-09 | End: 2022-06-09

## 2022-06-09 RX ADMIN — SODIUM CHLORIDE 25 ML/HR: 9 INJECTION, SOLUTION INTRAVENOUS at 14:09

## 2022-06-09 RX ADMIN — SODIUM CHLORIDE 1100 MG: 9 INJECTION, SOLUTION INTRAVENOUS at 15:21

## 2022-06-09 RX ADMIN — DEXAMETHASONE SODIUM PHOSPHATE 12 MG: 4 INJECTION, SOLUTION INTRAMUSCULAR; INTRAVENOUS at 14:25

## 2022-06-09 RX ADMIN — CARBOPLATIN 750 MG: 10 INJECTION, SOLUTION INTRAVENOUS at 15:43

## 2022-06-09 RX ADMIN — SODIUM CHLORIDE, PRESERVATIVE FREE 10 ML: 5 INJECTION INTRAVENOUS at 16:30

## 2022-06-09 RX ADMIN — SODIUM CHLORIDE 200 MG: 9 INJECTION, SOLUTION INTRAVENOUS at 13:22

## 2022-06-09 RX ADMIN — SODIUM CHLORIDE 150 MG: 900 INJECTION, SOLUTION INTRAVENOUS at 14:10

## 2022-06-09 RX ADMIN — HEPARIN 500 UNITS: 100 SYRINGE at 16:30

## 2022-06-09 RX ADMIN — PALONOSETRON 0.25 MG: 0.05 INJECTION, SOLUTION INTRAVENOUS at 14:56

## 2022-06-09 RX ADMIN — CYANOCOBALAMIN 1000 MCG: 1000 INJECTION, SOLUTION INTRAMUSCULAR at 16:30

## 2022-06-09 NOTE — PROGRESS NOTES
[facility] Chemo Progress Note    Date: 2022    Name: Chase Figueroa    MRN: [id number]         : 1960    1000 Ms. Jeovanny Louie Arrived to [facility] for C1 Keytruda/Alimta/Carboplatin ambulatory in stable condition. Assessment was completed, no acute issues at this time, no new complaints voiced. Port accessed with positive blood return. Labs drawn and sent for processing. Went to provider appointment with Medical Oncology    Chemotherapy Flowsheet 2022   Cycle C1   Date 2022   Drug / Regimen Keytruda/Carbo/Alimta   Dosage See Orders   Notes given         Ms. Canada's vitals were reviewed. Patient Vitals for the past 12 hrs:   Temp Pulse Resp BP   22 1629 -- -- -- 118/76   22 1008 97.3 °F (36.3 °C) 77 18 133/87         Lab results were obtained and reviewed. Recent Results (from the past 12 hour(s))   CBC WITH AUTOMATED DIFF    Collection Time: 22 10:06 AM   Result Value Ref Range    WBC 8.7 3.6 - 11.0 K/uL    RBC 4.26 3.80 - 5.20 M/uL    HGB 12.4 11.5 - 16.0 g/dL    HCT 37.4 35.0 - 47.0 %    MCV 87.8 80.0 - 99.0 FL    MCH 29.1 26.0 - 34.0 PG    MCHC 33.2 30.0 - 36.5 g/dL    RDW 13.1 11.5 - 14.5 %    PLATELET 567 203 - 895 K/uL    MPV 9.9 8.9 - 12.9 FL    NRBC 0.0 0  WBC    ABSOLUTE NRBC 0.00 0.00 - 0.01 K/uL    NEUTROPHILS 78 (H) 32 - 75 %    LYMPHOCYTES 15 12 - 49 %    MONOCYTES 7 5 - 13 %    EOSINOPHILS 0 0 - 7 %    BASOPHILS 0 0 - 1 %    IMMATURE GRANULOCYTES 0 0.0 - 0.5 %    ABS. NEUTROPHILS 6.7 1.8 - 8.0 K/UL    ABS. LYMPHOCYTES 1.3 0.8 - 3.5 K/UL    ABS. MONOCYTES 0.6 0.0 - 1.0 K/UL    ABS. EOSINOPHILS 0.0 0.0 - 0.4 K/UL    ABS. BASOPHILS 0.0 0.0 - 0.1 K/UL    ABS. IMM.  GRANS. 0.0 0.00 - 0.04 K/UL    DF AUTOMATED     METABOLIC PANEL, COMPREHENSIVE    Collection Time: 22 10:06 AM   Result Value Ref Range    Sodium 138 136 - 145 mmol/L    Potassium 3.6 3.5 - 5.1 mmol/L    Chloride 108 97 - 108 mmol/L    CO2 26 21 - 32 mmol/L    Anion gap 4 (L) 5 - 15 mmol/L Glucose 92 65 - 100 mg/dL    BUN 10 6 - 20 MG/DL    Creatinine 0.70 0.55 - 1.02 MG/DL    BUN/Creatinine ratio 14 12 - 20      GFR est AA >60 >60 ml/min/1.73m2    GFR est non-AA >60 >60 ml/min/1.73m2    Calcium 9.3 8.5 - 10.1 MG/DL    Bilirubin, total 0.5 0.2 - 1.0 MG/DL    ALT (SGPT) 15 12 - 78 U/L    AST (SGOT) 11 (L) 15 - 37 U/L    Alk. phosphatase 82 45 - 117 U/L    Protein, total 7.8 6.4 - 8.2 g/dL    Albumin 3.2 (L) 3.5 - 5.0 g/dL    Globulin 4.6 (H) 2.0 - 4.0 g/dL    A-G Ratio 0.7 (L) 1.1 - 2.2     TSH 3RD GENERATION    Collection Time: 06/09/22 10:06 AM   Result Value Ref Range    TSH 0.92 0.36 - 3.74 uIU/mL   CORTISOL    Collection Time: 06/09/22 10:06 AM   Result Value Ref Range    Cortisol, random 0.7 ug/dL   HEP B SURFACE AG    Collection Time: 06/09/22 10:06 AM   Result Value Ref Range    Hepatitis B surface Ag <0.10 Index    Hep B surface Ag Interp. Negative NEG     HEP B SURFACE AB    Collection Time: 06/09/22 10:06 AM   Result Value Ref Range    Hepatitis B surface Ab 4.43 mIU/mL    Hep B surface Ab Interp. NONREACTIVE NR         Pre-medications  were administered as ordered and chemotherapy was initiated.   Medications Administered     0.9% sodium chloride infusion     Admin Date  06/09/2022 Action  New Bag Dose  25 mL/hr Rate  25 mL/hr Route  IntraVENous Administered By  Nitza Briones RN          CARBOplatin (PARAPLATIN) 750 mg in 0.9% sodium chloride 250 mL, overfill volume 25 mL chemo infusion     Admin Date  06/09/2022 Action  New Bag Dose  750 mg Rate  700 mL/hr Route  IntraVENous Administered By  Nitza Briones RN          cyanocobalamin (VITAMIN B12) injection 1,000 mcg     Admin Date  06/09/2022 Action  Given Dose  1,000 mcg Route  IntraMUSCular Administered By  Nitza Briones RN          dexamethasone (DECADRON) 12 mg in 0.9% sodium chloride 50 mL, overfill volume 5 mL IVPB     Admin Date  06/09/2022 Action  New Bag Dose  12 mg Rate  232 mL/hr Route  IntraVENous Administered By  Tomas Madsen, Karmen Garza RN          fosaprepitant (EMEND) 150 mg in 0.9% sodium chloride 150 mL IVPB     Admin Date  06/09/2022 Action  New Bag Dose  150 mg Rate  450 mL/hr Route  IntraVENous Administered By  Jorge Duque RN          heparin (porcine) pf 300-500 Units     Admin Date  06/09/2022 Action  Given Dose  500 Units Route  InterCATHeter Administered By  Jorge Duque RN          palonosetron HCl (ALOXI) injection 0.25 mg     Admin Date  06/09/2022 Action  Given Dose  0.25 mg Route  IntraVENous Administered By  Jorge Duque RN          pembrolizumab [facility]) 200 mg in 0.9% sodium chloride 100 mL, overfill volume 10 mL IVPB     Admin Date  06/09/2022 Action  New Bag Dose  200 mg Rate  236 mL/hr Route  IntraVENous Administered By  Jorge Duque RN          PEMEtrexed disodium (ALIMTA) 1,100 mg in 0.9% sodium chloride 100 mL, overfill volume 10 mL chemo infusion     Admin Date  06/09/2022 Action  New Bag Dose  1,100 mg Rate  924 mL/hr Route  IntraVENous Administered By  Jorge Duque RN          sodium chloride (NS) flush 10 mL     Admin Date  06/09/2022 Action  Given Dose  10 mL Route  IntraVENous Administered By  Jorge Duque RN                  6218 Patient tolerated treatment well. Port maintained positive blood return throughout treatment. Port flushed, heparinized and de accessed per protocol. Patient was discharged from [facility] in stable condition. Patient aware of next appointment.      Future Appointments   Date Time Provider Raymond Gonzales   6/30/2022 10:00 AM A3 [facility] [address]   6/30/2022 10:15 AM Danielle Farris  [facility]   7/21/2022 10:00 AM A3 [facility] [address] H   8/11/2022 10:00 AM A3 [facility] [address] H   9/1/2022 10:00 AM D4 [facility] [address] H   9/22/2022 10:00 AM A1 [facility] [address]   10/13/2022 10:00 AM A1 [facility] [address]   11/3/2022 10:00 [facility]   11/24/2022 10:00 AM A1 [facility], RN  June 9, 2022

## 2022-06-09 NOTE — PROGRESS NOTES
[facility] [place] at [address], [id number] [place] [address] Road, [address] [place] [address]  W: [phone]  F: [phone]    Reason for visit   Nadeem Arellano is a 57 y.o. female who is seen for follow up of stage IV NSCLC- PDL1- 15%, no  mutations       Treatment History:   · 5/4/2022: Left thoracentecis- Adenocarcinoma   · 6/9/2022: Caroplatin+ Alimta+Keytruda    History of Present Illness:   Patient is a 28-year-old female with diabetes mellitus, hypertension who presented to the [facility] emergency room on 5/4/2022 with complaints of shortness of breath x 14 days which has been progressively getting worse over several weeks with mild cough. Denies any headaches, vision changes, falls trouble swallowing, chest pain, fevers, weight loss, hemoptysis. CTA on admission showed no evidence of pulmonary embolism but she was noted to have a moderate to large left-sided pleural effusion with a left apical mass measuring 28 x 20 mm, she had small pulmonary nodules on the right, possible hepatic hypodensity. She had ultrasound-guided thoracentesis of the left side on 5/4/2022 and 1300 cc of fluid was aspirated. This was sent for cytology which was positive. Pleural fluid cytology showed more than 100 RBCs, total protein of 5.3, albumin 2.8, . Labs were noted for a bilirubin of 1.1. Comes to start C1D1 of Chemoimmunotherapy    She had thoracentesis again on 5/20/2022. Couldn't have a drain placed as there was not enough. Sob much better. No changes         Rondy her  is with her. She used to work for [employer]. Past Medical History:   Diagnosis Date    Diabetes mellitus type 2, noninsulin dependent ([address]     Generalized anxiety disorder     Hypercholesterolemia     Hypertension     Neoplasm of major salivary gland       Past Surgical History:   Procedure Laterality Date    COLONOSCOPY N/A 2/24/2021    . COLONOSCOPY  :- [facility] ENDOSCOPY    HX ACL RECONSTRUCTION      HX COLONOSCOPY      HX HYSTERECTOMY      IR INSERT CATH PLEURAL INDWELL  5/26/2022    IR INSERT TUNL CVC W PORT OVER 5 YEARS  6/6/2022    IR REMOVE LUNG/PLEURAL DRAIN / CATHETER  5/24/2022      Social History     Tobacco Use    Smoking status: Current Every Day Smoker     Packs/day: 0.50     Years: 20.00     Pack years: 10.00    Smokeless tobacco: Never Used   Substance Use Topics    Alcohol use: Yes     Comment: occ      No family history on file. Current Outpatient Medications   Medication Sig    prochlorperazine (COMPAZINE) 5 mg tablet Take 1 Tablet by mouth every six (6) hours as needed for Nausea or Vomiting.  dexAMETHasone (DECADRON) 4 mg tablet Take 1 tablets by mouth twice daily the day before chemotherapy and the day after chemotherapy    ondansetron (ZOFRAN ODT) 8 mg disintegrating tablet Take 1 Tablet by mouth every eight (8) hours as needed for Nausea or Vomiting.  folic acid (FOLVITE) 1 mg tablet Take 1 Tablet by mouth daily.  lidocaine-prilocaine (EMLA) topical cream Apply  to affected area as needed for PRN Reason (Other) (for port access). Apply a thin layer to the port site 30-60 minutes prior to arrival for chemotherapy    amLODIPine (NORVASC) 5 mg tablet Take 5 mg by mouth daily.  ALPRAZolam (XANAX) 0.25 mg tablet Take  by mouth.  metFORMIN (GLUCOPHAGE) 850 mg tablet Take 850 mg by mouth two (2) times daily (with meals).  escitalopram oxalate (Lexapro) 20 mg tablet Take 20 mg by mouth daily.  simvastatin (ZOCOR) 20 mg tablet Take 20 mg by mouth nightly.  losartan (COZAAR) 100 mg tablet Take 100 mg by mouth daily.  aspirin 81 mg chewable tablet Take 81 mg by mouth daily.  acetaminophen (TYLENOL PO) Take 500 mg by mouth every six (6) hours as needed for Pain. No current facility-administered medications for this visit.      Facility-Administered Medications Ordered in Other Visits   Medication Dose Route Frequency  sodium chloride (NS) flush 10 mL  10 mL IntraVENous PRN    heparin (porcine) pf 300-500 Units  300-500 Units InterCATHeter PRN      Allergies   Allergen Reactions    Erythromycin Other (comments)     Stomach cramps    Penicillins Hives        Review of Systems: A complete review of systems was obtained, negative except as described above. Physical Exam:     Visit Vitals  /87 (BP 1 Location: Left upper arm, BP Patient Position: Sitting)   Pulse 77   Temp 97.3 °F (36.3 °C)   Resp 18   Wt 225 lb (102.1 kg)   SpO2 98%   BMI 35.24 kg/m²     ECOG PS: 1  General: No distress  Eyes: PERRLA, anicteric sclerae  HENT: Atraumatic  Neck: Supple  Lymphatic: No visible neck nodes  Respiratory:  normal respiratory effort  CV: no peripheral edema  GI:nondistended  Skin: No rashes  Psych: Alert, oriented,     Results:     Lab Results   Component Value Date/Time    WBC 7.4 05/20/2022 03:09 AM    HGB 12.4 05/20/2022 03:09 AM    HCT 38.4 05/20/2022 03:09 AM    PLATELET 560 25/38/4431 03:09 AM    MCV 91.0 05/20/2022 03:09 AM    ABS. NEUTROPHILS 5.9 05/19/2022 03:23 PM     Lab Results   Component Value Date/Time    Sodium 140 05/20/2022 03:09 AM    Potassium 3.6 05/20/2022 03:09 AM    Chloride 108 05/20/2022 03:09 AM    CO2 28 05/20/2022 03:09 AM    Glucose 116 (H) 05/20/2022 03:09 AM    BUN 10 05/20/2022 03:09 AM    Creatinine 0.65 05/20/2022 03:09 AM    GFR est AA >60 05/20/2022 03:09 AM    GFR est non-AA >60 05/20/2022 03:09 AM    Calcium 8.8 05/20/2022 03:09 AM    Glucose (POC) 96 05/24/2022 11:29 AM    Creatinine (POC) 0.9 12/28/2012 06:39 AM     Lab Results   Component Value Date/Time    Bilirubin, total 0.9 05/19/2022 03:23 PM    ALT (SGPT) 26 05/19/2022 03:23 PM    Alk. phosphatase 93 05/19/2022 03:23 PM    Protein, total 7.7 05/19/2022 03:23 PM    Albumin 3.3 (L) 05/19/2022 03:23 PM    Globulin 4.4 (H) 05/19/2022 03:23 PM         Records reviewed and summarized above.   Pathology report(s) reviewed above. Radiology report(s) reviewed above. CT chest abdomen pelvis 5/5/2022    IMPRESSION  Diminished left-sided pleural effusion, status post thoracentesis with stable  mass lesion at the left apex. Mass lesion demonstrated at the left apex is not amenable to percutaneous  sampling. There are numerous additional nodular densities along the pleural margin  consistent with pleural carcinomatosis. There are additional small nodular densities demonstrated in the peritoneum,  these most consistent with peritoneal carcinomatosis. Small adrenal nodule on  the left likely an additional metastatic focus. No definitive target for percutaneous sampling. Currently awaiting results of  pleural fluid analysis/cytology and cellblock. MRI brain 5/2022      1. No evidence of intracranial metastases. 2. Minimal chronic microvascular ischemic disease. No acute intracranial  abnormality. 3. Enhancing lesions in the bilateral parotid glands, largest in the left  parotid gland measuring 3.6 cm. Lesions in the right parotid gland were  previously biopsied in 2020 and returned as Warthin tumors. These likely all  represent multiple Warthin tumors  Assessment:   1) Non small cell Lung cancer- adenocarcinoma stage IV    PDL1-15%, ALK/EGFR/ROS1/BRAF negative ( limited panel)  Liquid bx [id number], TMB low  CT chest abdomen and pelvis from 5/5/2022 was reviewed. Notable for large left-sided pleural effusion, concern for pleural carcinomatosis, left apical lung nodule measuring 20 x 26 mm, left adrenal nodule 19 x 18 mm, peritoneal nodularity concerning for carcinomatosis.   Status post left-sided thoracentesis and + cytology   H/o Smoking    Understands that this is advanced malignancy and treatments will be palliative  Dictated my molecular studies  With the current molecular studies I have recommended Carboplatin+ Alimta+ Keytruda x 4 followed by Alimta and Keytruda maintenance  Starts cycle today      If and when there is disease progression repeat expanded NGS is indicated    2) Left pleural effusion  S/p thoracentesis x 2, not enough for a drain  Hold off on Pleurx for now, hoping for resolution on systemic therapy    3) H/P R parotidectomy for Warthin's tumor   Has persistent multiple Warthin's tumors     4) Depression  On Lexapro    5) Encounter for high risk medications like chemotherapy and high risk diseas e    Supportive      Plan:     · Proceed with C1D1 of carboplatin auc 5, alimta 500 mg/m2 and Keytruda every 3 weeks x 4 then alimta and keytruda maintenance  · Antiemetics, steroids, b12, FA per protocol  · Hold off on Pleurx  · Start ASAP    RTC D1 of each cycle   I appreciate the opportunity to participate in Ms. Tricia Smith Dread's care.     Signed By: Carrie Armendariz MD

## 2022-06-09 NOTE — PROGRESS NOTES
Danyelle Church is a 57 y.o. female    Chief Complaint   Patient presents with    Follow-up      stage IV NSCLC- PDL1- 15%, no  mutations        1. Have you been to the ER, urgent care clinic since your last visit? Hospitalized since your last visit? No    2. Have you seen or consulted any other health care providers outside of the [address] [place] [address] since your last visit? Include any pap smears or colon screening.  No

## 2022-06-10 LAB
HBV CORE AB SERPL QL IA: NEGATIVE
HBV CORE AB SERPL QL IA: NEGATIVE
HBV CORE IGM SERPL QL IA: NEGATIVE

## 2022-06-23 RX ORDER — EPINEPHRINE 1 MG/ML
0.3 INJECTION, SOLUTION, CONCENTRATE INTRAVENOUS AS NEEDED
Status: CANCELLED | OUTPATIENT
Start: 2022-06-30

## 2022-06-23 RX ORDER — ACETAMINOPHEN 325 MG/1
650 TABLET ORAL AS NEEDED
Status: CANCELLED
Start: 2022-06-30

## 2022-06-23 RX ORDER — ALBUTEROL SULFATE 0.83 MG/ML
2.5 SOLUTION RESPIRATORY (INHALATION) AS NEEDED
Status: CANCELLED
Start: 2022-06-30

## 2022-06-23 RX ORDER — CYANOCOBALAMIN 1000 UG/ML
1000 INJECTION, SOLUTION INTRAMUSCULAR; SUBCUTANEOUS
Status: CANCELLED | OUTPATIENT
Start: 2022-06-30 | End: 2022-07-01

## 2022-06-23 RX ORDER — ONDANSETRON 2 MG/ML
8 INJECTION INTRAMUSCULAR; INTRAVENOUS AS NEEDED
Status: CANCELLED | OUTPATIENT
Start: 2022-06-30

## 2022-06-23 RX ORDER — DIPHENHYDRAMINE HYDROCHLORIDE 50 MG/ML
25 INJECTION, SOLUTION INTRAMUSCULAR; INTRAVENOUS AS NEEDED
Status: CANCELLED
Start: 2022-06-30

## 2022-06-23 RX ORDER — DIPHENHYDRAMINE HYDROCHLORIDE 50 MG/ML
50 INJECTION, SOLUTION INTRAMUSCULAR; INTRAVENOUS AS NEEDED
Status: CANCELLED
Start: 2022-06-30

## 2022-06-23 RX ORDER — HYDROCORTISONE SODIUM SUCCINATE 100 MG/2ML
100 INJECTION, POWDER, FOR SOLUTION INTRAMUSCULAR; INTRAVENOUS AS NEEDED
Status: CANCELLED | OUTPATIENT
Start: 2022-06-30

## 2022-06-30 ENCOUNTER — HOSPITAL ENCOUNTER (OUTPATIENT)
Dept: INFUSION THERAPY | Age: 62
Discharge: HOME | End: 2022-06-30
Payer: MEDICAID

## 2022-06-30 ENCOUNTER — OFFICE VISIT (OUTPATIENT)
Dept: ONCOLOGY | Age: 62
End: 2022-06-30
Payer: MEDICAID

## 2022-06-30 VITALS
HEART RATE: 80 BPM | WEIGHT: 227 LBS | HEIGHT: 67 IN | TEMPERATURE: 97.9 F | SYSTOLIC BLOOD PRESSURE: 132 MMHG | RESPIRATION RATE: 18 BRPM | DIASTOLIC BLOOD PRESSURE: 80 MMHG | BODY MASS INDEX: 35.63 KG/M2

## 2022-06-30 VITALS
SYSTOLIC BLOOD PRESSURE: 134 MMHG | TEMPERATURE: 97.9 F | RESPIRATION RATE: 18 BRPM | BODY MASS INDEX: 35.55 KG/M2 | WEIGHT: 227 LBS | HEART RATE: 90 BPM | DIASTOLIC BLOOD PRESSURE: 81 MMHG | OXYGEN SATURATION: 98 %

## 2022-06-30 DIAGNOSIS — C34.90 NSCLC METASTATIC TO ADRENAL GLAND (HCC): ICD-10-CM

## 2022-06-30 DIAGNOSIS — C79.70 NSCLC METASTATIC TO ADRENAL GLAND (HCC): ICD-10-CM

## 2022-06-30 DIAGNOSIS — C34.90 PRIMARY ADENOCARCINOMA OF LUNG, UNSPECIFIED LATERALITY (HCC): Primary | ICD-10-CM

## 2022-06-30 LAB
ALBUMIN SERPL-MCNC: 3.6 G/DL (ref 3.5–5)
ALBUMIN/GLOB SERPL: 0.9 {RATIO} (ref 1.1–2.2)
ALP SERPL-CCNC: 100 U/L (ref 45–117)
ALT SERPL-CCNC: 33 U/L (ref 12–78)
ANION GAP SERPL CALC-SCNC: 9 MMOL/L (ref 5–15)
AST SERPL-CCNC: 18 U/L (ref 15–37)
BASOPHILS # BLD: 0 K/UL (ref 0–0.1)
BASOPHILS NFR BLD: 0 % (ref 0–1)
BILIRUB SERPL-MCNC: 0.3 MG/DL (ref 0.2–1)
BUN SERPL-MCNC: 12 MG/DL (ref 6–20)
BUN/CREAT SERPL: 14 (ref 12–20)
CALCIUM SERPL-MCNC: 10 MG/DL (ref 8.5–10.1)
CHLORIDE SERPL-SCNC: 107 MMOL/L (ref 97–108)
CO2 SERPL-SCNC: 24 MMOL/L (ref 21–32)
CREAT SERPL-MCNC: 0.84 MG/DL (ref 0.55–1.02)
DIFFERENTIAL METHOD BLD: ABNORMAL
EOSINOPHIL # BLD: 0 K/UL (ref 0–0.4)
EOSINOPHIL NFR BLD: 0 % (ref 0–7)
ERYTHROCYTE [DISTWIDTH] IN BLOOD BY AUTOMATED COUNT: 13.7 % (ref 11.5–14.5)
GLOBULIN SER CALC-MCNC: 4 G/DL (ref 2–4)
GLUCOSE SERPL-MCNC: 114 MG/DL (ref 65–100)
HCT VFR BLD AUTO: 35.3 % (ref 35–47)
HGB BLD-MCNC: 11.7 G/DL (ref 11.5–16)
IMM GRANULOCYTES # BLD AUTO: 0.2 K/UL (ref 0–0.04)
IMM GRANULOCYTES NFR BLD AUTO: 2 % (ref 0–0.5)
LYMPHOCYTES # BLD: 1.1 K/UL (ref 0.8–3.5)
LYMPHOCYTES NFR BLD: 13 % (ref 12–49)
MCH RBC QN AUTO: 28.8 PG (ref 26–34)
MCHC RBC AUTO-ENTMCNC: 33.1 G/DL (ref 30–36.5)
MCV RBC AUTO: 86.9 FL (ref 80–99)
MONOCYTES # BLD: 1.1 K/UL (ref 0–1)
MONOCYTES NFR BLD: 12 % (ref 5–13)
NEUTS SEG # BLD: 6.1 K/UL (ref 1.8–8)
NEUTS SEG NFR BLD: 73 % (ref 32–75)
NRBC # BLD: 0 K/UL (ref 0–0.01)
NRBC BLD-RTO: 0 PER 100 WBC
PLATELET # BLD AUTO: 503 K/UL (ref 150–400)
PMV BLD AUTO: 8.7 FL (ref 8.9–12.9)
POTASSIUM SERPL-SCNC: 4.2 MMOL/L (ref 3.5–5.1)
PROT SERPL-MCNC: 7.6 G/DL (ref 6.4–8.2)
RBC # BLD AUTO: 4.06 M/UL (ref 3.8–5.2)
SODIUM SERPL-SCNC: 140 MMOL/L (ref 136–145)
WBC # BLD AUTO: 8.4 K/UL (ref 3.6–11)

## 2022-06-30 PROCEDURE — 99215 OFFICE O/P EST HI 40 MIN: CPT | Performed by: INTERNAL MEDICINE

## 2022-06-30 PROCEDURE — 80053 COMPREHEN METABOLIC PANEL: CPT

## 2022-06-30 PROCEDURE — 96375 TX/PRO/DX INJ NEW DRUG ADDON: CPT

## 2022-06-30 PROCEDURE — 77030012965 HC NDL HUBR BBMI -A

## 2022-06-30 PROCEDURE — 96367 TX/PROPH/DG ADDL SEQ IV INF: CPT

## 2022-06-30 PROCEDURE — 36415 COLL VENOUS BLD VENIPUNCTURE: CPT

## 2022-06-30 PROCEDURE — 96417 CHEMO IV INFUS EACH ADDL SEQ: CPT

## 2022-06-30 PROCEDURE — 96415 CHEMO IV INFUSION ADDL HR: CPT

## 2022-06-30 PROCEDURE — 74011250636 HC RX REV CODE- 250/636: Performed by: INTERNAL MEDICINE

## 2022-06-30 PROCEDURE — 85025 COMPLETE CBC W/AUTO DIFF WBC: CPT

## 2022-06-30 PROCEDURE — 96413 CHEMO IV INFUSION 1 HR: CPT

## 2022-06-30 PROCEDURE — 74011000258 HC RX REV CODE- 258: Performed by: INTERNAL MEDICINE

## 2022-06-30 RX ORDER — SODIUM CHLORIDE 9 MG/ML
25 INJECTION, SOLUTION INTRAVENOUS CONTINUOUS
Status: DISPENSED | OUTPATIENT
Start: 2022-06-30 | End: 2022-06-30

## 2022-06-30 RX ORDER — SODIUM CHLORIDE 9 MG/ML
10 INJECTION INTRAMUSCULAR; INTRAVENOUS; SUBCUTANEOUS AS NEEDED
Status: ACTIVE | OUTPATIENT
Start: 2022-06-30 | End: 2022-06-30

## 2022-06-30 RX ORDER — SODIUM CHLORIDE 0.9 % (FLUSH) 0.9 %
10 SYRINGE (ML) INJECTION AS NEEDED
Status: DISPENSED | OUTPATIENT
Start: 2022-06-30 | End: 2022-06-30

## 2022-06-30 RX ORDER — HEPARIN 100 UNIT/ML
300-500 SYRINGE INTRAVENOUS AS NEEDED
Status: ACTIVE | OUTPATIENT
Start: 2022-06-30 | End: 2022-06-30

## 2022-06-30 RX ORDER — PALONOSETRON 0.05 MG/ML
0.25 INJECTION, SOLUTION INTRAVENOUS ONCE
Status: COMPLETED | OUTPATIENT
Start: 2022-06-30 | End: 2022-06-30

## 2022-06-30 RX ADMIN — SODIUM CHLORIDE 25 ML/HR: 9 INJECTION, SOLUTION INTRAVENOUS at 12:25

## 2022-06-30 RX ADMIN — CARBOPLATIN 683 MG: 10 INJECTION, SOLUTION INTRAVENOUS at 14:13

## 2022-06-30 RX ADMIN — SODIUM CHLORIDE 200 MG: 9 INJECTION, SOLUTION INTRAVENOUS at 12:25

## 2022-06-30 RX ADMIN — DEXAMETHASONE SODIUM PHOSPHATE 12 MG: 4 INJECTION, SOLUTION INTRAMUSCULAR; INTRAVENOUS at 13:32

## 2022-06-30 RX ADMIN — PALONOSETRON 0.25 MG: 0.05 INJECTION, SOLUTION INTRAVENOUS at 13:07

## 2022-06-30 RX ADMIN — SODIUM CHLORIDE 1100 MG: 9 INJECTION, SOLUTION INTRAVENOUS at 13:55

## 2022-06-30 RX ADMIN — SODIUM CHLORIDE 150 MG: 900 INJECTION, SOLUTION INTRAVENOUS at 13:09

## 2022-06-30 NOTE — PROGRESS NOTES
\[facility]\"" Chemotherapy Progress Note    Date: 2022    Name: Sabi Reynoso    MRN: [id number]         : 1960      1010 Pt admit to [facility] [place] for [address] ambulatory in stable condition. Assessment completed. No new concerns voiced. Port with positive blood return. Labs reviewed. Chemotherapy Flowsheet 2022   Cycle C2   Date 2022   Drug / Regimen Keytruda/Alimta/Carboplatin   Dosage -   Pre Meds givne   Notes given         Ms. Canada's vitals were reviewed. Patient Vitals for the past 12 hrs:   Temp Pulse Resp BP   22 1500 -- 80 -- 132/80   22 1010 97.9 °F (36.6 °C) 90 18 134/81         Lab results were obtained and reviewed. Recent Results (from the past 12 hour(s))   CBC WITH AUTOMATED DIFF    Collection Time: 22 10:22 AM   Result Value Ref Range    WBC 8.4 3.6 - 11.0 K/uL    RBC 4.06 3.80 - 5.20 M/uL    HGB 11.7 11.5 - 16.0 g/dL    HCT 35.3 35.0 - 47.0 %    MCV 86.9 80.0 - 99.0 FL    MCH 28.8 26.0 - 34.0 PG    MCHC 33.1 30.0 - 36.5 g/dL    RDW 13.7 11.5 - 14.5 %    PLATELET 601 (H) 578 - 400 K/uL    MPV 8.7 (L) 8.9 - 12.9 FL    NRBC 0.0 0  WBC    ABSOLUTE NRBC 0.00 0.00 - 0.01 K/uL    NEUTROPHILS 73 32 - 75 %    LYMPHOCYTES 13 12 - 49 %    MONOCYTES 12 5 - 13 %    EOSINOPHILS 0 0 - 7 %    BASOPHILS 0 0 - 1 %    IMMATURE GRANULOCYTES 2 (H) 0.0 - 0.5 %    ABS. NEUTROPHILS 6.1 1.8 - 8.0 K/UL    ABS. LYMPHOCYTES 1.1 0.8 - 3.5 K/UL    ABS. MONOCYTES 1.1 (H) 0.0 - 1.0 K/UL    ABS. EOSINOPHILS 0.0 0.0 - 0.4 K/UL    ABS. BASOPHILS 0.0 0.0 - 0.1 K/UL    ABS. IMM.  GRANS. 0.2 (H) 0.00 - 0.04 K/UL    DF AUTOMATED     METABOLIC PANEL, COMPREHENSIVE    Collection Time: 22 10:22 AM   Result Value Ref Range    Sodium 140 136 - 145 mmol/L    Potassium 4.2 3.5 - 5.1 mmol/L    Chloride 107 97 - 108 mmol/L    CO2 24 21 - 32 mmol/L    Anion gap 9 5 - 15 mmol/L    Glucose 114 (H) 65 - 100 mg/dL    BUN 12 6 - 20 MG/DL    Creatinine 0.84 0.55 - 1.02 MG/DL BUN/Creatinine ratio 14 12 - 20      GFR est AA >60 >60 ml/min/1.73m2    GFR est non-AA >60 >60 ml/min/1.73m2    Calcium 10.0 8.5 - 10.1 MG/DL    Bilirubin, total 0.3 0.2 - 1.0 MG/DL    ALT (SGPT) 33 12 - 78 U/L    AST (SGOT) 18 15 - 37 U/L    Alk. phosphatase 100 45 - 117 U/L    Protein, total 7.6 6.4 - 8.2 g/dL    Albumin 3.6 3.5 - 5.0 g/dL    Globulin 4.0 2.0 - 4.0 g/dL    A-G Ratio 0.9 (L) 1.1 - 2.2         Pre-medications  were administered as ordered and chemotherapy was initiated.   Medications Administered     0.9% sodium chloride infusion     Admin Date  06/30/2022 Action  New Bag Dose  25 mL/hr Rate  25 mL/hr Route  IntraVENous Administered By  Shelly Olvera RN          CARBOplatin (PARAPLATIN) 683 mg in 0.9% sodium chloride 250 mL, overfill volume 25 mL chemo infusion     Admin Date  06/30/2022 Action  New Bag Dose  683 mg Rate  686.6 mL/hr Route  IntraVENous Administered By  Shelly Olvera RN          dexamethasone (DECADRON) 12 mg in 0.9% sodium chloride 50 mL, overfill volume 5 mL IVPB     Admin Date  06/30/2022 Action  New Bag Dose  12 mg Rate  232 mL/hr Route  IntraVENous Administered By  Shelly Olvera RN          fosaprepitant (EMEND) 150 mg in 0.9% sodium chloride 150 mL IVPB     Admin Date  06/30/2022 Action  New Bag Dose  150 mg Rate  450 mL/hr Route  IntraVENous Administered By  Shelly Olvera RN          palonosetron HCl (ALOXI) injection 0.25 mg     Admin Date  06/30/2022 Action  Given Dose  0.25 mg Route  IntraVENous Administered By  Shelly Olvera RN          pembrolizumab [facility]) 200 mg in 0.9% sodium chloride 100 mL, overfill volume 10 mL IVPB     Admin Date  06/30/2022 Action  New Bag Dose  200 mg Rate  236 mL/hr Route  IntraVENous Administered By  Shelly Olvera RN          PEMEtrexed disodium (ALIMTA) 1,100 mg in 0.9% sodium chloride 100 mL, overfill volume 10 mL chemo infusion     Admin Date  06/30/2022 Action  New Bag Dose  1,100 mg Rate  924 mL/hr Route  IntraVENous Administered By  Emiliana Brown RN                Two nurses verified prior to administering:Drug name, Drug doseInfusion volume or drug volume when prepared in a syringe, Rate of administration, Route of administration, Expiration dates and/or times, Appearance and physical integrity of the drugs, Rate set on infusion pump, when used, Sequencing of drug administration. 1500 Pt tolerated treatment well. Port maintained positive blood return throughout treatment. Flushed, heparinized and de-accessed per protocol. D/c home ambulatory in no distress.     Future Appointments   Date Time Provider Raymond Gonzales   7/21/2022 10:00 AM A3 [facility] [address] [place] [address]   7/21/2022 10:15 AM Sal Payne  N Anju Fonseca BS AMB   8/11/2022 10:00 AM A3 [facility] [address] [place] [address]   9/1/2022 10:00 AM D4 [facility] [address] [place] [address]   9/22/2022 10:00 AM A1 [facility] [address] [place] [address]   10/13/2022 10:00 AM A1 [facility]   11/3/2022 10:00 AM A1 [facility]   11/24/2022 10:00 AM A1 [facility] [address], RN  June 30, 2022

## 2022-06-30 NOTE — PROGRESS NOTES
[facility], [id number] [place] [address] Road, [address] [place] [address]  W: [phone]  F: [phone]    Reason for visit   Vianey Barron is a 60 y.o. female who is seen for follow up of stage IV NSCLC- PDL1- 15%, no  mutations       Treatment History:   · 5/4/2022: Left thoracentecis- Adenocarcinoma   · 6/9/2022: Caroplatin+ Alimta+Keytruda    History of Present Illness:   Patient is a 57-year-old female with diabetes mellitus, hypertension who presented to the [facility] emergency room on 5/4/2022 with complaints of shortness of breath x 14 days which has been progressively getting worse over several weeks with mild cough. Denies any headaches, vision changes, falls trouble swallowing, chest pain, fevers, weight loss, hemoptysis. CTA on admission showed no evidence of pulmonary embolism but she was noted to have a moderate to large left-sided pleural effusion with a left apical mass measuring 28 x 20 mm, she had small pulmonary nodules on the right, possible hepatic hypodensity. She had ultrasound-guided thoracentesis of the left side on 5/4/2022 and 1300 cc of fluid was aspirated. This was sent for cytology which was positive. Pleural fluid cytology showed more than 100 RBCs, total protein of 5.3, albumin 2.8, . Labs were noted for a bilirubin of 1.1. Comes to start C1D1 of Chemoimmunotherapy    She had thoracentesis again on 5/20/2022. Couldn't have a drain placed as there was not enough. Seen for cycle 2 of Chemo immunotherapy. She had mild nausea, was tired. She has noted some SOB with taking some steps but thinks that its stable. She is no coughing. She has had 1 day of LBP, does not persist.She has some GERD. Tums helps. Jay her  is with her. She used to work for [employer].        Past Medical History:   Diagnosis Date    Diabetes mellitus type 2, noninsulin dependent (HCC)     Generalized anxiety disorder     Hypercholesterolemia     Hypertension     Neoplasm of major salivary gland       Past Surgical History:   Procedure Laterality Date    COLONOSCOPY N/A 2/24/2021    . COLONOSCOPY  :- performed by Rosita Thomson [facility] at [facility] END[facility]COPY    HX ACL RECONSTRUCTION      HX COLONOSCOPY      HX HYSTERECTOMY      IR INSERT CATH PLEURAL INDWELL  5/26/2022    IR INSERT TUNL CVC W PORT OVER 5 YEARS  6/6/2022    IR REMOVE LUNG/PLEURAL DRAIN / CATHETER  5/24/2022      Social History     Tobacco Use    Smoking status: Current Every Day Smoker     Packs/day: 0.50     Years: 20.00     Pack years: 10.00    Smokeless tobacco: Never Used   Substance Use Topics    Alcohol use: Yes     Comment: occ      No family history on file. Current Outpatient Medications   Medication Sig    prochlorperazine (COMPAZINE) 5 mg tablet Take 1 Tablet by mouth every six (6) hours as needed for Nausea or Vomiting.  dexAMETHasone (DECADRON) 4 mg tablet Take 1 tablets by mouth twice daily the day before chemotherapy and the day after chemotherapy    ondansetron (ZOFRAN ODT) 8 mg disintegrating tablet Take 1 Tablet by mouth every eight (8) hours as needed for Nausea or Vomiting.  folic acid (FOLVITE) 1 mg tablet Take 1 Tablet by mouth daily.  lidocaine-prilocaine (EMLA) topical cream Apply  to affected area as needed for PRN Reason (Other) (for port access). Apply a thin layer to the port site 30-60 minutes prior to arrival for chemotherapy    amLODIPine (NORVASC) 5 mg tablet Take 5 mg by mouth daily.  ALPRAZolam (XANAX) 0.25 mg tablet Take  by mouth.  metFORMIN (GLUCOPHAGE) 850 mg tablet Take 850 mg by mouth two (2) times daily (with meals).  escitalopram oxalate (Lexapro) 20 mg tablet Take 20 mg by mouth daily.  simvastatin (ZOCOR) 20 mg tablet Take 20 mg by mouth nightly.  losartan (COZAAR) 100 mg tablet Take 100 mg by mouth daily.  aspirin 81 mg chewable tablet Take 81 mg by mouth daily.     acetaminophen (TYLENOL PO) Take 500 mg by mouth every six (6) hours as needed for Pain. No current facility-administered medications for this visit. Facility-Administered Medications Ordered in Other Visits   Medication Dose Route Frequency    sodium chloride (NS) flush 10 mL  10 mL IntraVENous PRN    0.9% sodium chloride injection 10 mL  10 mL IntraVENous PRN    heparin (porcine) pf 300-500 Units  300-500 Units InterCATHeter PRN      Allergies   Allergen Reactions    Erythromycin Other (comments)     Stomach cramps    Penicillins Hives        Review of Systems: A complete review of systems was obtained, negative except as described above. Physical Exam:     Visit Vitals  /81 (BP 1 Location: Left upper arm, BP Patient Position: Sitting)   Pulse 90   Temp 97.9 °F (36.6 °C)   Resp 18   Wt 227 lb (103 kg)   SpO2 98%   BMI 35.55 kg/m²     ECOG PS: 1  General: No distress  Eyes: PERRLA, anicteric sclerae  HENT: Atraumatic  Neck: Supple  Lymphatic: No visible neck nodes  Respiratory:  normal respiratory effort  CV: no peripheral edema  GI:nondistended  Skin: No rashes  Psych: Alert, oriented,     Results:     Lab Results   Component Value Date/Time    WBC 8.4 06/30/2022 10:22 AM    HGB 11.7 06/30/2022 10:22 AM    HCT 35.3 06/30/2022 10:22 AM    PLATELET 088 (H) 83/93/4939 10:22 AM    MCV 86.9 06/30/2022 10:22 AM    ABS.  NEUTROPHILS 6.1 06/30/2022 10:22 AM     Lab Results   Component Value Date/Time    Sodium 138 06/09/2022 10:06 AM    Potassium 3.6 06/09/2022 10:06 AM    Chloride 108 06/09/2022 10:06 AM    CO2 26 06/09/2022 10:06 AM    Glucose 92 06/09/2022 10:06 AM    BUN 10 06/09/2022 10:06 AM    Creatinine 0.70 06/09/2022 10:06 AM    GFR est AA >60 06/09/2022 10:06 AM    GFR est non-AA >60 06/09/2022 10:06 AM    Calcium 9.3 06/09/2022 10:06 AM    Glucose (POC) 96 05/24/2022 11:29 AM    Creatinine (POC) 0.9 12/28/2012 06:39 AM     Lab Results   Component Value Date/Time    Bilirubin, total 0.5 06/09/2022 10:06 AM    ALT (SGPT) 15 06/09/2022 10:06 AM    Alk. phosphatase 82 06/09/2022 10:06 AM    Protein, total 7.8 06/09/2022 10:06 AM    Albumin 3.2 (L) 06/09/2022 10:06 AM    Globulin 4.6 (H) 06/09/2022 10:06 AM         Records reviewed and summarized above. Pathology report(s) reviewed above. Radiology report(s) reviewed above. CT chest abdomen pelvis 5/5/2022    IMPRESSION  Diminished left-sided pleural effusion, status post thoracentesis with stable  mass lesion at the left apex. Mass lesion demonstrated at the left apex is not amenable to percutaneous  sampling. There are numerous additional nodular densities along the pleural margin  consistent with pleural carcinomatosis. There are additional small nodular densities demonstrated in the peritoneum,  these most consistent with peritoneal carcinomatosis. Small adrenal nodule on  the left likely an additional metastatic focus. No definitive target for percutaneous sampling. Currently awaiting results of  pleural fluid analysis/cytology and cellblock. MRI brain 5/2022      1. No evidence of intracranial metastases. 2. Minimal chronic microvascular ischemic disease. No acute intracranial  abnormality. 3. Enhancing lesions in the bilateral parotid glands, largest in the left  parotid gland measuring 3.6 cm. Lesions in the right parotid gland were  previously biopsied in 2020 and returned as Warthin tumors. These likely all  represent multiple Warthin tumors  Assessment:   1) Non small cell Lung cancer- adenocarcinoma stage IV    PDL1-15%, ALK/EGFR/ROS1/BRAF negative ( limited panel)  Liquid bx [id number], TMB low  CT chest abdomen and pelvis from 5/5/2022 was reviewed. Notable for large left-sided pleural effusion, concern for pleural carcinomatosis, left apical lung nodule measuring 20 x 26 mm, left adrenal nodule 19 x 18 mm, peritoneal nodularity concerning for carcinomatosis.   Status post left-sided thoracentesis and + cytology   H/o Smoking    Understands that this is advanced malignancy and treatments will be palliative  Here for Carboplatin+ Alimta+ Keytruda cycle 2  Tolerated well  CBC stable         If and when there is disease progression repeat expanded NGS is indicated    2) Left pleural effusion  S/p thoracentesis x 2, not enough for a drain  Hold off on Pleurx for now, hoping for resolution on systemic therapy    3) H/P R parotidectomy for Warthin's tumor   Has persistent multiple Warthin's tumors     4) Depression  On Lexapro    5) GERD  Start Pepcid    6) Encounter for high risk medications like chemotherapy and high risk disease    Supportive      Plan:     · Proceed with C2D1 of carboplatin auc 5, alimta 500 mg/m2 and Keytruda every 3 weeks x 4 then alimta and keytruda maintenance  · Antiemetics, steroids, b12, FA per protocol  · Hold off on Pleurx  · Start ASAP  · Pepcid dorothy    RTC D1 of each cycle   Scans after cycle 3  I appreciate the opportunity to participate in Ms. Virginia Boogie Dread's care.     Signed By: Stephanie Clay MD

## 2022-06-30 NOTE — PROGRESS NOTES
Casper Paris is a 60 y.o. female    Chief Complaint   Patient presents with    Follow-up      stage IV NSCLC- PDL1- 15%, no  mutations        1. Have you been to the ER, urgent care clinic since your last visit? Hospitalized since your last visit? No    2. Have you seen or consulted any other health care providers outside of the [address] [place] [address] since your last visit? Include any pap smears or colon screening.  No

## 2022-07-11 ENCOUNTER — TELEPHONE (OUTPATIENT)
Dept: ONCOLOGY | Age: 62
End: 2022-07-11

## 2022-07-11 DIAGNOSIS — C79.70 NSCLC METASTATIC TO ADRENAL GLAND (HCC): Primary | ICD-10-CM

## 2022-07-11 DIAGNOSIS — C34.90 NSCLC METASTATIC TO ADRENAL GLAND (HCC): Primary | ICD-10-CM

## 2022-07-11 RX ORDER — PANTOPRAZOLE SODIUM 40 MG/1
40 TABLET, DELAYED RELEASE ORAL DAILY
Qty: 30 TABLET | Refills: 1 | Status: SHIPPED | OUTPATIENT
Start: 2022-07-11 | End: 2022-08-29 | Stop reason: SDUPTHER

## 2022-07-11 NOTE — TELEPHONE ENCOUNTER
Patient called stating Madeline Corley is taking Tums and Pepcid AC\", but is still experience burning, mostly at night. Patient would like to try an alternative. Patient is also experiencing dark spots on hands and face. Patient can be reached at [phone].

## 2022-07-11 NOTE — TELEPHONE ENCOUNTER
1100  Returned pts call, HIPAA verified x2. Updated pt per [facility], NP that she has sent in  protonix for her to try; can use TUMS as well. Pt voiced understanding. I also advised pt to take pictures of the darker areas on her skin so she can show us at her upcoming appt this month. Pt denies the darker areas to be itching, swollen or spreading at this time. Pt will contact our office if sx worsen. Pt has no further questions or concerns at this time.

## 2022-07-14 RX ORDER — EPINEPHRINE 1 MG/ML
0.3 INJECTION, SOLUTION, CONCENTRATE INTRAVENOUS AS NEEDED
Status: CANCELLED | OUTPATIENT
Start: 2022-07-21

## 2022-07-14 RX ORDER — HYDROCORTISONE SODIUM SUCCINATE 100 MG/2ML
100 INJECTION, POWDER, FOR SOLUTION INTRAMUSCULAR; INTRAVENOUS AS NEEDED
Status: CANCELLED | OUTPATIENT
Start: 2022-07-21

## 2022-07-14 RX ORDER — ALBUTEROL SULFATE 0.83 MG/ML
2.5 SOLUTION RESPIRATORY (INHALATION) AS NEEDED
Status: CANCELLED
Start: 2022-07-21

## 2022-07-14 RX ORDER — DIPHENHYDRAMINE HYDROCHLORIDE 50 MG/ML
50 INJECTION, SOLUTION INTRAMUSCULAR; INTRAVENOUS AS NEEDED
Status: CANCELLED
Start: 2022-07-21

## 2022-07-21 ENCOUNTER — OFFICE VISIT (OUTPATIENT)
Dept: ONCOLOGY | Age: 62
End: 2022-07-21
Payer: MEDICAID

## 2022-07-21 ENCOUNTER — HOSPITAL ENCOUNTER (OUTPATIENT)
Dept: INFUSION THERAPY | Age: 62
Discharge: HOME | End: 2022-07-21
Payer: MEDICAID

## 2022-07-21 VITALS
HEIGHT: 67 IN | WEIGHT: 230 LBS | BODY MASS INDEX: 36.1 KG/M2 | SYSTOLIC BLOOD PRESSURE: 175 MMHG | DIASTOLIC BLOOD PRESSURE: 98 MMHG | HEART RATE: 100 BPM | TEMPERATURE: 97.6 F

## 2022-07-21 VITALS
HEART RATE: 92 BPM | TEMPERATURE: 97.6 F | DIASTOLIC BLOOD PRESSURE: 77 MMHG | OXYGEN SATURATION: 96 % | BODY MASS INDEX: 36.01 KG/M2 | WEIGHT: 230 LBS | SYSTOLIC BLOOD PRESSURE: 124 MMHG | RESPIRATION RATE: 18 BRPM

## 2022-07-21 DIAGNOSIS — C79.70 NSCLC METASTATIC TO ADRENAL GLAND (HCC): Primary | ICD-10-CM

## 2022-07-21 DIAGNOSIS — C79.70 NSCLC METASTATIC TO ADRENAL GLAND (HCC): ICD-10-CM

## 2022-07-21 DIAGNOSIS — C34.90 NSCLC METASTATIC TO ADRENAL GLAND (HCC): Primary | ICD-10-CM

## 2022-07-21 DIAGNOSIS — C34.90 NSCLC METASTATIC TO ADRENAL GLAND (HCC): ICD-10-CM

## 2022-07-21 DIAGNOSIS — C34.90 PRIMARY ADENOCARCINOMA OF LUNG, UNSPECIFIED LATERALITY (HCC): Primary | ICD-10-CM

## 2022-07-21 LAB
ALBUMIN SERPL-MCNC: 3.6 G/DL (ref 3.5–5)
ALBUMIN/GLOB SERPL: 0.9 {RATIO} (ref 1.1–2.2)
ALP SERPL-CCNC: 92 U/L (ref 45–117)
ALT SERPL-CCNC: 62 U/L (ref 12–78)
ANION GAP SERPL CALC-SCNC: 7 MMOL/L (ref 5–15)
AST SERPL-CCNC: 29 U/L (ref 15–37)
BASOPHILS # BLD: 0 K/UL (ref 0–0.1)
BASOPHILS NFR BLD: 0 % (ref 0–1)
BILIRUB SERPL-MCNC: 0.5 MG/DL (ref 0.2–1)
BUN SERPL-MCNC: 12 MG/DL (ref 6–20)
BUN/CREAT SERPL: 15 (ref 12–20)
CALCIUM SERPL-MCNC: 9.7 MG/DL (ref 8.5–10.1)
CHLORIDE SERPL-SCNC: 107 MMOL/L (ref 97–108)
CO2 SERPL-SCNC: 24 MMOL/L (ref 21–32)
CREAT SERPL-MCNC: 0.78 MG/DL (ref 0.55–1.02)
DIFFERENTIAL METHOD BLD: ABNORMAL
EOSINOPHIL # BLD: 0 K/UL (ref 0–0.4)
EOSINOPHIL NFR BLD: 0 % (ref 0–7)
ERYTHROCYTE [DISTWIDTH] IN BLOOD BY AUTOMATED COUNT: 14.9 % (ref 11.5–14.5)
GLOBULIN SER CALC-MCNC: 3.8 G/DL (ref 2–4)
GLUCOSE SERPL-MCNC: 126 MG/DL (ref 65–100)
HCT VFR BLD AUTO: 34.7 % (ref 35–47)
HGB BLD-MCNC: 12.4 G/DL (ref 11.5–16)
IMM GRANULOCYTES # BLD AUTO: 0 K/UL
IMM GRANULOCYTES NFR BLD AUTO: 0 %
LYMPHOCYTES # BLD: 1 K/UL (ref 0.8–3.5)
LYMPHOCYTES NFR BLD: 13 % (ref 12–49)
MCH RBC QN AUTO: 30.7 PG (ref 26–34)
MCHC RBC AUTO-ENTMCNC: 35.7 G/DL (ref 30–36.5)
MCV RBC AUTO: 85.9 FL (ref 80–99)
METAMYELOCYTES NFR BLD MANUAL: 1 %
MONOCYTES # BLD: 0.8 K/UL (ref 0–1)
MONOCYTES NFR BLD: 11 % (ref 5–13)
NEUTS SEG # BLD: 5.8 K/UL (ref 1.8–8)
NEUTS SEG NFR BLD: 75 % (ref 32–75)
NRBC # BLD: 0 K/UL (ref 0–0.01)
NRBC BLD-RTO: 0 PER 100 WBC
PLATELET # BLD AUTO: 486 K/UL (ref 150–400)
PMV BLD AUTO: 9.2 FL (ref 8.9–12.9)
POTASSIUM SERPL-SCNC: 4.4 MMOL/L (ref 3.5–5.1)
PROT SERPL-MCNC: 7.4 G/DL (ref 6.4–8.2)
RBC # BLD AUTO: 4.04 M/UL (ref 3.8–5.2)
RBC MORPH BLD: ABNORMAL
SODIUM SERPL-SCNC: 138 MMOL/L (ref 136–145)
TSH SERPL DL<=0.05 MIU/L-ACNC: 0.67 UIU/ML (ref 0.36–3.74)
WBC # BLD AUTO: 7.7 K/UL (ref 3.6–11)

## 2022-07-21 PROCEDURE — 96413 CHEMO IV INFUSION 1 HR: CPT

## 2022-07-21 PROCEDURE — 74011250636 HC RX REV CODE- 250/636: Performed by: INTERNAL MEDICINE

## 2022-07-21 PROCEDURE — 99215 OFFICE O/P EST HI 40 MIN: CPT | Performed by: INTERNAL MEDICINE

## 2022-07-21 PROCEDURE — 85025 COMPLETE CBC W/AUTO DIFF WBC: CPT

## 2022-07-21 PROCEDURE — 96417 CHEMO IV INFUS EACH ADDL SEQ: CPT

## 2022-07-21 PROCEDURE — 77030012965 HC NDL HUBR BBMI -A

## 2022-07-21 PROCEDURE — 84443 ASSAY THYROID STIM HORMONE: CPT

## 2022-07-21 PROCEDURE — 74011000258 HC RX REV CODE- 258: Performed by: INTERNAL MEDICINE

## 2022-07-21 PROCEDURE — 80053 COMPREHEN METABOLIC PANEL: CPT

## 2022-07-21 PROCEDURE — 74011000250 HC RX REV CODE- 250: Performed by: INTERNAL MEDICINE

## 2022-07-21 PROCEDURE — 96411 CHEMO IV PUSH ADDL DRUG: CPT

## 2022-07-21 PROCEDURE — 96375 TX/PRO/DX INJ NEW DRUG ADDON: CPT

## 2022-07-21 PROCEDURE — 96367 TX/PROPH/DG ADDL SEQ IV INF: CPT

## 2022-07-21 PROCEDURE — 36415 COLL VENOUS BLD VENIPUNCTURE: CPT

## 2022-07-21 RX ORDER — PALONOSETRON 0.05 MG/ML
0.25 INJECTION, SOLUTION INTRAVENOUS ONCE
Status: COMPLETED | OUTPATIENT
Start: 2022-07-21 | End: 2022-07-21

## 2022-07-21 RX ORDER — CYANOCOBALAMIN 1000 UG/ML
1000 INJECTION, SOLUTION INTRAMUSCULAR; SUBCUTANEOUS
Status: DISCONTINUED | OUTPATIENT
Start: 2022-07-21 | End: 2022-07-21

## 2022-07-21 RX ORDER — SODIUM CHLORIDE 0.9 % (FLUSH) 0.9 %
10 SYRINGE (ML) INJECTION AS NEEDED
Status: DISPENSED | OUTPATIENT
Start: 2022-07-21 | End: 2022-07-21

## 2022-07-21 RX ORDER — HEPARIN 100 UNIT/ML
300-500 SYRINGE INTRAVENOUS AS NEEDED
Status: ACTIVE | OUTPATIENT
Start: 2022-07-21 | End: 2022-07-21

## 2022-07-21 RX ORDER — ACETAMINOPHEN 325 MG/1
650 TABLET ORAL AS NEEDED
Status: ACTIVE | OUTPATIENT
Start: 2022-07-21 | End: 2022-07-21

## 2022-07-21 RX ORDER — SODIUM CHLORIDE 9 MG/ML
25 INJECTION, SOLUTION INTRAVENOUS CONTINUOUS
Status: DISPENSED | OUTPATIENT
Start: 2022-07-21 | End: 2022-07-21

## 2022-07-21 RX ORDER — SODIUM CHLORIDE 9 MG/ML
10 INJECTION INTRAMUSCULAR; INTRAVENOUS; SUBCUTANEOUS AS NEEDED
Status: ACTIVE | OUTPATIENT
Start: 2022-07-21 | End: 2022-07-21

## 2022-07-21 RX ORDER — ONDANSETRON 2 MG/ML
8 INJECTION INTRAMUSCULAR; INTRAVENOUS AS NEEDED
Status: ACTIVE | OUTPATIENT
Start: 2022-07-21 | End: 2022-07-21

## 2022-07-21 RX ORDER — DIPHENHYDRAMINE HYDROCHLORIDE 50 MG/ML
25 INJECTION, SOLUTION INTRAMUSCULAR; INTRAVENOUS AS NEEDED
Status: ACTIVE | OUTPATIENT
Start: 2022-07-21 | End: 2022-07-21

## 2022-07-21 RX ADMIN — SODIUM CHLORIDE 25 ML/HR: 900 INJECTION, SOLUTION INTRAVENOUS at 12:34

## 2022-07-21 RX ADMIN — SODIUM CHLORIDE, PRESERVATIVE FREE 10 ML: 5 INJECTION INTRAVENOUS at 10:00

## 2022-07-21 RX ADMIN — CARBOPLATIN 733 MG: 10 INJECTION, SOLUTION INTRAVENOUS at 14:46

## 2022-07-21 RX ADMIN — SODIUM CHLORIDE, PRESERVATIVE FREE 10 ML: 5 INJECTION INTRAVENOUS at 15:25

## 2022-07-21 RX ADMIN — PALONOSETRON 0.25 MG: 0.05 INJECTION, SOLUTION INTRAVENOUS at 13:16

## 2022-07-21 RX ADMIN — DEXAMETHASONE SODIUM PHOSPHATE 12 MG: 4 INJECTION, SOLUTION INTRAMUSCULAR; INTRAVENOUS at 13:16

## 2022-07-21 RX ADMIN — SODIUM CHLORIDE 200 MG: 9 INJECTION, SOLUTION INTRAVENOUS at 12:37

## 2022-07-21 RX ADMIN — Medication 500 UNITS: at 15:25

## 2022-07-21 RX ADMIN — SODIUM CHLORIDE 150 MG: 900 INJECTION, SOLUTION INTRAVENOUS at 13:35

## 2022-07-21 RX ADMIN — PEMETREXED 1100 MG: 500 INJECTION, POWDER, LYOPHILIZED, FOR SOLUTION INTRAVENOUS at 14:31

## 2022-07-21 NOTE — PROGRESS NOTES
Grecia Del Castillo is a 60 y.o. female    Chief Complaint   Patient presents with    Follow-up     stage IV NSCLC- PDL1- 15%, no  mutations          1. Have you been to the ER, urgent care clinic since your last visit? Hospitalized since your last visit? No    2. Have you seen or consulted any other health care providers outside of the [address] [place] [address] since your last visit? Include any pap smears or colon screening.  No

## 2022-07-21 NOTE — PROGRESS NOTES
[facility] Chemo Progress Note    Date: July 21, 2022        1000: Ms. Sebastian Torres Arrived to [facility] for  C3D1 Keytruda/Alimta/Carboplatin ambulatory in stable condition. Assessment was completed and port accessed by Leonard Layton RN. Labs drawn and sent for processing. Went to provider appointment with Medical Oncology. 1155: Returned from provider appointment. Labs reviewed. Criteria for treatment was met. Chemotherapy Flowsheet 7/21/2022   Cycle C3   Date 7/21/2022   Drug / Regimen Keytruda/Alimta/Carboplatin   Dosage -   Pre Meds Given   Notes Given       Ms. Canada's vitals were reviewed. Patient Vitals for the past 12 hrs:   Temp Pulse BP   07/21/22 1524 -- 100 (!) 175/98   07/21/22 1010 97.6 °F (36.4 °C) -- --       Lab results were obtained and reviewed. Recent Results (from the past 12 hour(s))   CBC WITH AUTOMATED DIFF    Collection Time: 07/21/22 10:21 AM   Result Value Ref Range    WBC 7.7 3.6 - 11.0 K/uL    RBC 4.04 3.80 - 5.20 M/uL    HGB 12.4 11.5 - 16.0 g/dL    HCT 34.7 (L) 35.0 - 47.0 %    MCV 85.9 80.0 - 99.0 FL    MCH 30.7 26.0 - 34.0 PG    MCHC 35.7 30.0 - 36.5 g/dL    RDW 14.9 (H) 11.5 - 14.5 %    PLATELET 934 (H) 063 - 400 K/uL    MPV 9.2 8.9 - 12.9 FL    NRBC 0.0 0  WBC    ABSOLUTE NRBC 0.00 0.00 - 0.01 K/uL    NEUTROPHILS 75 32 - 75 %    LYMPHOCYTES 13 12 - 49 %    MONOCYTES 11 5 - 13 %    EOSINOPHILS 0 0 - 7 %    BASOPHILS 0 0 - 1 %    METAMYELOCYTES 1 (H) 0 %    IMMATURE GRANULOCYTES 0 %    ABS. NEUTROPHILS 5.8 1.8 - 8.0 K/UL    ABS. LYMPHOCYTES 1.0 0.8 - 3.5 K/UL    ABS. MONOCYTES 0.8 0.0 - 1.0 K/UL    ABS. EOSINOPHILS 0.0 0.0 - 0.4 K/UL    ABS. BASOPHILS 0.0 0.0 - 0.1 K/UL    ABS. IMM.  GRANS. 0.0 K/UL    DF MANUAL      RBC COMMENTS NORMOCYTIC, NORMOCHROMIC     METABOLIC PANEL, COMPREHENSIVE    Collection Time: 07/21/22 10:21 AM   Result Value Ref Range    Sodium 138 136 - 145 mmol/L    Potassium 4.4 3.5 - 5.1 mmol/L    Chloride 107 97 - 108 mmol/L    CO2 24 21 - 32 mmol/L    Anion gap 7 5 - 15 mmol/L    Glucose 126 (H) 65 - 100 mg/dL    BUN 12 6 - 20 MG/DL    Creatinine 0.78 0.55 - 1.02 MG/DL    BUN/Creatinine ratio 15 12 - 20      GFR est AA >60 >60 ml/min/1.73m2    GFR est non-AA >60 >60 ml/min/1.73m2    Calcium 9.7 8.5 - 10.1 MG/DL    Bilirubin, total 0.5 0.2 - 1.0 MG/DL    ALT (SGPT) 62 12 - 78 U/L    AST (SGOT) 29 15 - 37 U/L    Alk. phosphatase 92 45 - 117 U/L    Protein, total 7.4 6.4 - 8.2 g/dL    Albumin 3.6 3.5 - 5.0 g/dL    Globulin 3.8 2.0 - 4.0 g/dL    A-G Ratio 0.9 (L) 1.1 - 2.2     TSH 3RD GENERATION    Collection Time: 07/21/22 10:21 AM   Result Value Ref Range    TSH 0.67 0.36 - 3.74 uIU/mL        Pre-medications  were administered as ordered and chemotherapy was initiated.   Medications Administered       0.9% sodium chloride infusion       Admin Date  07/21/2022 Action  New Bag Dose  25 mL/hr Rate  25 mL/hr Route  IntraVENous [facility]  [facility]              0.9% sodium chloride injection 10 mL       Admin Date  07/21/2022 Action  Given Dose  10 mL Route  IntraVENous Administered By  Cady Sosa RN               Admin Date  07/21/2022 Action  Given Dose  10 mL Route  IntraVENous Administered By  [facility]              CARBOplatin (PARAPLATIN) 733 mg in 0.9% sodium chloride 250 mL, overfill volume 25 mL chemo infusion       Admin Date  07/21/2022 Action  New Bag Dose  733 mg Rate  696.6 mL/hr Route  IntraVENous Administered By  [facility]              dexamethasone (DECADRON) 12 mg in 0.9% sodium chloride 50 mL, overfill volume 5 mL IVPB       Admin Date  07/21/2022 Action  New Bag Dose  12 mg Rate  232 mL/hr Route  IntraVENous Administered By  [facility]              fosaprepitant (EMEND) 150 mg in 0.9% sodium chloride 150 mL IVPB       Admin Date  07/21/2022 Action  New Bag Dose  150 mg Rate  450 mL/hr Route  IntraVENous Administered By  Asenath Fontan              heparin (porcine) pf 300-500 Units       Admin Date  07/21/2022 Action  Given Dose  500 Units Route  InterCATHeter Administered [facility]  [facility]              palonosetron HCl (ALOXI) injection 0.25 mg       Admin Date  07/21/2022 Action  Given Dose  0.25 mg Route  IntraVENous Administered By  Vandana Infield              pembrolizumab [facility]) 200 mg in 0.9% sodium chloride 100 mL, overfill volume 10 mL IVPB       Admin Date  07/21/2022 Action  New Bag Dose  200 mg Rate  236 mL/hr Route  IntraVENous Administered By  Vandana [place]              PEMEtrexed disodium (ALIMTA) 1,100 mg in 0.9% sodium chloride 100 mL, overfill volume 10 mL chemo infusion       Admin Date  07/21/2022 Action  New Bag Dose  1,100 mg Rate  924 mL/hr Route  IntraVENous Administered By  [facility]              sodium chloride (NS) flush 10 mL       Admin Date  07/21/2022 Action  Given Dose  10 mL Route  IntraVENous Administered By  Adaline Dance, RN                  Two nurses verified prior to administering: Drug name, Drug dose, Infusion volume or drug volume when prepared in a syringe, Rate of administration, Route of administration, Expiration dates and/or times, Appearance and physical integrity of the drugs, Rate set on infusion pump, when used, and Sequencing of drug administration. 1525: Patient tolerated treatment well. Port maintained positive blood return throughout treatment. Port flushed, heparinized and de accessed per protocol. Patient was discharged in stable condition. Patient is aware of next scheduled [facility] appointment on 8/11/22.     Future Appointments   Date Time Provider Raymond Gonzales   8/11/2022 10:00 AM A3 [facility] [address] [place] [address]   8/11/2022 10:15 AM Lali Farris  [address] BS AMB   9/1/2022 10:00 AM D4 [facility] [address] [place] [address]   9/22/2022 10:00 AM A1 [facility] [address] [place] [address]   10/13/2022 10:00 AM A1 [facility] [address] [place] [address]   11/3/2022 10:00 AM A1 [facility] [address] [place] [address]   11/24/2022 10:00 AM A1 [facility] [address], RN  July 21, 2022

## 2022-07-21 NOTE — PROGRESS NOTES
[facility] [place] [address] [address], [address]  W: [phone]  F: [phone]    Reason for visit   Breezy Fernandez is a 60 y.o. female who is seen for follow up of stage IV NSCLC- PDL1- 15%, no  mutations     Treatment History:   5/4/2022: Left thoracentecis- Adenocarcinoma   6/9/2022: Caroplatin+ Alimta+Keytruda    History of Present Illness:   Patient is a 79-year-old female with diabetes mellitus, hypertension who presented to the [facility] emergency room on 5/4/2022 with complaints of shortness of breath x 14 days which has been progressively getting worse over several weeks with mild cough. Denies any headaches, vision changes, falls trouble swallowing, chest pain, fevers, weight loss, hemoptysis. CTA on admission showed no evidence of pulmonary embolism but she was noted to have a moderate to large left-sided pleural effusion with a left apical mass measuring 28 x 20 mm, she had small pulmonary nodules on the right, possible hepatic hypodensity. She had ultrasound-guided thoracentesis of the left side on 5/4/2022 and 1300 cc of fluid was aspirated. This was sent for cytology which was positive. Pleural fluid cytology showed more than 100 RBCs, total protein of 5.3, albumin 2.8, . Labs were noted for a bilirubin of 1.1. Comes to start C1D1 of Chemoimmunotherapy    She had thoracentesis again on 5/20/2022. I see [facility] for C3D1 Carboplatin/Alimta/Keytruda. She continues with mild nausea, alternating zofran and compazine. She is fatigued which starts about day 5. She is still caring for her elderly mother. Taking protonix daily which helps with GERD. She notes skin abnormality on right cheek. No itching, not painful. Hyperpigmentation of fingertips    Rondy her  is with her. She used to work for [employer].        Past Medical History:   Diagnosis Date    Diabetes mellitus type 2, noninsulin dependent ([address] Generalized anxiety disorder     Hypercholesterolemia     Hypertension     Neoplasm of major salivary gland       Past Surgical History:   Procedure Laterality Date    COLONOSCOPY N/A 2/24/2021    . COLONOSCOPY  :- performed by Shoshana Runner, MD at [facility] ENDOSCOPY    HX ACL RECONSTRUCTION      HX COLONOSCOPY      HX HYSTERECTOMY      IR INSERT CATH PLEURAL INDWELL  5/26/2022    IR INSERT TUNL CVC W PORT OVER 5 YEARS  6/6/2022    IR REMOVE LUNG/PLEURAL DRAIN / CATHETER  5/24/2022      Social History     Tobacco Use    Smoking status: Every Day     Packs/day: 0.50     Years: 20.00     Pack years: 10.00     Types: Cigarettes    Smokeless tobacco: Never   Substance Use Topics    Alcohol use: Yes     Comment: occ      No family history on file. Current Outpatient Medications   Medication Sig    pantoprazole (PROTONIX) 40 mg tablet Take 1 Tablet by mouth daily. prochlorperazine (COMPAZINE) 5 mg tablet Take 1 Tablet by mouth every six (6) hours as needed for Nausea or Vomiting. dexAMETHasone (DECADRON) 4 mg tablet Take 1 tablets by mouth twice daily the day before chemotherapy and the day after chemotherapy    ondansetron (ZOFRAN ODT) 8 mg disintegrating tablet Take 1 Tablet by mouth every eight (8) hours as needed for Nausea or Vomiting. folic acid (FOLVITE) 1 mg tablet Take 1 Tablet by mouth daily. lidocaine-prilocaine (EMLA) topical cream Apply  to affected area as needed for PRN Reason (Other) (for port access). Apply a thin layer to the port site 30-60 minutes prior to arrival for chemotherapy    amLODIPine (NORVASC) 5 mg tablet Take 5 mg by mouth daily. ALPRAZolam (XANAX) 0.25 mg tablet Take  by mouth.    metFORMIN (GLUCOPHAGE) 850 mg tablet Take 850 mg by mouth two (2) times daily (with meals). escitalopram oxalate (LEXAPRO) 20 mg tablet Take 20 mg by mouth daily. simvastatin (ZOCOR) 20 mg tablet Take 20 mg by mouth nightly. losartan (COZAAR) 100 mg tablet Take 100 mg by mouth daily.     aspirin 81 mg chewable tablet Take 81 mg by mouth daily. acetaminophen (TYLENOL PO) Take 500 mg by mouth every six (6) hours as needed for Pain. No current facility-administered medications for this visit. Allergies   Allergen Reactions    Erythromycin Other (comments)     Stomach cramps    Penicillins Hives        Review of Systems: A complete review of systems was obtained, negative except as described above. Physical Exam:     Visit Vitals  /77 (BP 1 Location: Left upper arm, BP Patient Position: Sitting)   Pulse 92   Temp 97.6 °F (36.4 °C)   Resp 18   Wt 230 lb (104.3 kg)   SpO2 96%   BMI 36.01 kg/m²     ECOG PS: 1  General: No distress  Eyes: PERRLA, anicteric sclerae  HENT: Atraumatic, mild erythematous patch right cheek  Neck: Supple  Lymphatic: No visible neck nodes  Respiratory:  normal respiratory effort  CV: no peripheral edema  GI:nondistended  Skin: No rashes  Psych: Alert, oriented    Results:     Lab Results   Component Value Date/Time    WBC 7.7 07/21/2022 10:21 AM    HGB 12.4 07/21/2022 10:21 AM    HCT 34.7 (L) 07/21/2022 10:21 AM    PLATELET 697 (H) 82/65/1156 10:21 AM    MCV 85.9 07/21/2022 10:21 AM    ABS. NEUTROPHILS PENDING 07/21/2022 10:21 AM     Lab Results   Component Value Date/Time    Sodium 140 06/30/2022 10:22 AM    Potassium 4.2 06/30/2022 10:22 AM    Chloride 107 06/30/2022 10:22 AM    CO2 24 06/30/2022 10:22 AM    Glucose 114 (H) 06/30/2022 10:22 AM    BUN 12 06/30/2022 10:22 AM    Creatinine 0.84 06/30/2022 10:22 AM    GFR est AA >60 06/30/2022 10:22 AM    GFR est non-AA >60 06/30/2022 10:22 AM    Calcium 10.0 06/30/2022 10:22 AM    Glucose (POC) 96 05/24/2022 11:29 AM    Creatinine (POC) 0.9 12/28/2012 06:39 AM     Lab Results   Component Value Date/Time    Bilirubin, total 0.3 06/30/2022 10:22 AM    ALT (SGPT) 33 06/30/2022 10:22 AM    Alk.  phosphatase 100 06/30/2022 10:22 AM    Protein, total 7.6 06/30/2022 10:22 AM    Albumin 3.6 06/30/2022 10:22 AM    Globulin 4.0 06/30/2022 10:22 AM         Records reviewed and summarized above. Pathology report(s) reviewed above. Radiology report(s) reviewed above. CT chest abdomen pelvis 5/5/2022    IMPRESSION  Diminished left-sided pleural effusion, status post thoracentesis with stable  mass lesion at the left apex. Mass lesion demonstrated at the left apex is not amenable to percutaneous  sampling. There are numerous additional nodular densities along the pleural margin  consistent with pleural carcinomatosis. There are additional small nodular densities demonstrated in the peritoneum,  these most consistent with peritoneal carcinomatosis. Small adrenal nodule on  the left likely an additional metastatic focus. No definitive target for percutaneous sampling. Currently awaiting results of  pleural fluid analysis/cytology and cellblock. MRI brain 5/2022      1. No evidence of intracranial metastases. 2. Minimal chronic microvascular ischemic disease. No acute intracranial  abnormality. 3. Enhancing lesions in the bilateral parotid glands, largest in the left  parotid gland measuring 3.6 cm. Lesions in the right parotid gland were  previously biopsied in 2020 and returned as Warthin tumors. These likely all  represent multiple Warthin tumors  Assessment:   1) Non small cell Lung cancer- adenocarcinoma stage IV    PDL1-15%, ALK/EGFR/ROS1/BRAF negative ( limited panel)  Liquid bx [id number], TMB low  CT chest abdomen and pelvis from 5/5/2022 was reviewed. Notable for large left-sided pleural effusion, concern for pleural carcinomatosis, left apical lung nodule measuring 20 x 26 mm, left adrenal nodule 19 x 18 mm, peritoneal nodularity concerning for carcinomatosis. Status post left-sided thoracentesis and + cytology   H/o Smoking    Understands that this is advanced malignancy and treatments will be palliative  Here for Carboplatin+ Alimta+ Keytruda cycle 3  Grade 1 nausea, grade 1 fatigue    Proceed with cycle 3.  CT CAP to be done prior to fourth cycle    If and when there is disease progression repeat expanded NGS is indicated    2) Left pleural effusion  S/p thoracentesis x 2, not enough for a drain  Hold off on Pleurx for now, hoping for resolution on systemic therapy    3) H/P R parotidectomy for Warthin's tumor   Has persistent multiple Warthin's tumors     4) Depression  On Lexapro    5) Rash  Likely from Alimta  Topical hydrocortisone    6) Encounter for high risk medications like chemotherapy and high risk disease  Labs pending  Grade 1 nausea, grade 1 fatigue, grade 1 rash  Supportive      Plan:     Proceed with C3D1 of carboplatin auc 5, alimta 500 mg/m2 and Keytruda every 3 weeks x 4 then alimta and keytruda maintenance  Antiemetics, steroids, b12, FA per protocol  Hold off on Pleurx  Topical hydrocortisone   Protonix daily  CT CAP to be done prior to C4    RTC D1 of each cycle     I appreciate the opportunity to participate in Ms. Nick Canada's care. I personally saw and evaluated the patient and performed the key components of medical decision making. The history, physical exam, and documentation were performed by Philip Vogt NP. I reviewed and verified the above documentation and modified it as needed.     Doing well, labs stable, lungs clear  R check scaly rash, smaller chest wall aceniform rash- Alimta induced  Topica steroids  C3 today then scans     Signed By: Nela Floyd MD

## 2022-08-03 ENCOUNTER — HOSPITAL ENCOUNTER (OUTPATIENT)
Dept: CT IMAGING | Age: 62
Discharge: HOME | End: 2022-08-03
Attending: NURSE PRACTITIONER
Payer: MEDICAID

## 2022-08-03 DIAGNOSIS — C79.70 NSCLC METASTATIC TO ADRENAL GLAND (HCC): ICD-10-CM

## 2022-08-03 DIAGNOSIS — C34.90 NSCLC METASTATIC TO ADRENAL GLAND (HCC): ICD-10-CM

## 2022-08-03 PROCEDURE — 71260 CT THORAX DX C+: CPT

## 2022-08-03 PROCEDURE — 74011000636 HC RX REV CODE- 636: Performed by: NURSE PRACTITIONER

## 2022-08-03 PROCEDURE — 74177 CT ABD & PELVIS W/CONTRAST: CPT

## 2022-08-03 RX ADMIN — IOPAMIDOL 100 ML: 755 INJECTION, SOLUTION INTRAVENOUS at 11:33

## 2022-08-04 RX ORDER — EPINEPHRINE 1 MG/ML
0.3 INJECTION, SOLUTION, CONCENTRATE INTRAVENOUS AS NEEDED
Status: CANCELLED | OUTPATIENT
Start: 2022-08-11

## 2022-08-04 RX ORDER — DIPHENHYDRAMINE HYDROCHLORIDE 50 MG/ML
50 INJECTION, SOLUTION INTRAMUSCULAR; INTRAVENOUS AS NEEDED
Status: CANCELLED
Start: 2022-08-11

## 2022-08-04 RX ORDER — ALBUTEROL SULFATE 0.83 MG/ML
2.5 SOLUTION RESPIRATORY (INHALATION) AS NEEDED
Status: CANCELLED
Start: 2022-08-11

## 2022-08-04 RX ORDER — HYDROCORTISONE SODIUM SUCCINATE 100 MG/2ML
100 INJECTION, POWDER, FOR SOLUTION INTRAMUSCULAR; INTRAVENOUS AS NEEDED
Status: CANCELLED | OUTPATIENT
Start: 2022-08-11

## 2022-08-10 NOTE — PROGRESS NOTES
[facility] [place] [facility] [place] [address] [address], [address]  W: [phone]  F: [phone]    Reason for visit   Yonny Arias is a 60 y.o. female who is seen for follow up of stage IV NSCLC- PDL1- 15%, no  mutations     Treatment History:   5/4/2022: Left thoracentecis- Adenocarcinoma   6/9/2022- current: Caroplatin+ Alimta+Keytruda    History of Present Illness:   Patient is a 79-year-old female with diabetes mellitus, hypertension who presented to the [facility] emergency room on 5/4/2022 with complaints of shortness of breath x 14 days which has been progressively getting worse over several weeks with mild cough. Denies any headaches, vision changes, falls trouble swallowing, chest pain, fevers, weight loss, hemoptysis. CTA on admission showed no evidence of pulmonary embolism but she was noted to have a moderate to large left-sided pleural effusion with a left apical mass measuring 28 x 20 mm, she had small pulmonary nodules on the right, possible hepatic hypodensity. She had ultrasound-guided thoracentesis of the left side on 5/4/2022 and 1300 cc of fluid was aspirated. This was sent for cytology which was positive. Pleural fluid cytology showed more than 100 RBCs, total protein of 5.3, albumin 2.8, . Labs were noted for a bilirubin of 1.1. Comes to start C1D1 of Chemoimmunotherapy    She had thoracentesis again on 5/20/2022. I see Nessa Spencer for C4D1 Carboplatin/Alimta/Keytruda. S    he continues with mild nausea, Transient mouth sores, she has stable CASTILLO, has had some L chest wall pain with cough. No new HA and no edema    Taking protonix daily which helps with GERD. Jay her  is with her. She used to work for [employer].        Past Medical History:   Diagnosis Date    Diabetes mellitus type 2, noninsulin dependent (HCC)     Generalized anxiety disorder     Hypercholesterolemia     Hypertension     Neoplasm of major salivary gland       Past Surgical History:   Procedure Laterality Date    COLONOSCOPY N/A 2/24/2021    . COLONOSCOPY  :- performed by Marcie Mathias MD at [facility] ENDOSCOPY    HX ACL RECONSTRUCTION      HX COLONOSCOPY      HX HYSTERECTOMY      IR INSERT CATH PLEURAL INDWELL  5/26/2022    IR INSERT TUNL CVC W PORT OVER 5 YEARS  6/6/2022    IR REMOVE LUNG/PLEURAL DRAIN / CATHETER  5/24/2022      Social History     Tobacco Use    Smoking status: Every Day     Packs/day: 0.50     Years: 20.00     Pack years: 10.00     Types: Cigarettes    Smokeless tobacco: Never   Substance Use Topics    Alcohol use: Yes     Comment: occ      No family history on file. Current Outpatient Medications   Medication Sig    pantoprazole (PROTONIX) 40 mg tablet Take 1 Tablet by mouth daily. prochlorperazine (COMPAZINE) 5 mg tablet Take 1 Tablet by mouth every six (6) hours as needed for Nausea or Vomiting. dexAMETHasone (DECADRON) 4 mg tablet Take 1 tablets by mouth twice daily the day before chemotherapy and the day after chemotherapy    ondansetron (ZOFRAN ODT) 8 mg disintegrating tablet Take 1 Tablet by mouth every eight (8) hours as needed for Nausea or Vomiting. folic acid (FOLVITE) 1 mg tablet Take 1 Tablet by mouth daily. lidocaine-prilocaine (EMLA) topical cream Apply  to affected area as needed for PRN Reason (Other) (for port access). Apply a thin layer to the port site 30-60 minutes prior to arrival for chemotherapy    amLODIPine (NORVASC) 5 mg tablet Take 5 mg by mouth daily. ALPRAZolam (XANAX) 0.25 mg tablet Take  by mouth.    metFORMIN (GLUCOPHAGE) 850 mg tablet Take 850 mg by mouth two (2) times daily (with meals). escitalopram oxalate (LEXAPRO) 20 mg tablet Take 20 mg by mouth daily. simvastatin (ZOCOR) 20 mg tablet Take 20 mg by mouth nightly. losartan (COZAAR) 100 mg tablet Take 100 mg by mouth daily. aspirin 81 mg chewable tablet Take 81 mg by mouth daily.     acetaminophen (TYLENOL PO) Take 500 mg by mouth every six (6) hours as needed for Pain. No current facility-administered medications for this visit. Allergies   Allergen Reactions    Erythromycin Other (comments)     Stomach cramps    Penicillins Hives        Review of Systems: A complete review of systems was obtained, negative except as described above. Physical Exam:     Vitals reviewed     ECOG PS: 1  General: No distress  Eyes: PERRLA, anicteric sclerae  HENT: Atraumatic, mild erythematous patch right cheek  Neck: Supple  Lymphatic: No visible neck nodes  Respiratory:  normal respiratory effort  CV: no peripheral edema  GI:nondistended  Skin: No rashes  Psych: Alert, oriented    Results:     Lab Results   Component Value Date/Time    WBC 7.7 07/21/2022 10:21 AM    HGB 12.4 07/21/2022 10:21 AM    HCT 34.7 (L) 07/21/2022 10:21 AM    PLATELET 618 (H) 83/25/5936 10:21 AM    MCV 85.9 07/21/2022 10:21 AM    ABS. NEUTROPHILS 5.8 07/21/2022 10:21 AM     Lab Results   Component Value Date/Time    Sodium 138 07/21/2022 10:21 AM    Potassium 4.4 07/21/2022 10:21 AM    Chloride 107 07/21/2022 10:21 AM    CO2 24 07/21/2022 10:21 AM    Glucose 126 (H) 07/21/2022 10:21 AM    BUN 12 07/21/2022 10:21 AM    Creatinine 0.78 07/21/2022 10:21 AM    GFR est AA >60 07/21/2022 10:21 AM    GFR est non-AA >60 07/21/2022 10:21 AM    Calcium 9.7 07/21/2022 10:21 AM    Glucose (POC) 96 05/24/2022 11:29 AM    Creatinine (POC) 0.9 12/28/2012 06:39 AM     Lab Results   Component Value Date/Time    Bilirubin, total 0.5 07/21/2022 10:21 AM    ALT (SGPT) 62 07/21/2022 10:21 AM    Alk. phosphatase 92 07/21/2022 10:21 AM    Protein, total 7.4 07/21/2022 10:21 AM    Albumin 3.6 07/21/2022 10:21 AM    Globulin 3.8 07/21/2022 10:21 AM         Records reviewed and summarized above. Pathology report(s) reviewed above. Radiology report(s) reviewed above.   CT chest abdomen pelvis 5/5/2022    IMPRESSION  Diminished left-sided pleural effusion, status post thoracentesis with stable  mass lesion at the left apex. Mass lesion demonstrated at the left apex is not amenable to percutaneous  sampling. There are numerous additional nodular densities along the pleural margin  consistent with pleural carcinomatosis. There are additional small nodular densities demonstrated in the peritoneum,  these most consistent with peritoneal carcinomatosis. Small adrenal nodule on  the left likely an additional metastatic focus. No definitive target for percutaneous sampling. Currently awaiting results of  pleural fluid analysis/cytology and cellblock. MRI brain 5/2022      1. No evidence of intracranial metastases. 2. Minimal chronic microvascular ischemic disease. No acute intracranial  abnormality. 3. Enhancing lesions in the bilateral parotid glands, largest in the left  parotid gland measuring 3.6 cm. Lesions in the right parotid gland were  previously biopsied in 2020 and returned as Warthin tumors. These likely all  represent multiple Warthin tumors    CT 8/2022     1. The mass lesion left lung apex is noted and has mildly decreased. 2. Pleural metastases on left side and decreased in size. Left pleural effusion  has decreased and there is improved aeration in the left lung. 3. Left adrenal nodule is stable. 4. Linear irregular nodular densities in the anterior peritoneum in the right  lower quadrant and midline in the lower abdomen and upper pelvis are noted and  unchanged. Assessment:   1) Non small cell Lung cancer- adenocarcinoma stage IV    PDL1-15%, ALK/EGFR/ROS1/BRAF negative ( limited panel)  Liquid bx [id number], TMB low  CT chest abdomen and pelvis from 5/5/2022 was reviewed. Notable for large left-sided pleural effusion, concern for pleural carcinomatosis, left apical lung nodule measuring 20 x 26 mm, left adrenal nodule 19 x 18 mm, peritoneal nodularity concerning for carcinomatosis.   Status post left-sided thoracentesis and + cytology   H/o Smoking    Understands that this is advanced malignancy and treatments will be palliative  Here for Carboplatin+ Alimta+ Keytruda cycle 4  Grade 1 nausea, grade 1 fatigue  CT CAP from 8/3/2022 reviewed personally and does show a response with decrease in the left lung apical mass, pleural metastases, left adrenal nodule and peritoneal disease. We discussed proceeding with cycle 4 as planned and then moving onto maintenance Keytruda and Alimta until progression. If and when there is disease progression repeat expanded NGS is indicated    2) Left pleural effusion  S/p thoracentesis x 2, not enough for a drain  Hold off on Pleurx given no recurrence on systemic therapy    3) H/P R parotidectomy for Warthin's tumor   Has persistent multiple Warthin's tumors     4) Depression  On Lexapro    5) Rash  Likely from Alimta  Topical hydrocortisone    6) Encounter for high risk medications like chemotherapy and high risk disease      Plan:     Proceed with C4D1 of carboplatin auc 5, alimta 500 mg/m2 and Keytruda today and then switched to alimta and [place] maintenance every 3 weeks until progression  Antiemetics, steroids, b12, FA per protocol  Topical hydrocortisone   Protonix daily      RTC D1 of each cycle     I appreciate the opportunity to participate in Ms. Radha Canada's care.       Signed By: Shawn Arias MD

## 2022-08-11 ENCOUNTER — OFFICE VISIT (OUTPATIENT)
Dept: ONCOLOGY | Age: 62
End: 2022-08-11
Payer: MEDICAID

## 2022-08-11 ENCOUNTER — HOSPITAL ENCOUNTER (OUTPATIENT)
Dept: INFUSION THERAPY | Age: 62
Discharge: HOME | End: 2022-08-11
Payer: MEDICAID

## 2022-08-11 VITALS
OXYGEN SATURATION: 95 % | TEMPERATURE: 98 F | SYSTOLIC BLOOD PRESSURE: 136 MMHG | DIASTOLIC BLOOD PRESSURE: 83 MMHG | RESPIRATION RATE: 17 BRPM | BODY MASS INDEX: 37.2 KG/M2 | HEIGHT: 67 IN | HEART RATE: 94 BPM | WEIGHT: 237 LBS

## 2022-08-11 VITALS
TEMPERATURE: 97.1 F | DIASTOLIC BLOOD PRESSURE: 77 MMHG | SYSTOLIC BLOOD PRESSURE: 136 MMHG | RESPIRATION RATE: 18 BRPM | BODY MASS INDEX: 37.17 KG/M2 | WEIGHT: 237.4 LBS | HEART RATE: 95 BPM

## 2022-08-11 DIAGNOSIS — C34.90 PRIMARY ADENOCARCINOMA OF LUNG, UNSPECIFIED LATERALITY (HCC): Primary | ICD-10-CM

## 2022-08-11 DIAGNOSIS — C79.70 NSCLC METASTATIC TO ADRENAL GLAND (HCC): Primary | ICD-10-CM

## 2022-08-11 DIAGNOSIS — C34.90 NSCLC METASTATIC TO ADRENAL GLAND (HCC): ICD-10-CM

## 2022-08-11 DIAGNOSIS — C34.90 NSCLC METASTATIC TO ADRENAL GLAND (HCC): Primary | ICD-10-CM

## 2022-08-11 DIAGNOSIS — C79.70 NSCLC METASTATIC TO ADRENAL GLAND (HCC): ICD-10-CM

## 2022-08-11 LAB
ALBUMIN SERPL-MCNC: 3.6 G/DL (ref 3.5–5)
ALBUMIN/GLOB SERPL: 0.9 {RATIO} (ref 1.1–2.2)
ALP SERPL-CCNC: 88 U/L (ref 45–117)
ALT SERPL-CCNC: 45 U/L (ref 12–78)
ANION GAP SERPL CALC-SCNC: 10 MMOL/L (ref 5–15)
AST SERPL-CCNC: 26 U/L (ref 15–37)
BASOPHILS # BLD: 0.1 K/UL (ref 0–0.1)
BASOPHILS NFR BLD: 1 % (ref 0–1)
BILIRUB SERPL-MCNC: 0.5 MG/DL (ref 0.2–1)
BUN SERPL-MCNC: 10 MG/DL (ref 6–20)
BUN/CREAT SERPL: 11 (ref 12–20)
CALCIUM SERPL-MCNC: 9.4 MG/DL (ref 8.5–10.1)
CHLORIDE SERPL-SCNC: 106 MMOL/L (ref 97–108)
CO2 SERPL-SCNC: 23 MMOL/L (ref 21–32)
CREAT SERPL-MCNC: 0.89 MG/DL (ref 0.55–1.02)
DIFFERENTIAL METHOD BLD: ABNORMAL
EOSINOPHIL # BLD: 0 K/UL (ref 0–0.4)
EOSINOPHIL NFR BLD: 0 % (ref 0–7)
ERYTHROCYTE [DISTWIDTH] IN BLOOD BY AUTOMATED COUNT: 16.1 % (ref 11.5–14.5)
GLOBULIN SER CALC-MCNC: 4.1 G/DL (ref 2–4)
GLUCOSE SERPL-MCNC: 152 MG/DL (ref 65–100)
HCT VFR BLD AUTO: 34.5 % (ref 35–47)
HGB BLD-MCNC: 11.5 G/DL (ref 11.5–16)
IMM GRANULOCYTES # BLD AUTO: 0 K/UL
IMM GRANULOCYTES NFR BLD AUTO: 0 %
LYMPHOCYTES # BLD: 0.7 K/UL (ref 0.8–3.5)
LYMPHOCYTES NFR BLD: 11 % (ref 12–49)
MCH RBC QN AUTO: 29 PG (ref 26–34)
MCHC RBC AUTO-ENTMCNC: 33.3 G/DL (ref 30–36.5)
MCV RBC AUTO: 87.1 FL (ref 80–99)
METAMYELOCYTES NFR BLD MANUAL: 1 %
MONOCYTES # BLD: 0.7 K/UL (ref 0–1)
MONOCYTES NFR BLD: 11 % (ref 5–13)
MYELOCYTES NFR BLD MANUAL: 1 %
NEUTS BAND NFR BLD MANUAL: 1 % (ref 0–6)
NEUTS SEG # BLD: 5 K/UL (ref 1.8–8)
NEUTS SEG NFR BLD: 74 % (ref 32–75)
NRBC # BLD: 0.02 K/UL (ref 0–0.01)
NRBC BLD-RTO: 0.3 PER 100 WBC
PLATELET # BLD AUTO: 390 K/UL (ref 150–400)
PMV BLD AUTO: 9.2 FL (ref 8.9–12.9)
POTASSIUM SERPL-SCNC: 3.7 MMOL/L (ref 3.5–5.1)
PROT SERPL-MCNC: 7.7 G/DL (ref 6.4–8.2)
RBC # BLD AUTO: 3.96 M/UL (ref 3.8–5.2)
RBC MORPH BLD: ABNORMAL
SODIUM SERPL-SCNC: 139 MMOL/L (ref 136–145)
TSH SERPL DL<=0.05 MIU/L-ACNC: 0.67 UIU/ML (ref 0.36–3.74)
WBC # BLD AUTO: 6.6 K/UL (ref 3.6–11)

## 2022-08-11 PROCEDURE — 74011250636 HC RX REV CODE- 250/636: Performed by: INTERNAL MEDICINE

## 2022-08-11 PROCEDURE — 74011000250 HC RX REV CODE- 250: Performed by: INTERNAL MEDICINE

## 2022-08-11 PROCEDURE — 96413 CHEMO IV INFUSION 1 HR: CPT

## 2022-08-11 PROCEDURE — 80053 COMPREHEN METABOLIC PANEL: CPT

## 2022-08-11 PROCEDURE — 85025 COMPLETE CBC W/AUTO DIFF WBC: CPT

## 2022-08-11 PROCEDURE — 74011000258 HC RX REV CODE- 258: Performed by: INTERNAL MEDICINE

## 2022-08-11 PROCEDURE — 84443 ASSAY THYROID STIM HORMONE: CPT

## 2022-08-11 PROCEDURE — 96366 THER/PROPH/DIAG IV INF ADDON: CPT

## 2022-08-11 PROCEDURE — 36415 COLL VENOUS BLD VENIPUNCTURE: CPT

## 2022-08-11 PROCEDURE — 96411 CHEMO IV PUSH ADDL DRUG: CPT

## 2022-08-11 PROCEDURE — 99215 OFFICE O/P EST HI 40 MIN: CPT | Performed by: INTERNAL MEDICINE

## 2022-08-11 PROCEDURE — 77030012965 HC NDL HUBR BBMI -A

## 2022-08-11 PROCEDURE — 96375 TX/PRO/DX INJ NEW DRUG ADDON: CPT

## 2022-08-11 RX ORDER — DIPHENHYDRAMINE HYDROCHLORIDE 50 MG/ML
25 INJECTION, SOLUTION INTRAMUSCULAR; INTRAVENOUS AS NEEDED
Status: DISCONTINUED | OUTPATIENT
Start: 2022-08-11 | End: 2022-08-12 | Stop reason: HOSPADM

## 2022-08-11 RX ORDER — ACETAMINOPHEN 325 MG/1
650 TABLET ORAL AS NEEDED
Status: DISCONTINUED | OUTPATIENT
Start: 2022-08-11 | End: 2022-08-12 | Stop reason: HOSPADM

## 2022-08-11 RX ORDER — SODIUM CHLORIDE 9 MG/ML
10 INJECTION INTRAMUSCULAR; INTRAVENOUS; SUBCUTANEOUS AS NEEDED
Status: DISCONTINUED | OUTPATIENT
Start: 2022-08-11 | End: 2022-08-12 | Stop reason: HOSPADM

## 2022-08-11 RX ORDER — SODIUM CHLORIDE 9 MG/ML
25 INJECTION, SOLUTION INTRAVENOUS CONTINUOUS
Status: DISCONTINUED | OUTPATIENT
Start: 2022-08-11 | End: 2022-08-12 | Stop reason: HOSPADM

## 2022-08-11 RX ORDER — PALONOSETRON 0.05 MG/ML
0.25 INJECTION, SOLUTION INTRAVENOUS ONCE
Status: COMPLETED | OUTPATIENT
Start: 2022-08-11 | End: 2022-08-11

## 2022-08-11 RX ORDER — SODIUM CHLORIDE 0.9 % (FLUSH) 0.9 %
10 SYRINGE (ML) INJECTION AS NEEDED
Status: DISCONTINUED | OUTPATIENT
Start: 2022-08-11 | End: 2022-08-12 | Stop reason: HOSPADM

## 2022-08-11 RX ORDER — ONDANSETRON 2 MG/ML
8 INJECTION INTRAMUSCULAR; INTRAVENOUS AS NEEDED
Status: DISCONTINUED | OUTPATIENT
Start: 2022-08-11 | End: 2022-08-12 | Stop reason: HOSPADM

## 2022-08-11 RX ORDER — HEPARIN 100 UNIT/ML
300-500 SYRINGE INTRAVENOUS AS NEEDED
Status: DISCONTINUED | OUTPATIENT
Start: 2022-08-11 | End: 2022-08-12 | Stop reason: HOSPADM

## 2022-08-11 RX ORDER — CYANOCOBALAMIN 1000 UG/ML
1000 INJECTION, SOLUTION INTRAMUSCULAR; SUBCUTANEOUS
Status: COMPLETED | OUTPATIENT
Start: 2022-08-11 | End: 2022-08-11

## 2022-08-11 RX ADMIN — CYANOCOBALAMIN 1000 MCG: 1000 INJECTION, SOLUTION INTRAMUSCULAR at 14:09

## 2022-08-11 RX ADMIN — SODIUM CHLORIDE 150 MG: 900 INJECTION, SOLUTION INTRAVENOUS at 14:35

## 2022-08-11 RX ADMIN — DEXAMETHASONE SODIUM PHOSPHATE 12 MG: 4 INJECTION, SOLUTION INTRAMUSCULAR; INTRAVENOUS at 14:15

## 2022-08-11 RX ADMIN — SODIUM CHLORIDE 25 ML/HR: 9 INJECTION, SOLUTION INTRAVENOUS at 14:07

## 2022-08-11 RX ADMIN — SODIUM CHLORIDE 200 MG: 9 INJECTION, SOLUTION INTRAVENOUS at 15:15

## 2022-08-11 RX ADMIN — SODIUM CHLORIDE, PRESERVATIVE FREE 10 ML: 5 INJECTION INTRAVENOUS at 16:40

## 2022-08-11 RX ADMIN — CARBOPLATIN 675 MG: 10 INJECTION, SOLUTION INTRAVENOUS at 16:04

## 2022-08-11 RX ADMIN — PALONOSETRON 0.25 MG: 0.05 INJECTION, SOLUTION INTRAVENOUS at 14:08

## 2022-08-11 RX ADMIN — HEPARIN 500 UNITS: 100 SYRINGE at 16:40

## 2022-08-11 RX ADMIN — PEMETREXED DISODIUM 1100 MG: 500 INJECTION, POWDER, LYOPHILIZED, FOR SOLUTION INTRAVENOUS at 15:50

## 2022-08-11 NOTE — PROGRESS NOTES
[facility] Chemo Progress Note    Date: 2022    Name: Yeny Arcos    MRN: [id number]         : 1960    1000Ms. Sinan Mock Arrived to [facility] for C4 Keytruda/Alimta/Carboplatin ambulatory in stable condition. Assessment was completed and port accessed by assessment RN. Ms. Silvano Coats vitals were reviewed. Patient Vitals for the past 12 hrs:   Temp Pulse Resp BP   22 1637 -- 95 18 136/77   22 1012 97.1 °F (36.2 °C) 90 18 (!) 144/75       Lab results were obtained and reviewed. Recent Results (from the past 12 hour(s))   CBC WITH AUTOMATED DIFF    Collection Time: 22 10:15 AM   Result Value Ref Range    WBC 6.6 3.6 - 11.0 K/uL    RBC 3.96 3.80 - 5.20 M/uL    HGB 11.5 11.5 - 16.0 g/dL    HCT 34.5 (L) 35.0 - 47.0 %    MCV 87.1 80.0 - 99.0 FL    MCH 29.0 26.0 - 34.0 PG    MCHC 33.3 30.0 - 36.5 g/dL    RDW 16.1 (H) 11.5 - 14.5 %    PLATELET 889 095 - 798 K/uL    MPV 9.2 8.9 - 12.9 FL    NRBC 0.3 (H) 0  WBC    ABSOLUTE NRBC 0.02 (H) 0.00 - 0.01 K/uL    NEUTROPHILS 74 32 - 75 %    BAND NEUTROPHILS 1 0 - 6 %    LYMPHOCYTES 11 (L) 12 - 49 %    MONOCYTES 11 5 - 13 %    EOSINOPHILS 0 0 - 7 %    BASOPHILS 1 0 - 1 %    METAMYELOCYTES 1 (H) 0 %    MYELOCYTES 1 (H) 0 %    IMMATURE GRANULOCYTES 0 %    ABS. NEUTROPHILS 5.0 1.8 - 8.0 K/UL    ABS. LYMPHOCYTES 0.7 (L) 0.8 - 3.5 K/UL    ABS. MONOCYTES 0.7 0.0 - 1.0 K/UL    ABS. EOSINOPHILS 0.0 0.0 - 0.4 K/UL    ABS. BASOPHILS 0.1 0.0 - 0.1 K/UL    ABS. IMM.  GRANS. 0.0 K/UL    DF MANUAL      RBC COMMENTS ANISOCYTOSIS  1+       METABOLIC PANEL, COMPREHENSIVE    Collection Time: 22 10:15 AM   Result Value Ref Range    Sodium 139 136 - 145 mmol/L    Potassium 3.7 3.5 - 5.1 mmol/L    Chloride 106 97 - 108 mmol/L    CO2 23 21 - 32 mmol/L    Anion gap 10 5 - 15 mmol/L    Glucose 152 (H) 65 - 100 mg/dL    BUN 10 6 - 20 MG/DL    Creatinine 0.89 0.55 - 1.02 MG/DL    BUN/Creatinine ratio 11 (L) 12 - 20      GFR est AA >60 >60 ml/min/1.73m2 GFR est non-AA >60 >60 ml/min/1.73m2    Calcium 9.4 8.5 - 10.1 MG/DL    Bilirubin, total 0.5 0.2 - 1.0 MG/DL    ALT (SGPT) 45 12 - 78 U/L    AST (SGOT) 26 15 - 37 U/L    Alk.  phosphatase 88 45 - 117 U/L    Protein, total 7.7 6.4 - 8.2 g/dL    Albumin 3.6 3.5 - 5.0 g/dL    Globulin 4.1 (H) 2.0 - 4.0 g/dL    A-G Ratio 0.9 (L) 1.1 - 2.2     TSH 3RD GENERATION    Collection Time: 08/11/22 10:15 AM   Result Value Ref Range    TSH 0.67 0.36 - 3.74 uIU/mL       Pre-medications  were administered as ordered and chemotherapy was given  Medications Administered       0.9% sodium chloride infusion       Admin Date  08/11/2022 Action  New Bag Dose  25 mL/hr Rate  25 mL/hr Route  IntraVENous Administered By  Emili Murray RN              CARBOplatin (PARAPLATIN) 675 mg in 0.9% sodium chloride 250 mL, overfill volume 25 mL chemo infusion       Admin Date  08/11/2022 Action  New Bag Dose  675 mg Rate  685 mL/hr Route  IntraVENous Administered By  Emili Murray RN              cyanocobalamin (VITAMIN B12) injection 1,000 mcg       Admin Date  08/11/2022 Action  Given Dose  1,000 mcg Route  IntraMUSCular Administered By  Emili Murray RN              dexamethasone (DECADRON) 12 mg in 0.9% sodium chloride 50 mL, overfill volume 5 mL IVPB       Admin Date  08/11/2022 Action  New Bag Dose  12 mg Rate  232 mL/hr Route  IntraVENous Administered By  Emili Murray RN              fosaprepitant (EMEND) 150 mg in 0.9% sodium chloride 150 mL IVPB       Admin Date  08/11/2022 Action  New Bag Dose  150 mg Rate  450 mL/hr Route  IntraVENous Administered By  Emili Murray RN              heparin (porcine) pf 300-500 Units       Admin Date  08/11/2022 Action  Given Dose  500 Units Route  InterCATHeter Administered By  Emili Murray RN              palonosetron HCl (ALOXI) injection 0.25 mg       Admin Date  08/11/2022 Action  Given Dose  0.25 mg Route  IntraVENous Administered By  Emili Murray RN              pembrolizumab (KEYTRUDA) 200 mg in 0.9% sodium chloride 100 mL, overfill volume 10 mL IVPB       Admin Date  08/11/2022 Action  New Bag Dose  200 mg Rate  236 mL/hr Route  IntraVENous Administered By  Donnie Maldonado RN              PEMEtrexed disodium (ALIMTA) 1,100 mg in 0.9% sodium chloride 100 mL, overfill volume 10 mL chemo infusion       Admin Date  08/11/2022 Action  New Bag Dose  1,100 mg Rate  924 mL/hr Route  IntraVENous Administered By  Donnie Maldonado RN              sodium chloride (NS) flush 10 mL       Admin Date  08/11/2022 Action  Given Dose  10 mL Route  IntraVENous Administered By  Donnie Maldonado RN                    1640 Patient tolerated treatment well. Port maintained positive blood return throughout treatment. Port flushed, heparinized and de accessed per protocol. Patient was discharged from [facility] in stable condition. Patient aware of next appointment.      Future Appointments   Date Time Provider Raymond Gonzales   9/1/2022 10:00 AM D4 [facility] [address] [place] [address]   9/1/2022 10:15 AM Tisha Polanco  [facility] BS AMB   9/22/2022 10:00 AM A1 [facility]  [facility]   10/13/2022 10:00 AM A1 [facility]   11/3/2022 10:00 AM A1 [facility]   11/24/2022 10:00 AM A1 [facility] [address] [place] [address]         Rosalinda Brown RN  August 11, 2022

## 2022-08-11 NOTE — PROGRESS NOTES
Son Jordan is a 60 y.o. female  Chief Complaint   Patient presents with    Follow-up    Lung Cancer     1. Have you been to the ER, urgent care clinic since your last visit? Hospitalized since your last visit? No    2. Have you seen or consulted any other health care providers outside of the [address] [place] [address] since your last visit? Include any pap smears or colon screening.  No

## 2022-08-29 DIAGNOSIS — C34.90 NSCLC METASTATIC TO ADRENAL GLAND (HCC): ICD-10-CM

## 2022-08-29 DIAGNOSIS — C79.70 NSCLC METASTATIC TO ADRENAL GLAND (HCC): ICD-10-CM

## 2022-08-29 RX ORDER — ALBUTEROL SULFATE 0.83 MG/ML
2.5 SOLUTION RESPIRATORY (INHALATION) AS NEEDED
Status: CANCELLED
Start: 2022-09-01

## 2022-08-29 RX ORDER — DIPHENHYDRAMINE HYDROCHLORIDE 50 MG/ML
50 INJECTION, SOLUTION INTRAMUSCULAR; INTRAVENOUS AS NEEDED
Status: CANCELLED
Start: 2022-09-01

## 2022-08-29 RX ORDER — HYDROCORTISONE SODIUM SUCCINATE 100 MG/2ML
100 INJECTION, POWDER, FOR SOLUTION INTRAMUSCULAR; INTRAVENOUS AS NEEDED
Status: CANCELLED | OUTPATIENT
Start: 2022-09-01

## 2022-08-29 RX ORDER — EPINEPHRINE 1 MG/ML
0.3 INJECTION, SOLUTION, CONCENTRATE INTRAVENOUS AS NEEDED
Status: CANCELLED | OUTPATIENT
Start: 2022-09-01

## 2022-08-29 RX ORDER — PANTOPRAZOLE SODIUM 40 MG/1
40 TABLET, DELAYED RELEASE ORAL DAILY
Qty: 30 TABLET | Refills: 3 | Status: SHIPPED | OUTPATIENT
Start: 2022-08-29 | End: 2022-09-01 | Stop reason: SDUPTHER

## 2022-08-29 RX ORDER — CYANOCOBALAMIN 1000 UG/ML
1000 INJECTION, SOLUTION INTRAMUSCULAR; SUBCUTANEOUS
Status: CANCELLED | OUTPATIENT
Start: 2022-09-01 | End: 2022-09-02

## 2022-08-29 NOTE — TELEPHONE ENCOUNTER
Oncology Pharmacist Note    Violeta Vieyra is a  57 y. o.female  diagnosed with lung cancer. Ms. Christa Jon is being treated with CARBOplatin, PEMEtrexed, pembrolizumab. Refill for pantoprazole sent to [address] Severiano Good, PharmD, [facility], [address] [place] [address] in place: Yes  Recommendation Provided To: Patient/Caregiver: 1 via Telephone  Intervention Detail: Refill(s) Provided    Intervention Accepted By: Patient/Caregiver: 1  Time Spent (min): 10

## 2022-09-01 ENCOUNTER — HOSPITAL ENCOUNTER (OUTPATIENT)
Dept: INFUSION THERAPY | Age: 62
Discharge: HOME | End: 2022-09-01
Payer: MEDICAID

## 2022-09-01 ENCOUNTER — OFFICE VISIT (OUTPATIENT)
Dept: ONCOLOGY | Age: 62
End: 2022-09-01
Payer: MEDICAID

## 2022-09-01 VITALS
WEIGHT: 238 LBS | DIASTOLIC BLOOD PRESSURE: 80 MMHG | HEART RATE: 93 BPM | RESPIRATION RATE: 18 BRPM | TEMPERATURE: 98.4 F | OXYGEN SATURATION: 100 % | BODY MASS INDEX: 37.26 KG/M2 | SYSTOLIC BLOOD PRESSURE: 136 MMHG

## 2022-09-01 VITALS
DIASTOLIC BLOOD PRESSURE: 82 MMHG | HEIGHT: 67 IN | RESPIRATION RATE: 16 BRPM | WEIGHT: 238 LBS | OXYGEN SATURATION: 100 % | BODY MASS INDEX: 37.35 KG/M2 | HEART RATE: 89 BPM | SYSTOLIC BLOOD PRESSURE: 148 MMHG | TEMPERATURE: 98.4 F

## 2022-09-01 DIAGNOSIS — C34.90 NSCLC METASTATIC TO ADRENAL GLAND (HCC): Primary | ICD-10-CM

## 2022-09-01 DIAGNOSIS — C79.70 NSCLC METASTATIC TO ADRENAL GLAND (HCC): ICD-10-CM

## 2022-09-01 DIAGNOSIS — C79.70 NSCLC METASTATIC TO ADRENAL GLAND (HCC): Primary | ICD-10-CM

## 2022-09-01 DIAGNOSIS — C34.90 NSCLC METASTATIC TO ADRENAL GLAND (HCC): ICD-10-CM

## 2022-09-01 DIAGNOSIS — C34.90 PRIMARY ADENOCARCINOMA OF LUNG, UNSPECIFIED LATERALITY (HCC): Primary | ICD-10-CM

## 2022-09-01 LAB
ALBUMIN SERPL-MCNC: 3.8 G/DL (ref 3.5–5)
ALBUMIN/GLOB SERPL: 1.1 {RATIO} (ref 1.1–2.2)
ALP SERPL-CCNC: 86 U/L (ref 45–117)
ALT SERPL-CCNC: 41 U/L (ref 12–78)
ANION GAP SERPL CALC-SCNC: 6 MMOL/L (ref 5–15)
AST SERPL-CCNC: 21 U/L (ref 15–37)
BASOPHILS # BLD: 0 K/UL (ref 0–0.1)
BASOPHILS NFR BLD: 0 % (ref 0–1)
BILIRUB SERPL-MCNC: 0.6 MG/DL (ref 0.2–1)
BUN SERPL-MCNC: 11 MG/DL (ref 6–20)
BUN/CREAT SERPL: 13 (ref 12–20)
CALCIUM SERPL-MCNC: 9.2 MG/DL (ref 8.5–10.1)
CHLORIDE SERPL-SCNC: 108 MMOL/L (ref 97–108)
CO2 SERPL-SCNC: 26 MMOL/L (ref 21–32)
CREAT SERPL-MCNC: 0.84 MG/DL (ref 0.55–1.02)
DIFFERENTIAL METHOD BLD: ABNORMAL
EOSINOPHIL # BLD: 0 K/UL (ref 0–0.4)
EOSINOPHIL NFR BLD: 0 % (ref 0–7)
ERYTHROCYTE [DISTWIDTH] IN BLOOD BY AUTOMATED COUNT: 17.2 % (ref 11.5–14.5)
GLOBULIN SER CALC-MCNC: 3.5 G/DL (ref 2–4)
GLUCOSE SERPL-MCNC: 135 MG/DL (ref 65–100)
HCT VFR BLD AUTO: 32.6 % (ref 35–47)
HGB BLD-MCNC: 11.1 G/DL (ref 11.5–16)
IMM GRANULOCYTES # BLD AUTO: 0.1 K/UL (ref 0–0.04)
IMM GRANULOCYTES NFR BLD AUTO: 1 % (ref 0–0.5)
LYMPHOCYTES # BLD: 0.9 K/UL (ref 0.8–3.5)
LYMPHOCYTES NFR BLD: 15 % (ref 12–49)
MCH RBC QN AUTO: 29.1 PG (ref 26–34)
MCHC RBC AUTO-ENTMCNC: 34 G/DL (ref 30–36.5)
MCV RBC AUTO: 85.3 FL (ref 80–99)
MONOCYTES # BLD: 0.9 K/UL (ref 0–1)
MONOCYTES NFR BLD: 15 % (ref 5–13)
NEUTS SEG # BLD: 4 K/UL (ref 1.8–8)
NEUTS SEG NFR BLD: 69 % (ref 32–75)
NRBC # BLD: 0 K/UL (ref 0–0.01)
NRBC BLD-RTO: 0 PER 100 WBC
PLATELET # BLD AUTO: 333 K/UL (ref 150–400)
PMV BLD AUTO: 9.1 FL (ref 8.9–12.9)
POTASSIUM SERPL-SCNC: 4.1 MMOL/L (ref 3.5–5.1)
PROT SERPL-MCNC: 7.3 G/DL (ref 6.4–8.2)
RBC # BLD AUTO: 3.82 M/UL (ref 3.8–5.2)
SODIUM SERPL-SCNC: 140 MMOL/L (ref 136–145)
WBC # BLD AUTO: 5.8 K/UL (ref 3.6–11)

## 2022-09-01 PROCEDURE — 96411 CHEMO IV PUSH ADDL DRUG: CPT

## 2022-09-01 PROCEDURE — 77030012965 HC NDL HUBR BBMI -A

## 2022-09-01 PROCEDURE — 99214 OFFICE O/P EST MOD 30 MIN: CPT | Performed by: INTERNAL MEDICINE

## 2022-09-01 PROCEDURE — 80053 COMPREHEN METABOLIC PANEL: CPT

## 2022-09-01 PROCEDURE — 96413 CHEMO IV INFUSION 1 HR: CPT

## 2022-09-01 PROCEDURE — 85025 COMPLETE CBC W/AUTO DIFF WBC: CPT

## 2022-09-01 PROCEDURE — 74011250636 HC RX REV CODE- 250/636: Performed by: INTERNAL MEDICINE

## 2022-09-01 PROCEDURE — 74011000258 HC RX REV CODE- 258: Performed by: INTERNAL MEDICINE

## 2022-09-01 PROCEDURE — 36415 COLL VENOUS BLD VENIPUNCTURE: CPT

## 2022-09-01 PROCEDURE — 96375 TX/PRO/DX INJ NEW DRUG ADDON: CPT

## 2022-09-01 RX ORDER — SODIUM CHLORIDE 9 MG/ML
10 INJECTION INTRAMUSCULAR; INTRAVENOUS; SUBCUTANEOUS AS NEEDED
Status: ACTIVE | OUTPATIENT
Start: 2022-09-01 | End: 2022-09-01

## 2022-09-01 RX ORDER — ACETAMINOPHEN 325 MG/1
650 TABLET ORAL AS NEEDED
Status: DISCONTINUED | OUTPATIENT
Start: 2022-09-01 | End: 2022-09-02 | Stop reason: HOSPADM

## 2022-09-01 RX ORDER — FOLIC ACID 1 MG/1
1 TABLET ORAL DAILY
Qty: 90 TABLET | Refills: 3 | Status: SHIPPED | OUTPATIENT
Start: 2022-09-01

## 2022-09-01 RX ORDER — HEPARIN 100 UNIT/ML
300-500 SYRINGE INTRAVENOUS AS NEEDED
Status: DISPENSED | OUTPATIENT
Start: 2022-09-01 | End: 2022-09-01

## 2022-09-01 RX ORDER — ONDANSETRON 2 MG/ML
8 INJECTION INTRAMUSCULAR; INTRAVENOUS AS NEEDED
Status: DISCONTINUED | OUTPATIENT
Start: 2022-09-01 | End: 2022-09-02 | Stop reason: HOSPADM

## 2022-09-01 RX ORDER — PANTOPRAZOLE SODIUM 40 MG/1
40 TABLET, DELAYED RELEASE ORAL DAILY
Qty: 90 TABLET | Refills: 3 | Status: SHIPPED | OUTPATIENT
Start: 2022-09-01

## 2022-09-01 RX ORDER — SODIUM CHLORIDE 9 MG/ML
25 INJECTION, SOLUTION INTRAVENOUS CONTINUOUS
Status: DISCONTINUED | OUTPATIENT
Start: 2022-09-01 | End: 2022-09-02 | Stop reason: HOSPADM

## 2022-09-01 RX ORDER — SODIUM CHLORIDE 0.9 % (FLUSH) 0.9 %
10 SYRINGE (ML) INJECTION AS NEEDED
Status: DISPENSED | OUTPATIENT
Start: 2022-09-01 | End: 2022-09-01

## 2022-09-01 RX ORDER — DEXAMETHASONE SODIUM PHOSPHATE 4 MG/ML
8 INJECTION, SOLUTION INTRA-ARTICULAR; INTRALESIONAL; INTRAMUSCULAR; INTRAVENOUS; SOFT TISSUE ONCE
Status: COMPLETED | OUTPATIENT
Start: 2022-09-01 | End: 2022-09-01

## 2022-09-01 RX ORDER — DIPHENHYDRAMINE HYDROCHLORIDE 50 MG/ML
25 INJECTION, SOLUTION INTRAMUSCULAR; INTRAVENOUS AS NEEDED
Status: DISCONTINUED | OUTPATIENT
Start: 2022-09-01 | End: 2022-09-02 | Stop reason: HOSPADM

## 2022-09-01 RX ADMIN — HEPARIN 500 UNITS: 100 SYRINGE at 14:45

## 2022-09-01 RX ADMIN — SODIUM CHLORIDE 25 ML/HR: 9 INJECTION, SOLUTION INTRAVENOUS at 13:29

## 2022-09-01 RX ADMIN — PEMETREXED DISODIUM 1100 MG: 100 INJECTION, POWDER, LYOPHILIZED, FOR SOLUTION INTRAVENOUS at 14:20

## 2022-09-01 RX ADMIN — SODIUM CHLORIDE 200 MG: 9 INJECTION, SOLUTION INTRAVENOUS at 13:30

## 2022-09-01 RX ADMIN — DEXAMETHASONE SODIUM PHOSPHATE 8 MG: 4 INJECTION INTRA-ARTICULAR; INTRALESIONAL; INTRAMUSCULAR; INTRAVENOUS; SOFT TISSUE at 14:06

## 2022-09-01 NOTE — PROGRESS NOTES
[facility] [place] [place] [address] [address], [address]  W: [phone]  F: [phone]    Reason for visit   Olman Cee is a 60 y.o. female who is seen for follow up of stage IV NSCLC- PDL1- 15%, no  mutations     Treatment History:   5/4/2022: Left thoracentecis- Adenocarcinoma   6/9/2022- 8/11/2022- current: Caroplatin+ Alimta+Keytruda  8/2022- CT with response  9/1/2022: Alimta Keytruda    History of Present Illness:   Patient is a 18-year-old female with diabetes mellitus, hypertension who presented to the [facility] emergency room on 5/4/2022 with complaints of shortness of breath x 14 days which has been progressively getting worse over several weeks with mild cough. Denies any headaches, vision changes, falls trouble swallowing, chest pain, fevers, weight loss, hemoptysis. CTA on admission showed no evidence of pulmonary embolism but she was noted to have a moderate to large left-sided pleural effusion with a left apical mass measuring 28 x 20 mm, she had small pulmonary nodules on the right, possible hepatic hypodensity. She had ultrasound-guided thoracentesis of the left side on 5/4/2022 and 1300 cc of fluid was aspirated. This was sent for cytology which was positive. Pleural fluid cytology showed more than 100 RBCs, total protein of 5.3, albumin 2.8, . Labs were noted for a bilirubin of 1.1. I see Marah after C4 Carboplatin/Alimta/Keytruda. Taking protonix daily which helps with GERD. Rash is better, she had significant nausea the last cycle, she has some CASTILLO which is stable. She has no new BAZAN. Jay her  is with her. She used to work for [employer].        Past Medical History:   Diagnosis Date    Diabetes mellitus type 2, noninsulin dependent ([address]     Generalized anxiety disorder     Hypercholesterolemia     Hypertension     Neoplasm of major salivary gland       Past Surgical History:   Procedure Laterality Date    COLONOSCOPY N/A 2/24/2021    . COLONOSCOPY  :- performed by Babita Allen MD at [facility] ENDOSCOPY    HX ACL RECONSTRUCTION      HX COLONOSCOPY      HX HYSTERECTOMY      IR INSERT CATH PLEURAL INDWELL  5/26/2022    IR INSERT TUNL CVC W PORT OVER 5 YEARS  6/6/2022    IR REMOVE LUNG/PLEURAL DRAIN / CATHETER  5/24/2022      Social History     Tobacco Use    Smoking status: Every Day     Packs/day: 0.50     Years: 20.00     Pack years: 10.00     Types: Cigarettes    Smokeless tobacco: Never   Substance Use Topics    Alcohol use: Yes     Comment: occ      No family history on file. Current Outpatient Medications   Medication Sig    pantoprazole (PROTONIX) 40 mg tablet Take 1 Tablet by mouth daily. prochlorperazine (COMPAZINE) 5 mg tablet Take 1 Tablet by mouth every six (6) hours as needed for Nausea or Vomiting. dexAMETHasone (DECADRON) 4 mg tablet Take 1 tablets by mouth twice daily the day before chemotherapy and the day after chemotherapy    ondansetron (ZOFRAN ODT) 8 mg disintegrating tablet Take 1 Tablet by mouth every eight (8) hours as needed for Nausea or Vomiting. folic acid (FOLVITE) 1 mg tablet Take 1 Tablet by mouth daily. lidocaine-prilocaine (EMLA) topical cream Apply  to affected area as needed for PRN Reason (Other) (for port access). Apply a thin layer to the port site 30-60 minutes prior to arrival for chemotherapy    amLODIPine (NORVASC) 5 mg tablet Take 5 mg by mouth daily. ALPRAZolam (XANAX) 0.25 mg tablet Take  by mouth.    metFORMIN (GLUCOPHAGE) 850 mg tablet Take 850 mg by mouth two (2) times daily (with meals). escitalopram oxalate (LEXAPRO) 20 mg tablet Take 20 mg by mouth daily. simvastatin (ZOCOR) 20 mg tablet Take 20 mg by mouth nightly. losartan (COZAAR) 100 mg tablet Take 100 mg by mouth daily. aspirin 81 mg chewable tablet Take 81 mg by mouth daily. acetaminophen (TYLENOL PO) Take 500 mg by mouth every six (6) hours as needed for Pain.      No current facility-administered medications for this visit. Facility-Administered Medications Ordered in Other Visits   Medication Dose Route Frequency    sodium chloride (NS) flush 10 mL  10 mL IntraVENous PRN    0.9% sodium chloride injection 10 mL  10 mL IntraVENous PRN    heparin (porcine) pf 300-500 Units  300-500 Units InterCATHeter PRN      Allergies   Allergen Reactions    Erythromycin Other (comments)     Stomach cramps    Penicillins Hives        Review of Systems: A complete review of systems was obtained, negative except as described above. Physical Exam:     Vitals reviewed     ECOG PS: 1  General: No distress  Eyes: PERRLA, anicteric sclerae  HENT: Atraumatic, mild erythematous patch right cheek  Neck: Supple  GI:nondistended  Skin: No rashes  Psych: Alert, oriented    Results:     Lab Results   Component Value Date/Time    WBC 6.6 08/11/2022 10:15 AM    HGB 11.5 08/11/2022 10:15 AM    HCT 34.5 (L) 08/11/2022 10:15 AM    PLATELET 065 61/22/6219 10:15 AM    MCV 87.1 08/11/2022 10:15 AM    ABS. NEUTROPHILS 5.0 08/11/2022 10:15 AM     Lab Results   Component Value Date/Time    Sodium 139 08/11/2022 10:15 AM    Potassium 3.7 08/11/2022 10:15 AM    Chloride 106 08/11/2022 10:15 AM    CO2 23 08/11/2022 10:15 AM    Glucose 152 (H) 08/11/2022 10:15 AM    BUN 10 08/11/2022 10:15 AM    Creatinine 0.89 08/11/2022 10:15 AM    GFR est AA >60 08/11/2022 10:15 AM    GFR est non-AA >60 08/11/2022 10:15 AM    Calcium 9.4 08/11/2022 10:15 AM    Glucose (POC) 96 05/24/2022 11:29 AM    Creatinine (POC) 0.9 12/28/2012 06:39 AM     Lab Results   Component Value Date/Time    Bilirubin, total 0.5 08/11/2022 10:15 AM    ALT (SGPT) 45 08/11/2022 10:15 AM    Alk. phosphatase 88 08/11/2022 10:15 AM    Protein, total 7.7 08/11/2022 10:15 AM    Albumin 3.6 08/11/2022 10:15 AM    Globulin 4.1 (H) 08/11/2022 10:15 AM         Records reviewed and summarized above. Pathology report(s) reviewed above.   Radiology report(s) reviewed above.  CT chest abdomen pelvis 5/5/2022    IMPRESSION  Diminished left-sided pleural effusion, status post thoracentesis with stable  mass lesion at the left apex. Mass lesion demonstrated at the left apex is not amenable to percutaneous  sampling. There are numerous additional nodular densities along the pleural margin  consistent with pleural carcinomatosis. There are additional small nodular densities demonstrated in the peritoneum,  these most consistent with peritoneal carcinomatosis. Small adrenal nodule on  the left likely an additional metastatic focus. No definitive target for percutaneous sampling. Currently awaiting results of  pleural fluid analysis/cytology and cellblock. MRI brain 5/2022      1. No evidence of intracranial metastases. 2. Minimal chronic microvascular ischemic disease. No acute intracranial  abnormality. 3. Enhancing lesions in the bilateral parotid glands, largest in the left  parotid gland measuring 3.6 cm. Lesions in the right parotid gland were  previously biopsied in 2020 and returned as Warthin tumors. These likely all  represent multiple Warthin tumors    CT 8/2022     1. The mass lesion left lung apex is noted and has mildly decreased. 2. Pleural metastases on left side and decreased in size. Left pleural effusion  has decreased and there is improved aeration in the left lung. 3. Left adrenal nodule is stable. 4. Linear irregular nodular densities in the anterior peritoneum in the right  lower quadrant and midline in the lower abdomen and upper pelvis are noted and  unchanged. Assessment:   1) Non small cell Lung cancer- adenocarcinoma stage IV    PDL1-15%, ALK/EGFR/ROS1/BRAF negative ( limited panel)  Liquid bx [id number], TMB low  CT chest abdomen and pelvis from 5/5/2022 was reviewed.   Notable for large left-sided pleural effusion, concern for pleural carcinomatosis, left apical lung nodule measuring 20 x 26 mm, left adrenal nodule 19 x 18 mm, peritoneal nodularity concerning for carcinomatosis. Status post left-sided thoracentesis and + cytology   H/o Smoking  Palliative treatments  Completed 4 cycles of Carboplatin+ Alimta+ Keytruda with a response as of 8/2022  Tolerating well  Switch to Alimta and Keytruda maintenance until progression    Grade 1 nausea, grade 1 fatigue    If and when there is disease progression repeat expanded NGS is indicated    2) Left pleural effusion  S/p thoracentesis x 2, not enough for a drain  Hold off on Pleurx given no recurrence on systemic therapy    3) H/P R parotidectomy for Warthin's tumor   Has persistent multiple Warthin's tumors     4) Depression  On Lexapro    5) Rash  Likely from Alimta  Topical hydrocortisone  Grade 1     6) Encounter for high risk medications like chemotherapy and high risk disease      Plan:     Proceed with maintenance alimta 500 mg/m2 and Keytruda every 3 weeks until progression  Antiemetics, steroids, b12, FA per protocol  Topical hydrocortisone   Protonix daily      RTC every cycle     I appreciate the opportunity to participate in Ms. Vero Emerson Dread's care.       Signed By: Gasper Sagastume MD

## 2022-09-01 NOTE — PROGRESS NOTES
Outpatient Infusion Center - Chemotherapy Progress Note    1005 Pt admit to [facility] for [address] ambulatory in stable condition accompanied by spouse. Assessment completed by access RN. No new concerns voiced. Port accessed by access RN with positive blood return. Labs drawn per order and sent. Line flushed, clamped, Curos Cap applied to end clave. Pt upstairs to MD appointment.     1320 Port flushed w/ positive blood return; NS infusing    Visit Vitals  BP (!) 148/82   Pulse 89   Temp 98.4 °F (36.9 °C)   Resp 16   Ht 5' 7.01\" (1.702 m)   Wt 108 kg (238 lb)   SpO2 100%   Breastfeeding No   BMI 37.26 kg/m²     Medications Administered       0.9% sodium chloride infusion       Admin Date  09/01/2022 Action  New Bag Dose  25 mL/hr Rate  25 mL/hr Route  IntraVENous Administered By  Francis Peñaloza RN              dexamethasone (DECADRON) 4 mg/mL injection 8 mg       Admin Date  09/01/2022 Action  Given Dose  8 mg Route  IntraVENous Administered By  Francis Peñaloza RN              heparin (porcine) pf 300-500 Units       Admin Date  09/01/2022 Action  Given Dose  500 Units Route  InterCATHeter Administered By  Francis Peñaloza RN              pembrolizumab [facility]) 200 mg in 0.9% sodium chloride 100 mL, overfill volume 10 mL IVPB       Admin Date  09/01/2022 Action  New Bag Dose  200 mg Rate  236 mL/hr Route  IntraVENous Administered By  Francis Peñaloza, RN              PEMEtrexed disodium (ALIMTA) 1,100 mg in 0.9% sodium chloride 100 mL, overfill volume 10 mL chemo infusion       Admin Date  09/01/2022 Action  New Bag Dose  1,100 mg Rate  924 mL/hr Route  IntraVENous Administered By  Francis Peñaloza RN                        Two nurses verified prior to administering:, Drug name, Drug dose, Infusion volume or drug volume when prepared in a syringe, Rate of administration, Route of administration, Expiration dates and/or times, Appearance and physical integrity of the drugs, Rate set on infusion pump, when used, Sequencing of drug administration         1445 Pt tolerated treatment well. Port maintained positive blood return throughout treatment, flushed with positive blood return at conclusion, heparinized and de-accessed. D/c home ambulatory in no distress. Pt aware of next [facility] appointment scheduled for 09/22/2022. Recent Results (from the past 12 hour(s))   CBC WITH AUTOMATED DIFF    Collection Time: 09/01/22 10:34 AM   Result Value Ref Range    WBC 5.8 3.6 - 11.0 K/uL    RBC 3.82 3.80 - 5.20 M/uL    HGB 11.1 (L) 11.5 - 16.0 g/dL    HCT 32.6 (L) 35.0 - 47.0 %    MCV 85.3 80.0 - 99.0 FL    MCH 29.1 26.0 - 34.0 PG    MCHC 34.0 30.0 - 36.5 g/dL    RDW 17.2 (H) 11.5 - 14.5 %    PLATELET 513 722 - 068 K/uL    MPV 9.1 8.9 - 12.9 FL    NRBC 0.0 0  WBC    ABSOLUTE NRBC 0.00 0.00 - 0.01 K/uL    NEUTROPHILS 69 32 - 75 %    LYMPHOCYTES 15 12 - 49 %    MONOCYTES 15 (H) 5 - 13 %    EOSINOPHILS 0 0 - 7 %    BASOPHILS 0 0 - 1 %    IMMATURE GRANULOCYTES 1 (H) 0.0 - 0.5 %    ABS. NEUTROPHILS 4.0 1.8 - 8.0 K/UL    ABS. LYMPHOCYTES 0.9 0.8 - 3.5 K/UL    ABS. MONOCYTES 0.9 0.0 - 1.0 K/UL    ABS. EOSINOPHILS 0.0 0.0 - 0.4 K/UL    ABS. BASOPHILS 0.0 0.0 - 0.1 K/UL    ABS. IMM. GRANS. 0.1 (H) 0.00 - 0.04 K/UL    DF AUTOMATED     METABOLIC PANEL, COMPREHENSIVE    Collection Time: 09/01/22 10:34 AM   Result Value Ref Range    Sodium 140 136 - 145 mmol/L    Potassium 4.1 3.5 - 5.1 mmol/L    Chloride 108 97 - 108 mmol/L    CO2 26 21 - 32 mmol/L    Anion gap 6 5 - 15 mmol/L    Glucose 135 (H) 65 - 100 mg/dL    BUN 11 6 - 20 MG/DL    Creatinine 0.84 0.55 - 1.02 MG/DL    BUN/Creatinine ratio 13 12 - 20      GFR est AA >60 >60 ml/min/1.73m2    GFR est non-AA >60 >60 ml/min/1.73m2    Calcium 9.2 8.5 - 10.1 MG/DL    Bilirubin, total 0.6 0.2 - 1.0 MG/DL    ALT (SGPT) 41 12 - 78 U/L    AST (SGOT) 21 15 - 37 U/L    Alk.  phosphatase 86 45 - 117 U/L    Protein, total 7.3 6.4 - 8.2 g/dL    Albumin 3.8 3.5 - 5.0 g/dL    Globulin 3.5 2.0 - 4.0 g/dL    A-G Ratio 1.1 1.1 - 2.2

## 2022-09-01 NOTE — PROGRESS NOTES
Son Jordan is a 60 y.o. female    Chief Complaint   Patient presents with    Follow-up     stage IV NSCLC- PDL1- 15%, no  mutations           1. Have you been to the ER, urgent care clinic since your last visit? Hospitalized since your last visit? No    2. Have you seen or consulted any other health care providers outside of the [address] [place] [address] since your last visit? Include any pap smears or colon screening.  No

## 2022-09-15 RX ORDER — SODIUM CHLORIDE 9 MG/ML
10 INJECTION INTRAMUSCULAR; INTRAVENOUS; SUBCUTANEOUS AS NEEDED
Status: CANCELLED | OUTPATIENT
Start: 2022-09-22

## 2022-09-15 RX ORDER — SODIUM CHLORIDE 0.9 % (FLUSH) 0.9 %
10 SYRINGE (ML) INJECTION AS NEEDED
Status: CANCELLED | OUTPATIENT
Start: 2022-09-22

## 2022-09-15 RX ORDER — CYANOCOBALAMIN 1000 UG/ML
1000 INJECTION, SOLUTION INTRAMUSCULAR; SUBCUTANEOUS
Status: CANCELLED | OUTPATIENT
Start: 2022-09-22 | End: 2022-09-23

## 2022-09-22 ENCOUNTER — HOSPITAL ENCOUNTER (OUTPATIENT)
Dept: INFUSION THERAPY | Age: 62
Discharge: HOME | End: 2022-09-22
Payer: MEDICAID

## 2022-09-22 ENCOUNTER — OFFICE VISIT (OUTPATIENT)
Dept: ONCOLOGY | Age: 62
End: 2022-09-22
Payer: MEDICAID

## 2022-09-22 VITALS
BODY MASS INDEX: 38.11 KG/M2 | HEART RATE: 95 BPM | HEIGHT: 67 IN | DIASTOLIC BLOOD PRESSURE: 79 MMHG | SYSTOLIC BLOOD PRESSURE: 147 MMHG | TEMPERATURE: 97.6 F | RESPIRATION RATE: 18 BRPM | WEIGHT: 242.8 LBS | OXYGEN SATURATION: 98 %

## 2022-09-22 VITALS
RESPIRATION RATE: 18 BRPM | TEMPERATURE: 97.6 F | HEART RATE: 95 BPM | OXYGEN SATURATION: 98 % | BODY MASS INDEX: 37.89 KG/M2 | WEIGHT: 242 LBS | DIASTOLIC BLOOD PRESSURE: 82 MMHG | SYSTOLIC BLOOD PRESSURE: 130 MMHG

## 2022-09-22 DIAGNOSIS — C34.90 PRIMARY ADENOCARCINOMA OF LUNG, UNSPECIFIED LATERALITY (HCC): Primary | ICD-10-CM

## 2022-09-22 DIAGNOSIS — Z95.828 PORT-A-CATH IN PLACE: ICD-10-CM

## 2022-09-22 DIAGNOSIS — C34.90 NSCLC METASTATIC TO ADRENAL GLAND (HCC): Primary | ICD-10-CM

## 2022-09-22 DIAGNOSIS — C79.70 NSCLC METASTATIC TO ADRENAL GLAND (HCC): Primary | ICD-10-CM

## 2022-09-22 DIAGNOSIS — C34.90 NSCLC METASTATIC TO ADRENAL GLAND (HCC): ICD-10-CM

## 2022-09-22 DIAGNOSIS — Z51.11 ENCOUNTER FOR ANTINEOPLASTIC CHEMOTHERAPY AND IMMUNOTHERAPY: ICD-10-CM

## 2022-09-22 DIAGNOSIS — Z51.12 ENCOUNTER FOR ANTINEOPLASTIC CHEMOTHERAPY AND IMMUNOTHERAPY: ICD-10-CM

## 2022-09-22 DIAGNOSIS — C79.70 NSCLC METASTATIC TO ADRENAL GLAND (HCC): ICD-10-CM

## 2022-09-22 LAB
ALBUMIN SERPL-MCNC: 3.8 G/DL (ref 3.5–5)
ALBUMIN/GLOB SERPL: 1.1 {RATIO} (ref 1.1–2.2)
ALP SERPL-CCNC: 80 U/L (ref 45–117)
ALT SERPL-CCNC: 56 U/L (ref 12–78)
ANION GAP SERPL CALC-SCNC: 10 MMOL/L (ref 5–15)
AST SERPL-CCNC: 31 U/L (ref 15–37)
BASOPHILS # BLD: 0 K/UL (ref 0–0.1)
BASOPHILS NFR BLD: 0 % (ref 0–1)
BILIRUB SERPL-MCNC: 0.7 MG/DL (ref 0.2–1)
BUN SERPL-MCNC: 16 MG/DL (ref 6–20)
BUN/CREAT SERPL: 18 (ref 12–20)
CALCIUM SERPL-MCNC: 9.3 MG/DL (ref 8.5–10.1)
CHLORIDE SERPL-SCNC: 106 MMOL/L (ref 97–108)
CO2 SERPL-SCNC: 21 MMOL/L (ref 21–32)
CREAT SERPL-MCNC: 0.9 MG/DL (ref 0.55–1.02)
DIFFERENTIAL METHOD BLD: ABNORMAL
EOSINOPHIL # BLD: 0 K/UL (ref 0–0.4)
EOSINOPHIL NFR BLD: 0 % (ref 0–7)
ERYTHROCYTE [DISTWIDTH] IN BLOOD BY AUTOMATED COUNT: 17.2 % (ref 11.5–14.5)
GLOBULIN SER CALC-MCNC: 3.6 G/DL (ref 2–4)
GLUCOSE SERPL-MCNC: 206 MG/DL (ref 65–100)
HCT VFR BLD AUTO: 33.5 % (ref 35–47)
HGB BLD-MCNC: 10.9 G/DL (ref 11.5–16)
IMM GRANULOCYTES # BLD AUTO: 0.1 K/UL (ref 0–0.04)
IMM GRANULOCYTES NFR BLD AUTO: 1 % (ref 0–0.5)
LYMPHOCYTES # BLD: 0.8 K/UL (ref 0.8–3.5)
LYMPHOCYTES NFR BLD: 11 % (ref 12–49)
MCH RBC QN AUTO: 29.1 PG (ref 26–34)
MCHC RBC AUTO-ENTMCNC: 32.5 G/DL (ref 30–36.5)
MCV RBC AUTO: 89.3 FL (ref 80–99)
MONOCYTES # BLD: 0.8 K/UL (ref 0–1)
MONOCYTES NFR BLD: 11 % (ref 5–13)
NEUTS SEG # BLD: 5.3 K/UL (ref 1.8–8)
NEUTS SEG NFR BLD: 77 % (ref 32–75)
NRBC # BLD: 0 K/UL (ref 0–0.01)
NRBC BLD-RTO: 0 PER 100 WBC
PLATELET # BLD AUTO: 382 K/UL (ref 150–400)
PMV BLD AUTO: 9.5 FL (ref 8.9–12.9)
POTASSIUM SERPL-SCNC: 3.5 MMOL/L (ref 3.5–5.1)
PROT SERPL-MCNC: 7.4 G/DL (ref 6.4–8.2)
RBC # BLD AUTO: 3.75 M/UL (ref 3.8–5.2)
RBC MORPH BLD: ABNORMAL
SODIUM SERPL-SCNC: 137 MMOL/L (ref 136–145)
TSH SERPL DL<=0.05 MIU/L-ACNC: 0.48 UIU/ML (ref 0.36–3.74)
WBC # BLD AUTO: 7 K/UL (ref 3.6–11)

## 2022-09-22 PROCEDURE — 85025 COMPLETE CBC W/AUTO DIFF WBC: CPT

## 2022-09-22 PROCEDURE — 74011250636 HC RX REV CODE- 250/636: Performed by: INTERNAL MEDICINE

## 2022-09-22 PROCEDURE — 74011000258 HC RX REV CODE- 258: Performed by: INTERNAL MEDICINE

## 2022-09-22 PROCEDURE — 36415 COLL VENOUS BLD VENIPUNCTURE: CPT

## 2022-09-22 PROCEDURE — 96417 CHEMO IV INFUS EACH ADDL SEQ: CPT

## 2022-09-22 PROCEDURE — 99214 OFFICE O/P EST MOD 30 MIN: CPT | Performed by: INTERNAL MEDICINE

## 2022-09-22 PROCEDURE — 80053 COMPREHEN METABOLIC PANEL: CPT

## 2022-09-22 PROCEDURE — 96375 TX/PRO/DX INJ NEW DRUG ADDON: CPT

## 2022-09-22 PROCEDURE — 84443 ASSAY THYROID STIM HORMONE: CPT

## 2022-09-22 PROCEDURE — 77030012965 HC NDL HUBR BBMI -A

## 2022-09-22 PROCEDURE — 96413 CHEMO IV INFUSION 1 HR: CPT

## 2022-09-22 RX ORDER — ONDANSETRON 2 MG/ML
8 INJECTION INTRAMUSCULAR; INTRAVENOUS AS NEEDED
Status: DISCONTINUED | OUTPATIENT
Start: 2022-09-22 | End: 2022-09-23 | Stop reason: HOSPADM

## 2022-09-22 RX ORDER — DEXAMETHASONE SODIUM PHOSPHATE 4 MG/ML
8 INJECTION, SOLUTION INTRA-ARTICULAR; INTRALESIONAL; INTRAMUSCULAR; INTRAVENOUS; SOFT TISSUE ONCE
Status: COMPLETED | OUTPATIENT
Start: 2022-09-22 | End: 2022-09-22

## 2022-09-22 RX ORDER — ACETAMINOPHEN 325 MG/1
650 TABLET ORAL AS NEEDED
Status: DISCONTINUED | OUTPATIENT
Start: 2022-09-22 | End: 2022-09-23 | Stop reason: HOSPADM

## 2022-09-22 RX ORDER — SODIUM CHLORIDE 9 MG/ML
25 INJECTION, SOLUTION INTRAVENOUS CONTINUOUS
Status: DISCONTINUED | OUTPATIENT
Start: 2022-09-22 | End: 2022-09-23 | Stop reason: HOSPADM

## 2022-09-22 RX ORDER — ALBUTEROL SULFATE 0.83 MG/ML
2.5 SOLUTION RESPIRATORY (INHALATION) AS NEEDED
Status: DISCONTINUED | OUTPATIENT
Start: 2022-09-22 | End: 2022-09-23 | Stop reason: HOSPADM

## 2022-09-22 RX ORDER — EPINEPHRINE 1 MG/ML
0.3 INJECTION, SOLUTION, CONCENTRATE INTRAVENOUS AS NEEDED
Status: DISCONTINUED | OUTPATIENT
Start: 2022-09-22 | End: 2022-09-23 | Stop reason: HOSPADM

## 2022-09-22 RX ORDER — HEPARIN 100 UNIT/ML
300-500 SYRINGE INTRAVENOUS AS NEEDED
Status: DISCONTINUED | OUTPATIENT
Start: 2022-09-22 | End: 2022-09-23 | Stop reason: HOSPADM

## 2022-09-22 RX ORDER — DIPHENHYDRAMINE HYDROCHLORIDE 50 MG/ML
25 INJECTION, SOLUTION INTRAMUSCULAR; INTRAVENOUS AS NEEDED
Status: DISCONTINUED | OUTPATIENT
Start: 2022-09-22 | End: 2022-09-23 | Stop reason: HOSPADM

## 2022-09-22 RX ORDER — HYDROCORTISONE SODIUM SUCCINATE 100 MG/2ML
100 INJECTION, POWDER, FOR SOLUTION INTRAMUSCULAR; INTRAVENOUS AS NEEDED
Status: DISCONTINUED | OUTPATIENT
Start: 2022-09-22 | End: 2022-09-23 | Stop reason: HOSPADM

## 2022-09-22 RX ORDER — DIPHENHYDRAMINE HYDROCHLORIDE 50 MG/ML
50 INJECTION, SOLUTION INTRAMUSCULAR; INTRAVENOUS AS NEEDED
Status: DISCONTINUED | OUTPATIENT
Start: 2022-09-22 | End: 2022-09-23 | Stop reason: HOSPADM

## 2022-09-22 RX ADMIN — SODIUM CHLORIDE 200 MG: 9 INJECTION, SOLUTION INTRAVENOUS at 13:36

## 2022-09-22 RX ADMIN — HEPARIN 500 UNITS: 100 SYRINGE at 15:28

## 2022-09-22 RX ADMIN — DEXAMETHASONE SODIUM PHOSPHATE 8 MG: 4 INJECTION, SOLUTION INTRAMUSCULAR; INTRAVENOUS at 14:18

## 2022-09-22 RX ADMIN — SODIUM CHLORIDE 25 ML/HR: 9 INJECTION, SOLUTION INTRAVENOUS at 12:57

## 2022-09-22 RX ADMIN — PEMETREXED 1100 MG: 100 INJECTION, POWDER, LYOPHILIZED, FOR SOLUTION INTRAVENOUS at 15:06

## 2022-09-22 NOTE — PROGRESS NOTES
[facility] Chemo Progress Note    Date: September 22, 2022        1000: Ms. Aminah Williamson Arrived to [facility] for  keytruda/alimta ambulatory in stable condition. Assessment was completed and port accessed and labs drawn by access RN. Went to provider appointment with Medical Oncology. Returned from provider appointment. Labs reviewed. Criteria for treatment was met. Ms. Jonatan Montalvo vitals were reviewed. Patient Vitals for the past 12 hrs:   Temp Pulse Resp BP SpO2   09/22/22 1528 -- -- -- (!) 147/79 --   09/22/22 0958 97.6 °F (36.4 °C) 95 18 130/82 98 %         Lab results were obtained and reviewed. Pre-medications  were administered as ordered and chemotherapy was initiated. Two nurses verified prior to administering: Drug name, Drug dose, Infusion volume or drug volume when prepared in a syringe, Rate of administration, Route of administration, Expiration dates and/or times, Appearance and physical integrity of the drugs, Rate set on infusion pump, when used, and Sequencing of drug administration. 1530 Patient tolerated treatment well. Port maintained positive blood return throughout treatment. Port flushed, heparinized and de accessed per protocol. Patient was discharged in stable condition. Patient is aware of next scheduled [facility] appointment. Future Appointments   Date Time Provider Raymond Gonzales   10/13/2022 10:00 AM [facility] [address] [place] [address]   10/13/2022 10:15 AM Sandy Farris  N Anju Fonseca BS AMB   10/13/2022  4:00 PM [facility] [place] [facility] [address] [facility]   11/3/2022 10:00 AM [facility]   11/24/2022 10:00 AM [facility] [address] [place] [address]         Kenji Gordon, RN, RN  September 22, 2022

## 2022-09-22 NOTE — PROGRESS NOTES
Christin Parsons is a 60 y.o. female    Chief Complaint   Patient presents with    Follow-up     stage IV NSCLC- PDL1- 15%, no  mutations        1. Have you been to the ER, urgent care clinic since your last visit? Hospitalized since your last visit? No    2. Have you seen or consulted any other health care providers outside of the [address] [place] [address] since your last visit? Include any pap smears or colon screening.  No

## 2022-09-22 NOTE — PROGRESS NOTES
[facility] [place] [facility], [id number] [place] [address] Road, [address] [place] [address]  W: [phone]  F: [phone]    Reason for visit   Ayush Cid is a 60 y.o. female who is seen for follow up of stage IV NSCLC- PDL1- 15%, no  mutations     Treatment History:   5/4/2022: Left thoracentecis- Adenocarcinoma   6/9/2022- 8/11/2022- current: Caroplatin+ Alimta+Keytruda  8/2022- CT with response  9/1/2022: Alimta Keytruda    History of Present Illness:   Patient is a 62-year-old female with diabetes mellitus, hypertension who presented to the [facility] emergency room on 5/4/2022 with complaints of shortness of breath x 14 days which has been progressively getting worse over several weeks with mild cough. Denies any headaches, vision changes, falls trouble swallowing, chest pain, fevers, weight loss, hemoptysis. CTA on admission showed no evidence of pulmonary embolism but she was noted to have a moderate to large left-sided pleural effusion with a left apical mass measuring 28 x 20 mm, she had small pulmonary nodules on the right, possible hepatic hypodensity. She had ultrasound-guided thoracentesis of the left side on 5/4/2022 and 1300 cc of fluid was aspirated. This was sent for cytology which was positive. Pleural fluid cytology showed more than 100 RBCs, total protein of 5.3, albumin 2.8, . Labs were noted for a bilirubin of 1.1. Comes today for cycle 6 of Alimta + Keytruda. Had some fatigue for 3-4 days after treatment. Having some sweats, no fevers. Has some intermittent discomfort on left side but this has improved. Has stable CASTILLO, cough is the same. She is getting out and about more. Denies diarrhea/constipation. Has rare mild nausea. No LE swelling. No other complaints. Jay her  is with her. She used to work for [employer].      Past Medical History:   Diagnosis Date    Diabetes mellitus type 2, noninsulin dependent (HCC)     Generalized anxiety disorder     Hypercholesterolemia     Hypertension     Neoplasm of major salivary gland       Past Surgical History:   Procedure Laterality Date    COLONOSCOPY N/A 2/24/2021    . COLONOSCOPY  :- performed by Mary Hoang MD at [facility] ENDOSCOPY    HX ACL RECONSTRUCTION      HX COLONOSCOPY      HX HYSTERECTOMY      IR INSERT CATH PLEURAL INDWELL  5/26/2022    IR INSERT TUNL CVC W PORT OVER 5 YEARS  6/6/2022    IR REMOVE LUNG/PLEURAL DRAIN / CATHETER  5/24/2022      Social History     Tobacco Use    Smoking status: Every Day     Packs/day: 0.50     Years: 20.00     Pack years: 10.00     Types: Cigarettes    Smokeless tobacco: Never   Substance Use Topics    Alcohol use: Yes     Comment: occ      No family history on file. Current Outpatient Medications   Medication Sig    folic acid (FOLVITE) 1 mg tablet Take 1 Tablet by mouth daily. pantoprazole (PROTONIX) 40 mg tablet Take 1 Tablet by mouth daily. prochlorperazine (COMPAZINE) 5 mg tablet Take 1 Tablet by mouth every six (6) hours as needed for Nausea or Vomiting. dexAMETHasone (DECADRON) 4 mg tablet Take 1 tablets by mouth twice daily the day before chemotherapy and the day after chemotherapy    ondansetron (ZOFRAN ODT) 8 mg disintegrating tablet Take 1 Tablet by mouth every eight (8) hours as needed for Nausea or Vomiting.    lidocaine-prilocaine (EMLA) topical cream Apply  to affected area as needed for PRN Reason (Other) (for port access). Apply a thin layer to the port site 30-60 minutes prior to arrival for chemotherapy    amLODIPine (NORVASC) 5 mg tablet Take 5 mg by mouth daily. ALPRAZolam (XANAX) 0.25 mg tablet Take  by mouth.    metFORMIN (GLUCOPHAGE) 850 mg tablet Take 850 mg by mouth two (2) times daily (with meals). escitalopram oxalate (LEXAPRO) 20 mg tablet Take 20 mg by mouth daily. simvastatin (ZOCOR) 20 mg tablet Take 20 mg by mouth nightly. losartan (COZAAR) 100 mg tablet Take 100 mg by mouth daily.     aspirin 81 mg chewable tablet Take 81 mg by mouth daily. acetaminophen (TYLENOL PO) Take 500 mg by mouth every six (6) hours as needed for Pain. No current facility-administered medications for this visit. Allergies   Allergen Reactions    Erythromycin Other (comments)     Stomach cramps    Penicillins Hives        Review of Systems: A complete review of systems was obtained, negative except as described above. Physical Exam:     Vitals reviewed     ECOG PS: 1  General: No distress  Eyes: PERRL, anicteric sclerae  HENT: Atraumatic  Neck: Supple  GI:nondistended  Skin: No rashes  Psych: Alert, oriented    Results:     Lab Results   Component Value Date/Time    WBC 5.8 09/01/2022 10:34 AM    HGB 11.1 (L) 09/01/2022 10:34 AM    HCT 32.6 (L) 09/01/2022 10:34 AM    PLATELET 570 93/47/5793 10:34 AM    MCV 85.3 09/01/2022 10:34 AM    ABS. NEUTROPHILS 4.0 09/01/2022 10:34 AM     Lab Results   Component Value Date/Time    Sodium 140 09/01/2022 10:34 AM    Potassium 4.1 09/01/2022 10:34 AM    Chloride 108 09/01/2022 10:34 AM    CO2 26 09/01/2022 10:34 AM    Glucose 135 (H) 09/01/2022 10:34 AM    BUN 11 09/01/2022 10:34 AM    Creatinine 0.84 09/01/2022 10:34 AM    GFR est AA >60 09/01/2022 10:34 AM    GFR est non-AA >60 09/01/2022 10:34 AM    Calcium 9.2 09/01/2022 10:34 AM    Glucose (POC) 96 05/24/2022 11:29 AM    Creatinine (POC) 0.9 12/28/2012 06:39 AM     Lab Results   Component Value Date/Time    Bilirubin, total 0.6 09/01/2022 10:34 AM    ALT (SGPT) 41 09/01/2022 10:34 AM    Alk. phosphatase 86 09/01/2022 10:34 AM    Protein, total 7.3 09/01/2022 10:34 AM    Albumin 3.8 09/01/2022 10:34 AM    Globulin 3.5 09/01/2022 10:34 AM         Records reviewed and summarized above. Pathology report(s) reviewed above. Radiology report(s) reviewed above. CT chest abdomen pelvis 5/5/2022    IMPRESSION  Diminished left-sided pleural effusion, status post thoracentesis with stable  mass lesion at the left apex.    Mass lesion demonstrated at the left apex is not amenable to percutaneous  sampling. There are numerous additional nodular densities along the pleural margin  consistent with pleural carcinomatosis. There are additional small nodular densities demonstrated in the peritoneum,  these most consistent with peritoneal carcinomatosis. Small adrenal nodule on  the left likely an additional metastatic focus. No definitive target for percutaneous sampling. Currently awaiting results of  pleural fluid analysis/cytology and cellblock. MRI brain 5/2022      1. No evidence of intracranial metastases. 2. Minimal chronic microvascular ischemic disease. No acute intracranial  abnormality. 3. Enhancing lesions in the bilateral parotid glands, largest in the left  parotid gland measuring 3.6 cm. Lesions in the right parotid gland were  previously biopsied in 2020 and returned as Warthin tumors. These likely all  represent multiple Warthin tumors    CT 8/2022     1. The mass lesion left lung apex is noted and has mildly decreased. 2. Pleural metastases on left side and decreased in size. Left pleural effusion  has decreased and there is improved aeration in the left lung. 3. Left adrenal nodule is stable. 4. Linear irregular nodular densities in the anterior peritoneum in the right  lower quadrant and midline in the lower abdomen and upper pelvis are noted and  unchanged. Assessment:   1) Non small cell Lung cancer- adenocarcinoma stage IV    PDL1-15%, ALK/EGFR/ROS1/BRAF negative (limited panel)  Liquid bx [id number], TMB low  CT chest abdomen and pelvis from 5/5/2022 was reviewed. Notable for large left-sided pleural effusion, concern for pleural carcinomatosis, left apical lung nodule measuring 20 x 26 mm, left adrenal nodule 19 x 18 mm, peritoneal nodularity concerning for carcinomatosis.   Status post left-sided thoracentesis and + cytology   H/o Smoking  Palliative treatments  Completed 4 cycles of Carboplatin+ Alimta+ Keytruda with a response as of 8/2022  Tolerating well  Switched to Alimta and Keytruda maintenance until progression    If and when there is disease progression repeat expanded NGS is indicated    2) Left pleural effusion  S/p thoracentesis x 2, not enough for a drain  Hold off on Pleurx given no recurrence on systemic therapy    3) H/P R parotidectomy for Warthin's tumor   Has persistent multiple Warthin's tumors     4) Depression  On Lexapro    5) Rash  Likely from Alimta  Topical hydrocortisone  Resolved today     6) Encounter for high risk medications like chemotherapy and high risk disease  Tolerating well  Grade 1 fatigue   Labs pending     Plan:     Proceed with maintenance alimta 500 mg/m2 and Keytruda every 3 weeks until progression  Antiemetics, steroids, b12, FA per protocol  Topical hydrocortisone   Protonix daily    RTC every cycle     I appreciate the opportunity to participate in Ms. Marisol Canada's care. I personally saw and evaluated the patient and performed the key components of medical decision making. The history, physical exam, and documentation were performed by Dina Doran NP. I reviewed and verified the above documentation and modified it as needed.       Signed By: Marimar Hernandes MD

## 2022-10-07 RX ORDER — DIPHENHYDRAMINE HYDROCHLORIDE 50 MG/ML
50 INJECTION, SOLUTION INTRAMUSCULAR; INTRAVENOUS AS NEEDED
Status: CANCELLED
Start: 2022-10-13

## 2022-10-07 RX ORDER — HYDROCORTISONE SODIUM SUCCINATE 100 MG/2ML
100 INJECTION, POWDER, FOR SOLUTION INTRAMUSCULAR; INTRAVENOUS AS NEEDED
Status: CANCELLED | OUTPATIENT
Start: 2022-10-13

## 2022-10-07 RX ORDER — EPINEPHRINE 1 MG/ML
0.3 INJECTION, SOLUTION, CONCENTRATE INTRAVENOUS AS NEEDED
Status: CANCELLED | OUTPATIENT
Start: 2022-10-13

## 2022-10-07 RX ORDER — ALBUTEROL SULFATE 0.83 MG/ML
2.5 SOLUTION RESPIRATORY (INHALATION) AS NEEDED
Status: CANCELLED
Start: 2022-10-13

## 2022-10-13 ENCOUNTER — OFFICE VISIT (OUTPATIENT)
Dept: ONCOLOGY | Age: 62
End: 2022-10-13
Payer: MEDICAID

## 2022-10-13 ENCOUNTER — HOSPITAL ENCOUNTER (OUTPATIENT)
Dept: MAMMOGRAPHY | Age: 62
Discharge: HOME | End: 2022-10-13
Attending: FAMILY MEDICINE
Payer: MEDICAID

## 2022-10-13 ENCOUNTER — TRANSCRIBE ORDER (OUTPATIENT)
Dept: MAMMOGRAPHY | Age: 62
End: 2022-10-13

## 2022-10-13 ENCOUNTER — HOSPITAL ENCOUNTER (OUTPATIENT)
Dept: INFUSION THERAPY | Age: 62
Discharge: HOME | End: 2022-10-13
Payer: MEDICAID

## 2022-10-13 VITALS
DIASTOLIC BLOOD PRESSURE: 75 MMHG | WEIGHT: 245 LBS | OXYGEN SATURATION: 95 % | SYSTOLIC BLOOD PRESSURE: 136 MMHG | HEART RATE: 85 BPM | RESPIRATION RATE: 16 BRPM | BODY MASS INDEX: 38.36 KG/M2 | TEMPERATURE: 96.8 F

## 2022-10-13 VITALS
RESPIRATION RATE: 16 BRPM | TEMPERATURE: 96.8 F | SYSTOLIC BLOOD PRESSURE: 136 MMHG | HEART RATE: 85 BPM | DIASTOLIC BLOOD PRESSURE: 75 MMHG

## 2022-10-13 DIAGNOSIS — Z12.31 VISIT FOR SCREENING MAMMOGRAM: ICD-10-CM

## 2022-10-13 DIAGNOSIS — C34.90 PRIMARY ADENOCARCINOMA OF LUNG, UNSPECIFIED LATERALITY (HCC): Primary | ICD-10-CM

## 2022-10-13 DIAGNOSIS — Z95.828 PORT-A-CATH IN PLACE: ICD-10-CM

## 2022-10-13 DIAGNOSIS — Z51.11 ENCOUNTER FOR ANTINEOPLASTIC CHEMOTHERAPY AND IMMUNOTHERAPY: ICD-10-CM

## 2022-10-13 DIAGNOSIS — C34.90 NSCLC METASTATIC TO ADRENAL GLAND (HCC): Primary | ICD-10-CM

## 2022-10-13 DIAGNOSIS — C79.70 NSCLC METASTATIC TO ADRENAL GLAND (HCC): ICD-10-CM

## 2022-10-13 DIAGNOSIS — Z12.31 VISIT FOR SCREENING MAMMOGRAM: Primary | ICD-10-CM

## 2022-10-13 DIAGNOSIS — C34.90 NSCLC METASTATIC TO ADRENAL GLAND (HCC): ICD-10-CM

## 2022-10-13 DIAGNOSIS — Z51.12 ENCOUNTER FOR ANTINEOPLASTIC CHEMOTHERAPY AND IMMUNOTHERAPY: ICD-10-CM

## 2022-10-13 DIAGNOSIS — C79.70 NSCLC METASTATIC TO ADRENAL GLAND (HCC): Primary | ICD-10-CM

## 2022-10-13 LAB
ALBUMIN SERPL-MCNC: 3.9 G/DL (ref 3.5–5)
ALBUMIN/GLOB SERPL: 1.1 {RATIO} (ref 1.1–2.2)
ALP SERPL-CCNC: 91 U/L (ref 45–117)
ALT SERPL-CCNC: 58 U/L (ref 12–78)
ANION GAP SERPL CALC-SCNC: 8 MMOL/L (ref 5–15)
AST SERPL-CCNC: 28 U/L (ref 15–37)
BASOPHILS # BLD: 0 K/UL (ref 0–0.1)
BASOPHILS NFR BLD: 0 % (ref 0–1)
BILIRUB SERPL-MCNC: 0.7 MG/DL (ref 0.2–1)
BUN SERPL-MCNC: 11 MG/DL (ref 6–20)
BUN/CREAT SERPL: 13 (ref 12–20)
CALCIUM SERPL-MCNC: 9.6 MG/DL (ref 8.5–10.1)
CHLORIDE SERPL-SCNC: 106 MMOL/L (ref 97–108)
CO2 SERPL-SCNC: 24 MMOL/L (ref 21–32)
CREAT SERPL-MCNC: 0.87 MG/DL (ref 0.55–1.02)
DIFFERENTIAL METHOD BLD: ABNORMAL
EOSINOPHIL # BLD: 0 K/UL (ref 0–0.4)
EOSINOPHIL NFR BLD: 0 % (ref 0–7)
ERYTHROCYTE [DISTWIDTH] IN BLOOD BY AUTOMATED COUNT: 16 % (ref 11.5–14.5)
GLOBULIN SER CALC-MCNC: 3.7 G/DL (ref 2–4)
GLUCOSE SERPL-MCNC: 174 MG/DL (ref 65–100)
HCT VFR BLD AUTO: 35.5 % (ref 35–47)
HGB BLD-MCNC: 11.7 G/DL (ref 11.5–16)
IMM GRANULOCYTES # BLD AUTO: 0.1 K/UL (ref 0–0.04)
IMM GRANULOCYTES NFR BLD AUTO: 2 % (ref 0–0.5)
LYMPHOCYTES # BLD: 0.8 K/UL (ref 0.8–3.5)
LYMPHOCYTES NFR BLD: 11 % (ref 12–49)
MCH RBC QN AUTO: 29.8 PG (ref 26–34)
MCHC RBC AUTO-ENTMCNC: 33 G/DL (ref 30–36.5)
MCV RBC AUTO: 90.3 FL (ref 80–99)
MONOCYTES # BLD: 0.8 K/UL (ref 0–1)
MONOCYTES NFR BLD: 11 % (ref 5–13)
NEUTS SEG # BLD: 5.4 K/UL (ref 1.8–8)
NEUTS SEG NFR BLD: 76 % (ref 32–75)
NRBC # BLD: 0 K/UL (ref 0–0.01)
NRBC BLD-RTO: 0 PER 100 WBC
PLATELET # BLD AUTO: 382 K/UL (ref 150–400)
PMV BLD AUTO: 10 FL (ref 8.9–12.9)
POTASSIUM SERPL-SCNC: 4.1 MMOL/L (ref 3.5–5.1)
PROT SERPL-MCNC: 7.6 G/DL (ref 6.4–8.2)
RBC # BLD AUTO: 3.93 M/UL (ref 3.8–5.2)
RBC MORPH BLD: ABNORMAL
SODIUM SERPL-SCNC: 138 MMOL/L (ref 136–145)
WBC # BLD AUTO: 7.1 K/UL (ref 3.6–11)

## 2022-10-13 PROCEDURE — 85025 COMPLETE CBC W/AUTO DIFF WBC: CPT

## 2022-10-13 PROCEDURE — 77063 BREAST TOMOSYNTHESIS BI: CPT

## 2022-10-13 PROCEDURE — 99214 OFFICE O/P EST MOD 30 MIN: CPT | Performed by: INTERNAL MEDICINE

## 2022-10-13 PROCEDURE — 74011000258 HC RX REV CODE- 258: Performed by: INTERNAL MEDICINE

## 2022-10-13 PROCEDURE — 96375 TX/PRO/DX INJ NEW DRUG ADDON: CPT

## 2022-10-13 PROCEDURE — 74011000250 HC RX REV CODE- 250: Performed by: INTERNAL MEDICINE

## 2022-10-13 PROCEDURE — 80053 COMPREHEN METABOLIC PANEL: CPT

## 2022-10-13 PROCEDURE — 74011250636 HC RX REV CODE- 250/636: Performed by: INTERNAL MEDICINE

## 2022-10-13 PROCEDURE — 36415 COLL VENOUS BLD VENIPUNCTURE: CPT

## 2022-10-13 PROCEDURE — 96411 CHEMO IV PUSH ADDL DRUG: CPT

## 2022-10-13 PROCEDURE — 96413 CHEMO IV INFUSION 1 HR: CPT

## 2022-10-13 PROCEDURE — 77030012965 HC NDL HUBR BBMI -A

## 2022-10-13 RX ORDER — DEXAMETHASONE 4 MG/1
TABLET ORAL
Qty: 32 TABLET | Refills: 6 | Status: SHIPPED | OUTPATIENT
Start: 2022-10-13

## 2022-10-13 RX ORDER — SODIUM CHLORIDE 9 MG/ML
25 INJECTION, SOLUTION INTRAVENOUS CONTINUOUS
Status: DISPENSED | OUTPATIENT
Start: 2022-10-13 | End: 2022-10-13

## 2022-10-13 RX ORDER — ONDANSETRON 2 MG/ML
8 INJECTION INTRAMUSCULAR; INTRAVENOUS AS NEEDED
Status: ACTIVE | OUTPATIENT
Start: 2022-10-13 | End: 2022-10-13

## 2022-10-13 RX ORDER — CYANOCOBALAMIN 1000 UG/ML
1000 INJECTION, SOLUTION INTRAMUSCULAR; SUBCUTANEOUS
Status: COMPLETED | OUTPATIENT
Start: 2022-10-13 | End: 2022-10-13

## 2022-10-13 RX ORDER — SODIUM CHLORIDE 0.9 % (FLUSH) 0.9 %
10 SYRINGE (ML) INJECTION AS NEEDED
Status: DISPENSED | OUTPATIENT
Start: 2022-10-13 | End: 2022-10-13

## 2022-10-13 RX ORDER — SODIUM CHLORIDE 9 MG/ML
10 INJECTION INTRAMUSCULAR; INTRAVENOUS; SUBCUTANEOUS AS NEEDED
Status: ACTIVE | OUTPATIENT
Start: 2022-10-13 | End: 2022-10-13

## 2022-10-13 RX ORDER — DIPHENHYDRAMINE HYDROCHLORIDE 50 MG/ML
25 INJECTION, SOLUTION INTRAMUSCULAR; INTRAVENOUS AS NEEDED
Status: ACTIVE | OUTPATIENT
Start: 2022-10-13 | End: 2022-10-13

## 2022-10-13 RX ORDER — DEXAMETHASONE SODIUM PHOSPHATE 4 MG/ML
8 INJECTION, SOLUTION INTRA-ARTICULAR; INTRALESIONAL; INTRAMUSCULAR; INTRAVENOUS; SOFT TISSUE ONCE
Status: COMPLETED | OUTPATIENT
Start: 2022-10-13 | End: 2022-10-13

## 2022-10-13 RX ORDER — HEPARIN 100 UNIT/ML
300-500 SYRINGE INTRAVENOUS AS NEEDED
Status: DISPENSED | OUTPATIENT
Start: 2022-10-13 | End: 2022-10-13

## 2022-10-13 RX ORDER — ACETAMINOPHEN 325 MG/1
650 TABLET ORAL AS NEEDED
Status: ACTIVE | OUTPATIENT
Start: 2022-10-13 | End: 2022-10-13

## 2022-10-13 RX ADMIN — PEMETREXED 1100 MG: 100 INJECTION, POWDER, LYOPHILIZED, FOR SOLUTION INTRAVENOUS at 13:45

## 2022-10-13 RX ADMIN — DEXAMETHASONE SODIUM PHOSPHATE 8 MG: 4 INJECTION, SOLUTION INTRAMUSCULAR; INTRAVENOUS at 13:25

## 2022-10-13 RX ADMIN — SODIUM CHLORIDE, PRESERVATIVE FREE 10 ML: 5 INJECTION INTRAVENOUS at 10:20

## 2022-10-13 RX ADMIN — CYANOCOBALAMIN 1000 MCG: 1000 INJECTION, SOLUTION INTRAMUSCULAR at 11:37

## 2022-10-13 RX ADMIN — SODIUM CHLORIDE 25 ML/HR: 9 INJECTION, SOLUTION INTRAVENOUS at 11:37

## 2022-10-13 RX ADMIN — SODIUM CHLORIDE 200 MG: 900 INJECTION, SOLUTION INTRAVENOUS at 12:50

## 2022-10-13 RX ADMIN — HEPARIN 500 UNITS: 100 SYRINGE at 14:10

## 2022-10-13 NOTE — PROGRESS NOTES
A Spiritual Care Partner Volunteer visited patient in [facility]  on 10/13/2022.   Documented by:  Chaplain Gauthier MDiv, MS, [address] [facility]

## 2022-10-13 NOTE — PROGRESS NOTES
Cancer [place] at [place] [address] [address], [address] [facility] [address], [address] [place] [address]  W: [phone]  F: [phone]    Reason for visit   Dayday Gamboa is a 60 y.o. female who is seen for follow up of stage IV NSCLC- PDL1- 15%, no  mutations     Treatment History:   5/4/2022: Left thoracentecis- Adenocarcinoma   6/9/2022- 8/11/2022- current: Caroplatin+ Alimta+Keytruda  8/2022- CT with response  9/1/2022: Alimta Keytruda    History of Present Illness:   Patient is a 26-year-old female with diabetes mellitus, hypertension who presented to the [facility] emergency room on 5/4/2022 with complaints of shortness of breath x 14 days which has been progressively getting worse over several weeks with mild cough. Denies any headaches, vision changes, falls trouble swallowing, chest pain, fevers, weight loss, hemoptysis. CTA on admission showed no evidence of pulmonary embolism but she was noted to have a moderate to large left-sided pleural effusion with a left apical mass measuring 28 x 20 mm, she had small pulmonary nodules on the right, possible hepatic hypodensity. She had ultrasound-guided thoracentesis of the left side on 5/4/2022 and 1300 cc of fluid was aspirated. This was sent for cytology which was positive. Pleural fluid cytology showed more than 100 RBCs, total protein of 5.3, albumin 2.8, . Labs were noted for a bilirubin of 1.1. Comes today for cycle 8 of Alimta + Keytruda. Has a runny nose in the mornings. CASTILLO is stable. No cough. Denies diarrhea/constipation. Eating and drinking ok. No new pain. No rash or itching. No LE swelling. No complaints. Jay her  is with her. She used to work for [employer].      Past Medical History:   Diagnosis Date    Diabetes mellitus type 2, noninsulin dependent ([address]     Generalized anxiety disorder     Hypercholesterolemia     Hypertension     Neoplasm of major salivary gland       Past Surgical History:   Procedure Laterality Date    COLONOSCOPY N/A 2/24/2021    . COLONOSCOPY  :- performed by Hiral Kwon MD at [facility] ENDOSCOPY    HX ACL RECONSTRUCTION      HX COLONOSCOPY      HX HYSTERECTOMY      IR INSERT CATH PLEURAL INDWELL  5/26/2022    IR INSERT TUNL CVC W PORT OVER 5 YEARS  6/6/2022    IR REMOVE LUNG/PLEURAL DRAIN / CATHETER  5/24/2022      Social History     Tobacco Use    Smoking status: Every Day     Packs/day: 0.50     Years: 20.00     Pack years: 10.00     Types: Cigarettes    Smokeless tobacco: Never   Substance Use Topics    Alcohol use: Yes     Comment: occ      No family history on file. Current Outpatient Medications   Medication Sig    folic acid (FOLVITE) 1 mg tablet Take 1 Tablet by mouth daily. pantoprazole (PROTONIX) 40 mg tablet Take 1 Tablet by mouth daily. prochlorperazine (COMPAZINE) 5 mg tablet Take 1 Tablet by mouth every six (6) hours as needed for Nausea or Vomiting. dexAMETHasone (DECADRON) 4 mg tablet Take 1 tablets by mouth twice daily the day before chemotherapy and the day after chemotherapy    ondansetron (ZOFRAN ODT) 8 mg disintegrating tablet Take 1 Tablet by mouth every eight (8) hours as needed for Nausea or Vomiting.    lidocaine-prilocaine (EMLA) topical cream Apply  to affected area as needed for PRN Reason (Other) (for port access). Apply a thin layer to the port site 30-60 minutes prior to arrival for chemotherapy    amLODIPine (NORVASC) 5 mg tablet Take 5 mg by mouth daily. ALPRAZolam (XANAX) 0.25 mg tablet Take  by mouth.    metFORMIN (GLUCOPHAGE) 850 mg tablet Take 850 mg by mouth two (2) times daily (with meals). escitalopram oxalate (LEXAPRO) 20 mg tablet Take 20 mg by mouth daily. simvastatin (ZOCOR) 20 mg tablet Take 20 mg by mouth nightly. losartan (COZAAR) 100 mg tablet Take 100 mg by mouth daily. aspirin 81 mg chewable tablet Take 81 mg by mouth daily. acetaminophen (TYLENOL PO) Take 500 mg by mouth every six (6) hours as needed for Pain.      No current facility-administered medications for this visit. Facility-Administered Medications Ordered in Other Visits   Medication Dose Route Frequency    cyanocobalamin (VITAMIN B12) injection 1,000 mcg  1,000 mcg IntraMUSCular ONCE PRN    sodium chloride (NS) flush 10 mL  10 mL IntraVENous PRN    0.9% sodium chloride injection 10 mL  10 mL IntraVENous PRN    heparin (porcine) pf 300-500 Units  300-500 Units InterCATHeter PRN      Allergies   Allergen Reactions    Erythromycin Other (comments)     Stomach cramps    Penicillins Hives        Review of Systems: A complete review of systems was obtained, negative except as described above. Physical Exam:     Visit Vitals  /75 (BP 1 Location: Left upper arm, BP Patient Position: Sitting)   Pulse 85   Temp 96.8 °F (36 °C)   Resp 16   Wt 245 lb (111.1 kg)   SpO2 95%   BMI 38.36 kg/m²     ECOG PS: 1  General: No distress  Eyes: PERRL, anicteric sclerae  HENT: Atraumatic  Neck: Supple  GI:nondistended  Skin: No rashes  Psych: Alert, oriented    Results:     Lab Results   Component Value Date/Time    WBC 7.0 09/22/2022 10:09 AM    HGB 10.9 (L) 09/22/2022 10:09 AM    HCT 33.5 (L) 09/22/2022 10:09 AM    PLATELET 525 60/26/0353 10:09 AM    MCV 89.3 09/22/2022 10:09 AM    ABS. NEUTROPHILS 5.3 09/22/2022 10:09 AM     Lab Results   Component Value Date/Time    Sodium 137 09/22/2022 10:09 AM    Potassium 3.5 09/22/2022 10:09 AM    Chloride 106 09/22/2022 10:09 AM    CO2 21 09/22/2022 10:09 AM    Glucose 206 (H) 09/22/2022 10:09 AM    BUN 16 09/22/2022 10:09 AM    Creatinine 0.90 09/22/2022 10:09 AM    GFR est AA >60 09/22/2022 10:09 AM    GFR est non-AA >60 09/22/2022 10:09 AM    Calcium 9.3 09/22/2022 10:09 AM    Glucose (POC) 96 05/24/2022 11:29 AM    Creatinine (POC) 0.9 12/28/2012 06:39 AM     Lab Results   Component Value Date/Time    Bilirubin, total 0.7 09/22/2022 10:09 AM    ALT (SGPT) 56 09/22/2022 10:09 AM    Alk.  phosphatase 80 09/22/2022 10:09 AM    Protein, total 7.4 09/22/2022 10:09 AM    Albumin 3.8 09/22/2022 10:09 AM    Globulin 3.6 09/22/2022 10:09 AM         Records reviewed and summarized above. Pathology report(s) reviewed above. Radiology report(s) reviewed above. CT chest abdomen pelvis 5/5/2022    IMPRESSION  Diminished left-sided pleural effusion, status post thoracentesis with stable  mass lesion at the left apex. Mass lesion demonstrated at the left apex is not amenable to percutaneous  sampling. There are numerous additional nodular densities along the pleural margin  consistent with pleural carcinomatosis. There are additional small nodular densities demonstrated in the peritoneum,  these most consistent with peritoneal carcinomatosis. Small adrenal nodule on  the left likely an additional metastatic focus. No definitive target for percutaneous sampling. Currently awaiting results of  pleural fluid analysis/cytology and cellblock. MRI brain 5/2022      1. No evidence of intracranial metastases. 2. Minimal chronic microvascular ischemic disease. No acute intracranial  abnormality. 3. Enhancing lesions in the bilateral parotid glands, largest in the left  parotid gland measuring 3.6 cm. Lesions in the right parotid gland were  previously biopsied in 2020 and returned as Warthin tumors. These likely all  represent multiple Warthin tumors    CT 8/2022     1. The mass lesion left lung apex is noted and has mildly decreased. 2. Pleural metastases on left side and decreased in size. Left pleural effusion  has decreased and there is improved aeration in the left lung. 3. Left adrenal nodule is stable. 4. Linear irregular nodular densities in the anterior peritoneum in the right  lower quadrant and midline in the lower abdomen and upper pelvis are noted and  unchanged.        Assessment:   1) Non small cell Lung cancer- adenocarcinoma stage IV    PDL1-15%, ALK/EGFR/ROS1/BRAF negative (limited panel)  Liquid bx [id number], TMB low  CT chest abdomen and pelvis from 5/5/2022 was reviewed. Notable for large left-sided pleural effusion, concern for pleural carcinomatosis, left apical lung nodule measuring 20 x 26 mm, left adrenal nodule 19 x 18 mm, peritoneal nodularity concerning for carcinomatosis. Status post left-sided thoracentesis and + cytology   H/o Smoking  Palliative treatments  Completed 4 cycles of Carboplatin+ Alimta+ Keytruda with a response as of 8/2022  Tolerating well  Switched to Alimta and Keytruda maintenance until progression    If and when there is disease progression repeat expanded NGS is indicated    2) Left pleural effusion  S/p thoracentesis x 2, not enough for a drain  Hold off on Pleurx given no recurrence on systemic therapy    3) H/P R parotidectomy for Warthin's tumor   Has persistent multiple Warthin's tumors     4) Depression  On Lexapro    5) Rash  Likely from Alimta  Topical hydrocortisone  Resolved today     6) Encounter for high risk medications like chemotherapy and high risk disease  Tolerating well  Grade 1 fatigue   Labs pending     Plan:     Proceed with maintenance alimta 500 mg/m2 and Keytruda every 3 weeks until progression  Antiemetics, steroids, b12, FA per protocol  Topical hydrocortisone   Protonix daily  CT ordered in 6 weeks    OPIC q3 weeks  OV in 6 weeks for scan review     I appreciate the opportunity to participate in Ms. Isabella Canada's care. I personally saw and evaluated the patient and performed the key components of medical decision making. The history, physical exam, and documentation were performed by Ginny Nuñez NP. I reviewed and verified the above documentation and modified it as needed.     No edema, has no adenopathy  Labs stable  Signed By: Ney Gonzales MD

## 2022-10-13 NOTE — PROGRESS NOTES
Kathy Saldivar is a 60 y.o. female    Chief Complaint   Patient presents with    Follow-up     stage IV NSCLC- PDL1- 15%, no  mutations        1. Have you been to the ER, urgent care clinic since your last visit? Hospitalized since your last visit? No    2. Have you seen or consulted any other health care providers outside of the [address] [place] [address] since your last visit? Include any pap smears or colon screening.  No

## 2022-10-13 NOTE — PROGRESS NOTES
[facility]fusion [facility] - Chemotherapy Progress Note    1010 Pt admit to [facility] for [address] injection ambulatory in stable condition accompanied by spouse. Assessment completed. No new concerns voiced. Port accessed with positive blood return. Labs drawn per order and sent. Line flushed, clamped, Curos Cap applied to end clave. Pt upstairs to MD appointment.     1125 Return to [facility]; labs resulted, WNL; line flushed, NS infusing     Visit Vitals  /75   Pulse 85   Temp 96.8 °F (36 °C)   Resp 16   Breastfeeding No       Medications Administered       0.9% sodium chloride infusion       Admin Date  10/13/2022 Action  New Bag Dose  25 mL/hr Rate  25 mL/hr Route  IntraVENous Administered By  Arianna Burleson RN              0.9% sodium chloride injection 10 mL       Admin Date  10/13/2022 Action  Given Dose  10 mL Route  IntraVENous Administered By  Arianna Burleson RN              cyanocobalamin (VITAMIN B12) injection 1,000 mcg       Admin Date  10/13/2022 Action  Given Dose  1,000 mcg Route  IntraMUSCular Administered By  Arianna Burleson RN              dexamethasone (DECADRON) 4 mg/mL injection 8 mg       Admin Date  10/13/2022 Action  Given Dose  8 mg Route  IntraVENous Administered By  Arianna Burleson RN              heparin (porcine) pf 300-500 Units       Admin Date  10/13/2022 Action  Given Dose  500 Units Route  InterCATHeter Administered By  Arianna Burleson RN              pembrolizumab [facility]) 200 mg in 0.9% sodium chloride 100 mL, overfill volume 10 mL IVPB       Admin Date  10/13/2022 Action  New Bag Dose  200 mg Rate  236 mL/hr Route  IntraVENous Administered By  Arianna Burleson RN              PEMEtrexed disodium (ALIMTA) 1,100 mg in 0.9% sodium chloride 100 mL, overfill volume 10 mL chemo infusion       Admin Date  10/13/2022 Action  New Bag Dose  1,100 mg Rate  924 mL/hr Route  IntraVENous Administered By  Arianna Burleson RN              sodium chloride (NS) flush 10 mL       Admin Date  10/13/2022 Action  Given Dose  10 mL Route  IntraVENous Administered By  Cliff Cueto RN                        (B12, IM L arm)    Two nurses verified prior to administering:, Drug name, Drug dose, Infusion volume or drug volume when prepared in a syringe, Rate of administration, Route of administration, Expiration dates and/or times, Appearance and physical integrity of the drugs, Rate set on infusion pump, when used, Sequencing of drug administration         1410 Pt tolerated treatment well. Port maintained positive blood return throughout treatment, flushed with positive blood return at conclusion, heparinized and de-accessed. D/c home ambulatory in no distress. Pt aware of next [facility] appointment scheduled for 11/03/2022. Recent Results (from the past 12 hour(s))   CBC WITH AUTOMATED DIFF    Collection Time: 10/13/22 10:12 AM   Result Value Ref Range    WBC 7.1 3.6 - 11.0 K/uL    RBC 3.93 3.80 - 5.20 M/uL    HGB 11.7 11.5 - 16.0 g/dL    HCT 35.5 35.0 - 47.0 %    MCV 90.3 80.0 - 99.0 FL    MCH 29.8 26.0 - 34.0 PG    MCHC 33.0 30.0 - 36.5 g/dL    RDW 16.0 (H) 11.5 - 14.5 %    PLATELET 379 089 - 369 K/uL    MPV 10.0 8.9 - 12.9 FL    NRBC 0.0 0  WBC    ABSOLUTE NRBC 0.00 0.00 - 0.01 K/uL    NEUTROPHILS 76 (H) 32 - 75 %    LYMPHOCYTES 11 (L) 12 - 49 %    MONOCYTES 11 5 - 13 %    EOSINOPHILS 0 0 - 7 %    BASOPHILS 0 0 - 1 %    IMMATURE GRANULOCYTES 2 (H) 0.0 - 0.5 %    ABS. NEUTROPHILS 5.4 1.8 - 8.0 K/UL    ABS. LYMPHOCYTES 0.8 0.8 - 3.5 K/UL    ABS. MONOCYTES 0.8 0.0 - 1.0 K/UL    ABS. EOSINOPHILS 0.0 0.0 - 0.4 K/UL    ABS. BASOPHILS 0.0 0.0 - 0.1 K/UL    ABS. IMM.  GRANS. 0.1 (H) 0.00 - 0.04 K/UL    DF SMEAR SCANNED      RBC COMMENTS ANISOCYTOSIS  1+       METABOLIC PANEL, COMPREHENSIVE    Collection Time: 10/13/22 10:12 AM   Result Value Ref Range    Sodium 138 136 - 145 mmol/L    Potassium 4.1 3.5 - 5.1 mmol/L    Chloride 106 97 - 108 mmol/L    CO2 24 21 - 32 mmol/L Anion gap 8 5 - 15 mmol/L    Glucose 174 (H) 65 - 100 mg/dL    BUN 11 6 - 20 MG/DL    Creatinine 0.87 0.55 - 1.02 MG/DL    BUN/Creatinine ratio 13 12 - 20      eGFR >60 >60 ml/min/1.73m2    Calcium 9.6 8.5 - 10.1 MG/DL    Bilirubin, total 0.7 0.2 - 1.0 MG/DL    ALT (SGPT) 58 12 - 78 U/L    AST (SGOT) 28 15 - 37 U/L    Alk.  phosphatase 91 45 - 117 U/L    Protein, total 7.6 6.4 - 8.2 g/dL    Albumin 3.9 3.5 - 5.0 g/dL    Globulin 3.7 2.0 - 4.0 g/dL    A-G Ratio 1.1 1.1 - 2.2

## 2022-10-26 RX ORDER — CYANOCOBALAMIN 1000 UG/ML
1000 INJECTION, SOLUTION INTRAMUSCULAR; SUBCUTANEOUS
Status: CANCELLED | OUTPATIENT
Start: 2022-11-03 | End: 2022-11-04

## 2022-10-26 RX ORDER — SODIUM CHLORIDE 0.9 % (FLUSH) 0.9 %
10 SYRINGE (ML) INJECTION AS NEEDED
Status: CANCELLED | OUTPATIENT
Start: 2022-11-03

## 2022-10-26 RX ORDER — SODIUM CHLORIDE 9 MG/ML
10 INJECTION INTRAMUSCULAR; INTRAVENOUS; SUBCUTANEOUS AS NEEDED
Status: CANCELLED | OUTPATIENT
Start: 2022-11-03

## 2022-11-03 ENCOUNTER — HOSPITAL ENCOUNTER (OUTPATIENT)
Dept: INFUSION THERAPY | Age: 62
Discharge: HOME | End: 2022-11-03
Payer: MEDICAID

## 2022-11-03 VITALS
TEMPERATURE: 97.9 F | RESPIRATION RATE: 18 BRPM | BODY MASS INDEX: 38.61 KG/M2 | SYSTOLIC BLOOD PRESSURE: 147 MMHG | DIASTOLIC BLOOD PRESSURE: 70 MMHG | HEART RATE: 103 BPM | HEIGHT: 67 IN | WEIGHT: 246 LBS

## 2022-11-03 DIAGNOSIS — C34.90 NSCLC METASTATIC TO ADRENAL GLAND (HCC): ICD-10-CM

## 2022-11-03 DIAGNOSIS — C79.70 NSCLC METASTATIC TO ADRENAL GLAND (HCC): ICD-10-CM

## 2022-11-03 DIAGNOSIS — C34.90 PRIMARY ADENOCARCINOMA OF LUNG, UNSPECIFIED LATERALITY (HCC): Primary | ICD-10-CM

## 2022-11-03 LAB
ALBUMIN SERPL-MCNC: 3.7 G/DL (ref 3.5–5)
ALBUMIN/GLOB SERPL: 1 {RATIO} (ref 1.1–2.2)
ALP SERPL-CCNC: 92 U/L (ref 45–117)
ALT SERPL-CCNC: 52 U/L (ref 12–78)
ANION GAP SERPL CALC-SCNC: 7 MMOL/L (ref 5–15)
AST SERPL-CCNC: 29 U/L (ref 15–37)
BASOPHILS # BLD: 0 K/UL (ref 0–0.1)
BASOPHILS NFR BLD: 0 % (ref 0–1)
BILIRUB SERPL-MCNC: 0.6 MG/DL (ref 0.2–1)
BUN SERPL-MCNC: 12 MG/DL (ref 6–20)
BUN/CREAT SERPL: 13 (ref 12–20)
CALCIUM SERPL-MCNC: 9.2 MG/DL (ref 8.5–10.1)
CHLORIDE SERPL-SCNC: 105 MMOL/L (ref 97–108)
CO2 SERPL-SCNC: 24 MMOL/L (ref 21–32)
CREAT SERPL-MCNC: 0.92 MG/DL (ref 0.55–1.02)
DIFFERENTIAL METHOD BLD: ABNORMAL
EOSINOPHIL # BLD: 0 K/UL (ref 0–0.4)
EOSINOPHIL NFR BLD: 0 % (ref 0–7)
ERYTHROCYTE [DISTWIDTH] IN BLOOD BY AUTOMATED COUNT: 15.2 % (ref 11.5–14.5)
GLOBULIN SER CALC-MCNC: 3.6 G/DL (ref 2–4)
GLUCOSE SERPL-MCNC: 253 MG/DL (ref 65–100)
HCT VFR BLD AUTO: 35.1 % (ref 35–47)
HGB BLD-MCNC: 11.3 G/DL (ref 11.5–16)
IMM GRANULOCYTES # BLD AUTO: 0.1 K/UL (ref 0–0.04)
IMM GRANULOCYTES NFR BLD AUTO: 1 % (ref 0–0.5)
LYMPHOCYTES # BLD: 0.6 K/UL (ref 0.8–3.5)
LYMPHOCYTES NFR BLD: 11 % (ref 12–49)
MCH RBC QN AUTO: 29.1 PG (ref 26–34)
MCHC RBC AUTO-ENTMCNC: 32.2 G/DL (ref 30–36.5)
MCV RBC AUTO: 90.5 FL (ref 80–99)
MONOCYTES # BLD: 0.3 K/UL (ref 0–1)
MONOCYTES NFR BLD: 5 % (ref 5–13)
NEUTS SEG # BLD: 4.7 K/UL (ref 1.8–8)
NEUTS SEG NFR BLD: 83 % (ref 32–75)
NRBC # BLD: 0 K/UL (ref 0–0.01)
NRBC BLD-RTO: 0 PER 100 WBC
PLATELET # BLD AUTO: 378 K/UL (ref 150–400)
PMV BLD AUTO: 9.8 FL (ref 8.9–12.9)
POTASSIUM SERPL-SCNC: 4 MMOL/L (ref 3.5–5.1)
PROT SERPL-MCNC: 7.3 G/DL (ref 6.4–8.2)
RBC # BLD AUTO: 3.88 M/UL (ref 3.8–5.2)
RBC MORPH BLD: ABNORMAL
RBC MORPH BLD: ABNORMAL
SODIUM SERPL-SCNC: 136 MMOL/L (ref 136–145)
TSH SERPL DL<=0.05 MIU/L-ACNC: 0.41 UIU/ML (ref 0.36–3.74)
WBC # BLD AUTO: 5.7 K/UL (ref 3.6–11)

## 2022-11-03 PROCEDURE — 84443 ASSAY THYROID STIM HORMONE: CPT

## 2022-11-03 PROCEDURE — 77030012965 HC NDL HUBR BBMI -A

## 2022-11-03 PROCEDURE — 74011250636 HC RX REV CODE- 250/636: Performed by: INTERNAL MEDICINE

## 2022-11-03 PROCEDURE — 96411 CHEMO IV PUSH ADDL DRUG: CPT

## 2022-11-03 PROCEDURE — 36415 COLL VENOUS BLD VENIPUNCTURE: CPT

## 2022-11-03 PROCEDURE — 74011000258 HC RX REV CODE- 258: Performed by: INTERNAL MEDICINE

## 2022-11-03 PROCEDURE — 96413 CHEMO IV INFUSION 1 HR: CPT

## 2022-11-03 PROCEDURE — 85025 COMPLETE CBC W/AUTO DIFF WBC: CPT

## 2022-11-03 PROCEDURE — 80053 COMPREHEN METABOLIC PANEL: CPT

## 2022-11-03 PROCEDURE — 96375 TX/PRO/DX INJ NEW DRUG ADDON: CPT

## 2022-11-03 RX ORDER — DIPHENHYDRAMINE HYDROCHLORIDE 50 MG/ML
25 INJECTION, SOLUTION INTRAMUSCULAR; INTRAVENOUS AS NEEDED
Status: ACTIVE | OUTPATIENT
Start: 2022-11-03 | End: 2022-11-03

## 2022-11-03 RX ORDER — ALBUTEROL SULFATE 0.83 MG/ML
2.5 SOLUTION RESPIRATORY (INHALATION) AS NEEDED
Status: ACTIVE | OUTPATIENT
Start: 2022-11-03 | End: 2022-11-03

## 2022-11-03 RX ORDER — ACETAMINOPHEN 325 MG/1
650 TABLET ORAL AS NEEDED
Status: ACTIVE | OUTPATIENT
Start: 2022-11-03 | End: 2022-11-03

## 2022-11-03 RX ORDER — HEPARIN 100 UNIT/ML
300-500 SYRINGE INTRAVENOUS AS NEEDED
Status: ACTIVE | OUTPATIENT
Start: 2022-11-03 | End: 2022-11-03

## 2022-11-03 RX ORDER — EPINEPHRINE 1 MG/ML
0.3 INJECTION, SOLUTION, CONCENTRATE INTRAVENOUS AS NEEDED
Status: ACTIVE | OUTPATIENT
Start: 2022-11-03 | End: 2022-11-03

## 2022-11-03 RX ORDER — ONDANSETRON 2 MG/ML
8 INJECTION INTRAMUSCULAR; INTRAVENOUS AS NEEDED
Status: ACTIVE | OUTPATIENT
Start: 2022-11-03 | End: 2022-11-03

## 2022-11-03 RX ORDER — HYDROCORTISONE SODIUM SUCCINATE 100 MG/2ML
100 INJECTION, POWDER, FOR SOLUTION INTRAMUSCULAR; INTRAVENOUS AS NEEDED
Status: ACTIVE | OUTPATIENT
Start: 2022-11-03 | End: 2022-11-03

## 2022-11-03 RX ORDER — DIPHENHYDRAMINE HYDROCHLORIDE 50 MG/ML
50 INJECTION, SOLUTION INTRAMUSCULAR; INTRAVENOUS AS NEEDED
Status: ACTIVE | OUTPATIENT
Start: 2022-11-03 | End: 2022-11-03

## 2022-11-03 RX ORDER — SODIUM CHLORIDE 9 MG/ML
25 INJECTION, SOLUTION INTRAVENOUS CONTINUOUS
Status: DISPENSED | OUTPATIENT
Start: 2022-11-03 | End: 2022-11-03

## 2022-11-03 RX ORDER — DEXAMETHASONE SODIUM PHOSPHATE 4 MG/ML
8 INJECTION, SOLUTION INTRA-ARTICULAR; INTRALESIONAL; INTRAMUSCULAR; INTRAVENOUS; SOFT TISSUE ONCE
Status: COMPLETED | OUTPATIENT
Start: 2022-11-03 | End: 2022-11-03

## 2022-11-03 RX ADMIN — HEPARIN 500 UNITS: 100 SYRINGE at 14:30

## 2022-11-03 RX ADMIN — SODIUM CHLORIDE 200 MG: 9 INJECTION, SOLUTION INTRAVENOUS at 13:05

## 2022-11-03 RX ADMIN — DEXAMETHASONE SODIUM PHOSPHATE 8 MG: 4 INJECTION, SOLUTION INTRAMUSCULAR; INTRAVENOUS at 13:40

## 2022-11-03 RX ADMIN — SODIUM CHLORIDE 25 ML/HR: 9 INJECTION, SOLUTION INTRAVENOUS at 13:04

## 2022-11-03 RX ADMIN — PEMETREXED 1100 MG: 500 INJECTION, POWDER, LYOPHILIZED, FOR SOLUTION INTRAVENOUS at 13:52

## 2022-11-03 NOTE — PROGRESS NOTES
[facility]patient [facility]fusion [facility] - Chemotherapy Progress Note    1000 Pt admit to [facility] for [address] ambulatory in stable condition. Assessment completed by access RN. No new concerns voiced. Port accessed by access RN with positive blood return. Labs drawn per order and sent. Line flushed, clamped, Curos Cap applied to end clave.       Visit Vitals  BP (!) 147/70 (BP 1 Location: Left arm, BP Patient Position: Sitting)   Pulse (!) 103   Temp 97.9 °F (36.6 °C)   Resp 18   Ht 5' 7\" (1.702 m)   Wt 111.6 kg (246 lb)   BMI 38.53 kg/m²       Medications Administered       0.9% sodium chloride infusion       Admin Date  11/03/2022 Action  New Bag Dose  25 mL/hr Rate  25 mL/hr Route  IntraVENous Administered By  Domingo Brown RN              dexamethasone (DECADRON) 4 mg/mL injection 8 mg       Admin Date  11/03/2022 Action  Given Dose  8 mg Route  IntraVENous Administered By  Domingo Brown RN              heparin (porcine) pf 300-500 Units       Admin Date  11/03/2022 Action  Given Dose  500 Units Route  InterCATHeter Administered By  Domingo Brown RN              pembrolizumab [facility]) 200 mg in 0.9% sodium chloride 100 mL, overfill volume 10 mL IVPB       Admin Date  11/03/2022 Action  New Bag Dose  200 mg Rate  236 mL/hr Route  IntraVENous Administered By  Domingo Brown RN              PEMEtrexed disodium (ALIMTA) 1,100 mg in 0.9% sodium chloride 100 mL, overfill volume 10 mL chemo infusion       Admin Date  11/03/2022 Action  New Bag Dose  1,100 mg Rate  924 mL/hr Route  IntraVENous Administered By  Domingo Brown RN                        Two nurses verified prior to administering:, Drug name, Drug dose, Infusion volume or drug volume when prepared in a syringe, Rate of administration, Route of administration, Expiration dates and/or times, Appearance and physical integrity of the drugs, Rate set on infusion pump, when used, Sequencing of drug administration         1430 Pt tolerated treatment well. Port maintained positive blood return throughout treatment, flushed with positive blood return at conclusion, heparinized and de-accessed. D/c home ambulatory in no distress. Pt aware of next [facility] appointment scheduled for 11/29/2022. Recent Results (from the past 12 hour(s))   CBC WITH AUTOMATED DIFF    Collection Time: 11/03/22 10:10 AM   Result Value Ref Range    WBC 5.7 3.6 - 11.0 K/uL    RBC 3.88 3.80 - 5.20 M/uL    HGB 11.3 (L) 11.5 - 16.0 g/dL    HCT 35.1 35.0 - 47.0 %    MCV 90.5 80.0 - 99.0 FL    MCH 29.1 26.0 - 34.0 PG    MCHC 32.2 30.0 - 36.5 g/dL    RDW 15.2 (H) 11.5 - 14.5 %    PLATELET 993 356 - 156 K/uL    MPV 9.8 8.9 - 12.9 FL    NRBC 0.0 0  WBC    ABSOLUTE NRBC 0.00 0.00 - 0.01 K/uL    NEUTROPHILS 83 (H) 32 - 75 %    LYMPHOCYTES 11 (L) 12 - 49 %    MONOCYTES 5 5 - 13 %    EOSINOPHILS 0 0 - 7 %    BASOPHILS 0 0 - 1 %    IMMATURE GRANULOCYTES 1 (H) 0.0 - 0.5 %    ABS. NEUTROPHILS 4.7 1.8 - 8.0 K/UL    ABS. LYMPHOCYTES 0.6 (L) 0.8 - 3.5 K/UL    ABS. MONOCYTES 0.3 0.0 - 1.0 K/UL    ABS. EOSINOPHILS 0.0 0.0 - 0.4 K/UL    ABS. BASOPHILS 0.0 0.0 - 0.1 K/UL    ABS. IMM. GRANS. 0.1 (H) 0.00 - 0.04 K/UL    DF SMEAR SCANNED      RBC COMMENTS ANISOCYTOSIS  1+        RBC COMMENTS POLYCHROMASIA  PRESENT       METABOLIC PANEL, COMPREHENSIVE    Collection Time: 11/03/22 10:10 AM   Result Value Ref Range    Sodium 136 136 - 145 mmol/L    Potassium 4.0 3.5 - 5.1 mmol/L    Chloride 105 97 - 108 mmol/L    CO2 24 21 - 32 mmol/L    Anion gap 7 5 - 15 mmol/L    Glucose 253 (H) 65 - 100 mg/dL    BUN 12 6 - 20 MG/DL    Creatinine 0.92 0.55 - 1.02 MG/DL    BUN/Creatinine ratio 13 12 - 20      eGFR >60 >60 ml/min/1.73m2    Calcium 9.2 8.5 - 10.1 MG/DL    Bilirubin, total 0.6 0.2 - 1.0 MG/DL    ALT (SGPT) 52 12 - 78 U/L    AST (SGOT) 29 15 - 37 U/L    Alk.  phosphatase 92 45 - 117 U/L    Protein, total 7.3 6.4 - 8.2 g/dL    Albumin 3.7 3.5 - 5.0 g/dL    Globulin 3.6 2.0 - 4.0 g/dL    A-G Ratio 1.0 (L) 1.1 - 2.2     TSH 3RD GENERATION    Collection Time: 11/03/22 10:10 AM   Result Value Ref Range    TSH 0.41 0.36 - 3.74 uIU/mL

## 2022-11-14 ENCOUNTER — HOSPITAL ENCOUNTER (OUTPATIENT)
Dept: CT IMAGING | Age: 62
Discharge: HOME | End: 2022-11-14
Attending: REGISTERED NURSE
Payer: MEDICAID

## 2022-11-14 DIAGNOSIS — C34.90 NSCLC METASTATIC TO ADRENAL GLAND (HCC): ICD-10-CM

## 2022-11-14 DIAGNOSIS — C79.70 NSCLC METASTATIC TO ADRENAL GLAND (HCC): ICD-10-CM

## 2022-11-14 PROCEDURE — 74177 CT ABD & PELVIS W/CONTRAST: CPT

## 2022-11-14 PROCEDURE — 71260 CT THORAX DX C+: CPT

## 2022-11-14 PROCEDURE — 74011000636 HC RX REV CODE- 636: Performed by: REGISTERED NURSE

## 2022-11-14 RX ADMIN — IOPAMIDOL 100 ML: 755 INJECTION, SOLUTION INTRAVENOUS at 08:17

## 2022-11-16 ENCOUNTER — TELEPHONE (OUTPATIENT)
Dept: ONCOLOGY | Age: 62
End: 2022-11-16

## 2022-11-16 NOTE — TELEPHONE ENCOUNTER
Shorty Arteaga 65 pt with updates, HIPAA verified x2. Indio Cole, NP does not think that her sx would still be from receiving the flu vaccine, but she would like her to take a Covid test to rule that out instead. She also encourages pt to take Tylenol as needed, push fluids and rest. Pt voiced understanding. I will call pt on Friday to check on her sx and if sx worsen between now and Friday pt is to contact our office or go to urgent care. Pt voiced understanding and has no further questions or concerns at this time.

## 2022-11-16 NOTE — TELEPHONE ENCOUNTER
Patient called. Having headaches, sweating, chills for over a week now. Had flu show 2 weeks ago. Patient would like a call back to discuss.     Patient can be reached at:  [phone] or [phone]

## 2022-11-22 ENCOUNTER — HOSPITAL ENCOUNTER (EMERGENCY)
Age: 62
Discharge: HOME | End: 2022-11-22
Attending: EMERGENCY MEDICINE
Payer: MEDICAID

## 2022-11-22 ENCOUNTER — TELEPHONE (OUTPATIENT)
Dept: ONCOLOGY | Age: 62
End: 2022-11-22

## 2022-11-22 ENCOUNTER — APPOINTMENT (OUTPATIENT)
Dept: CT IMAGING | Age: 62
End: 2022-11-22
Attending: EMERGENCY MEDICINE
Payer: MEDICAID

## 2022-11-22 ENCOUNTER — APPOINTMENT (OUTPATIENT)
Dept: GENERAL RADIOLOGY | Age: 62
End: 2022-11-22
Attending: EMERGENCY MEDICINE
Payer: MEDICAID

## 2022-11-22 VITALS
RESPIRATION RATE: 12 BRPM | HEIGHT: 67 IN | WEIGHT: 224.87 LBS | BODY MASS INDEX: 35.29 KG/M2 | HEART RATE: 94 BPM | DIASTOLIC BLOOD PRESSURE: 80 MMHG | TEMPERATURE: 99.8 F | SYSTOLIC BLOOD PRESSURE: 139 MMHG | OXYGEN SATURATION: 95 %

## 2022-11-22 DIAGNOSIS — J10.1 INFLUENZA B: Primary | ICD-10-CM

## 2022-11-22 LAB
ALBUMIN SERPL-MCNC: 2.6 G/DL (ref 3.5–5)
ALBUMIN/GLOB SERPL: 0.5 {RATIO} (ref 1.1–2.2)
ALP SERPL-CCNC: 196 U/L (ref 45–117)
ALT SERPL-CCNC: 39 U/L (ref 12–78)
ANION GAP SERPL CALC-SCNC: 8 MMOL/L (ref 5–15)
AST SERPL-CCNC: 37 U/L (ref 15–37)
BASOPHILS # BLD: 0 K/UL (ref 0–0.1)
BASOPHILS NFR BLD: 0 % (ref 0–1)
BILIRUB SERPL-MCNC: 0.7 MG/DL (ref 0.2–1)
BNP SERPL-MCNC: 45 PG/ML
BUN SERPL-MCNC: 10 MG/DL (ref 6–20)
BUN/CREAT SERPL: 11 (ref 12–20)
CALCIUM SERPL-MCNC: 9 MG/DL (ref 8.5–10.1)
CHLORIDE SERPL-SCNC: 102 MMOL/L (ref 97–108)
CO2 SERPL-SCNC: 25 MMOL/L (ref 21–32)
CREAT SERPL-MCNC: 0.89 MG/DL (ref 0.55–1.02)
DIFFERENTIAL METHOD BLD: ABNORMAL
EOSINOPHIL # BLD: 0 K/UL (ref 0–0.4)
EOSINOPHIL NFR BLD: 0 % (ref 0–7)
ERYTHROCYTE [DISTWIDTH] IN BLOOD BY AUTOMATED COUNT: 15.3 % (ref 11.5–14.5)
GLOBULIN SER CALC-MCNC: 5.3 G/DL (ref 2–4)
GLUCOSE SERPL-MCNC: 122 MG/DL (ref 65–100)
HCT VFR BLD AUTO: 29.7 % (ref 35–47)
HGB BLD-MCNC: 9.6 G/DL (ref 11.5–16)
IMM GRANULOCYTES # BLD AUTO: 0.1 K/UL (ref 0–0.04)
IMM GRANULOCYTES NFR BLD AUTO: 1 % (ref 0–0.5)
LYMPHOCYTES # BLD: 1.2 K/UL (ref 0.8–3.5)
LYMPHOCYTES NFR BLD: 11 % (ref 12–49)
MCH RBC QN AUTO: 28.2 PG (ref 26–34)
MCHC RBC AUTO-ENTMCNC: 32.3 G/DL (ref 30–36.5)
MCV RBC AUTO: 87.1 FL (ref 80–99)
MONOCYTES # BLD: 1.5 K/UL (ref 0–1)
MONOCYTES NFR BLD: 14 % (ref 5–13)
NEUTS SEG # BLD: 7.7 K/UL (ref 1.8–8)
NEUTS SEG NFR BLD: 74 % (ref 32–75)
NRBC # BLD: 0 K/UL (ref 0–0.01)
NRBC BLD-RTO: 0 PER 100 WBC
PLATELET # BLD AUTO: 552 K/UL (ref 150–400)
PMV BLD AUTO: 9.4 FL (ref 8.9–12.9)
POTASSIUM SERPL-SCNC: 3.6 MMOL/L (ref 3.5–5.1)
PROT SERPL-MCNC: 7.9 G/DL (ref 6.4–8.2)
RBC # BLD AUTO: 3.41 M/UL (ref 3.8–5.2)
SODIUM SERPL-SCNC: 135 MMOL/L (ref 136–145)
TROPONIN-HIGH SENSITIVITY: 5 NG/L (ref 0–51)
WBC # BLD AUTO: 10.5 K/UL (ref 3.6–11)

## 2022-11-22 PROCEDURE — 93005 ELECTROCARDIOGRAM TRACING: CPT

## 2022-11-22 PROCEDURE — 74011000636 HC RX REV CODE- 636: Performed by: EMERGENCY MEDICINE

## 2022-11-22 PROCEDURE — 84484 ASSAY OF TROPONIN QUANT: CPT

## 2022-11-22 PROCEDURE — 85025 COMPLETE CBC W/AUTO DIFF WBC: CPT

## 2022-11-22 PROCEDURE — 74011250636 HC RX REV CODE- 250/636: Performed by: EMERGENCY MEDICINE

## 2022-11-22 PROCEDURE — 80053 COMPREHEN METABOLIC PANEL: CPT

## 2022-11-22 PROCEDURE — 36415 COLL VENOUS BLD VENIPUNCTURE: CPT

## 2022-11-22 PROCEDURE — 71045 X-RAY EXAM CHEST 1 VIEW: CPT

## 2022-11-22 PROCEDURE — 94761 N-INVAS EAR/PLS OXIMETRY MLT: CPT

## 2022-11-22 PROCEDURE — 99285 EMERGENCY DEPT VISIT HI MDM: CPT

## 2022-11-22 PROCEDURE — 83880 ASSAY OF NATRIURETIC PEPTIDE: CPT

## 2022-11-22 PROCEDURE — 71275 CT ANGIOGRAPHY CHEST: CPT

## 2022-11-22 RX ORDER — DOXYCYCLINE HYCLATE 100 MG
100 TABLET ORAL 2 TIMES DAILY
Qty: 14 TABLET | Refills: 0 | Status: SHIPPED | OUTPATIENT
Start: 2022-11-22 | End: 2022-11-29

## 2022-11-22 RX ORDER — BENZONATATE 100 MG/1
100 CAPSULE ORAL
Qty: 30 CAPSULE | Refills: 0 | Status: SHIPPED | OUTPATIENT
Start: 2022-11-22 | End: 2022-11-29

## 2022-11-22 RX ADMIN — SODIUM CHLORIDE 1000 ML: 9 INJECTION, SOLUTION INTRAVENOUS at 21:00

## 2022-11-22 RX ADMIN — IOPAMIDOL 75 ML: 755 INJECTION, SOLUTION INTRAVENOUS at 19:45

## 2022-11-22 NOTE — TELEPHONE ENCOUNTER
Patient called and stated that she has been feeling worse. She stated that she is extremely fatigued and becomes out of breathe just walking from one room to the other. Patient also stated that her urine has been orange, she described it has a pumpkin orange, despite her only drinking water. Requested a call back.      # [phone]

## 2022-11-22 NOTE — TELEPHONE ENCOUNTER
1361:    Returned call to patient and confirmed x2 identifiers   Patient feeling worse than previous encounter   Fatigued, taste is off, urine is orange, cough present   Chills   Night sweats     Patient did go and got tested for COVID and was negative     Patient wishes to know if some of these symptoms are from her treatment and wishes to know recommendations     1600:    Called patient and confirmed x2 identifiers   Patient stated that she is not experiencing pain with urination, no frequency or urgency   Cough is more like irritation in throat     Informed patient per NP Ella Ryan request to go to Urgent Care or [facility] to be assessed   Will touch base with patient tomorrow to see what was done and discussed   Patient verbalized understanding   No new questions at this time

## 2022-11-23 LAB
ATRIAL RATE: 90 BPM
CALCULATED P AXIS, ECG09: 65 DEGREES
CALCULATED R AXIS, ECG10: 14 DEGREES
CALCULATED T AXIS, ECG11: 40 DEGREES
DIAGNOSIS, 93000: NORMAL
P-R INTERVAL, ECG05: 136 MS
Q-T INTERVAL, ECG07: 376 MS
QRS DURATION, ECG06: 86 MS
QTC CALCULATION (BEZET), ECG08: 459 MS
VENTRICULAR RATE, ECG03: 90 BPM

## 2022-11-23 NOTE — ED NOTES
Ambulatory pulse ox performed, sats as low as 92% and HR as high as 120's but was symptomatic. MD notified     TRANSFER - IN REPORT:    Verbal report received from [facility] (name) on [address] Report consisted of patients Situation, Background, Assessment and   Recommendations(SBAR). Information from the following report(s) SBAR and ED Summary was reviewed with the receiving nurse. Opportunity for questions and clarification was provided.

## 2022-11-23 NOTE — ED NOTES
Patient resting in semi-fowlers, AOx4, vital signs stable, no acute distress noted and denies onset of new signs/symptoms. Side rails x2, call light on lap,  at bedside, indicates no needs at this time.

## 2022-11-23 NOTE — TELEPHONE ENCOUNTER
1415:    Called patient and confirmed x2 identifiers   [facility] and was sent to ED [facility]  Patient has the following:  Influenza B and pneumonia     Was given tessalon pearls and doxycycline scripts   Patient stated feeling a little better     Informed patient  to still monitor symptoms over the weekend and to go to ER if worsens     Patient verbalized understanding   No new questions or concerns at this time

## 2022-11-23 NOTE — ED PROVIDER NOTES
EMERGENCY DEPARTMENT HISTORY AND PHYSICAL EXAM      Date: 11/22/2022  Patient Name: Aminah Olivares    History of Presenting Illness     Chief Complaint   Patient presents with    Shortness of Breath     Pt arrives via EMS from [facility] d/t dyspnea on exertion, nausea, chills and extreme fatigue x 1 week. Pt has stage 4 lung cx receiving chemo, last session 10/22. Pt presented to [facility] for above sx, tested positive for Flu B and PNA on CXR, at [facility] sats 88%, sats 96% on RA for EMS. Pt also febrile at [facility] and received Tylenol. Pt oriented x 4. History Provided By: Patient    HPI: Aminah Olivares, 60 y.o. female with PMHx significant for diabetes, hypertension, hyperlipidemia, stage IV lung CA, COPD, who presents with a chief complaint of a week of chills, sweats, nausea, and fatigue. She states she got her flu shot 2 weeks ago and started feeling somewhat bad then but started feeling much worse about a week ago. She took a home COVID test which was negative after consulting with her oncologist.  She went to [facility] today after consulting with them again and she was not feeling any better. There she was febrile to 101.8 and received Tylenol. She tested positive for influenza B and they noted that she had a possible pneumonia on her chest x-ray. They report that her room air sats were 88% so sent her to the ED for evaluation. She had normal oxygen saturations for EMS and has normal oxygen saturations on presentation. She tells me she is currently on immunotherapy only for her cancer, last treatment was 10/22. PCP: Simba Juarez MD    There are no other associated signs or symptoms. No other exacerbating or alleviating factors. There are no other complaints, changes, or physical findings at this time.     Current Outpatient Medications   Medication Sig Dispense Refill    doxycycline (VIBRA-TABS) 100 mg tablet Take 1 Tablet by mouth two (2) times a day for 7 days. 14 Tablet 0    benzonatate (Tessalon Perles) 100 mg capsule Take 1 Capsule by mouth three (3) times daily as needed for Cough for up to 7 days. 30 Capsule 0    dexAMETHasone (DECADRON) 4 mg tablet Take 1 tablets by mouth twice daily the day before chemotherapy and the day after chemotherapy 32 Tablet 6    folic acid (FOLVITE) 1 mg tablet Take 1 Tablet by mouth daily. 90 Tablet 3    pantoprazole (PROTONIX) 40 mg tablet Take 1 Tablet by mouth daily. 90 Tablet 3    prochlorperazine (COMPAZINE) 5 mg tablet Take 1 Tablet by mouth every six (6) hours as needed for Nausea or Vomiting. 30 Tablet 1    ondansetron (ZOFRAN ODT) 8 mg disintegrating tablet Take 1 Tablet by mouth every eight (8) hours as needed for Nausea or Vomiting. 60 Tablet 2    lidocaine-prilocaine (EMLA) topical cream Apply  to affected area as needed for PRN Reason (Other) (for port access). Apply a thin layer to the port site 30-60 minutes prior to arrival for chemotherapy 30 g 0    amLODIPine (NORVASC) 5 mg tablet Take 5 mg by mouth daily. ALPRAZolam (XANAX) 0.25 mg tablet Take  by mouth.      metFORMIN (GLUCOPHAGE) 850 mg tablet Take 850 mg by mouth two (2) times daily (with meals). escitalopram oxalate (LEXAPRO) 20 mg tablet Take 20 mg by mouth daily. simvastatin (ZOCOR) 20 mg tablet Take 20 mg by mouth nightly. losartan (COZAAR) 100 mg tablet Take 100 mg by mouth daily. aspirin 81 mg chewable tablet Take 81 mg by mouth daily. acetaminophen (TYLENOL PO) Take 500 mg by mouth every six (6) hours as needed for Pain. Past History     Past Medical History:  Past Medical History:   Diagnosis Date    Diabetes mellitus type 2, noninsulin dependent ([address]     Generalized anxiety disorder     Hypercholesterolemia     Hypertension     Neoplasm of major salivary gland      Past Surgical History:  Past Surgical History:   Procedure Laterality Date    COLONOSCOPY N/A 2/24/2021    . COLONOSCOPY  :- performed by Kolby Tim MD at [facility] ENDOSCOPY    HX ACL RECONSTRUCTION      HX COLONOSCOPY      HX HYSTERECTOMY      IR INSERT CATH PLEURAL INDWELL  5/26/2022    IR INSERT TUNL CVC W PORT OVER 5 YEARS  6/6/2022    IR REMOVE LUNG/PLEURAL DRAIN / CATHETER  5/24/2022     Family History:  No family history on file. Social History:  Social History     Tobacco Use    Smoking status: Every Day     Packs/day: 0.50     Years: 20.00     Pack years: 10.00     Types: Cigarettes    Smokeless tobacco: Never   Substance Use Topics    Alcohol use: Yes     Comment: occ     Allergies: Allergies   Allergen Reactions    Erythromycin Other (comments)     Stomach cramps    Penicillins Hives     Review of Systems   Review of Systems   Constitutional:  Positive for chills, fatigue and fever. HENT:  Negative for congestion, rhinorrhea and sore throat. Respiratory:  Negative for cough and shortness of breath. Cardiovascular:  Negative for chest pain. Gastrointestinal:  Negative for abdominal pain, nausea and vomiting. Genitourinary:  Negative for dysuria and urgency. Skin:  Negative for rash. Neurological:  Negative for dizziness, light-headedness and headaches. All other systems reviewed and are negative. Physical Exam   Physical Exam  Vitals and nursing note reviewed. Constitutional:       General: She is not in acute distress. Appearance: She is well-developed. HENT:      Head: Normocephalic and atraumatic. Eyes:      Conjunctiva/sclera: Conjunctivae normal.      Pupils: Pupils are equal, round, and reactive to light. Cardiovascular:      Rate and Rhythm: Normal rate and regular rhythm. Pulmonary:      Effort: Pulmonary effort is normal. No respiratory distress. Breath sounds: Normal breath sounds. No stridor. Abdominal:      General: There is no distension. Palpations: Abdomen is soft. Tenderness: There is no abdominal tenderness. Musculoskeletal:         General: Normal range of motion. Cervical back: Normal range of motion. Skin:     General: Skin is warm and dry. Neurological:      Mental Status: She is alert and oriented to person, place, and time. Psychiatric:         Mood and Affect: Mood normal.         Thought Content: Thought content normal.     Diagnostic Study Results   Labs -     Recent Results (from the past 12 hour(s))   CBC WITH AUTOMATED DIFF    Collection Time: 11/22/22  6:05 PM   Result Value Ref Range    WBC 10.5 3.6 - 11.0 K/uL    RBC 3.41 (L) 3.80 - 5.20 M/uL    HGB 9.6 (L) 11.5 - 16.0 g/dL    HCT 29.7 (L) 35.0 - 47.0 %    MCV 87.1 80.0 - 99.0 FL    MCH 28.2 26.0 - 34.0 PG    MCHC 32.3 30.0 - 36.5 g/dL    RDW 15.3 (H) 11.5 - 14.5 %    PLATELET 604 (H) 909 - 400 K/uL    MPV 9.4 8.9 - 12.9 FL    NRBC 0.0 0  WBC    ABSOLUTE NRBC 0.00 0.00 - 0.01 K/uL    NEUTROPHILS 74 32 - 75 %    LYMPHOCYTES 11 (L) 12 - 49 %    MONOCYTES 14 (H) 5 - 13 %    EOSINOPHILS 0 0 - 7 %    BASOPHILS 0 0 - 1 %    IMMATURE GRANULOCYTES 1 (H) 0.0 - 0.5 %    ABS. NEUTROPHILS 7.7 1.8 - 8.0 K/UL    ABS. LYMPHOCYTES 1.2 0.8 - 3.5 K/UL    ABS. MONOCYTES 1.5 (H) 0.0 - 1.0 K/UL    ABS. EOSINOPHILS 0.0 0.0 - 0.4 K/UL    ABS. BASOPHILS 0.0 0.0 - 0.1 K/UL    ABS. IMM. GRANS. 0.1 (H) 0.00 - 0.04 K/UL    DF AUTOMATED     METABOLIC PANEL, COMPREHENSIVE    Collection Time: 11/22/22  6:05 PM   Result Value Ref Range    Sodium 135 (L) 136 - 145 mmol/L    Potassium 3.6 3.5 - 5.1 mmol/L    Chloride 102 97 - 108 mmol/L    CO2 25 21 - 32 mmol/L    Anion gap 8 5 - 15 mmol/L    Glucose 122 (H) 65 - 100 mg/dL    BUN 10 6 - 20 MG/DL    Creatinine 0.89 0.55 - 1.02 MG/DL    BUN/Creatinine ratio 11 (L) 12 - 20      eGFR >60 >60 ml/min/1.73m2    Calcium 9.0 8.5 - 10.1 MG/DL    Bilirubin, total 0.7 0.2 - 1.0 MG/DL    ALT (SGPT) 39 12 - 78 U/L    AST (SGOT) 37 15 - 37 U/L    Alk.  phosphatase 196 (H) 45 - 117 U/L    Protein, total 7.9 6.4 - 8.2 g/dL    Albumin 2.6 (L) 3.5 - 5.0 g/dL    Globulin 5.3 (H) 2.0 - 4.0 g/dL    A-G Ratio 0.5 (L) 1.1 - 2.2     TROPONIN-HIGH SENSITIVITY    Collection Time: 11/22/22  6:05 PM   Result Value Ref Range    Troponin-High Sensitivity 5 0 - 51 ng/L   NT-PRO BNP    Collection Time: 11/22/22  6:05 PM   Result Value Ref Range    NT pro-BNP 45 <125 PG/ML   EKG, 12 LEAD, INITIAL    Collection Time: 11/22/22  6:57 PM   Result Value Ref Range    Ventricular Rate 90 BPM    Atrial Rate 90 BPM    P-R Interval 136 ms    QRS Duration 86 ms    Q-T Interval 376 ms    QTC Calculation (Bezet) 459 ms    Calculated P Axis 65 degrees    Calculated R Axis 14 degrees    Calculated T Axis 40 degrees    Diagnosis       Normal sinus rhythm  Normal ECG  When compared with ECG of 19-MAY-2022 15:25,  Criteria for Septal infarct are no longer present         Radiologic Studies -   CTA CHEST W OR W WO CONT   Final Result   1. No evidence of pulmonary embolism. 2.  Essentially unchanged left upper lobe lung mass and subpleural thickening   compared to the recent chest CT. 3.  Prominent lower paratracheal mediastinal lymph nodes, none enlarged by size   criteria. 4.  Groundglass opacities in the lower lobes bilaterally may be infectious or   inflammatory. Correlate for infection. XR CHEST PORT   Final Result      No acute process on portable chest.           CTA CHEST W OR W WO CONT    Result Date: 11/22/2022  1. No evidence of pulmonary embolism. 2.  Essentially unchanged left upper lobe lung mass and subpleural thickening compared to the recent chest CT. 3.  Prominent lower paratracheal mediastinal lymph nodes, none enlarged by size criteria. 4.  Groundglass opacities in the lower lobes bilaterally may be infectious or inflammatory. Correlate for infection. XR CHEST PORT    Result Date: 11/22/2022  No acute process on portable chest.    Medical Decision Making   I am the first provider for this patient.     I reviewed the vital signs, available nursing notes, past medical history, past surgical history, family history and social history. Vital Signs-Reviewed the patient's vital signs. Patient Vitals for the past 12 hrs:   Temp Pulse Resp BP SpO2   11/22/22 1804 99.8 °F (37.7 °C) 91 18 -- --   11/22/22 1800 -- -- -- -- 96 %   11/22/22 1759 -- -- -- 139/80 --       Pulse Oximetry Analysis - 98% on ra    Cardiac Monitor:   Rate: 91 bpm  Rhythm: Normal Sinus Rhythm        ED EKG interpretation:  Rhythm: normal sinus rhythm; and regular . Rate (approx.): 90; Axis: normal; P wave: normal; QRS interval: normal ; ST/T wave: normal; Other findings: normal. This EKG was interpreted by UDAY Santoyo MD,ED Provider. Records Reviewed: Nursing Notes and Old Medical Records    Provider Notes (Medical Decision Making):   Patient presents with a chief complaint of fatigue, chills, fever. Tested positive for influenza B prior to arrival.  Suspect symptoms are related to influenza B. Outside of the window for Tamiflu given duration of symptoms. She is not hypoxic on presentation. Will repeat chest x-ray to evaluate lung spaces, check basic lab work. ED Course:   Initial assessment performed. The patients presenting problems have been discussed, and they are in agreement with the care plan formulated and outlined with them. I have encouraged them to ask questions as they arise throughout their visit. ED Course as of 11/22/22 2140   Tue Nov 22, 2022 2046 Patient did not become hypoxic with ambulation but did become mildly tachycardic. She does admit that she has not been eating or drinking much as she has not felt well but today is the first day she is feeling eating. CTA does not show any PE. Does show some bilateral groundglass opacities which could be inflammatory infectious. Suspect likely related to influenza diagnosis but given her history of lung CA we will cover with empiric antibiotics as well. Patient does not wish to be hospitalized. Plan for discharge after IV fluids.  [GASTON]      ED Course User Index  [GASTON] Karie Lopez MD       Procedures:  Procedures    Medications Given in ED:  Medications   iopamidoL (ISOVUE-370) 76 % injection 100 mL (75 mL IntraVENous Given 11/22/22 1945)   sodium chloride 0.9 % bolus infusion 1,000 mL (1,000 mL IntraVENous New Bag 11/22/22 2100)         Critical Care:  None    Disposition:  Discharge Note:  The patient has been re-evaluated and is ready for discharge. Reviewed available results with patient. Counseled patient on diagnosis and care plan. Patient has expressed understanding, and all questions have been answered. Patient agrees with plan and agrees to follow up as recommended, or to return to the ED if their symptoms worsen. Discharge instructions have been provided and explained to the patient, along with reasons to return to the ED. PLAN:  1. Current Discharge Medication List        START taking these medications    Details   doxycycline (VIBRA-TABS) 100 mg tablet Take 1 Tablet by mouth two (2) times a day for 7 days. Qty: 14 Tablet, Refills: 0  Start date: 11/22/2022, End date: 11/29/2022      benzonatate (Tessalon Perles) 100 mg capsule Take 1 Capsule by mouth three (3) times daily as needed for Cough for up to 7 days. Qty: 30 Capsule, Refills: 0  Start date: 11/22/2022, End date: 11/29/2022           2. Follow-up Information       Follow up With Specialties Details Why Contact Franck Nuñez MD Family Medicine Schedule an appointment as soon as possible for a visit   [address] Lisa Ortiz [address] [place] [id number] [phone]      \[facility] [place] [facility]\"" EMERGENCY DEPT Emergency Medicine  As needed, If symptoms worsen [address]  [address] [facility]  [phone]          Return to ED if worse     Diagnosis     Clinical Impression:   1. Influenza B            Please note that this dictation was completed with [employer], the [employer] voice recognition software.   Quite often unanticipated grammatical, syntax, homophones, and other interpretive errors are inadvertently transcribed by the computer software. Please disregard these errors.   Please excuse any errors that have escaped final proofreading

## 2022-11-24 ENCOUNTER — APPOINTMENT (OUTPATIENT)
Dept: INFUSION THERAPY | Age: 62
End: 2022-11-24
Payer: MEDICAID

## 2022-11-28 ENCOUNTER — TELEPHONE (OUTPATIENT)
Dept: ONCOLOGY | Age: 62
End: 2022-11-28

## 2022-11-28 ENCOUNTER — APPOINTMENT (OUTPATIENT)
Dept: INFUSION THERAPY | Age: 62
End: 2022-11-28
Payer: MEDICAID

## 2022-11-28 NOTE — TELEPHONE ENCOUNTER
Called patient and confirmed x2 identifiers   Patient stated symptoms have improved and confirmed upcoming appointments   No new questions or concerns at this time

## 2022-11-29 ENCOUNTER — OFFICE VISIT (OUTPATIENT)
Dept: ONCOLOGY | Age: 62
End: 2022-11-29

## 2022-11-29 ENCOUNTER — HOSPITAL ENCOUNTER (OUTPATIENT)
Dept: INFUSION THERAPY | Age: 62
Discharge: HOME | End: 2022-11-29
Payer: MEDICAID

## 2022-11-29 VITALS
BODY MASS INDEX: 36.18 KG/M2 | OXYGEN SATURATION: 98 % | SYSTOLIC BLOOD PRESSURE: 117 MMHG | WEIGHT: 231 LBS | DIASTOLIC BLOOD PRESSURE: 72 MMHG | HEART RATE: 100 BPM | TEMPERATURE: 97.6 F | RESPIRATION RATE: 18 BRPM

## 2022-11-29 VITALS
HEART RATE: 98 BPM | SYSTOLIC BLOOD PRESSURE: 129 MMHG | BODY MASS INDEX: 36.46 KG/M2 | TEMPERATURE: 97.6 F | WEIGHT: 232.8 LBS | DIASTOLIC BLOOD PRESSURE: 76 MMHG | OXYGEN SATURATION: 98 % | RESPIRATION RATE: 18 BRPM

## 2022-11-29 DIAGNOSIS — C34.90 PRIMARY ADENOCARCINOMA OF LUNG, UNSPECIFIED LATERALITY (HCC): Primary | ICD-10-CM

## 2022-11-29 DIAGNOSIS — C79.70 NSCLC METASTATIC TO ADRENAL GLAND (HCC): Primary | ICD-10-CM

## 2022-11-29 DIAGNOSIS — Z95.828 PORT-A-CATH IN PLACE: ICD-10-CM

## 2022-11-29 DIAGNOSIS — C34.90 NSCLC METASTATIC TO ADRENAL GLAND (HCC): Primary | ICD-10-CM

## 2022-11-29 DIAGNOSIS — C34.90 NSCLC METASTATIC TO ADRENAL GLAND (HCC): ICD-10-CM

## 2022-11-29 DIAGNOSIS — Z51.11 ENCOUNTER FOR ANTINEOPLASTIC CHEMOTHERAPY AND IMMUNOTHERAPY: ICD-10-CM

## 2022-11-29 DIAGNOSIS — Z51.12 ENCOUNTER FOR ANTINEOPLASTIC CHEMOTHERAPY AND IMMUNOTHERAPY: ICD-10-CM

## 2022-11-29 DIAGNOSIS — C79.70 NSCLC METASTATIC TO ADRENAL GLAND (HCC): ICD-10-CM

## 2022-11-29 LAB
ALBUMIN SERPL-MCNC: 2.9 G/DL (ref 3.5–5)
ALBUMIN/GLOB SERPL: 0.6 {RATIO} (ref 1.1–2.2)
ALP SERPL-CCNC: 188 U/L (ref 45–117)
ALT SERPL-CCNC: 31 U/L (ref 12–78)
ANION GAP SERPL CALC-SCNC: 8 MMOL/L (ref 5–15)
AST SERPL-CCNC: 21 U/L (ref 15–37)
BASOPHILS # BLD: 0 K/UL (ref 0–0.1)
BASOPHILS NFR BLD: 0 % (ref 0–1)
BILIRUB SERPL-MCNC: 0.6 MG/DL (ref 0.2–1)
BUN SERPL-MCNC: 15 MG/DL (ref 6–20)
BUN/CREAT SERPL: 16 (ref 12–20)
CALCIUM SERPL-MCNC: 9.2 MG/DL (ref 8.5–10.1)
CHLORIDE SERPL-SCNC: 105 MMOL/L (ref 97–108)
CO2 SERPL-SCNC: 25 MMOL/L (ref 21–32)
CREAT SERPL-MCNC: 0.93 MG/DL (ref 0.55–1.02)
DIFFERENTIAL METHOD BLD: ABNORMAL
EOSINOPHIL # BLD: 0 K/UL (ref 0–0.4)
EOSINOPHIL NFR BLD: 0 % (ref 0–7)
ERYTHROCYTE [DISTWIDTH] IN BLOOD BY AUTOMATED COUNT: 15 % (ref 11.5–14.5)
GLOBULIN SER CALC-MCNC: 4.9 G/DL (ref 2–4)
GLUCOSE SERPL-MCNC: 210 MG/DL (ref 65–100)
HCT VFR BLD AUTO: 34.1 % (ref 35–47)
HGB BLD-MCNC: 10.9 G/DL (ref 11.5–16)
IMM GRANULOCYTES # BLD AUTO: 0.1 K/UL (ref 0–0.04)
IMM GRANULOCYTES NFR BLD AUTO: 1 % (ref 0–0.5)
LYMPHOCYTES # BLD: 0.7 K/UL (ref 0.8–3.5)
LYMPHOCYTES NFR BLD: 9 % (ref 12–49)
MCH RBC QN AUTO: 28.2 PG (ref 26–34)
MCHC RBC AUTO-ENTMCNC: 32 G/DL (ref 30–36.5)
MCV RBC AUTO: 88.1 FL (ref 80–99)
MONOCYTES # BLD: 0.2 K/UL (ref 0–1)
MONOCYTES NFR BLD: 2 % (ref 5–13)
NEUTS SEG # BLD: 6.9 K/UL (ref 1.8–8)
NEUTS SEG NFR BLD: 88 % (ref 32–75)
NRBC # BLD: 0 K/UL (ref 0–0.01)
NRBC BLD-RTO: 0 PER 100 WBC
PLATELET # BLD AUTO: 621 K/UL (ref 150–400)
PMV BLD AUTO: 9.4 FL (ref 8.9–12.9)
POTASSIUM SERPL-SCNC: 4.2 MMOL/L (ref 3.5–5.1)
PROT SERPL-MCNC: 7.8 G/DL (ref 6.4–8.2)
RBC # BLD AUTO: 3.87 M/UL (ref 3.8–5.2)
RBC MORPH BLD: ABNORMAL
SODIUM SERPL-SCNC: 138 MMOL/L (ref 136–145)
WBC # BLD AUTO: 7.9 K/UL (ref 3.6–11)

## 2022-11-29 PROCEDURE — 74011000258 HC RX REV CODE- 258: Performed by: INTERNAL MEDICINE

## 2022-11-29 PROCEDURE — 74011250636 HC RX REV CODE- 250/636: Performed by: INTERNAL MEDICINE

## 2022-11-29 PROCEDURE — 96375 TX/PRO/DX INJ NEW DRUG ADDON: CPT

## 2022-11-29 PROCEDURE — 74011000250 HC RX REV CODE- 250: Performed by: INTERNAL MEDICINE

## 2022-11-29 PROCEDURE — 80053 COMPREHEN METABOLIC PANEL: CPT

## 2022-11-29 PROCEDURE — 96413 CHEMO IV INFUSION 1 HR: CPT

## 2022-11-29 PROCEDURE — 36415 COLL VENOUS BLD VENIPUNCTURE: CPT

## 2022-11-29 PROCEDURE — 96417 CHEMO IV INFUS EACH ADDL SEQ: CPT

## 2022-11-29 PROCEDURE — 85025 COMPLETE CBC W/AUTO DIFF WBC: CPT

## 2022-11-29 PROCEDURE — 77030012965 HC NDL HUBR BBMI -A

## 2022-11-29 PROCEDURE — 99215 OFFICE O/P EST HI 40 MIN: CPT | Performed by: INTERNAL MEDICINE

## 2022-11-29 RX ORDER — ACETAMINOPHEN 325 MG/1
650 TABLET ORAL AS NEEDED
Status: ACTIVE | OUTPATIENT
Start: 2022-11-29 | End: 2022-11-29

## 2022-11-29 RX ORDER — ONDANSETRON 2 MG/ML
8 INJECTION INTRAMUSCULAR; INTRAVENOUS AS NEEDED
Status: ACTIVE | OUTPATIENT
Start: 2022-11-29 | End: 2022-11-29

## 2022-11-29 RX ORDER — DIPHENHYDRAMINE HYDROCHLORIDE 50 MG/ML
25 INJECTION, SOLUTION INTRAMUSCULAR; INTRAVENOUS AS NEEDED
Status: ACTIVE | OUTPATIENT
Start: 2022-11-29 | End: 2022-11-29

## 2022-11-29 RX ORDER — HEPARIN 100 UNIT/ML
300-500 SYRINGE INTRAVENOUS AS NEEDED
Status: ACTIVE | OUTPATIENT
Start: 2022-11-29 | End: 2022-11-29

## 2022-11-29 RX ORDER — HYDROCORTISONE SODIUM SUCCINATE 100 MG/2ML
100 INJECTION, POWDER, FOR SOLUTION INTRAMUSCULAR; INTRAVENOUS AS NEEDED
Status: ACTIVE | OUTPATIENT
Start: 2022-11-29 | End: 2022-11-29

## 2022-11-29 RX ORDER — SODIUM CHLORIDE 0.9 % (FLUSH) 0.9 %
10 SYRINGE (ML) INJECTION AS NEEDED
Status: DISPENSED | OUTPATIENT
Start: 2022-11-29 | End: 2022-11-29

## 2022-11-29 RX ORDER — EPINEPHRINE 1 MG/ML
0.3 INJECTION, SOLUTION, CONCENTRATE INTRAVENOUS AS NEEDED
Status: ACTIVE | OUTPATIENT
Start: 2022-11-29 | End: 2022-11-29

## 2022-11-29 RX ORDER — SODIUM CHLORIDE 9 MG/ML
25 INJECTION, SOLUTION INTRAVENOUS CONTINUOUS
Status: DISPENSED | OUTPATIENT
Start: 2022-11-29 | End: 2022-11-29

## 2022-11-29 RX ORDER — ALBUTEROL SULFATE 0.83 MG/ML
2.5 SOLUTION RESPIRATORY (INHALATION) AS NEEDED
Status: ACTIVE | OUTPATIENT
Start: 2022-11-29 | End: 2022-11-29

## 2022-11-29 RX ORDER — DEXAMETHASONE SODIUM PHOSPHATE 4 MG/ML
8 INJECTION, SOLUTION INTRA-ARTICULAR; INTRALESIONAL; INTRAMUSCULAR; INTRAVENOUS; SOFT TISSUE ONCE
Status: COMPLETED | OUTPATIENT
Start: 2022-11-29 | End: 2022-11-29

## 2022-11-29 RX ORDER — DIPHENHYDRAMINE HYDROCHLORIDE 50 MG/ML
50 INJECTION, SOLUTION INTRAMUSCULAR; INTRAVENOUS AS NEEDED
Status: ACTIVE | OUTPATIENT
Start: 2022-11-29 | End: 2022-11-29

## 2022-11-29 RX ORDER — CYANOCOBALAMIN 1000 UG/ML
1000 INJECTION, SOLUTION INTRAMUSCULAR; SUBCUTANEOUS
Status: DISCONTINUED | OUTPATIENT
Start: 2022-11-29 | End: 2022-11-29

## 2022-11-29 RX ORDER — SODIUM CHLORIDE 9 MG/ML
10 INJECTION INTRAMUSCULAR; INTRAVENOUS; SUBCUTANEOUS AS NEEDED
Status: ACTIVE | OUTPATIENT
Start: 2022-11-29 | End: 2022-11-29

## 2022-11-29 RX ADMIN — DEXAMETHASONE SODIUM PHOSPHATE 8 MG: 4 INJECTION, SOLUTION INTRAMUSCULAR; INTRAVENOUS at 13:00

## 2022-11-29 RX ADMIN — SODIUM CHLORIDE 25 ML/HR: 9 INJECTION, SOLUTION INTRAVENOUS at 12:07

## 2022-11-29 RX ADMIN — PEMETREXED DISODIUM 1100 MG: 100 INJECTION, POWDER, LYOPHILIZED, FOR SOLUTION INTRAVENOUS at 13:08

## 2022-11-29 RX ADMIN — SODIUM CHLORIDE 200 MG: 9 INJECTION, SOLUTION INTRAVENOUS at 12:24

## 2022-11-29 RX ADMIN — HEPARIN 500 UNITS: 100 SYRINGE at 13:23

## 2022-11-29 RX ADMIN — SODIUM CHLORIDE, PRESERVATIVE FREE 10 ML: 5 INJECTION INTRAVENOUS at 13:21

## 2022-11-29 NOTE — PROGRESS NOTES
Ethan Abrams is a 60 y.o. female    Chief Complaint   Patient presents with    Follow-up     stage IV NSCLC- PDL1- 15%, no  mutations        1. Have you been to the ER, urgent care clinic since your last visit? Hospitalized since your last visit? Yes, [facility]    2. Have you seen or consulted any other health care providers outside of the [address] [place] [address] since your last visit? Include any pap smears or colon screening.  No

## 2022-11-29 NOTE — PROGRESS NOTES
[facility] Progress Note    Date: 2022    Name: Rupert Elizondo    MRN: [id number]         : 1960    Ms. Canada Arrived ambulatory and in no distress for cycle 9 day 1 of Keytruda/Alimta regimen. Follow Up: Proceed with treatment    Assessment was completed and documented in flowsheets. No acute concerns at this time. Port accessed without difficulty, labs drawn and processed. Chemotherapy Flowsheet 2022   Cycle C9   Date 2022   Drug / Regimen Keytruda/Alimta   Dosage -   Pre Meds given   Notes given         Ms. Canada's vitals were reviewed. Patient Vitals for the past 12 hrs:   Temp Pulse Resp BP SpO2   22 1318 -- 98 18 129/76 --   22 0955 97.6 °F (36.4 °C) 100 18 117/72 98 %       Lines:   Venous Access Device 22 Upper chest (subclavicular area, right (Active)   Central Line Being Utilized Yes 22 0955   Criteria for Appropriate Use Irritant/vesicant medication 22 0955   Site Assessment Clean, dry, & intact 22 0955   Date of Last Dressing Change 22 0955   Dressing Status New 22 1320   Dressing Type Bandaid 22 1320   Action Taken Blood drawn 22 1022   Date Accessed (Medial Site) 22 0955   Access Time (Medial Site) 1000 22 0955   Access Needle Size (Site #1) 20 G 22 0955   Access Needle Length (Medial Site) 1 inch 22 0955   Positive Blood Return (Medial Site) Yes 22 1320   Action Taken (Medial Site) Flushed; De-accessed 22 1320   Alcohol Cap Used Yes 22 09        Lab results were obtained and reviewed. Labs within parameter for treatment.    Recent Results (from the past 12 hour(s))   CBC WITH AUTOMATED DIFF    Collection Time: 22  9:59 AM   Result Value Ref Range    WBC 7.9 3.6 - 11.0 K/uL    RBC 3.87 3.80 - 5.20 M/uL    HGB 10.9 (L) 11.5 - 16.0 g/dL    HCT 34.1 (L) 35.0 - 47.0 %    MCV 88.1 80.0 - 99.0 FL    MCH 28.2 26.0 - 34.0 PG    MCHC 32.0 30.0 - 36.5 g/dL    RDW 15.0 (H) 11.5 - 14.5 %    PLATELET 029 (H) 412 - 400 K/uL    MPV 9.4 8.9 - 12.9 FL    NRBC 0.0 0  WBC    ABSOLUTE NRBC 0.00 0.00 - 0.01 K/uL    NEUTROPHILS 88 (H) 32 - 75 %    LYMPHOCYTES 9 (L) 12 - 49 %    MONOCYTES 2 (L) 5 - 13 %    EOSINOPHILS 0 0 - 7 %    BASOPHILS 0 0 - 1 %    IMMATURE GRANULOCYTES 1 (H) 0.0 - 0.5 %    ABS. NEUTROPHILS 6.9 1.8 - 8.0 K/UL    ABS. LYMPHOCYTES 0.7 (L) 0.8 - 3.5 K/UL    ABS. MONOCYTES 0.2 0.0 - 1.0 K/UL    ABS. EOSINOPHILS 0.0 0.0 - 0.4 K/UL    ABS. BASOPHILS 0.0 0.0 - 0.1 K/UL    ABS. IMM. GRANS. 0.1 (H) 0.00 - 0.04 K/UL    DF SMEAR SCANNED      RBC COMMENTS NORMOCYTIC, NORMOCHROMIC     METABOLIC PANEL, COMPREHENSIVE    Collection Time: 11/29/22  9:59 AM   Result Value Ref Range    Sodium 138 136 - 145 mmol/L    Potassium 4.2 3.5 - 5.1 mmol/L    Chloride 105 97 - 108 mmol/L    CO2 25 21 - 32 mmol/L    Anion gap 8 5 - 15 mmol/L    Glucose 210 (H) 65 - 100 mg/dL    BUN 15 6 - 20 MG/DL    Creatinine 0.93 0.55 - 1.02 MG/DL    BUN/Creatinine ratio 16 12 - 20      eGFR >60 >60 ml/min/1.73m2    Calcium 9.2 8.5 - 10.1 MG/DL    Bilirubin, total 0.6 0.2 - 1.0 MG/DL    ALT (SGPT) 31 12 - 78 U/L    AST (SGOT) 21 15 - 37 U/L    Alk. phosphatase 188 (H) 45 - 117 U/L    Protein, total 7.8 6.4 - 8.2 g/dL    Albumin 2.9 (L) 3.5 - 5.0 g/dL    Globulin 4.9 (H) 2.0 - 4.0 g/dL    A-G Ratio 0.6 (L) 1.1 - 2.2         Pre-medications  were administered as ordered and chemotherapy was initiated.   Medications Administered       0.9% sodium chloride infusion       Admin Date  11/29/2022 Action  New Bag Dose  25 mL/hr Rate  25 mL/hr Route  IntraVENous Administered By  Susana Arm              0.9% sodium chloride injection 10 mL       Admin Date  11/29/2022 Action  Given Dose  10 mL Route  IntraVENous Administered By  Susana Arm              dexamethasone (DECADRON) 4 mg/mL injection 8 mg       Admin Date  11/29/2022 Action  Given Dose  8 mg Route  IntraVENous Administered By  Ann Kellogg              heparin (porcine) pf 300-500 Units       Admin Date  11/29/2022 Action  Given Dose  500 Units Route  InterCATHeter Administered By  Ann Kellogg              pembrolizumab [facility]) 200 mg in 0.9% sodium chloride 100 mL, overfill volume 10 mL IVPB       Admin Date  11/29/2022 Action  New Bag Dose  200 mg Rate  236 mL/hr Route  IntraVENous Administered By  Ann Kellogg              PEMEtrexed disodium (ALIMTA) 1,100 mg in 0.9% sodium chloride 100 mL, overfill volume 10 mL chemo infusion       Admin Date  11/29/2022 Action  New Bag Dose  1,100 mg Route  IntraVENous Administered By  Ann Kellogg                    Two nurses verified prior to administering: Drug name, Drug dose, Infusion volume or drug volume when prepared in a syringe, Rate of administration, Route of administration, Expiration dates and/or times, Appearance and physical integrity of the drugs, Rate set on infusion pump, when used, and Sequencing of drug administration. Medication education and side effect management reinforced with patient. They verbalized understanding. Ms. Zara Espinosa tolerated treatment well, port flushed and de accessed, patient was discharged from [place] [address] in stable condition. Patient is aware of upcoming appointments.       Future Appointments   Date Time Provider Raymond Gonzales   12/22/2022 10:30 AM B3 [facility] [address] [place] [address]   1/12/2023 10:00 AM A1 [facility] [address] [place] [address]   2/2/2023 10:00 AM F4 [facility] [address] [place] [address]   2/23/2023 10:00 AM F4 [facility] [address]  November 29, 2022

## 2022-11-29 NOTE — PROGRESS NOTES
[facility] [place] [facility] [place] [address] [address], [address], [place]: [phone]  F: [phone]    Reason for visit   Sarah Robles is a 60 y.o. female who is seen for follow up of stage IV NSCLC- PDL1- 15%, no  mutations     Treatment History:   5/4/2022: Left thoracentecis- Adenocarcinoma   6/9/2022- 8/11/2022- current: Caroplatin+ Alimta+Keytruda  8/2022- CT with response  9/1/2022: Alimta Keytruda  CT 11/2022: stable     History of Present Illness:   Patient is a 57-year-old female with diabetes mellitus, hypertension who presented to the [facility] emergency room on 5/4/2022 with complaints of shortness of breath x 14 days which has been progressively getting worse over several weeks with mild cough. Denies any headaches, vision changes, falls trouble swallowing, chest pain, fevers, weight loss, hemoptysis. CTA on admission showed no evidence of pulmonary embolism but she was noted to have a moderate to large left-sided pleural effusion with a left apical mass measuring 28 x 20 mm, she had small pulmonary nodules on the right, possible hepatic hypodensity. She had ultrasound-guided thoracentesis of the left side on 5/4/2022 and 1300 cc of fluid was aspirated. This was sent for cytology which was positive. Pleural fluid cytology showed more than 100 RBCs, total protein of 5.3, albumin 2.8, . Labs were noted for a bilirubin of 1.1. Comes today for cycle 11 of Alimta + Keytruda. She recently was diagnosed with pneumonia. Has 1 day left of doxycycline. CASTILLO has improved significantly. Energy has been down. Has a rare cough. Normal BMs. Occasional nausea controlled with anti-emetics. Appetite has improved over the past week. No rash or itching. No leg swelling. No other complaints. Jay her  is with her. She used to work for [employer].      Past Medical History:   Diagnosis Date    Diabetes mellitus type 2, noninsulin dependent ([address] Generalized anxiety disorder     Hypercholesterolemia     Hypertension     Neoplasm of major salivary gland       Past Surgical History:   Procedure Laterality Date    COLONOSCOPY N/A 2/24/2021    . COLONOSCOPY  :- performed by Teresa Bernal MD at [facility] ENDOSCOPY    HX ACL RECONSTRUCTION      HX COLONOSCOPY      HX HYSTERECTOMY      IR INSERT CATH PLEURAL INDWELL  5/26/2022    IR INSERT TUNL CVC W PORT OVER 5 YEARS  6/6/2022    IR REMOVE LUNG/PLEURAL DRAIN / CATHETER  5/24/2022      Social History     Tobacco Use    Smoking status: Every Day     Packs/day: 0.50     Years: 20.00     Pack years: 10.00     Types: Cigarettes    Smokeless tobacco: Never   Substance Use Topics    Alcohol use: Yes     Comment: occ      No family history on file. Current Outpatient Medications   Medication Sig    doxycycline (VIBRA-TABS) 100 mg tablet Take 1 Tablet by mouth two (2) times a day for 7 days. benzonatate (Tessalon Perles) 100 mg capsule Take 1 Capsule by mouth three (3) times daily as needed for Cough for up to 7 days. dexAMETHasone (DECADRON) 4 mg tablet Take 1 tablets by mouth twice daily the day before chemotherapy and the day after chemotherapy    folic acid (FOLVITE) 1 mg tablet Take 1 Tablet by mouth daily. pantoprazole (PROTONIX) 40 mg tablet Take 1 Tablet by mouth daily. prochlorperazine (COMPAZINE) 5 mg tablet Take 1 Tablet by mouth every six (6) hours as needed for Nausea or Vomiting. ondansetron (ZOFRAN ODT) 8 mg disintegrating tablet Take 1 Tablet by mouth every eight (8) hours as needed for Nausea or Vomiting.    lidocaine-prilocaine (EMLA) topical cream Apply  to affected area as needed for PRN Reason (Other) (for port access). Apply a thin layer to the port site 30-60 minutes prior to arrival for chemotherapy    amLODIPine (NORVASC) 5 mg tablet Take 5 mg by mouth daily.     ALPRAZolam (XANAX) 0.25 mg tablet Take  by mouth.    metFORMIN (GLUCOPHAGE) 850 mg tablet Take 850 mg by mouth two (2) times daily (with meals). escitalopram oxalate (LEXAPRO) 20 mg tablet Take 20 mg by mouth daily. simvastatin (ZOCOR) 20 mg tablet Take 20 mg by mouth nightly. losartan (COZAAR) 100 mg tablet Take 100 mg by mouth daily. aspirin 81 mg chewable tablet Take 81 mg by mouth daily. acetaminophen (TYLENOL PO) Take 500 mg by mouth every six (6) hours as needed for Pain. No current facility-administered medications for this visit. Allergies   Allergen Reactions    Erythromycin Other (comments)     Stomach cramps    Penicillins Hives        Review of Systems: A complete review of systems was obtained, negative except as described above. Physical Exam:     Visit Vitals  /72 (BP 1 Location: Left upper arm, BP Patient Position: Sitting)   Pulse 100   Temp 97.6 °F (36.4 °C)   Resp 18   Wt 231 lb (104.8 kg)   SpO2 98%   BMI 36.18 kg/m²     ECOG PS: 1  General: No distress  Eyes: PERRL, anicteric sclerae  HENT: Atraumatic  Neck: Supple  Resp: CTAB, normal respiratory effort   GI:nondistended  Skin: No rashes  Psych: Alert, oriented    Results:     Lab Results   Component Value Date/Time    WBC 10.5 11/22/2022 06:05 PM    HGB 9.6 (L) 11/22/2022 06:05 PM    HCT 29.7 (L) 11/22/2022 06:05 PM    PLATELET 295 (H) 15/62/8610 06:05 PM    MCV 87.1 11/22/2022 06:05 PM    ABS.  NEUTROPHILS 7.7 11/22/2022 06:05 PM     Lab Results   Component Value Date/Time    Sodium 135 (L) 11/22/2022 06:05 PM    Potassium 3.6 11/22/2022 06:05 PM    Chloride 102 11/22/2022 06:05 PM    CO2 25 11/22/2022 06:05 PM    Glucose 122 (H) 11/22/2022 06:05 PM    BUN 10 11/22/2022 06:05 PM    Creatinine 0.89 11/22/2022 06:05 PM    GFR est AA >60 09/22/2022 10:09 AM    GFR est non-AA >60 09/22/2022 10:09 AM    Calcium 9.0 11/22/2022 06:05 PM    Glucose (POC) 96 05/24/2022 11:29 AM    Creatinine (POC) 0.9 12/28/2012 06:39 AM     Lab Results   Component Value Date/Time    Bilirubin, total 0.7 11/22/2022 06:05 PM    ALT (SGPT) 39 11/22/2022 06:05 PM Alk. phosphatase 196 (H) 11/22/2022 06:05 PM    Protein, total 7.9 11/22/2022 06:05 PM    Albumin 2.6 (L) 11/22/2022 06:05 PM    Globulin 5.3 (H) 11/22/2022 06:05 PM         Records reviewed and summarized above. Pathology report(s) reviewed above. Radiology report(s) reviewed above. CT chest abdomen pelvis 5/5/2022    IMPRESSION  Diminished left-sided pleural effusion, status post thoracentesis with stable  mass lesion at the left apex. Mass lesion demonstrated at the left apex is not amenable to percutaneous  sampling. There are numerous additional nodular densities along the pleural margin  consistent with pleural carcinomatosis. There are additional small nodular densities demonstrated in the peritoneum,  these most consistent with peritoneal carcinomatosis. Small adrenal nodule on  the left likely an additional metastatic focus. No definitive target for percutaneous sampling. Currently awaiting results of  pleural fluid analysis/cytology and cellblock. MRI brain 5/2022      1. No evidence of intracranial metastases. 2. Minimal chronic microvascular ischemic disease. No acute intracranial  abnormality. 3. Enhancing lesions in the bilateral parotid glands, largest in the left  parotid gland measuring 3.6 cm. Lesions in the right parotid gland were  previously biopsied in 2020 and returned as Warthin tumors. These likely all  represent multiple Warthin tumors    CT 8/2022     1. The mass lesion left lung apex is noted and has mildly decreased. 2. Pleural metastases on left side and decreased in size. Left pleural effusion  has decreased and there is improved aeration in the left lung. 3. Left adrenal nodule is stable. 4. Linear irregular nodular densities in the anterior peritoneum in the right  lower quadrant and midline in the lower abdomen and upper pelvis are noted and  unchanged. CT 11/2022:  IMPRESSION  1. No evidence of pulmonary embolism.   2.  Essentially unchanged left upper lobe lung mass and subpleural thickening  compared to the recent chest CT. 3.  Prominent lower paratracheal mediastinal lymph nodes, none enlarged by size  criteria. 4.  Groundglass opacities in the lower lobes bilaterally may be infectious or  inflammatory. Correlate for infection. Assessment:   1) Non small cell Lung cancer- adenocarcinoma stage IV    PDL1-15%, ALK/EGFR/ROS1/BRAF negative (limited panel)  Liquid bx [id number], TMB low  CT chest abdomen and pelvis from 5/5/2022 was reviewed. Notable for large left-sided pleural effusion, concern for pleural carcinomatosis, left apical lung nodule measuring 20 x 26 mm, left adrenal nodule 19 x 18 mm, peritoneal nodularity concerning for carcinomatosis. Status post left-sided thoracentesis and + cytology   H/o Smoking  Palliative treatments  Completed 4 cycles of Carboplatin+ Alimta+ Keytruda with a response as of 11/2022   Tolerating well  Switched to Alimta and Keytruda maintenance until progression    If and when there is disease progression repeat expanded NGS is indicated    2) Left pleural effusion  S/p thoracentesis x 2, not enough for a drain  Hold off on Pleurx given no recurrence on systemic therapy    3) H/P R parotidectomy for Warthin's tumor   Has persistent multiple Warthin's tumors     4) Depression  On Lexapro    5) Rash  Likely from Alimta  Topical hydrocortisone  Resolved today     6) Recent pneumonia  Symptoms significantly improved    7) Encounter for high risk medications like chemotherapy and high risk disease  Tolerating well  Grade 1 fatigue   Labs pending     Plan:     Proceed with maintenance alimta 500 mg/m2 and Keytruda every 3 weeks until progression  Antiemetics, steroids, b12, FA per protocol  Topical hydrocortisone   Protonix daily  Complete doxycycline tomorrow  Call with worsening symptoms     [facility] q3 weeks  OV in 6 weeks    I appreciate the opportunity to participate in Ms. Shabnam Canada's care.     I personally saw and evaluated the patient and performed the key components of medical decision making. The history, physical exam, and documentation were performed by Dangelo Hoffman NP. I reviewed and verified the above documentation and modified it as needed.     Signed By: Mara Quinteros MD

## 2022-12-22 ENCOUNTER — APPOINTMENT (OUTPATIENT)
Dept: INFUSION THERAPY | Age: 62
End: 2022-12-22
Payer: MEDICAID

## 2022-12-22 RX ORDER — DIPHENHYDRAMINE HYDROCHLORIDE 50 MG/ML
50 INJECTION, SOLUTION INTRAMUSCULAR; INTRAVENOUS AS NEEDED
Status: CANCELLED
Start: 2022-12-29

## 2022-12-22 RX ORDER — EPINEPHRINE 1 MG/ML
0.3 INJECTION, SOLUTION, CONCENTRATE INTRAVENOUS AS NEEDED
Status: CANCELLED | OUTPATIENT
Start: 2022-12-29

## 2022-12-22 RX ORDER — ALBUTEROL SULFATE 0.83 MG/ML
2.5 SOLUTION RESPIRATORY (INHALATION) AS NEEDED
Status: CANCELLED
Start: 2022-12-29

## 2022-12-22 RX ORDER — HYDROCORTISONE SODIUM SUCCINATE 100 MG/2ML
100 INJECTION, POWDER, FOR SOLUTION INTRAMUSCULAR; INTRAVENOUS AS NEEDED
Status: CANCELLED | OUTPATIENT
Start: 2022-12-29

## 2022-12-29 ENCOUNTER — HOSPITAL ENCOUNTER (OUTPATIENT)
Dept: INFUSION THERAPY | Age: 62
Discharge: HOME | End: 2022-12-29
Payer: MEDICAID

## 2022-12-29 VITALS
DIASTOLIC BLOOD PRESSURE: 70 MMHG | SYSTOLIC BLOOD PRESSURE: 132 MMHG | HEART RATE: 87 BPM | HEIGHT: 67 IN | BODY MASS INDEX: 38.08 KG/M2 | OXYGEN SATURATION: 96 % | RESPIRATION RATE: 18 BRPM | WEIGHT: 242.6 LBS

## 2022-12-29 DIAGNOSIS — C34.90 PRIMARY ADENOCARCINOMA OF LUNG, UNSPECIFIED LATERALITY (HCC): Primary | ICD-10-CM

## 2022-12-29 DIAGNOSIS — C34.90 NSCLC METASTATIC TO ADRENAL GLAND (HCC): ICD-10-CM

## 2022-12-29 DIAGNOSIS — C79.70 NSCLC METASTATIC TO ADRENAL GLAND (HCC): ICD-10-CM

## 2022-12-29 LAB
ALBUMIN SERPL-MCNC: 3.6 G/DL (ref 3.5–5)
ALBUMIN/GLOB SERPL: 1 {RATIO} (ref 1.1–2.2)
ALP SERPL-CCNC: 87 U/L (ref 45–117)
ALT SERPL-CCNC: 26 U/L (ref 12–78)
ANION GAP SERPL CALC-SCNC: 9 MMOL/L (ref 5–15)
AST SERPL-CCNC: 21 U/L (ref 15–37)
BASOPHILS # BLD: 0 K/UL (ref 0–0.1)
BASOPHILS NFR BLD: 0 % (ref 0–1)
BILIRUB SERPL-MCNC: 0.9 MG/DL (ref 0.2–1)
BUN SERPL-MCNC: 13 MG/DL (ref 6–20)
BUN/CREAT SERPL: 16 (ref 12–20)
CALCIUM SERPL-MCNC: 8.8 MG/DL (ref 8.5–10.1)
CHLORIDE SERPL-SCNC: 107 MMOL/L (ref 97–108)
CO2 SERPL-SCNC: 23 MMOL/L (ref 21–32)
CREAT SERPL-MCNC: 0.79 MG/DL (ref 0.55–1.02)
DIFFERENTIAL METHOD BLD: ABNORMAL
EOSINOPHIL # BLD: 0 K/UL (ref 0–0.4)
EOSINOPHIL NFR BLD: 0 % (ref 0–7)
ERYTHROCYTE [DISTWIDTH] IN BLOOD BY AUTOMATED COUNT: 17.6 % (ref 11.5–14.5)
GLOBULIN SER CALC-MCNC: 3.5 G/DL (ref 2–4)
GLUCOSE SERPL-MCNC: 172 MG/DL (ref 65–100)
HCT VFR BLD AUTO: 35.4 % (ref 35–47)
HGB BLD-MCNC: 11.1 G/DL (ref 11.5–16)
IMM GRANULOCYTES # BLD AUTO: 0.1 K/UL (ref 0–0.04)
IMM GRANULOCYTES NFR BLD AUTO: 1 % (ref 0–0.5)
LYMPHOCYTES # BLD: 0.7 K/UL (ref 0.8–3.5)
LYMPHOCYTES NFR BLD: 13 % (ref 12–49)
MCH RBC QN AUTO: 27.7 PG (ref 26–34)
MCHC RBC AUTO-ENTMCNC: 31.4 G/DL (ref 30–36.5)
MCV RBC AUTO: 88.3 FL (ref 80–99)
MONOCYTES # BLD: 0.4 K/UL (ref 0–1)
MONOCYTES NFR BLD: 7 % (ref 5–13)
NEUTS SEG # BLD: 4.5 K/UL (ref 1.8–8)
NEUTS SEG NFR BLD: 79 % (ref 32–75)
NRBC # BLD: 0 K/UL (ref 0–0.01)
NRBC BLD-RTO: 0 PER 100 WBC
PLATELET # BLD AUTO: 282 K/UL (ref 150–400)
PMV BLD AUTO: 10 FL (ref 8.9–12.9)
POTASSIUM SERPL-SCNC: 4.1 MMOL/L (ref 3.5–5.1)
PROT SERPL-MCNC: 7.1 G/DL (ref 6.4–8.2)
RBC # BLD AUTO: 4.01 M/UL (ref 3.8–5.2)
RBC MORPH BLD: ABNORMAL
SODIUM SERPL-SCNC: 139 MMOL/L (ref 136–145)
TSH SERPL DL<=0.05 MIU/L-ACNC: 0.46 UIU/ML (ref 0.36–3.74)
WBC # BLD AUTO: 5.7 K/UL (ref 3.6–11)

## 2022-12-29 PROCEDURE — 96417 CHEMO IV INFUS EACH ADDL SEQ: CPT

## 2022-12-29 PROCEDURE — 96413 CHEMO IV INFUSION 1 HR: CPT

## 2022-12-29 PROCEDURE — 74011000258 HC RX REV CODE- 258: Performed by: INTERNAL MEDICINE

## 2022-12-29 PROCEDURE — 36415 COLL VENOUS BLD VENIPUNCTURE: CPT

## 2022-12-29 PROCEDURE — 96411 CHEMO IV PUSH ADDL DRUG: CPT

## 2022-12-29 PROCEDURE — 96375 TX/PRO/DX INJ NEW DRUG ADDON: CPT

## 2022-12-29 PROCEDURE — 77030012965 HC NDL HUBR BBMI -A

## 2022-12-29 PROCEDURE — 80053 COMPREHEN METABOLIC PANEL: CPT

## 2022-12-29 PROCEDURE — 74011250636 HC RX REV CODE- 250/636: Performed by: INTERNAL MEDICINE

## 2022-12-29 PROCEDURE — 84443 ASSAY THYROID STIM HORMONE: CPT

## 2022-12-29 PROCEDURE — 85025 COMPLETE CBC W/AUTO DIFF WBC: CPT

## 2022-12-29 RX ORDER — SODIUM CHLORIDE 9 MG/ML
10 INJECTION INTRAMUSCULAR; INTRAVENOUS; SUBCUTANEOUS AS NEEDED
Status: ACTIVE | OUTPATIENT
Start: 2022-12-29 | End: 2022-12-29

## 2022-12-29 RX ORDER — HEPARIN 100 UNIT/ML
300-500 SYRINGE INTRAVENOUS AS NEEDED
Status: ACTIVE | OUTPATIENT
Start: 2022-12-29 | End: 2022-12-29

## 2022-12-29 RX ORDER — ACETAMINOPHEN 325 MG/1
650 TABLET ORAL AS NEEDED
Status: DISCONTINUED | OUTPATIENT
Start: 2022-12-29 | End: 2022-12-30 | Stop reason: HOSPADM

## 2022-12-29 RX ORDER — DEXAMETHASONE SODIUM PHOSPHATE 4 MG/ML
8 INJECTION, SOLUTION INTRA-ARTICULAR; INTRALESIONAL; INTRAMUSCULAR; INTRAVENOUS; SOFT TISSUE ONCE
Status: COMPLETED | OUTPATIENT
Start: 2022-12-29 | End: 2022-12-29

## 2022-12-29 RX ORDER — CYANOCOBALAMIN 1000 UG/ML
1000 INJECTION, SOLUTION INTRAMUSCULAR; SUBCUTANEOUS
Status: COMPLETED | OUTPATIENT
Start: 2022-12-29 | End: 2022-12-29

## 2022-12-29 RX ORDER — SODIUM CHLORIDE 9 MG/ML
25 INJECTION, SOLUTION INTRAVENOUS CONTINUOUS
Status: DISCONTINUED | OUTPATIENT
Start: 2022-12-29 | End: 2022-12-30 | Stop reason: HOSPADM

## 2022-12-29 RX ORDER — SODIUM CHLORIDE 0.9 % (FLUSH) 0.9 %
10 SYRINGE (ML) INJECTION AS NEEDED
Status: DISPENSED | OUTPATIENT
Start: 2022-12-29 | End: 2022-12-29

## 2022-12-29 RX ORDER — DIPHENHYDRAMINE HYDROCHLORIDE 50 MG/ML
25 INJECTION, SOLUTION INTRAMUSCULAR; INTRAVENOUS AS NEEDED
Status: DISCONTINUED | OUTPATIENT
Start: 2022-12-29 | End: 2022-12-30 | Stop reason: HOSPADM

## 2022-12-29 RX ORDER — ONDANSETRON 2 MG/ML
8 INJECTION INTRAMUSCULAR; INTRAVENOUS AS NEEDED
Status: DISCONTINUED | OUTPATIENT
Start: 2022-12-29 | End: 2022-12-30 | Stop reason: HOSPADM

## 2022-12-29 RX ADMIN — CYANOCOBALAMIN 1000 MCG: 1000 INJECTION, SOLUTION INTRAMUSCULAR at 14:43

## 2022-12-29 RX ADMIN — SODIUM CHLORIDE 25 ML/HR: 9 INJECTION, SOLUTION INTRAVENOUS at 13:32

## 2022-12-29 RX ADMIN — PEMETREXED 1100 MG: 500 INJECTION, POWDER, LYOPHILIZED, FOR SOLUTION INTRAVENOUS at 14:44

## 2022-12-29 RX ADMIN — SODIUM CHLORIDE 200 MG: 9 INJECTION, SOLUTION INTRAVENOUS at 14:11

## 2022-12-29 RX ADMIN — DEXAMETHASONE SODIUM PHOSPHATE 8 MG: 4 INJECTION, SOLUTION INTRAMUSCULAR; INTRAVENOUS at 13:32

## 2022-12-29 NOTE — PROGRESS NOTES
[facility] Short Note                       Date: 2022    Name: Laura Felton    MRN: [id number]         : 1960      1030 Pt admit to [facility] (C10D1) ambulatory in stable condition. Assessment completed. No new concerns voiced. Port accessed by [employer] RN w pos bood return noted. Ms. Viktor Charles vitals were reviewed prior to and after treatment. Patient Vitals for the past 12 hrs:   Pulse Resp BP SpO2   22 1450 -- -- 132/70 --   22 1043 87 18 126/78 96 %         Lab results were obtained and reviewed. Recent Results (from the past 12 hour(s))   CBC WITH AUTOMATED DIFF    Collection Time: 22 10:46 AM   Result Value Ref Range    WBC 5.7 3.6 - 11.0 K/uL    RBC 4.01 3.80 - 5.20 M/uL    HGB 11.1 (L) 11.5 - 16.0 g/dL    HCT 35.4 35.0 - 47.0 %    MCV 88.3 80.0 - 99.0 FL    MCH 27.7 26.0 - 34.0 PG    MCHC 31.4 30.0 - 36.5 g/dL    RDW 17.6 (H) 11.5 - 14.5 %    PLATELET 208 020 - 448 K/uL    MPV 10.0 8.9 - 12.9 FL    NRBC 0.0 0  WBC    ABSOLUTE NRBC 0.00 0.00 - 0.01 K/uL    NEUTROPHILS 79 (H) 32 - 75 %    LYMPHOCYTES 13 12 - 49 %    MONOCYTES 7 5 - 13 %    EOSINOPHILS 0 0 - 7 %    BASOPHILS 0 0 - 1 %    IMMATURE GRANULOCYTES 1 (H) 0.0 - 0.5 %    ABS. NEUTROPHILS 4.5 1.8 - 8.0 K/UL    ABS. LYMPHOCYTES 0.7 (L) 0.8 - 3.5 K/UL    ABS. MONOCYTES 0.4 0.0 - 1.0 K/UL    ABS. EOSINOPHILS 0.0 0.0 - 0.4 K/UL    ABS. BASOPHILS 0.0 0.0 - 0.1 K/UL    ABS. IMM.  GRANS. 0.1 (H) 0.00 - 0.04 K/UL    DF SMEAR SCANNED      RBC COMMENTS ANISOCYTOSIS  1+       METABOLIC PANEL, COMPREHENSIVE    Collection Time: 22 10:46 AM   Result Value Ref Range    Sodium 139 136 - 145 mmol/L    Potassium 4.1 3.5 - 5.1 mmol/L    Chloride 107 97 - 108 mmol/L    CO2 23 21 - 32 mmol/L    Anion gap 9 5 - 15 mmol/L    Glucose 172 (H) 65 - 100 mg/dL    BUN 13 6 - 20 MG/DL    Creatinine 0.79 0.55 - 1.02 MG/DL    BUN/Creatinine ratio 16 12 - 20      eGFR >60 >60 ml/min/1.73m2    Calcium 8.8 8.5 - 10.1 MG/DL    Bilirubin, total 0.9 0.2 - 1.0 MG/DL    ALT (SGPT) 26 12 - 78 U/L    AST (SGOT) 21 15 - 37 U/L    Alk. phosphatase 87 45 - 117 U/L    Protein, total 7.1 6.4 - 8.2 g/dL    Albumin 3.6 3.5 - 5.0 g/dL    Globulin 3.5 2.0 - 4.0 g/dL    A-G Ratio 1.0 (L) 1.1 - 2.2     TSH 3RD GENERATION    Collection Time: 12/29/22 10:46 AM   Result Value Ref Range    TSH 0.46 0.36 - 3.74 uIU/mL       Medications given:   Medications Administered       0.9% sodium chloride infusion       Admin Date  12/29/2022 Action  New Bag Dose  25 mL/hr Rate  25 mL/hr Route  IntraVENous Administered By  [employer] Ivjaison              cyanocobalamin (VITAMIN B12) injection 1,000 mcg       Admin Date  12/29/2022 Action  Given Dose  1,000 mcg Route  IntraMUSCular Administered By  [employer] Carissa              dexamethasone (DECADRON) 4 mg/mL injection 8 mg       Admin Date  12/29/2022 Action  Given Dose  8 mg Route  IntraVENous Administered By  [employer] Ivjaison              pembrolizumab [facility]) 200 mg in 0.9% sodium chloride 100 mL, overfill volume 10 mL IVPB       Admin Date  12/29/2022 Action  New Bag Dose  200 mg Rate  236 mL/hr Route  IntraVENous Administered By  [employer] Ivjaison              PEMEtrexed disodium (ALIMTA) 1,100 mg in 0.9% sodium chloride 100 mL, overfill volume 10 mL chemo infusion       Admin Date  12/29/2022 Action  New Bag Dose  1,100 mg Route  IntraVENous Administered By  Shashi Berry flushed, heparinized and de-accessed per protocol. Ms. Rodriguez Found tolerated the infusions and had no complaints. Ms. Rodriguez Found was discharged from [place] [address] in stable condition.    Pt aware of next appt    Future Appointments   Date Time Provider Raymond Janet   1/19/2023 10:00 AM A1 [facility]   1/19/2023 10:15 AM Rachelle Farris  [address] BS AMB   2/9/2023 10:00 AM A1 [facility] [place] [facility]   3/2/2023 10:30 AM [facility] MED [place] [facility] Randolph Banks  December 29, 2022  3:16 PM

## 2023-01-12 ENCOUNTER — APPOINTMENT (OUTPATIENT)
Dept: INFUSION THERAPY | Age: 63
End: 2023-01-12
Payer: MEDICAID

## 2023-01-12 RX ORDER — ALBUTEROL SULFATE 0.83 MG/ML
2.5 SOLUTION RESPIRATORY (INHALATION) AS NEEDED
Status: CANCELLED
Start: 2023-01-19

## 2023-01-12 RX ORDER — DIPHENHYDRAMINE HYDROCHLORIDE 50 MG/ML
50 INJECTION, SOLUTION INTRAMUSCULAR; INTRAVENOUS AS NEEDED
Status: CANCELLED
Start: 2023-01-19

## 2023-01-12 RX ORDER — HYDROCORTISONE SODIUM SUCCINATE 100 MG/2ML
100 INJECTION, POWDER, FOR SOLUTION INTRAMUSCULAR; INTRAVENOUS AS NEEDED
Status: CANCELLED | OUTPATIENT
Start: 2023-01-19

## 2023-01-12 RX ORDER — DIPHENHYDRAMINE HYDROCHLORIDE 50 MG/ML
25 INJECTION, SOLUTION INTRAMUSCULAR; INTRAVENOUS AS NEEDED
Status: CANCELLED
Start: 2023-01-19

## 2023-01-12 RX ORDER — CYANOCOBALAMIN 1000 UG/ML
1000 INJECTION, SOLUTION INTRAMUSCULAR; SUBCUTANEOUS
Status: CANCELLED | OUTPATIENT
Start: 2023-01-19 | End: 2023-01-20

## 2023-01-12 RX ORDER — EPINEPHRINE 1 MG/ML
0.3 INJECTION, SOLUTION, CONCENTRATE INTRAVENOUS AS NEEDED
Status: CANCELLED | OUTPATIENT
Start: 2023-01-19

## 2023-01-12 RX ORDER — ACETAMINOPHEN 325 MG/1
650 TABLET ORAL AS NEEDED
Status: CANCELLED
Start: 2023-01-19

## 2023-01-12 RX ORDER — ONDANSETRON 2 MG/ML
8 INJECTION INTRAMUSCULAR; INTRAVENOUS AS NEEDED
Status: CANCELLED | OUTPATIENT
Start: 2023-01-19

## 2023-01-12 RX ORDER — SODIUM CHLORIDE 9 MG/ML
10 INJECTION INTRAVENOUS AS NEEDED
Status: CANCELLED | OUTPATIENT
Start: 2023-01-19

## 2023-01-18 NOTE — PROGRESS NOTES
[facility] [place] at [address]   Pat N[place]o, [address], [place]: [phone]  F: [phone]    Reason for visit   Davia Cheadle is a 60 y.o. female who is seen for follow up of stage IV NSCLC- PDL1- 15%, no  mutations     Treatment History:   5/4/2022: Left thoracentecis- Adenocarcinoma   6/9/2022- 8/11/2022- current: Caroplatin+ Alimta+Keytruda  8/2022- CT with response  9/1/2022: Alimta Keytruda  CT 11/2022: stable     History of Present Illness:   Patient is a 79-year-old female with diabetes mellitus, hypertension who presented to the [facility] emergency room on 5/4/2022 with complaints of shortness of breath x 14 days which has been progressively getting worse over several weeks with mild cough. Denies any headaches, vision changes, falls trouble swallowing, chest pain, fevers, weight loss, hemoptysis. CTA on admission showed no evidence of pulmonary embolism but she was noted to have a moderate to large left-sided pleural effusion with a left apical mass measuring 28 x 20 mm, she had small pulmonary nodules on the right, possible hepatic hypodensity. She had ultrasound-guided thoracentesis of the left side on 5/4/2022 and 1300 cc of fluid was aspirated. This was sent for cytology which was positive. Pleural fluid cytology showed more than 100 RBCs, total protein of 5.3, albumin 2.8, . Labs were noted for a bilirubin of 1.1. Comes today for cycle 11 of Alimta + Keytruda. Appetite fair. Sob baseline. Denies chest pain/cough. Denies n/v/d. No rash, headache. No leg swelling. Feels well overall. No other complaints. Jay her  is with her. She used to work for [employer].      Past Medical History:   Diagnosis Date    Diabetes mellitus type 2, noninsulin dependent ([address]     Generalized anxiety disorder     Hypercholesterolemia     Hypertension     Neoplasm of major salivary gland       Past Surgical History:   Procedure Laterality Date    COLONOSCOPY N/A 2/24/2021    . COLONOSCOPY  :- performed by Lady Biswas MD at [facility] ENDOSCOPY    HX ACL RECONSTRUCTION      HX COLONOSCOPY      HX HYSTERECTOMY      IR INSERT CATH PLEURAL INDWELL  5/26/2022    IR INSERT TUNL CVC W PORT OVER 5 YEARS  6/6/2022    IR REMOVE LUNG/PLEURAL DRAIN / CATHETER  5/24/2022      Social History     Tobacco Use    Smoking status: Every Day     Packs/day: 0.50     Years: 20.00     Pack years: 10.00     Types: Cigarettes    Smokeless tobacco: Never   Substance Use Topics    Alcohol use: Yes     Comment: occ      No family history on file. Current Outpatient Medications   Medication Sig    dexAMETHasone (DECADRON) 4 mg tablet Take 1 tablets by mouth twice daily the day before chemotherapy and the day after chemotherapy    folic acid (FOLVITE) 1 mg tablet Take 1 Tablet by mouth daily. pantoprazole (PROTONIX) 40 mg tablet Take 1 Tablet by mouth daily. prochlorperazine (COMPAZINE) 5 mg tablet Take 1 Tablet by mouth every six (6) hours as needed for Nausea or Vomiting. ondansetron (ZOFRAN ODT) 8 mg disintegrating tablet Take 1 Tablet by mouth every eight (8) hours as needed for Nausea or Vomiting.    lidocaine-prilocaine (EMLA) topical cream Apply  to affected area as needed for PRN Reason (Other) (for port access). Apply a thin layer to the port site 30-60 minutes prior to arrival for chemotherapy    amLODIPine (NORVASC) 5 mg tablet Take 5 mg by mouth daily. ALPRAZolam (XANAX) 0.25 mg tablet Take  by mouth.    metFORMIN (GLUCOPHAGE) 850 mg tablet Take 850 mg by mouth two (2) times daily (with meals). escitalopram oxalate (LEXAPRO) 20 mg tablet Take 20 mg by mouth daily. simvastatin (ZOCOR) 20 mg tablet Take 20 mg by mouth nightly. losartan (COZAAR) 100 mg tablet Take 100 mg by mouth daily. aspirin 81 mg chewable tablet Take 81 mg by mouth daily. acetaminophen (TYLENOL PO) Take 500 mg by mouth every six (6) hours as needed for Pain.      No current facility-administered medications for this visit. Allergies   Allergen Reactions    Erythromycin Other (comments)     Stomach cramps    Penicillins Hives        Review of Systems: A complete review of systems was obtained, negative except as described above. Physical Exam:     There were no vitals taken for this visit. ECOG PS: 1  General: No distress  Eyes: PERRL, anicteric sclerae  HENT: Atraumatic  Neck: Supple  Resp: CTAB, normal respiratory effort   GI:nondistended  Skin: No rashes  Psych: Alert, oriented    Results:     Lab Results   Component Value Date/Time    WBC 5.7 12/29/2022 10:46 AM    HGB 11.1 (L) 12/29/2022 10:46 AM    HCT 35.4 12/29/2022 10:46 AM    PLATELET 295 55/09/5670 10:46 AM    MCV 88.3 12/29/2022 10:46 AM    ABS. NEUTROPHILS 4.5 12/29/2022 10:46 AM     Lab Results   Component Value Date/Time    Sodium 139 12/29/2022 10:46 AM    Potassium 4.1 12/29/2022 10:46 AM    Chloride 107 12/29/2022 10:46 AM    CO2 23 12/29/2022 10:46 AM    Glucose 172 (H) 12/29/2022 10:46 AM    BUN 13 12/29/2022 10:46 AM    Creatinine 0.79 12/29/2022 10:46 AM    GFR est AA >60 09/22/2022 10:09 AM    GFR est non-AA >60 09/22/2022 10:09 AM    Calcium 8.8 12/29/2022 10:46 AM    Glucose (POC) 96 05/24/2022 11:29 AM    Creatinine (POC) 0.9 12/28/2012 06:39 AM     Lab Results   Component Value Date/Time    Bilirubin, total 0.9 12/29/2022 10:46 AM    ALT (SGPT) 26 12/29/2022 10:46 AM    Alk. phosphatase 87 12/29/2022 10:46 AM    Protein, total 7.1 12/29/2022 10:46 AM    Albumin 3.6 12/29/2022 10:46 AM    Globulin 3.5 12/29/2022 10:46 AM         Records reviewed and summarized above. Pathology report(s) reviewed above. Radiology report(s) reviewed above. CT chest abdomen pelvis 5/5/2022    IMPRESSION  Diminished left-sided pleural effusion, status post thoracentesis with stable  mass lesion at the left apex. Mass lesion demonstrated at the left apex is not amenable to percutaneous  sampling.    There are numerous additional nodular densities along the pleural margin  consistent with pleural carcinomatosis. There are additional small nodular densities demonstrated in the peritoneum,  these most consistent with peritoneal carcinomatosis. Small adrenal nodule on  the left likely an additional metastatic focus. No definitive target for percutaneous sampling. Currently awaiting results of  pleural fluid analysis/cytology and cellblock. MRI brain 5/2022      1. No evidence of intracranial metastases. 2. Minimal chronic microvascular ischemic disease. No acute intracranial  abnormality. 3. Enhancing lesions in the bilateral parotid glands, largest in the left  parotid gland measuring 3.6 cm. Lesions in the right parotid gland were  previously biopsied in 2020 and returned as Warthin tumors. These likely all  represent multiple Warthin tumors    CT 8/2022     1. The mass lesion left lung apex is noted and has mildly decreased. 2. Pleural metastases on left side and decreased in size. Left pleural effusion  has decreased and there is improved aeration in the left lung. 3. Left adrenal nodule is stable. 4. Linear irregular nodular densities in the anterior peritoneum in the right  lower quadrant and midline in the lower abdomen and upper pelvis are noted and  unchanged. CT 11/2022:  IMPRESSION  1. No evidence of pulmonary embolism. 2.  Essentially unchanged left upper lobe lung mass and subpleural thickening  compared to the recent chest CT. 3.  Prominent lower paratracheal mediastinal lymph nodes, none enlarged by size  criteria. 4.  Groundglass opacities in the lower lobes bilaterally may be infectious or  inflammatory. Correlate for infection. Assessment:   1) Non small cell Lung cancer- adenocarcinoma stage IV    PDL1-15%, ALK/EGFR/ROS1/BRAF negative (limited panel)  Liquid bx [id number], TMB low  CT chest abdomen and pelvis from 5/5/2022 was reviewed.   Notable for large left-sided pleural effusion, concern for pleural carcinomatosis, left apical lung nodule measuring 20 x 26 mm, left adrenal nodule 19 x 18 mm, peritoneal nodularity concerning for carcinomatosis. Status post left-sided thoracentesis and + cytology   H/o Smoking  Palliative treatments  Completed 4 cycles of Carboplatin+ Alimta+ Keytruda with a response as of 11/2022   Tolerating well  Switched to Alimta and Keytruda maintenance until progression, tolerating well     If and when there is disease progression repeat expanded NGS is indicated    2) Left pleural effusion  S/p thoracentesis x 2, not enough for a drain  Hold off on Pleurx given no recurrence on systemic therapy    3) H/P R parotidectomy for Warthin's tumor   Has persistent multiple Warthin's tumors     4) Depression  On Lexapro    5) Rash  Likely from Alimta  Topical hydrocortisone  Resolved today     6) Encounter for high risk medications like chemotherapy and high risk disease  Tolerating well  Grade 1 fatigue   Labs pending     Plan:     Proceed with maintenance alimta 500 mg/m2 and Keytruda every 3 weeks until progression  Antiemetics, steroids, b12, FA per protocol  Topical hydrocortisone   Protonix daily  CAP CT scans ordered      [facility] q3 weeks  OV in 6 weeks    I appreciate the opportunity to participate in Ms. Billie Canada's care. I personally saw and evaluated the patient and performed the key components of medical decision making. The history, physical exam, and documentation were performed by Nury Schmitt NP.   I reviewed and verified the above documentation and modified it as needed      Signed By: Ksenia Melchor NP

## 2023-01-19 ENCOUNTER — HOSPITAL ENCOUNTER (OUTPATIENT)
Dept: INFUSION THERAPY | Age: 63
Discharge: HOME | End: 2023-01-19
Payer: MEDICAID

## 2023-01-19 ENCOUNTER — OFFICE VISIT (OUTPATIENT)
Dept: ONCOLOGY | Age: 63
End: 2023-01-19
Payer: MEDICAID

## 2023-01-19 VITALS
DIASTOLIC BLOOD PRESSURE: 71 MMHG | SYSTOLIC BLOOD PRESSURE: 128 MMHG | OXYGEN SATURATION: 96 % | HEIGHT: 67 IN | BODY MASS INDEX: 38.2 KG/M2 | HEART RATE: 82 BPM | WEIGHT: 243.4 LBS | TEMPERATURE: 98.5 F | RESPIRATION RATE: 18 BRPM

## 2023-01-19 VITALS
BODY MASS INDEX: 38.06 KG/M2 | OXYGEN SATURATION: 96 % | TEMPERATURE: 98.5 F | RESPIRATION RATE: 18 BRPM | SYSTOLIC BLOOD PRESSURE: 136 MMHG | WEIGHT: 243 LBS | DIASTOLIC BLOOD PRESSURE: 81 MMHG | HEART RATE: 90 BPM

## 2023-01-19 DIAGNOSIS — C79.70 NSCLC METASTATIC TO ADRENAL GLAND (HCC): Primary | ICD-10-CM

## 2023-01-19 DIAGNOSIS — C34.90 NSCLC METASTATIC TO ADRENAL GLAND (HCC): ICD-10-CM

## 2023-01-19 DIAGNOSIS — C79.70 NSCLC METASTATIC TO ADRENAL GLAND (HCC): ICD-10-CM

## 2023-01-19 DIAGNOSIS — C34.90 NSCLC METASTATIC TO ADRENAL GLAND (HCC): Primary | ICD-10-CM

## 2023-01-19 DIAGNOSIS — C34.90 PRIMARY ADENOCARCINOMA OF LUNG, UNSPECIFIED LATERALITY (HCC): Primary | ICD-10-CM

## 2023-01-19 DIAGNOSIS — Z51.12 ENCOUNTER FOR ANTINEOPLASTIC CHEMOTHERAPY AND IMMUNOTHERAPY: ICD-10-CM

## 2023-01-19 DIAGNOSIS — Z51.11 ENCOUNTER FOR ANTINEOPLASTIC CHEMOTHERAPY AND IMMUNOTHERAPY: ICD-10-CM

## 2023-01-19 DIAGNOSIS — Z95.828 PORT-A-CATH IN PLACE: ICD-10-CM

## 2023-01-19 LAB
ALBUMIN SERPL-MCNC: 3.6 G/DL (ref 3.5–5)
ALBUMIN/GLOB SERPL: 1 (ref 1.1–2.2)
ALP SERPL-CCNC: 87 U/L (ref 45–117)
ALT SERPL-CCNC: 31 U/L (ref 12–78)
ANION GAP SERPL CALC-SCNC: 7 MMOL/L (ref 5–15)
AST SERPL-CCNC: 22 U/L (ref 15–37)
BASOPHILS # BLD: 0 K/UL (ref 0–0.1)
BASOPHILS NFR BLD: 0 % (ref 0–1)
BILIRUB SERPL-MCNC: 0.8 MG/DL (ref 0.2–1)
BUN SERPL-MCNC: 12 MG/DL (ref 6–20)
BUN/CREAT SERPL: 13 (ref 12–20)
CALCIUM SERPL-MCNC: 9.4 MG/DL (ref 8.5–10.1)
CHLORIDE SERPL-SCNC: 107 MMOL/L (ref 97–108)
CO2 SERPL-SCNC: 26 MMOL/L (ref 21–32)
CREAT SERPL-MCNC: 0.94 MG/DL (ref 0.55–1.02)
DIFFERENTIAL METHOD BLD: ABNORMAL
EOSINOPHIL # BLD: 0 K/UL (ref 0–0.4)
EOSINOPHIL NFR BLD: 0 % (ref 0–7)
ERYTHROCYTE [DISTWIDTH] IN BLOOD BY AUTOMATED COUNT: 17.2 % (ref 11.5–14.5)
GLOBULIN SER CALC-MCNC: 3.5 G/DL (ref 2–4)
GLUCOSE SERPL-MCNC: 187 MG/DL (ref 65–100)
HCT VFR BLD AUTO: 36.2 % (ref 35–47)
HGB BLD-MCNC: 11.3 G/DL (ref 11.5–16)
IMM GRANULOCYTES # BLD AUTO: 0.1 K/UL (ref 0–0.04)
IMM GRANULOCYTES NFR BLD AUTO: 1 % (ref 0–0.5)
LYMPHOCYTES # BLD: 0.7 K/UL (ref 0.8–3.5)
LYMPHOCYTES NFR BLD: 10 % (ref 12–49)
MCH RBC QN AUTO: 27.4 PG (ref 26–34)
MCHC RBC AUTO-ENTMCNC: 31.2 G/DL (ref 30–36.5)
MCV RBC AUTO: 87.9 FL (ref 80–99)
MONOCYTES # BLD: 0.7 K/UL (ref 0–1)
MONOCYTES NFR BLD: 10 % (ref 5–13)
NEUTS SEG # BLD: 5.5 K/UL (ref 1.8–8)
NEUTS SEG NFR BLD: 79 % (ref 32–75)
NRBC # BLD: 0 K/UL (ref 0–0.01)
NRBC BLD-RTO: 0 PER 100 WBC
PLATELET # BLD AUTO: 396 K/UL (ref 150–400)
PMV BLD AUTO: 9.4 FL (ref 8.9–12.9)
POTASSIUM SERPL-SCNC: 3.9 MMOL/L (ref 3.5–5.1)
PROT SERPL-MCNC: 7.1 G/DL (ref 6.4–8.2)
RBC # BLD AUTO: 4.12 M/UL (ref 3.8–5.2)
RBC MORPH BLD: ABNORMAL
SODIUM SERPL-SCNC: 140 MMOL/L (ref 136–145)
WBC # BLD AUTO: 7 K/UL (ref 3.6–11)

## 2023-01-19 PROCEDURE — 36415 COLL VENOUS BLD VENIPUNCTURE: CPT

## 2023-01-19 PROCEDURE — 77030012965 HC NDL HUBR BBMI -A

## 2023-01-19 PROCEDURE — 74011000250 HC RX REV CODE- 250: Performed by: INTERNAL MEDICINE

## 2023-01-19 PROCEDURE — 96375 TX/PRO/DX INJ NEW DRUG ADDON: CPT

## 2023-01-19 PROCEDURE — 85025 COMPLETE CBC W/AUTO DIFF WBC: CPT

## 2023-01-19 PROCEDURE — 96413 CHEMO IV INFUSION 1 HR: CPT

## 2023-01-19 PROCEDURE — 74011000258 HC RX REV CODE- 258: Performed by: INTERNAL MEDICINE

## 2023-01-19 PROCEDURE — 74011250636 HC RX REV CODE- 250/636: Performed by: INTERNAL MEDICINE

## 2023-01-19 PROCEDURE — 99212 OFFICE O/P EST SF 10 MIN: CPT | Performed by: NURSE PRACTITIONER

## 2023-01-19 PROCEDURE — 80053 COMPREHEN METABOLIC PANEL: CPT

## 2023-01-19 PROCEDURE — 96411 CHEMO IV PUSH ADDL DRUG: CPT

## 2023-01-19 RX ORDER — SODIUM CHLORIDE 0.9 % (FLUSH) 0.9 %
10 SYRINGE (ML) INJECTION AS NEEDED
Status: DISPENSED | OUTPATIENT
Start: 2023-01-19 | End: 2023-01-19

## 2023-01-19 RX ORDER — HEPARIN 100 UNIT/ML
300-500 SYRINGE INTRAVENOUS AS NEEDED
Status: ACTIVE | OUTPATIENT
Start: 2023-01-19 | End: 2023-01-19

## 2023-01-19 RX ORDER — SODIUM CHLORIDE 9 MG/ML
25 INJECTION, SOLUTION INTRAVENOUS CONTINUOUS
Status: DISCONTINUED | OUTPATIENT
Start: 2023-01-19 | End: 2023-01-20 | Stop reason: HOSPADM

## 2023-01-19 RX ORDER — DEXAMETHASONE SODIUM PHOSPHATE 4 MG/ML
8 INJECTION, SOLUTION INTRA-ARTICULAR; INTRALESIONAL; INTRAMUSCULAR; INTRAVENOUS; SOFT TISSUE ONCE
Status: COMPLETED | OUTPATIENT
Start: 2023-01-19 | End: 2023-01-19

## 2023-01-19 RX ADMIN — HEPARIN 500 UNITS: 100 SYRINGE at 14:06

## 2023-01-19 RX ADMIN — PEMETREXED DISODIUM 1100 MG: 500 INJECTION, POWDER, LYOPHILIZED, FOR SOLUTION INTRAVENOUS at 13:49

## 2023-01-19 RX ADMIN — SODIUM CHLORIDE, PRESERVATIVE FREE 10 ML: 5 INJECTION INTRAVENOUS at 14:05

## 2023-01-19 RX ADMIN — SODIUM CHLORIDE 25 ML/HR: 9 INJECTION, SOLUTION INTRAVENOUS at 12:17

## 2023-01-19 RX ADMIN — SODIUM CHLORIDE 200 MG: 9 INJECTION, SOLUTION INTRAVENOUS at 13:05

## 2023-01-19 RX ADMIN — DEXAMETHASONE SODIUM PHOSPHATE 8 MG: 4 INJECTION, SOLUTION INTRAMUSCULAR; INTRAVENOUS at 13:40

## 2023-01-19 NOTE — PROGRESS NOTES
[facility] Progress Note    Date: 2023    Name: Renae Slaughter    MRN: [id number]         : 1960    Ms. Canada Arrived ambulatory and in no distress for cycle 11 day 1 of Keytruda/Alimta regimen. Follow Up: Proceed with treatment    Assessment was completed and documented in flowsheets. No acute concerns at this time. Port accessed without difficulty, labs drawn and processed. Chemotherapy Flowsheet 2023   Cycle C11   Date 2023   Drug / Regimen Keytruda/Alimta   Dosage -   Pre Meds given   Notes given         Ms. Canada's vitals were reviewed. Patient Vitals for the past 12 hrs:   Temp Pulse Resp BP SpO2   23 1404 -- 82 -- 128/71 --   23 1005 98.5 °F (36.9 °C) 90 18 136/81 96 %       Lines:   Venous Access Device 22 Upper chest (subclavicular area, right (Active)   Central Line Being Utilized Yes 23 1010   Criteria for Appropriate Use Irritant/vesicant medication 23 1010   Site Assessment Clean, dry, & intact 23 1010   Date of Last Dressing Change 22 0955   Dressing Status New;Clean, dry, & intact 23 1010   Dressing Type Bandaid 23 1406   Action Taken Blood drawn 23 1010   Date Accessed (Medial Site) 23 1010   Access Time (Medial Site) 1000 22 0955   Access Needle Size (Site #1) 20 G 23 1010   Access Needle Length (Medial Site) 1 inch 23 1010   Positive Blood Return (Medial Site) Yes 23 1406   Action Taken (Medial Site) Flushed; De-accessed 23 1406   Alcohol Cap Used Yes 23 1010        Lab results were obtained and reviewed. Labs within parameter for treatment.    Recent Results (from the past 12 hour(s))   CBC WITH AUTOMATED DIFF    Collection Time: 23 10:18 AM   Result Value Ref Range    WBC 7.0 3.6 - 11.0 K/uL    RBC 4.12 3.80 - 5.20 M/uL    HGB 11.3 (L) 11.5 - 16.0 g/dL    HCT 36.2 35.0 - 47.0 %    MCV 87.9 80.0 - 99.0 FL    MCH 27.4 26.0 - 34.0 PG    MCHC 31.2 30.0 - 36.5 g/dL    RDW 17.2 (H) 11.5 - 14.5 %    PLATELET 775 119 - 368 K/uL    MPV 9.4 8.9 - 12.9 FL    NRBC 0.0 0  WBC    ABSOLUTE NRBC 0.00 0.00 - 0.01 K/uL    NEUTROPHILS 79 (H) 32 - 75 %    LYMPHOCYTES 10 (L) 12 - 49 %    MONOCYTES 10 5 - 13 %    EOSINOPHILS 0 0 - 7 %    BASOPHILS 0 0 - 1 %    IMMATURE GRANULOCYTES 1 (H) 0.0 - 0.5 %    ABS. NEUTROPHILS 5.5 1.8 - 8.0 K/UL    ABS. LYMPHOCYTES 0.7 (L) 0.8 - 3.5 K/UL    ABS. MONOCYTES 0.7 0.0 - 1.0 K/UL    ABS. EOSINOPHILS 0.0 0.0 - 0.4 K/UL    ABS. BASOPHILS 0.0 0.0 - 0.1 K/UL    ABS. IMM. GRANS. 0.1 (H) 0.00 - 0.04 K/UL    DF SMEAR SCANNED      RBC COMMENTS ANISOCYTOSIS  1+       METABOLIC PANEL, COMPREHENSIVE    Collection Time: 01/19/23 10:18 AM   Result Value Ref Range    Sodium 140 136 - 145 mmol/L    Potassium 3.9 3.5 - 5.1 mmol/L    Chloride 107 97 - 108 mmol/L    CO2 26 21 - 32 mmol/L    Anion gap 7 5 - 15 mmol/L    Glucose 187 (H) 65 - 100 mg/dL    BUN 12 6 - 20 MG/DL    Creatinine 0.94 0.55 - 1.02 MG/DL    BUN/Creatinine ratio 13 12 - 20      eGFR >60 >60 ml/min/1.73m2    Calcium 9.4 8.5 - 10.1 MG/DL    Bilirubin, total 0.8 0.2 - 1.0 MG/DL    ALT (SGPT) 31 12 - 78 U/L    AST (SGOT) 22 15 - 37 U/L    Alk. phosphatase 87 45 - 117 U/L    Protein, total 7.1 6.4 - 8.2 g/dL    Albumin 3.6 3.5 - 5.0 g/dL    Globulin 3.5 2.0 - 4.0 g/dL    A-G Ratio 1.0 (L) 1.1 - 2.2         Pre-medications  were administered as ordered and chemotherapy was initiated.   Medications Administered       0.9% sodium chloride infusion       Admin Date  01/19/2023 Action  New Bag Dose  25 mL/hr Rate  25 mL/hr Route  IntraVENous Administered By  Erin Yarbrough RN              dexamethasone (DECADRON) 4 mg/mL injection 8 mg       Admin Date  01/19/2023 Action  Given Dose  8 mg Route  IntraVENous Administered By  Erin Yarbrough RN              heparin (porcine) pf 300-500 Units       Admin Date  01/19/2023 Action  Given Dose  500 Units Route  InterCATHeter Administered By  Bridgette Burkett RN              pembrolizumab [facility]) 200 mg in 0.9% sodium chloride 100 mL, overfill volume 10 mL IVPB       Admin Date  01/19/2023 Action  New Bag Dose  200 mg Rate  236 mL/hr Route  IntraVENous Administered By  Bridgette Burkett RN              PEMEtrexed disodium (ALIMTA) 1,100 mg in 0.9% sodium chloride 100 mL, overfill volume 10 mL chemo infusion       Admin Date  01/19/2023 Action  New Bag Dose  1,100 mg Route  IntraVENous Administered By  Bridgette Burkett RN              sodium chloride (NS) flush 10 mL       Admin Date  01/19/2023 Action  Given Dose  10 mL Route  IntraVENous Administered By  Bridgette Burkett RN                    Two nurses verified prior to administering: Drug name, Drug dose, Infusion volume or drug volume when prepared in a syringe, Rate of administration, Route of administration, Expiration dates and/or times, Appearance and physical integrity of the drugs, Rate set on infusion pump, when used, and Sequencing of drug administration. Medication education and side effect management reinforced with patient. They verbalized understanding. Ms. Su Beavers tolerated treatment well, port flushed and de accessed, patient was discharged from [place] [address] in stable condition. Patient is aware of upcoming appointments.       Future Appointments   Date Time Provider Raymond Gonzales   2/9/2023 10:00 AM A1 [facility] [address]   3/2/2023 10:30 AM B3 [facility] [address] [place]   3/2/2023 10:45 AM Justin Nunn  [facility]         Geovanna Griffith RN  January 19, 2023

## 2023-01-19 NOTE — PROGRESS NOTES
Tj Vallecillo is a 60 y.o. female    Chief Complaint   Patient presents with    Follow-up     stage IV NSCLC- PDL1- 15%, no  mutations        1. Have you been to the ER, urgent care clinic since your last visit? Hospitalized since your last visit? No    2. Have you seen or consulted any other health care providers outside of the [address] [place] [address] since your last visit? Include any pap smears or colon screening.  No

## 2023-01-26 ENCOUNTER — HOSPITAL ENCOUNTER (OUTPATIENT)
Dept: CT IMAGING | Age: 63
Discharge: HOME | End: 2023-01-26
Attending: NURSE PRACTITIONER
Payer: MEDICAID

## 2023-01-26 ENCOUNTER — HOSPITAL ENCOUNTER (OUTPATIENT)
Dept: CT IMAGING | Age: 63
End: 2023-01-26
Attending: NURSE PRACTITIONER
Payer: MEDICAID

## 2023-01-26 DIAGNOSIS — C34.90 NSCLC METASTATIC TO ADRENAL GLAND (HCC): ICD-10-CM

## 2023-01-26 DIAGNOSIS — C79.70 NSCLC METASTATIC TO ADRENAL GLAND (HCC): ICD-10-CM

## 2023-01-26 PROCEDURE — 74011000636 HC RX REV CODE- 636: Performed by: NURSE PRACTITIONER

## 2023-01-26 PROCEDURE — 71260 CT THORAX DX C+: CPT

## 2023-01-26 PROCEDURE — 74177 CT ABD & PELVIS W/CONTRAST: CPT

## 2023-01-26 RX ADMIN — IOPAMIDOL 100 ML: 755 INJECTION, SOLUTION INTRAVENOUS at 08:28

## 2023-02-02 ENCOUNTER — APPOINTMENT (OUTPATIENT)
Dept: INFUSION THERAPY | Age: 63
End: 2023-02-02
Payer: MEDICAID

## 2023-02-09 ENCOUNTER — HOSPITAL ENCOUNTER (OUTPATIENT)
Dept: INFUSION THERAPY | Age: 63
Discharge: HOME | End: 2023-02-09
Payer: MEDICAID

## 2023-02-09 VITALS
SYSTOLIC BLOOD PRESSURE: 126 MMHG | TEMPERATURE: 98.6 F | BODY MASS INDEX: 38.53 KG/M2 | WEIGHT: 246 LBS | HEART RATE: 98 BPM | DIASTOLIC BLOOD PRESSURE: 77 MMHG | OXYGEN SATURATION: 96 % | RESPIRATION RATE: 18 BRPM

## 2023-02-09 DIAGNOSIS — C34.90 PRIMARY ADENOCARCINOMA OF LUNG, UNSPECIFIED LATERALITY (HCC): Primary | ICD-10-CM

## 2023-02-09 DIAGNOSIS — C34.90 NSCLC METASTATIC TO ADRENAL GLAND (HCC): ICD-10-CM

## 2023-02-09 DIAGNOSIS — C79.70 NSCLC METASTATIC TO ADRENAL GLAND (HCC): ICD-10-CM

## 2023-02-09 LAB
ALBUMIN SERPL-MCNC: 3.6 G/DL (ref 3.5–5)
ALBUMIN/GLOB SERPL: 1 (ref 1.1–2.2)
ALP SERPL-CCNC: 87 U/L (ref 45–117)
ALT SERPL-CCNC: 37 U/L (ref 12–78)
ANION GAP SERPL CALC-SCNC: 10 MMOL/L (ref 5–15)
AST SERPL-CCNC: 20 U/L (ref 15–37)
BASOPHILS # BLD: 0 K/UL (ref 0–0.1)
BASOPHILS NFR BLD: 0 % (ref 0–1)
BILIRUB SERPL-MCNC: 0.7 MG/DL (ref 0.2–1)
BUN SERPL-MCNC: 13 MG/DL (ref 6–20)
BUN/CREAT SERPL: 13 (ref 12–20)
CALCIUM SERPL-MCNC: 9 MG/DL (ref 8.5–10.1)
CHLORIDE SERPL-SCNC: 106 MMOL/L (ref 97–108)
CO2 SERPL-SCNC: 21 MMOL/L (ref 21–32)
CREAT SERPL-MCNC: 1.02 MG/DL (ref 0.55–1.02)
DIFFERENTIAL METHOD BLD: ABNORMAL
EOSINOPHIL # BLD: 0 K/UL (ref 0–0.4)
EOSINOPHIL NFR BLD: 0 % (ref 0–7)
ERYTHROCYTE [DISTWIDTH] IN BLOOD BY AUTOMATED COUNT: 17.1 % (ref 11.5–14.5)
GLOBULIN SER CALC-MCNC: 3.7 G/DL (ref 2–4)
GLUCOSE SERPL-MCNC: 258 MG/DL (ref 65–100)
HCT VFR BLD AUTO: 36.7 % (ref 35–47)
HGB BLD-MCNC: 11.5 G/DL (ref 11.5–16)
IMM GRANULOCYTES # BLD AUTO: 0.1 K/UL (ref 0–0.04)
IMM GRANULOCYTES NFR BLD AUTO: 2 % (ref 0–0.5)
LYMPHOCYTES # BLD: 0.6 K/UL (ref 0.8–3.5)
LYMPHOCYTES NFR BLD: 10 % (ref 12–49)
MCH RBC QN AUTO: 27.4 PG (ref 26–34)
MCHC RBC AUTO-ENTMCNC: 31.3 G/DL (ref 30–36.5)
MCV RBC AUTO: 87.6 FL (ref 80–99)
MONOCYTES # BLD: 0.4 K/UL (ref 0–1)
MONOCYTES NFR BLD: 7 % (ref 5–13)
NEUTS SEG # BLD: 5.1 K/UL (ref 1.8–8)
NEUTS SEG NFR BLD: 81 % (ref 32–75)
NRBC # BLD: 0 K/UL (ref 0–0.01)
NRBC BLD-RTO: 0 PER 100 WBC
PLATELET # BLD AUTO: 390 K/UL (ref 150–400)
PMV BLD AUTO: 9.5 FL (ref 8.9–12.9)
POTASSIUM SERPL-SCNC: 3.8 MMOL/L (ref 3.5–5.1)
PROT SERPL-MCNC: 7.3 G/DL (ref 6.4–8.2)
RBC # BLD AUTO: 4.19 M/UL (ref 3.8–5.2)
RBC MORPH BLD: ABNORMAL
SODIUM SERPL-SCNC: 137 MMOL/L (ref 136–145)
TSH SERPL DL<=0.05 MIU/L-ACNC: 0.34 UIU/ML (ref 0.36–3.74)
WBC # BLD AUTO: 6.2 K/UL (ref 3.6–11)

## 2023-02-09 PROCEDURE — 80053 COMPREHEN METABOLIC PANEL: CPT

## 2023-02-09 PROCEDURE — 96375 TX/PRO/DX INJ NEW DRUG ADDON: CPT

## 2023-02-09 PROCEDURE — 36415 COLL VENOUS BLD VENIPUNCTURE: CPT

## 2023-02-09 PROCEDURE — 74011250636 HC RX REV CODE- 250/636: Performed by: INTERNAL MEDICINE

## 2023-02-09 PROCEDURE — 85025 COMPLETE CBC W/AUTO DIFF WBC: CPT

## 2023-02-09 PROCEDURE — 96413 CHEMO IV INFUSION 1 HR: CPT

## 2023-02-09 PROCEDURE — 74011000258 HC RX REV CODE- 258: Performed by: INTERNAL MEDICINE

## 2023-02-09 PROCEDURE — 77030012965 HC NDL HUBR BBMI -A

## 2023-02-09 PROCEDURE — 74011000250 HC RX REV CODE- 250: Performed by: INTERNAL MEDICINE

## 2023-02-09 PROCEDURE — 84443 ASSAY THYROID STIM HORMONE: CPT

## 2023-02-09 PROCEDURE — 96411 CHEMO IV PUSH ADDL DRUG: CPT

## 2023-02-09 RX ORDER — HYDROCORTISONE SODIUM SUCCINATE 100 MG/2ML
100 INJECTION, POWDER, FOR SOLUTION INTRAMUSCULAR; INTRAVENOUS AS NEEDED
Status: DISCONTINUED | OUTPATIENT
Start: 2023-02-09 | End: 2023-02-10 | Stop reason: HOSPADM

## 2023-02-09 RX ORDER — SODIUM CHLORIDE 9 MG/ML
25 INJECTION, SOLUTION INTRAVENOUS CONTINUOUS
Status: DISCONTINUED | OUTPATIENT
Start: 2023-02-09 | End: 2023-02-10 | Stop reason: HOSPADM

## 2023-02-09 RX ORDER — DIPHENHYDRAMINE HYDROCHLORIDE 50 MG/ML
25 INJECTION, SOLUTION INTRAMUSCULAR; INTRAVENOUS AS NEEDED
Status: DISCONTINUED | OUTPATIENT
Start: 2023-02-09 | End: 2023-02-10 | Stop reason: HOSPADM

## 2023-02-09 RX ORDER — CYANOCOBALAMIN 1000 UG/ML
1000 INJECTION, SOLUTION INTRAMUSCULAR; SUBCUTANEOUS
Status: DISCONTINUED | OUTPATIENT
Start: 2023-02-09 | End: 2023-02-09

## 2023-02-09 RX ORDER — ONDANSETRON 2 MG/ML
8 INJECTION INTRAMUSCULAR; INTRAVENOUS AS NEEDED
Status: DISCONTINUED | OUTPATIENT
Start: 2023-02-09 | End: 2023-02-10 | Stop reason: HOSPADM

## 2023-02-09 RX ORDER — HEPARIN 100 UNIT/ML
300-500 SYRINGE INTRAVENOUS AS NEEDED
Status: DISCONTINUED | OUTPATIENT
Start: 2023-02-09 | End: 2023-02-10 | Stop reason: HOSPADM

## 2023-02-09 RX ORDER — ACETAMINOPHEN 325 MG/1
650 TABLET ORAL AS NEEDED
Status: DISCONTINUED | OUTPATIENT
Start: 2023-02-09 | End: 2023-02-10 | Stop reason: HOSPADM

## 2023-02-09 RX ORDER — EPINEPHRINE 1 MG/ML
0.3 INJECTION, SOLUTION, CONCENTRATE INTRAVENOUS AS NEEDED
Status: DISCONTINUED | OUTPATIENT
Start: 2023-02-09 | End: 2023-02-10 | Stop reason: HOSPADM

## 2023-02-09 RX ORDER — DIPHENHYDRAMINE HYDROCHLORIDE 50 MG/ML
50 INJECTION, SOLUTION INTRAMUSCULAR; INTRAVENOUS AS NEEDED
Status: DISCONTINUED | OUTPATIENT
Start: 2023-02-09 | End: 2023-02-10 | Stop reason: HOSPADM

## 2023-02-09 RX ORDER — ALBUTEROL SULFATE 0.83 MG/ML
2.5 SOLUTION RESPIRATORY (INHALATION) AS NEEDED
Status: DISCONTINUED | OUTPATIENT
Start: 2023-02-09 | End: 2023-02-10 | Stop reason: HOSPADM

## 2023-02-09 RX ORDER — DEXAMETHASONE SODIUM PHOSPHATE 4 MG/ML
8 INJECTION, SOLUTION INTRA-ARTICULAR; INTRALESIONAL; INTRAMUSCULAR; INTRAVENOUS; SOFT TISSUE ONCE
Status: COMPLETED | OUTPATIENT
Start: 2023-02-09 | End: 2023-02-09

## 2023-02-09 RX ORDER — SODIUM CHLORIDE 0.9 % (FLUSH) 0.9 %
10 SYRINGE (ML) INJECTION AS NEEDED
Status: DISCONTINUED | OUTPATIENT
Start: 2023-02-09 | End: 2023-02-10 | Stop reason: HOSPADM

## 2023-02-09 RX ORDER — SODIUM CHLORIDE 9 MG/ML
10 INJECTION INTRAVENOUS AS NEEDED
Status: DISCONTINUED | OUTPATIENT
Start: 2023-02-09 | End: 2023-02-10 | Stop reason: HOSPADM

## 2023-02-09 RX ADMIN — SODIUM CHLORIDE, PRESERVATIVE FREE 10 ML: 5 INJECTION INTRAVENOUS at 12:50

## 2023-02-09 RX ADMIN — SODIUM CHLORIDE, PRESERVATIVE FREE 10 ML: 5 INJECTION INTRAVENOUS at 14:18

## 2023-02-09 RX ADMIN — SODIUM CHLORIDE 25 ML/HR: 9 INJECTION, SOLUTION INTRAVENOUS at 12:51

## 2023-02-09 RX ADMIN — SODIUM CHLORIDE 200 MG: 9 INJECTION, SOLUTION INTRAVENOUS at 13:24

## 2023-02-09 RX ADMIN — PEMETREXED DISODIUM 1100 MG: 100 INJECTION, POWDER, LYOPHILIZED, FOR SOLUTION INTRAVENOUS at 14:05

## 2023-02-09 RX ADMIN — HEPARIN 500 UNITS: 100 SYRINGE at 14:18

## 2023-02-09 RX ADMIN — DEXAMETHASONE SODIUM PHOSPHATE 8 MG: 4 INJECTION, SOLUTION INTRAMUSCULAR; INTRAVENOUS at 12:55

## 2023-02-09 NOTE — PROGRESS NOTES
[facility] Chemo Progress Note    Date: February 9, 2023        1000: Ms. Zay Ramirez Arrived to [facility] for  [address] ambulatory in stable condition. Assessment was completed and port accessed by Micheal Daniels RN. Labs drawn and sent for processing. Ms. Ria Hunt vitals were reviewed. Patient Vitals for the past 12 hrs:   Temp Pulse Resp BP SpO2   02/09/23 1422 -- 98 -- 126/77 --   02/09/23 1009 98.6 °F (37 °C) 96 18 136/77 96 %         Lab results were obtained and reviewed. Recent Results (from the past 12 hour(s))   TSH 3RD GENERATION    Collection Time: 02/09/23 10:08 AM   Result Value Ref Range    TSH 0.34 (L) 0.36 - 3.74 uIU/mL   CBC WITH AUTOMATED DIFF    Collection Time: 02/09/23 10:08 AM   Result Value Ref Range    WBC 6.2 3.6 - 11.0 K/uL    RBC 4.19 3.80 - 5.20 M/uL    HGB 11.5 11.5 - 16.0 g/dL    HCT 36.7 35.0 - 47.0 %    MCV 87.6 80.0 - 99.0 FL    MCH 27.4 26.0 - 34.0 PG    MCHC 31.3 30.0 - 36.5 g/dL    RDW 17.1 (H) 11.5 - 14.5 %    PLATELET 068 176 - 750 K/uL    MPV 9.5 8.9 - 12.9 FL    NRBC 0.0 0  WBC    ABSOLUTE NRBC 0.00 0.00 - 0.01 K/uL    NEUTROPHILS 81 (H) 32 - 75 %    LYMPHOCYTES 10 (L) 12 - 49 %    MONOCYTES 7 5 - 13 %    EOSINOPHILS 0 0 - 7 %    BASOPHILS 0 0 - 1 %    IMMATURE GRANULOCYTES 2 (H) 0.0 - 0.5 %    ABS. NEUTROPHILS 5.1 1.8 - 8.0 K/UL    ABS. LYMPHOCYTES 0.6 (L) 0.8 - 3.5 K/UL    ABS. MONOCYTES 0.4 0.0 - 1.0 K/UL    ABS. EOSINOPHILS 0.0 0.0 - 0.4 K/UL    ABS. BASOPHILS 0.0 0.0 - 0.1 K/UL    ABS. IMM.  GRANS. 0.1 (H) 0.00 - 0.04 K/UL    DF SMEAR SCANNED      RBC COMMENTS ANISOCYTOSIS  1+       METABOLIC PANEL, COMPREHENSIVE    Collection Time: 02/09/23 10:08 AM   Result Value Ref Range    Sodium 137 136 - 145 mmol/L    Potassium 3.8 3.5 - 5.1 mmol/L    Chloride 106 97 - 108 mmol/L    CO2 21 21 - 32 mmol/L    Anion gap 10 5 - 15 mmol/L    Glucose 258 (H) 65 - 100 mg/dL    BUN 13 6 - 20 MG/DL    Creatinine 1.02 0.55 - 1.02 MG/DL    BUN/Creatinine ratio 13 12 - 20      eGFR >60 >60 ml/min/1.73m2    Calcium 9.0 8.5 - 10.1 MG/DL    Bilirubin, total 0.7 0.2 - 1.0 MG/DL    ALT (SGPT) 37 12 - 78 U/L    AST (SGOT) 20 15 - 37 U/L    Alk. phosphatase 87 45 - 117 U/L    Protein, total 7.3 6.4 - 8.2 g/dL    Albumin 3.6 3.5 - 5.0 g/dL    Globulin 3.7 2.0 - 4.0 g/dL    A-G Ratio 1.0 (L) 1.1 - 2.2       Chemotherapy Flowsheet 2/9/2023   Cycle C12   Date 2/9/2023   Drug / Regimen Keytruda/Alimta   Dosage -   Pre Meds given   Notes given        Pre-medications  were administered as ordered and chemotherapy was initiated.   Medications Administered       0.9% sodium chloride infusion       Admin Date  02/09/2023 Action  New Bag Dose  25 mL/hr Rate  25 mL/hr Route  IntraVENous Administered By  Minh Peace RN              dexamethasone (DECADRON) 4 mg/mL injection 8 mg       Admin Date  02/09/2023 Action  Given Dose  8 mg Route  IntraVENous Administered By  Minh Peace RN              heparin (porcine) pf 300-500 Units       Admin Date  02/09/2023 Action  Given Dose  500 Units Route  InterCATHeter Administered By  Minh Peace RN              pembrolizumab [facility]) 200 mg in 0.9% sodium chloride 100 mL, overfill volume 10 mL IVPB       Admin Date  02/09/2023 Action  New Bag Dose  200 mg Rate  236 mL/hr Route  IntraVENous Administered By  Minh Peace RN              PEMEtrexed disodium (ALIMTA) 1,100 mg in 0.9% sodium chloride 100 mL, overfill volume 10 mL chemo infusion       Admin Date  02/09/2023 Action  New Bag Dose  1,100 mg Rate  924 mL/hr Route  IntraVENous Administered By  Minh Peace RN              sodium chloride (NS) flush 10 mL       Admin Date  02/09/2023 Action  Given Dose  10 mL Route  IntraVENous Administered By  Minh Peace RN               Admin Date  02/09/2023 Action  Given Dose  10 mL Route  IntraVENous Administered By  Minh Peace RN               Two nurses verified prior to administering: Drug name, Drug dose, Infusion volume or drug volume when prepared in a syringe, Rate of administration, Route of administration, Expiration dates and/or times, Appearance and physical integrity of the drugs, Rate set on infusion pump, when used, and Sequencing of drug administration. 1420 Patient tolerated treatment well. Port maintained positive blood return throughout treatment. Port flushed, heparinized and de accessed per protocol. Patient was discharged in stable condition.  Patient is aware of next scheduled [facility] appointment on   Future Appointments   Date Time Provider Raymond Gonzales   3/2/2023 10:30 AM [address] [facility]   3/2/2023 10:45 AM Sreekanth Clayton  N Anju Fonseca BS [facility]         Rohini Price, RN, RN  February 9, 2023

## 2023-02-23 ENCOUNTER — APPOINTMENT (OUTPATIENT)
Dept: INFUSION THERAPY | Age: 63
End: 2023-02-23
Payer: MEDICAID

## 2023-03-01 NOTE — PROGRESS NOTES
[facility] [place] [address] [address], [place]: [phone]  F: [phone]    Reason for visit   Elio Severance is a 60 y.o. female who is seen for follow up of stage IV NSCLC- PDL1- 15%, no  mutations     Treatment History:   5/4/2022: Left thoracentecis- Adenocarcinoma   6/9/2022- 8/11/2022- current: Caroplatin+ Alimta+Keytruda  8/2022- CT with response  9/1/2022: Alimta Keytruda  CT 11/2022: stable     History of Present Illness:   Patient is a 69-year-old female with diabetes mellitus, hypertension who presented to the [facility] emergency room on 5/4/2022 with complaints of shortness of breath x 14 days which has been progressively getting worse over several weeks with mild cough. Denies any headaches, vision changes, falls trouble swallowing, chest pain, fevers, weight loss, hemoptysis. CTA on admission showed no evidence of pulmonary embolism but she was noted to have a moderate to large left-sided pleural effusion with a left apical mass measuring 28 x 20 mm, she had small pulmonary nodules on the right, possible hepatic hypodensity. She had ultrasound-guided thoracentesis of the left side on 5/4/2022 and 1300 cc of fluid was aspirated. This was sent for cytology which was positive. Pleural fluid cytology showed more than 100 RBCs, total protein of 5.3, albumin 2.8, . Labs were noted for a bilirubin of 1.1. Comes today for cycle 11 of Alimta + Keytruda. Dizzy spells for the last two weeks. Some fatigue. Appetite fair. Sob baseline. Denies chest pain/cough. Denies n/v/d. No rash, headache. No leg swelling. Feels well overall. No other complaints. Jay her  is with her. She used to work for [employer].      Past Medical History:   Diagnosis Date    Diabetes mellitus type 2, noninsulin dependent (HCC)     Generalized anxiety disorder     Hypercholesterolemia     Hypertension     Neoplasm of major salivary gland       Past Surgical History:   Procedure Laterality Date    COLONOSCOPY N/A 2/24/2021    . COLONOSCOPY  :- performed by Suzanna Malloy MD at [facility] ENDOSCOPY    HX ACL RECONSTRUCTION      HX COLONOSCOPY      HX HYSTERECTOMY      IR INSERT CATH PLEURAL INDWELL  5/26/2022    IR INSERT TUNL CVC W PORT OVER 5 YEARS  6/6/2022    IR REMOVE LUNG/PLEURAL DRAIN / CATHETER  5/24/2022      Social History     Tobacco Use    Smoking status: Every Day     Packs/day: 0.50     Years: 20.00     Pack years: 10.00     Types: Cigarettes    Smokeless tobacco: Never   Substance Use Topics    Alcohol use: Yes     Comment: occ      No family history on file. Current Outpatient Medications   Medication Sig    dexAMETHasone (DECADRON) 4 mg tablet Take 1 tablets by mouth twice daily the day before chemotherapy and the day after chemotherapy    folic acid (FOLVITE) 1 mg tablet Take 1 Tablet by mouth daily. pantoprazole (PROTONIX) 40 mg tablet Take 1 Tablet by mouth daily. prochlorperazine (COMPAZINE) 5 mg tablet Take 1 Tablet by mouth every six (6) hours as needed for Nausea or Vomiting. ondansetron (ZOFRAN ODT) 8 mg disintegrating tablet Take 1 Tablet by mouth every eight (8) hours as needed for Nausea or Vomiting.    lidocaine-prilocaine (EMLA) topical cream Apply  to affected area as needed for PRN Reason (Other) (for port access). Apply a thin layer to the port site 30-60 minutes prior to arrival for chemotherapy    amLODIPine (NORVASC) 5 mg tablet Take 5 mg by mouth daily. ALPRAZolam (XANAX) 0.25 mg tablet Take  by mouth.    metFORMIN (GLUCOPHAGE) 850 mg tablet Take 850 mg by mouth two (2) times daily (with meals). escitalopram oxalate (LEXAPRO) 20 mg tablet Take 20 mg by mouth daily. simvastatin (ZOCOR) 20 mg tablet Take 20 mg by mouth nightly. losartan (COZAAR) 100 mg tablet Take 100 mg by mouth daily. aspirin 81 mg chewable tablet Take 81 mg by mouth daily.     acetaminophen (TYLENOL PO) Take 500 mg by mouth every six (6) hours as needed for Pain. No current facility-administered medications for this visit. Allergies   Allergen Reactions    Erythromycin Other (comments)     Stomach cramps    Penicillins Hives        Review of Systems: A complete review of systems was obtained, negative except as described above. Physical Exam:     Visit Vitals  /69 (BP 1 Location: Left upper arm, BP Patient Position: Sitting)   Pulse 82   Temp 97.8 °F (36.6 °C)   Resp 18   Wt 251 lb (113.9 kg)   SpO2 97%   BMI 39.31 kg/m²       ECOG PS: 1  General: No distress  Eyes: PERRL, anicteric sclerae  HENT: Atraumatic  Neck: Supple  Resp: CTAB, normal respiratory effort   GI:nondistended  Skin: No rashes  Psych: Alert, oriented    Results:     Lab Results   Component Value Date/Time    WBC 6.2 02/09/2023 10:08 AM    HGB 11.5 02/09/2023 10:08 AM    HCT 36.7 02/09/2023 10:08 AM    PLATELET 345 57/09/9843 10:08 AM    MCV 87.6 02/09/2023 10:08 AM    ABS. NEUTROPHILS 5.1 02/09/2023 10:08 AM     Lab Results   Component Value Date/Time    Sodium 137 02/09/2023 10:08 AM    Potassium 3.8 02/09/2023 10:08 AM    Chloride 106 02/09/2023 10:08 AM    CO2 21 02/09/2023 10:08 AM    Glucose 258 (H) 02/09/2023 10:08 AM    BUN 13 02/09/2023 10:08 AM    Creatinine 1.02 02/09/2023 10:08 AM    GFR est AA >60 09/22/2022 10:09 AM    GFR est non-AA >60 09/22/2022 10:09 AM    Calcium 9.0 02/09/2023 10:08 AM    Glucose (POC) 96 05/24/2022 11:29 AM    Creatinine (POC) 0.9 12/28/2012 06:39 AM     Lab Results   Component Value Date/Time    Bilirubin, total 0.7 02/09/2023 10:08 AM    ALT (SGPT) 37 02/09/2023 10:08 AM    Alk. phosphatase 87 02/09/2023 10:08 AM    Protein, total 7.3 02/09/2023 10:08 AM    Albumin 3.6 02/09/2023 10:08 AM    Globulin 3.7 02/09/2023 10:08 AM         Records reviewed and summarized above. Pathology report(s) reviewed above. Radiology report(s) reviewed above.   CT chest abdomen pelvis 5/5/2022    IMPRESSION  Diminished left-sided pleural effusion, status post thoracentesis with stable  mass lesion at the left apex. Mass lesion demonstrated at the left apex is not amenable to percutaneous  sampling. There are numerous additional nodular densities along the pleural margin  consistent with pleural carcinomatosis. There are additional small nodular densities demonstrated in the peritoneum,  these most consistent with peritoneal carcinomatosis. Small adrenal nodule on  the left likely an additional metastatic focus. No definitive target for percutaneous sampling. Currently awaiting results of  pleural fluid analysis/cytology and cellblock. MRI brain 5/2022      1. No evidence of intracranial metastases. 2. Minimal chronic microvascular ischemic disease. No acute intracranial  abnormality. 3. Enhancing lesions in the bilateral parotid glands, largest in the left  parotid gland measuring 3.6 cm. Lesions in the right parotid gland were  previously biopsied in 2020 and returned as Warthin tumors. These likely all  represent multiple Warthin tumors    CT 8/2022     1. The mass lesion left lung apex is noted and has mildly decreased. 2. Pleural metastases on left side and decreased in size. Left pleural effusion  has decreased and there is improved aeration in the left lung. 3. Left adrenal nodule is stable. 4. Linear irregular nodular densities in the anterior peritoneum in the right  lower quadrant and midline in the lower abdomen and upper pelvis are noted and  unchanged. CT 11/2022:  IMPRESSION  1. No evidence of pulmonary embolism. 2.  Essentially unchanged left upper lobe lung mass and subpleural thickening  compared to the recent chest CT. 3.  Prominent lower paratracheal mediastinal lymph nodes, none enlarged by size  criteria. 4.  Groundglass opacities in the lower lobes bilaterally may be infectious or  inflammatory. Correlate for infection. CT 1/1023:  1. CT of the chest is unchanged.  The spiculated pleural-based nodule left apex  is stable. Left pleural nodularity is stable. 2. CT of the abdomen and pelvis demonstrates no significant change. Bilateral  adrenal nodules are stable. .  Peritoneal nodularity is stable. No acute abnormality identified. Assessment:   1) Non small cell Lung cancer- adenocarcinoma stage IV    PDL1-15%, ALK/EGFR/ROS1/BRAF negative (limited panel)  Liquid bx [id number], TMB low  CT chest abdomen and pelvis from 5/5/2022 was reviewed. Notable for large left-sided pleural effusion, concern for pleural carcinomatosis, left apical lung nodule measuring 20 x 26 mm, left adrenal nodule 19 x 18 mm, peritoneal nodularity concerning for carcinomatosis. Status post left-sided thoracentesis and + cytology   H/o Smoking  Palliative treatments  Completed 4 cycles of Carboplatin+ Alimta+ Keytruda with a response as of 11/2022   Tolerating well  Switched to Alimta and Keytruda maintenance until progression, tolerating well     If and when there is disease progression repeat expanded NGS is indicated    2) Left pleural effusion  S/p thoracentesis x 2, not enough for a drain  Hold off on Pleurx given no recurrence on systemic therapy    3) H/P R parotidectomy for Warthin's tumor   Has persistent multiple Warthin's tumors     4) Depression  On Lexapro    5) Rash  Likely from Alimta  Topical hydrocortisone  Resolved today     6) Dizziness  Discussed possible reasons for dizziness  Encouraged BP and blood sugar checks  Also encouraged fu with PCP    6) Encounter for high risk medications like chemotherapy and high risk disease  Tolerating well  Grade 1 fatigue   Labs pending     Plan:     Proceed with maintenance alimta 500 mg/m2 and Keytruda every 3 weeks until progression  Antiemetics, steroids, b12, FA per protocol  Topical hydrocortisone   Protonix daily  Fu with pcp  Order scans next visit       [facility] q3 weeks  OV in 6 weeks    I appreciate the opportunity to participate in Ms. Julio Manley Killdeer's care.     I personally saw and evaluated the patient and performed the key components of medical decision making. The history, physical exam, and documentation were performed by Santiago Rae NP.   I reviewed and verified the above documentation and modified it as needed      Signed By: Luke Silva NP

## 2023-03-02 ENCOUNTER — TELEPHONE (OUTPATIENT)
Dept: ONCOLOGY | Age: 63
End: 2023-03-02

## 2023-03-02 ENCOUNTER — OFFICE VISIT (OUTPATIENT)
Dept: ONCOLOGY | Age: 63
End: 2023-03-02
Payer: MEDICAID

## 2023-03-02 ENCOUNTER — HOSPITAL ENCOUNTER (OUTPATIENT)
Dept: INFUSION THERAPY | Age: 63
Discharge: HOME | End: 2023-03-02
Payer: MEDICAID

## 2023-03-02 VITALS
RESPIRATION RATE: 18 BRPM | BODY MASS INDEX: 39.31 KG/M2 | DIASTOLIC BLOOD PRESSURE: 69 MMHG | TEMPERATURE: 97.8 F | OXYGEN SATURATION: 97 % | SYSTOLIC BLOOD PRESSURE: 123 MMHG | HEART RATE: 82 BPM | WEIGHT: 251 LBS

## 2023-03-02 VITALS
TEMPERATURE: 97.8 F | SYSTOLIC BLOOD PRESSURE: 132 MMHG | HEART RATE: 83 BPM | HEIGHT: 67 IN | DIASTOLIC BLOOD PRESSURE: 69 MMHG | WEIGHT: 251.4 LBS | BODY MASS INDEX: 39.46 KG/M2 | RESPIRATION RATE: 18 BRPM

## 2023-03-02 DIAGNOSIS — C79.70 NSCLC METASTATIC TO ADRENAL GLAND (HCC): Primary | ICD-10-CM

## 2023-03-02 DIAGNOSIS — Z95.828 PORT-A-CATH IN PLACE: ICD-10-CM

## 2023-03-02 DIAGNOSIS — C34.90 NSCLC METASTATIC TO ADRENAL GLAND (HCC): ICD-10-CM

## 2023-03-02 DIAGNOSIS — C34.90 NSCLC METASTATIC TO ADRENAL GLAND (HCC): Primary | ICD-10-CM

## 2023-03-02 DIAGNOSIS — Z51.11 ENCOUNTER FOR ANTINEOPLASTIC CHEMOTHERAPY AND IMMUNOTHERAPY: ICD-10-CM

## 2023-03-02 DIAGNOSIS — C34.90 PRIMARY ADENOCARCINOMA OF LUNG, UNSPECIFIED LATERALITY (HCC): Primary | ICD-10-CM

## 2023-03-02 DIAGNOSIS — C79.70 NSCLC METASTATIC TO ADRENAL GLAND (HCC): ICD-10-CM

## 2023-03-02 DIAGNOSIS — Z51.12 ENCOUNTER FOR ANTINEOPLASTIC CHEMOTHERAPY AND IMMUNOTHERAPY: ICD-10-CM

## 2023-03-02 LAB
ALBUMIN SERPL-MCNC: 3.7 G/DL (ref 3.5–5)
ALBUMIN/GLOB SERPL: 1 (ref 1.1–2.2)
ALP SERPL-CCNC: 81 U/L (ref 45–117)
ALT SERPL-CCNC: 33 U/L (ref 12–78)
ANION GAP SERPL CALC-SCNC: 7 MMOL/L (ref 5–15)
AST SERPL-CCNC: 24 U/L (ref 15–37)
BASOPHILS # BLD: 0 K/UL (ref 0–0.1)
BASOPHILS NFR BLD: 0 % (ref 0–1)
BILIRUB SERPL-MCNC: 0.5 MG/DL (ref 0.2–1)
BUN SERPL-MCNC: 13 MG/DL (ref 6–20)
BUN/CREAT SERPL: 15 (ref 12–20)
CALCIUM SERPL-MCNC: 9.5 MG/DL (ref 8.5–10.1)
CHLORIDE SERPL-SCNC: 107 MMOL/L (ref 97–108)
CO2 SERPL-SCNC: 26 MMOL/L (ref 21–32)
CREAT SERPL-MCNC: 0.87 MG/DL (ref 0.55–1.02)
DIFFERENTIAL METHOD BLD: ABNORMAL
EOSINOPHIL # BLD: 0 K/UL (ref 0–0.4)
EOSINOPHIL NFR BLD: 0 % (ref 0–7)
ERYTHROCYTE [DISTWIDTH] IN BLOOD BY AUTOMATED COUNT: 16.6 % (ref 11.5–14.5)
GLOBULIN SER CALC-MCNC: 3.6 G/DL (ref 2–4)
GLUCOSE SERPL-MCNC: 157 MG/DL (ref 65–100)
HCT VFR BLD AUTO: 36.1 % (ref 35–47)
HGB BLD-MCNC: 11.4 G/DL (ref 11.5–16)
IMM GRANULOCYTES # BLD AUTO: 0.1 K/UL (ref 0–0.04)
IMM GRANULOCYTES NFR BLD AUTO: 1 % (ref 0–0.5)
LYMPHOCYTES # BLD: 0.7 K/UL (ref 0.8–3.5)
LYMPHOCYTES NFR BLD: 10 % (ref 12–49)
MCH RBC QN AUTO: 27.9 PG (ref 26–34)
MCHC RBC AUTO-ENTMCNC: 31.6 G/DL (ref 30–36.5)
MCV RBC AUTO: 88.5 FL (ref 80–99)
MONOCYTES # BLD: 0.8 K/UL (ref 0–1)
MONOCYTES NFR BLD: 12 % (ref 5–13)
NEUTS SEG # BLD: 5.2 K/UL (ref 1.8–8)
NEUTS SEG NFR BLD: 77 % (ref 32–75)
NRBC # BLD: 0 K/UL (ref 0–0.01)
NRBC BLD-RTO: 0 PER 100 WBC
PLATELET # BLD AUTO: 398 K/UL (ref 150–400)
PMV BLD AUTO: 9.6 FL (ref 8.9–12.9)
POTASSIUM SERPL-SCNC: 4.1 MMOL/L (ref 3.5–5.1)
PROT SERPL-MCNC: 7.3 G/DL (ref 6.4–8.2)
RBC # BLD AUTO: 4.08 M/UL (ref 3.8–5.2)
RBC MORPH BLD: ABNORMAL
RBC MORPH BLD: ABNORMAL
SODIUM SERPL-SCNC: 140 MMOL/L (ref 136–145)
WBC # BLD AUTO: 6.8 K/UL (ref 3.6–11)

## 2023-03-02 PROCEDURE — 36415 COLL VENOUS BLD VENIPUNCTURE: CPT

## 2023-03-02 PROCEDURE — 96413 CHEMO IV INFUSION 1 HR: CPT

## 2023-03-02 PROCEDURE — 96411 CHEMO IV PUSH ADDL DRUG: CPT

## 2023-03-02 PROCEDURE — 96372 THER/PROPH/DIAG INJ SC/IM: CPT

## 2023-03-02 PROCEDURE — 74011250636 HC RX REV CODE- 250/636: Performed by: INTERNAL MEDICINE

## 2023-03-02 PROCEDURE — 85025 COMPLETE CBC W/AUTO DIFF WBC: CPT

## 2023-03-02 PROCEDURE — 80053 COMPREHEN METABOLIC PANEL: CPT

## 2023-03-02 PROCEDURE — 96375 TX/PRO/DX INJ NEW DRUG ADDON: CPT

## 2023-03-02 PROCEDURE — 74011000258 HC RX REV CODE- 258: Performed by: INTERNAL MEDICINE

## 2023-03-02 PROCEDURE — 99212 OFFICE O/P EST SF 10 MIN: CPT | Performed by: INTERNAL MEDICINE

## 2023-03-02 PROCEDURE — 77030012965 HC NDL HUBR BBMI -A

## 2023-03-02 RX ORDER — ALBUTEROL SULFATE 0.83 MG/ML
2.5 SOLUTION RESPIRATORY (INHALATION) AS NEEDED
Start: 2023-04-13

## 2023-03-02 RX ORDER — ONDANSETRON 2 MG/ML
8 INJECTION INTRAMUSCULAR; INTRAVENOUS AS NEEDED
OUTPATIENT
Start: 2023-04-13

## 2023-03-02 RX ORDER — CYANOCOBALAMIN 1000 UG/ML
1000 INJECTION, SOLUTION INTRAMUSCULAR; SUBCUTANEOUS
OUTPATIENT
Start: 2023-03-23

## 2023-03-02 RX ORDER — DEXAMETHASONE SODIUM PHOSPHATE 4 MG/ML
8 INJECTION, SOLUTION INTRA-ARTICULAR; INTRALESIONAL; INTRAMUSCULAR; INTRAVENOUS; SOFT TISSUE ONCE
OUTPATIENT
Start: 2023-03-23 | End: 2023-03-23

## 2023-03-02 RX ORDER — ALBUTEROL SULFATE 0.83 MG/ML
2.5 SOLUTION RESPIRATORY (INHALATION) AS NEEDED
Start: 2023-03-23

## 2023-03-02 RX ORDER — DIPHENHYDRAMINE HYDROCHLORIDE 50 MG/ML
50 INJECTION, SOLUTION INTRAMUSCULAR; INTRAVENOUS AS NEEDED
Start: 2023-04-13

## 2023-03-02 RX ORDER — ACETAMINOPHEN 325 MG/1
650 TABLET ORAL AS NEEDED
Start: 2023-04-13

## 2023-03-02 RX ORDER — SODIUM CHLORIDE 9 MG/ML
25 INJECTION, SOLUTION INTRAVENOUS CONTINUOUS
OUTPATIENT
Start: 2023-04-13

## 2023-03-02 RX ORDER — SODIUM CHLORIDE 0.9 % (FLUSH) 0.9 %
10 SYRINGE (ML) INJECTION AS NEEDED
OUTPATIENT
Start: 2023-03-23

## 2023-03-02 RX ORDER — HYDROCORTISONE SODIUM SUCCINATE 100 MG/2ML
100 INJECTION, POWDER, FOR SOLUTION INTRAMUSCULAR; INTRAVENOUS AS NEEDED
Status: DISCONTINUED | OUTPATIENT
Start: 2023-03-02 | End: 2023-03-03 | Stop reason: HOSPADM

## 2023-03-02 RX ORDER — ONDANSETRON 2 MG/ML
8 INJECTION INTRAMUSCULAR; INTRAVENOUS AS NEEDED
Status: DISCONTINUED | OUTPATIENT
Start: 2023-03-02 | End: 2023-03-03 | Stop reason: HOSPADM

## 2023-03-02 RX ORDER — DIPHENHYDRAMINE HYDROCHLORIDE 50 MG/ML
25 INJECTION, SOLUTION INTRAMUSCULAR; INTRAVENOUS AS NEEDED
Status: DISCONTINUED | OUTPATIENT
Start: 2023-03-02 | End: 2023-03-03 | Stop reason: HOSPADM

## 2023-03-02 RX ORDER — HEPARIN 100 UNIT/ML
300-500 SYRINGE INTRAVENOUS AS NEEDED
Status: DISCONTINUED | OUTPATIENT
Start: 2023-03-02 | End: 2023-03-03 | Stop reason: HOSPADM

## 2023-03-02 RX ORDER — CYANOCOBALAMIN 1000 UG/ML
1000 INJECTION, SOLUTION INTRAMUSCULAR; SUBCUTANEOUS
Status: COMPLETED | OUTPATIENT
Start: 2023-03-02 | End: 2023-03-02

## 2023-03-02 RX ORDER — HEPARIN 100 UNIT/ML
300-500 SYRINGE INTRAVENOUS AS NEEDED
Start: 2023-03-23

## 2023-03-02 RX ORDER — SODIUM CHLORIDE 9 MG/ML
10 INJECTION INTRAVENOUS AS NEEDED
Status: DISCONTINUED | OUTPATIENT
Start: 2023-03-02 | End: 2023-03-03 | Stop reason: HOSPADM

## 2023-03-02 RX ORDER — ALBUTEROL SULFATE 0.83 MG/ML
2.5 SOLUTION RESPIRATORY (INHALATION) AS NEEDED
Status: DISCONTINUED | OUTPATIENT
Start: 2023-03-02 | End: 2023-03-03 | Stop reason: HOSPADM

## 2023-03-02 RX ORDER — DEXAMETHASONE SODIUM PHOSPHATE 4 MG/ML
8 INJECTION, SOLUTION INTRA-ARTICULAR; INTRALESIONAL; INTRAMUSCULAR; INTRAVENOUS; SOFT TISSUE ONCE
Status: COMPLETED | OUTPATIENT
Start: 2023-03-02 | End: 2023-03-02

## 2023-03-02 RX ORDER — HYDROCORTISONE SODIUM SUCCINATE 100 MG/2ML
100 INJECTION, POWDER, FOR SOLUTION INTRAMUSCULAR; INTRAVENOUS AS NEEDED
OUTPATIENT
Start: 2023-03-23

## 2023-03-02 RX ORDER — EPINEPHRINE 1 MG/ML
0.3 INJECTION, SOLUTION, CONCENTRATE INTRAVENOUS AS NEEDED
OUTPATIENT
Start: 2023-03-23

## 2023-03-02 RX ORDER — DEXAMETHASONE SODIUM PHOSPHATE 4 MG/ML
8 INJECTION, SOLUTION INTRA-ARTICULAR; INTRALESIONAL; INTRAMUSCULAR; INTRAVENOUS; SOFT TISSUE ONCE
OUTPATIENT
Start: 2023-04-13 | End: 2023-04-13

## 2023-03-02 RX ORDER — HYDROCORTISONE SODIUM SUCCINATE 100 MG/2ML
100 INJECTION, POWDER, FOR SOLUTION INTRAMUSCULAR; INTRAVENOUS AS NEEDED
OUTPATIENT
Start: 2023-04-13

## 2023-03-02 RX ORDER — ACETAMINOPHEN 325 MG/1
650 TABLET ORAL AS NEEDED
Start: 2023-03-23

## 2023-03-02 RX ORDER — EPINEPHRINE 1 MG/ML
0.3 INJECTION, SOLUTION, CONCENTRATE INTRAVENOUS AS NEEDED
Status: DISCONTINUED | OUTPATIENT
Start: 2023-03-02 | End: 2023-03-03 | Stop reason: HOSPADM

## 2023-03-02 RX ORDER — DIPHENHYDRAMINE HYDROCHLORIDE 50 MG/ML
25 INJECTION, SOLUTION INTRAMUSCULAR; INTRAVENOUS AS NEEDED
Start: 2023-04-13

## 2023-03-02 RX ORDER — CYANOCOBALAMIN 1000 UG/ML
1000 INJECTION, SOLUTION INTRAMUSCULAR; SUBCUTANEOUS
OUTPATIENT
Start: 2023-04-13

## 2023-03-02 RX ORDER — DIPHENHYDRAMINE HYDROCHLORIDE 50 MG/ML
50 INJECTION, SOLUTION INTRAMUSCULAR; INTRAVENOUS AS NEEDED
Start: 2023-03-23

## 2023-03-02 RX ORDER — SODIUM CHLORIDE 0.9 % (FLUSH) 0.9 %
10 SYRINGE (ML) INJECTION AS NEEDED
Status: DISCONTINUED | OUTPATIENT
Start: 2023-03-02 | End: 2023-03-03 | Stop reason: HOSPADM

## 2023-03-02 RX ORDER — SODIUM CHLORIDE 0.9 % (FLUSH) 0.9 %
10 SYRINGE (ML) INJECTION AS NEEDED
OUTPATIENT
Start: 2023-04-13

## 2023-03-02 RX ORDER — SODIUM CHLORIDE 9 MG/ML
25 INJECTION, SOLUTION INTRAVENOUS CONTINUOUS
OUTPATIENT
Start: 2023-03-23

## 2023-03-02 RX ORDER — DIPHENHYDRAMINE HYDROCHLORIDE 50 MG/ML
50 INJECTION, SOLUTION INTRAMUSCULAR; INTRAVENOUS AS NEEDED
Status: DISCONTINUED | OUTPATIENT
Start: 2023-03-02 | End: 2023-03-03 | Stop reason: HOSPADM

## 2023-03-02 RX ORDER — DIPHENHYDRAMINE HYDROCHLORIDE 50 MG/ML
25 INJECTION, SOLUTION INTRAMUSCULAR; INTRAVENOUS AS NEEDED
Start: 2023-03-23

## 2023-03-02 RX ORDER — SODIUM CHLORIDE 9 MG/ML
10 INJECTION INTRAVENOUS AS NEEDED
OUTPATIENT
Start: 2023-03-23

## 2023-03-02 RX ORDER — SODIUM CHLORIDE 9 MG/ML
25 INJECTION, SOLUTION INTRAVENOUS CONTINUOUS
Status: DISCONTINUED | OUTPATIENT
Start: 2023-03-02 | End: 2023-03-03 | Stop reason: HOSPADM

## 2023-03-02 RX ORDER — ONDANSETRON 2 MG/ML
8 INJECTION INTRAMUSCULAR; INTRAVENOUS AS NEEDED
OUTPATIENT
Start: 2023-03-23

## 2023-03-02 RX ORDER — HEPARIN 100 UNIT/ML
300-500 SYRINGE INTRAVENOUS AS NEEDED
Start: 2023-04-13

## 2023-03-02 RX ORDER — EPINEPHRINE 1 MG/ML
0.3 INJECTION, SOLUTION, CONCENTRATE INTRAVENOUS AS NEEDED
OUTPATIENT
Start: 2023-04-13

## 2023-03-02 RX ORDER — SODIUM CHLORIDE 9 MG/ML
10 INJECTION INTRAVENOUS AS NEEDED
OUTPATIENT
Start: 2023-04-13

## 2023-03-02 RX ORDER — ACETAMINOPHEN 325 MG/1
650 TABLET ORAL AS NEEDED
Status: DISCONTINUED | OUTPATIENT
Start: 2023-03-02 | End: 2023-03-03 | Stop reason: HOSPADM

## 2023-03-02 RX ADMIN — DEXAMETHASONE SODIUM PHOSPHATE 8 MG: 4 INJECTION, SOLUTION INTRAMUSCULAR; INTRAVENOUS at 14:34

## 2023-03-02 RX ADMIN — HEPARIN 500 UNITS: 100 SYRINGE at 14:57

## 2023-03-02 RX ADMIN — SODIUM CHLORIDE 200 MG: 9 INJECTION, SOLUTION INTRAVENOUS at 13:50

## 2023-03-02 RX ADMIN — CYANOCOBALAMIN 1000 MCG: 1000 INJECTION, SOLUTION INTRAMUSCULAR at 14:30

## 2023-03-02 RX ADMIN — PEMETREXED DISODIUM 1100 MG: 100 INJECTION, POWDER, LYOPHILIZED, FOR SOLUTION INTRAVENOUS at 14:40

## 2023-03-02 NOTE — PROGRESS NOTES
Melanie Argueta is a 60 y.o. female    Chief Complaint   Patient presents with    Follow-up     stage IV NSCLC- PDL1- 15%, no  mutations           1. Have you been to the ER, urgent care clinic since your last visit? Hospitalized since your last visit? No    2. Have you seen or consulted any other health care providers outside of the [address] [place] [address] since your last visit? Include any pap smears or colon screening.  No

## 2023-03-02 NOTE — PROGRESS NOTES
[facility] Chemo Progress Note    Date: 2023    Name: Keyla Buckley    MRN: [id number]         : 1960    Ms. Canada Arrived ambulatory and in no distress for cycle 13 day 1 of Keytruda + Alimta. Assessment was completed and documented in flowsheets. No acute concerns at this time. Port accessed without difficulty, labs drawn and processed. Follow Up: Proceed with treatment    Chemotherapy Flowsheet 3/2/2023   Cycle C13   Date 3/2/2023   Drug / Regimen Alimita/Keytruda   Dosage -   Pre Meds given   Notes given + B12         Ms. Canada's vitals were reviewed. Patient Vitals for the past 12 hrs:   Temp Pulse Resp BP   23 1452 -- 83 -- 132/69   23 1034 97.8 °F (36.6 °C) 82 18 123/69         Lab results were obtained and reviewed. Labs within parameter for treatment. Recent Results (from the past 12 hour(s))   CBC WITH AUTOMATED DIFF    Collection Time: 23 10:37 AM   Result Value Ref Range    WBC 6.8 3.6 - 11.0 K/uL    RBC 4.08 3.80 - 5.20 M/uL    HGB 11.4 (L) 11.5 - 16.0 g/dL    HCT 36.1 35.0 - 47.0 %    MCV 88.5 80.0 - 99.0 FL    MCH 27.9 26.0 - 34.0 PG    MCHC 31.6 30.0 - 36.5 g/dL    RDW 16.6 (H) 11.5 - 14.5 %    PLATELET 095 101 - 499 K/uL    MPV 9.6 8.9 - 12.9 FL    NRBC 0.0 0  WBC    ABSOLUTE NRBC 0.00 0.00 - 0.01 K/uL    NEUTROPHILS 77 (H) 32 - 75 %    LYMPHOCYTES 10 (L) 12 - 49 %    MONOCYTES 12 5 - 13 %    EOSINOPHILS 0 0 - 7 %    BASOPHILS 0 0 - 1 %    IMMATURE GRANULOCYTES 1 (H) 0.0 - 0.5 %    ABS. NEUTROPHILS 5.2 1.8 - 8.0 K/UL    ABS. LYMPHOCYTES 0.7 (L) 0.8 - 3.5 K/UL    ABS. MONOCYTES 0.8 0.0 - 1.0 K/UL    ABS. EOSINOPHILS 0.0 0.0 - 0.4 K/UL    ABS. BASOPHILS 0.0 0.0 - 0.1 K/UL    ABS. IMM.  GRANS. 0.1 (H) 0.00 - 0.04 K/UL    DF SMEAR SCANNED      RBC COMMENTS ANISOCYTOSIS  1+        RBC COMMENTS MICROCYTOSIS  1+       METABOLIC PANEL, COMPREHENSIVE    Collection Time: 23 10:37 AM   Result Value Ref Range    Sodium 140 136 - 145 mmol/L Potassium 4.1 3.5 - 5.1 mmol/L    Chloride 107 97 - 108 mmol/L    CO2 26 21 - 32 mmol/L    Anion gap 7 5 - 15 mmol/L    Glucose 157 (H) 65 - 100 mg/dL    BUN 13 6 - 20 MG/DL    Creatinine 0.87 0.55 - 1.02 MG/DL    BUN/Creatinine ratio 15 12 - 20      eGFR >60 >60 ml/min/1.73m2    Calcium 9.5 8.5 - 10.1 MG/DL    Bilirubin, total 0.5 0.2 - 1.0 MG/DL    ALT (SGPT) 33 12 - 78 U/L    AST (SGOT) 24 15 - 37 U/L    Alk. phosphatase 81 45 - 117 U/L    Protein, total 7.3 6.4 - 8.2 g/dL    Albumin 3.7 3.5 - 5.0 g/dL    Globulin 3.6 2.0 - 4.0 g/dL    A-G Ratio 1.0 (L) 1.1 - 2.2         Pre-medications  were administered as ordered and chemotherapy was initiated. Medications Administered       cyanocobalamin (VITAMIN B12) injection 1,000 mcg       Admin Date  03/02/2023 Action  Given Dose  1,000 mcg Route  IntraMUSCular Administered By  Stevie Boxer, RN              dexamethasone (DECADRON) 4 mg/mL injection 8 mg       Admin Date  03/02/2023 Action  Given Dose  8 mg Route  IntraVENous Administered By  Stevie Boxer, RN              pembrolizumab [facility]) 200 mg in 0.9% sodium chloride 100 mL, overfill volume 10 mL IVPB       Admin Date  03/02/2023 Action  New Bag Dose  200 mg Rate  236 mL/hr Route  IntraVENous Administered By  Stevie Boxer, RN              PEMEtrexed disodium (ALIMTA) 1,100 mg in 0.9% sodium chloride 100 mL, overfill volume 10 mL chemo infusion       Admin Date  03/02/2023 Action  New Bag Dose  1,100 mg Route  IntraVENous Administered By  Stevie Boxer, RN                    Two nurses verified prior to administering:  Drug name, Drug dose, Infusion volume or drug volume when prepared in a syringe, Rate of administration, Route of administration, Expiration dates and/or times, Appearance and physical integrity of the drugs, Rate set on infusion pump, when used, and Sequencing of drug administration. Ms. Nila Goldstein tolerated treatment well, port flushed and de-accessed per protocol.  [facility]. Patient was discharged from [facility] in stable condition. Patient aware of next appointment.      Future Appointments   Date Time Provider Raymond Janet   3/23/2023  8:30 AM F4 [address]   4/13/2023  1:30 PM A3 [address]   4/13/2023  1:45 PM Kimberlee Wade  N Anju Fonseca BS AMB         Ethan Felipe RN  March 2, 2023

## 2023-03-23 ENCOUNTER — HOSPITAL ENCOUNTER (OUTPATIENT)
Dept: INFUSION THERAPY | Age: 63
Discharge: HOME | End: 2023-03-23
Payer: MEDICAID

## 2023-03-23 VITALS
BODY MASS INDEX: 37.83 KG/M2 | RESPIRATION RATE: 18 BRPM | TEMPERATURE: 97.4 F | SYSTOLIC BLOOD PRESSURE: 113 MMHG | WEIGHT: 241 LBS | DIASTOLIC BLOOD PRESSURE: 60 MMHG | HEART RATE: 80 BPM | HEIGHT: 67 IN

## 2023-03-23 DIAGNOSIS — C34.90 NSCLC METASTATIC TO ADRENAL GLAND (HCC): ICD-10-CM

## 2023-03-23 DIAGNOSIS — C34.90 PRIMARY ADENOCARCINOMA OF LUNG, UNSPECIFIED LATERALITY (HCC): Primary | ICD-10-CM

## 2023-03-23 DIAGNOSIS — C79.70 NSCLC METASTATIC TO ADRENAL GLAND (HCC): ICD-10-CM

## 2023-03-23 LAB
ALBUMIN SERPL-MCNC: 3.6 G/DL (ref 3.5–5)
ALBUMIN/GLOB SERPL: 0.9 (ref 1.1–2.2)
ALP SERPL-CCNC: 77 U/L (ref 45–117)
ALT SERPL-CCNC: 36 U/L (ref 12–78)
ANION GAP SERPL CALC-SCNC: 6 MMOL/L (ref 5–15)
AST SERPL-CCNC: 28 U/L (ref 15–37)
BASOPHILS # BLD: 0 K/UL (ref 0–0.1)
BASOPHILS NFR BLD: 0 % (ref 0–1)
BILIRUB SERPL-MCNC: 0.5 MG/DL (ref 0.2–1)
BUN SERPL-MCNC: 16 MG/DL (ref 6–20)
BUN/CREAT SERPL: 16 (ref 12–20)
CALCIUM SERPL-MCNC: 9.1 MG/DL (ref 8.5–10.1)
CHLORIDE SERPL-SCNC: 108 MMOL/L (ref 97–108)
CO2 SERPL-SCNC: 24 MMOL/L (ref 21–32)
CREAT SERPL-MCNC: 0.97 MG/DL (ref 0.55–1.02)
DIFFERENTIAL METHOD BLD: ABNORMAL
EOSINOPHIL # BLD: 0 K/UL (ref 0–0.4)
EOSINOPHIL NFR BLD: 0 % (ref 0–7)
ERYTHROCYTE [DISTWIDTH] IN BLOOD BY AUTOMATED COUNT: 16.1 % (ref 11.5–14.5)
GLOBULIN SER CALC-MCNC: 4.2 G/DL (ref 2–4)
GLUCOSE SERPL-MCNC: 182 MG/DL (ref 65–100)
HCT VFR BLD AUTO: 37.2 % (ref 35–47)
HGB BLD-MCNC: 11.7 G/DL (ref 11.5–16)
IMM GRANULOCYTES # BLD AUTO: 0 K/UL (ref 0–0.04)
IMM GRANULOCYTES NFR BLD AUTO: 0 % (ref 0–0.5)
LYMPHOCYTES # BLD: 0.5 K/UL (ref 0.8–3.5)
LYMPHOCYTES NFR BLD: 17 % (ref 12–49)
MCH RBC QN AUTO: 27.5 PG (ref 26–34)
MCHC RBC AUTO-ENTMCNC: 31.5 G/DL (ref 30–36.5)
MCV RBC AUTO: 87.5 FL (ref 80–99)
MONOCYTES # BLD: 0.3 K/UL (ref 0–1)
MONOCYTES NFR BLD: 11 % (ref 5–13)
NEUTS SEG # BLD: 2 K/UL (ref 1.8–8)
NEUTS SEG NFR BLD: 72 % (ref 32–75)
NRBC # BLD: 0 K/UL (ref 0–0.01)
NRBC BLD-RTO: 0 PER 100 WBC
PLATELET # BLD AUTO: 327 K/UL (ref 150–400)
PMV BLD AUTO: 9.7 FL (ref 8.9–12.9)
POTASSIUM SERPL-SCNC: 3.5 MMOL/L (ref 3.5–5.1)
PROT SERPL-MCNC: 7.8 G/DL (ref 6.4–8.2)
RBC # BLD AUTO: 4.25 M/UL (ref 3.8–5.2)
RBC MORPH BLD: ABNORMAL
SODIUM SERPL-SCNC: 138 MMOL/L (ref 136–145)
TSH SERPL DL<=0.05 MIU/L-ACNC: 0.6 UIU/ML (ref 0.36–3.74)
WBC # BLD AUTO: 2.8 K/UL (ref 3.6–11)

## 2023-03-23 PROCEDURE — 36415 COLL VENOUS BLD VENIPUNCTURE: CPT

## 2023-03-23 PROCEDURE — 84443 ASSAY THYROID STIM HORMONE: CPT

## 2023-03-23 PROCEDURE — 96411 CHEMO IV PUSH ADDL DRUG: CPT

## 2023-03-23 PROCEDURE — 74011000258 HC RX REV CODE- 258: Performed by: NURSE PRACTITIONER

## 2023-03-23 PROCEDURE — 74011000250 HC RX REV CODE- 250: Performed by: NURSE PRACTITIONER

## 2023-03-23 PROCEDURE — 85025 COMPLETE CBC W/AUTO DIFF WBC: CPT

## 2023-03-23 PROCEDURE — 74011250636 HC RX REV CODE- 250/636: Performed by: NURSE PRACTITIONER

## 2023-03-23 PROCEDURE — 96375 TX/PRO/DX INJ NEW DRUG ADDON: CPT

## 2023-03-23 PROCEDURE — 80053 COMPREHEN METABOLIC PANEL: CPT

## 2023-03-23 PROCEDURE — 77030012965 HC NDL HUBR BBMI -A

## 2023-03-23 PROCEDURE — 96413 CHEMO IV INFUSION 1 HR: CPT

## 2023-03-23 RX ORDER — HEPARIN 100 UNIT/ML
300-500 SYRINGE INTRAVENOUS AS NEEDED
Status: ACTIVE | OUTPATIENT
Start: 2023-03-23 | End: 2023-03-23

## 2023-03-23 RX ORDER — DEXAMETHASONE SODIUM PHOSPHATE 4 MG/ML
8 INJECTION, SOLUTION INTRA-ARTICULAR; INTRALESIONAL; INTRAMUSCULAR; INTRAVENOUS; SOFT TISSUE ONCE
Status: COMPLETED | OUTPATIENT
Start: 2023-03-23 | End: 2023-03-23

## 2023-03-23 RX ORDER — ONDANSETRON 2 MG/ML
8 INJECTION INTRAMUSCULAR; INTRAVENOUS AS NEEDED
Status: ACTIVE | OUTPATIENT
Start: 2023-03-23 | End: 2023-03-23

## 2023-03-23 RX ORDER — SODIUM CHLORIDE 9 MG/ML
25 INJECTION, SOLUTION INTRAVENOUS CONTINUOUS
Status: DISPENSED | OUTPATIENT
Start: 2023-03-23 | End: 2023-03-23

## 2023-03-23 RX ORDER — DIPHENHYDRAMINE HYDROCHLORIDE 50 MG/ML
25 INJECTION, SOLUTION INTRAMUSCULAR; INTRAVENOUS AS NEEDED
Status: ACTIVE | OUTPATIENT
Start: 2023-03-23 | End: 2023-03-23

## 2023-03-23 RX ORDER — ACETAMINOPHEN 325 MG/1
650 TABLET ORAL AS NEEDED
Status: ACTIVE | OUTPATIENT
Start: 2023-03-23 | End: 2023-03-23

## 2023-03-23 RX ORDER — SODIUM CHLORIDE 9 MG/ML
10 INJECTION INTRAVENOUS AS NEEDED
Status: ACTIVE | OUTPATIENT
Start: 2023-03-23 | End: 2023-03-23

## 2023-03-23 RX ORDER — HYDROCORTISONE SODIUM SUCCINATE 100 MG/2ML
100 INJECTION, POWDER, FOR SOLUTION INTRAMUSCULAR; INTRAVENOUS AS NEEDED
Status: ACTIVE | OUTPATIENT
Start: 2023-03-23 | End: 2023-03-23

## 2023-03-23 RX ORDER — ALBUTEROL SULFATE 0.83 MG/ML
2.5 SOLUTION RESPIRATORY (INHALATION) AS NEEDED
Status: ACTIVE | OUTPATIENT
Start: 2023-03-23 | End: 2023-03-23

## 2023-03-23 RX ORDER — SODIUM CHLORIDE 0.9 % (FLUSH) 0.9 %
10 SYRINGE (ML) INJECTION AS NEEDED
Status: DISPENSED | OUTPATIENT
Start: 2023-03-23 | End: 2023-03-23

## 2023-03-23 RX ORDER — EPINEPHRINE 1 MG/ML
0.3 INJECTION, SOLUTION, CONCENTRATE INTRAVENOUS AS NEEDED
Status: ACTIVE | OUTPATIENT
Start: 2023-03-23 | End: 2023-03-23

## 2023-03-23 RX ORDER — DIPHENHYDRAMINE HYDROCHLORIDE 50 MG/ML
50 INJECTION, SOLUTION INTRAMUSCULAR; INTRAVENOUS AS NEEDED
Status: ACTIVE | OUTPATIENT
Start: 2023-03-23 | End: 2023-03-23

## 2023-03-23 RX ORDER — CYANOCOBALAMIN 1000 UG/ML
1000 INJECTION, SOLUTION INTRAMUSCULAR; SUBCUTANEOUS
Status: DISCONTINUED | OUTPATIENT
Start: 2023-03-23 | End: 2023-03-23

## 2023-03-23 RX ADMIN — SODIUM CHLORIDE 200 MG: 9 INJECTION, SOLUTION INTRAVENOUS at 11:53

## 2023-03-23 RX ADMIN — DEXAMETHASONE SODIUM PHOSPHATE 8 MG: 4 INJECTION, SOLUTION INTRAMUSCULAR; INTRAVENOUS at 12:25

## 2023-03-23 RX ADMIN — SODIUM CHLORIDE 25 ML/HR: 9 INJECTION, SOLUTION INTRAVENOUS at 11:10

## 2023-03-23 RX ADMIN — PEMETREXED DISODIUM 1100 MG: 100 INJECTION, POWDER, LYOPHILIZED, FOR SOLUTION INTRAVENOUS at 12:40

## 2023-03-23 RX ADMIN — HEPARIN 500 UNITS: 100 SYRINGE at 12:59

## 2023-03-23 RX ADMIN — SODIUM CHLORIDE, PRESERVATIVE FREE 10 ML: 5 INJECTION INTRAVENOUS at 12:59

## 2023-03-23 NOTE — PROGRESS NOTES
[facility] Progress Note    Date: 2023    Name: Huang De La Paz    MRN: [id number]         : 1960    Ms. Canada Arrived ambulatory and in no distress for cycle 14 day 1 of Keyruda/Alimta regimen. Follow Up: Proceed with treatment    Assessment was completed and documented in flowsheets. No acute concerns at this time. Port accessed without difficulty, labs drawn and processed. Chemotherapy Flowsheet 3/23/2023   Cycle C14   Date 3/23/2023   Drug / Regimen Keytruda/Alimta   Dosage -   Pre Meds -   Notes -         Ms. Canada's vitals were reviewed. Patient Vitals for the past 12 hrs:   Temp Pulse Resp BP   23 1256 -- 80 -- 113/60   23 0841 97.4 °F (36.3 °C) 67 18 114/66       Lines:   Venous Access Device 22 Upper chest (subclavicular area, right (Active)   Central Line Being Utilized Yes 23 0841   Criteria for Appropriate Use Irritant/vesicant medication 23 1045   Site Assessment Clean, dry, & intact 23 0841   Date of Last Dressing Change 23 0841   Dressing Status Clean, dry, & intact 23 0841   Dressing Type Bandaid 23 1300   Action Taken Blood drawn 23 0841   Date Accessed (Medial Site) 23 0841   Access Time (Medial Site) 1045 23 1045   Access Needle Size (Site #1) 20 G 23 0841   Access Needle Length (Medial Site) 0.75 inches 23 0841   Positive Blood Return (Medial Site) Yes 23 1300   Action Taken (Medial Site) Flushed; De-accessed 23 1300   Alcohol Cap Used Yes 23 1010        Lab results were obtained and reviewed. Labs within parameter for treatment.    Recent Results (from the past 12 hour(s))   TSH 3RD GENERATION    Collection Time: 23  9:04 AM   Result Value Ref Range    TSH 0.60 0.36 - 3.74 uIU/mL   CBC WITH AUTOMATED DIFF    Collection Time: 23  9:04 AM   Result Value Ref Range    WBC 2.8 (L) 3.6 - 11.0 K/uL    RBC 4.25 3.80 - 5.20 M/uL    HGB 11.7 11.5 - 16.0 g/dL    HCT 37.2 35.0 - 47.0 %    MCV 87.5 80.0 - 99.0 FL    MCH 27.5 26.0 - 34.0 PG    MCHC 31.5 30.0 - 36.5 g/dL    RDW 16.1 (H) 11.5 - 14.5 %    PLATELET 331 224 - 801 K/uL    MPV 9.7 8.9 - 12.9 FL    NRBC 0.0 0  WBC    ABSOLUTE NRBC 0.00 0.00 - 0.01 K/uL    NEUTROPHILS 72 32 - 75 %    LYMPHOCYTES 17 12 - 49 %    MONOCYTES 11 5 - 13 %    EOSINOPHILS 0 0 - 7 %    BASOPHILS 0 0 - 1 %    IMMATURE GRANULOCYTES 0 0.0 - 0.5 %    ABS. NEUTROPHILS 2.0 1.8 - 8.0 K/UL    ABS. LYMPHOCYTES 0.5 (L) 0.8 - 3.5 K/UL    ABS. MONOCYTES 0.3 0.0 - 1.0 K/UL    ABS. EOSINOPHILS 0.0 0.0 - 0.4 K/UL    ABS. BASOPHILS 0.0 0.0 - 0.1 K/UL    ABS. IMM. GRANS. 0.0 0.00 - 0.04 K/UL    DF SMEAR SCANNED      RBC COMMENTS ANISOCYTOSIS  1+       METABOLIC PANEL, COMPREHENSIVE    Collection Time: 03/23/23  9:04 AM   Result Value Ref Range    Sodium 138 136 - 145 mmol/L    Potassium 3.5 3.5 - 5.1 mmol/L    Chloride 108 97 - 108 mmol/L    CO2 24 21 - 32 mmol/L    Anion gap 6 5 - 15 mmol/L    Glucose 182 (H) 65 - 100 mg/dL    BUN 16 6 - 20 MG/DL    Creatinine 0.97 0.55 - 1.02 MG/DL    BUN/Creatinine ratio 16 12 - 20      eGFR >60 >60 ml/min/1.73m2    Calcium 9.1 8.5 - 10.1 MG/DL    Bilirubin, total 0.5 0.2 - 1.0 MG/DL    ALT (SGPT) 36 12 - 78 U/L    AST (SGOT) 28 15 - 37 U/L    Alk. phosphatase 77 45 - 117 U/L    Protein, total 7.8 6.4 - 8.2 g/dL    Albumin 3.6 3.5 - 5.0 g/dL    Globulin 4.2 (H) 2.0 - 4.0 g/dL    A-G Ratio 0.9 (L) 1.1 - 2.2         Pre-medications  were administered as ordered and chemotherapy was initiated.   Medications Administered       0.9% sodium chloride infusion       Admin Date  03/23/2023 Action  New Bag Dose  25 mL/hr Rate  25 mL/hr Route  IntraVENous Administered By  Denisse Boucher RN              0.9% sodium chloride injection 10 mL       Admin Date  03/23/2023 Action  Given Dose  10 mL Route  IntraVENous Administered By  Denisse Boucher RN              dexamethasone (DECADRON) 4 mg/mL injection 8 mg Admin Date  03/23/2023 Action  Given Dose  8 mg Route  IntraVENous Administered By  Hortensia Ramos RN              heparin (porcine) pf 300-500 Units       Admin Date  03/23/2023 Action  Given Dose  500 Units Route  InterCATHeter Administered By  Hortensia Ramos RN              pembrolizumab (KEYTRUDA) 200 mg in 0.9% sodium chloride 100 mL, overfill volume 10 mL IVPB       Admin Date  03/23/2023 Action  New Bag Dose  200 mg Rate  236 mL/hr Route  IntraVENous Administered By  Hortensia Ramos RN              PEMEtrexed disodium (ALIMTA) 1,100 mg in 0.9% sodium chloride 100 mL, overfill volume 10 mL chemo infusion       Admin Date  03/23/2023 Action  New Bag Dose  1,100 mg Route  IntraVENous Administered By  Hortensia Ramos RN                    Two nurses verified prior to administering: Drug name, Drug dose, Infusion volume or drug volume when prepared in a syringe, Rate of administration, Route of administration, Expiration dates and/or times, Appearance and physical integrity of the drugs, Rate set on infusion pump, when used, and Sequencing of drug administration. Medication education and side effect management reinforced with patient. They verbalized understanding. Ms. Adan Olvera tolerated treatment well, port flushed and de accessed, patient was discharged from [place] [address] in stable condition. Patient is aware of upcoming appointments.       Future Appointments   Date Time Provider Raymond Gonzales   4/13/2023  1:30 PM [place] [id number] [facility]   4/13/2023  1:45 PM Katey Andrea  N [facility] BS TOM Mejia RN  March 23, 2023

## 2023-04-13 ENCOUNTER — HOSPITAL ENCOUNTER (OUTPATIENT)
Dept: INFUSION THERAPY | Age: 63
Discharge: HOME | End: 2023-04-13
Payer: MEDICAID

## 2023-04-13 VITALS
SYSTOLIC BLOOD PRESSURE: 118 MMHG | BODY MASS INDEX: 38.64 KG/M2 | WEIGHT: 246.69 LBS | RESPIRATION RATE: 18 BRPM | HEART RATE: 78 BPM | TEMPERATURE: 98.4 F | DIASTOLIC BLOOD PRESSURE: 70 MMHG

## 2023-04-13 DIAGNOSIS — C34.90 NSCLC METASTATIC TO ADRENAL GLAND (HCC): ICD-10-CM

## 2023-04-13 DIAGNOSIS — C34.90 PRIMARY ADENOCARCINOMA OF LUNG, UNSPECIFIED LATERALITY (HCC): Primary | ICD-10-CM

## 2023-04-13 DIAGNOSIS — C79.70 NSCLC METASTATIC TO ADRENAL GLAND (HCC): ICD-10-CM

## 2023-04-13 LAB
ALBUMIN SERPL-MCNC: 3.4 G/DL (ref 3.5–5)
ALBUMIN/GLOB SERPL: 1 (ref 1.1–2.2)
ALP SERPL-CCNC: 74 U/L (ref 45–117)
ALT SERPL-CCNC: 35 U/L (ref 12–78)
ANION GAP SERPL CALC-SCNC: 7 MMOL/L (ref 5–15)
AST SERPL-CCNC: 22 U/L (ref 15–37)
BASOPHILS # BLD: 0 K/UL (ref 0–0.1)
BASOPHILS NFR BLD: 0 % (ref 0–1)
BILIRUB SERPL-MCNC: 0.6 MG/DL (ref 0.2–1)
BUN SERPL-MCNC: 17 MG/DL (ref 6–20)
BUN/CREAT SERPL: 15 (ref 12–20)
CALCIUM SERPL-MCNC: 9 MG/DL (ref 8.5–10.1)
CHLORIDE SERPL-SCNC: 109 MMOL/L (ref 97–108)
CO2 SERPL-SCNC: 24 MMOL/L (ref 21–32)
CREAT SERPL-MCNC: 1.16 MG/DL (ref 0.55–1.02)
DIFFERENTIAL METHOD BLD: ABNORMAL
EOSINOPHIL # BLD: 0 K/UL (ref 0–0.4)
EOSINOPHIL NFR BLD: 0 % (ref 0–7)
ERYTHROCYTE [DISTWIDTH] IN BLOOD BY AUTOMATED COUNT: 16.1 % (ref 11.5–14.5)
GLOBULIN SER CALC-MCNC: 3.5 G/DL (ref 2–4)
GLUCOSE SERPL-MCNC: 134 MG/DL (ref 65–100)
HCT VFR BLD AUTO: 35 % (ref 35–47)
HGB BLD-MCNC: 11.1 G/DL (ref 11.5–16)
IMM GRANULOCYTES # BLD AUTO: 0.1 K/UL (ref 0–0.04)
IMM GRANULOCYTES NFR BLD AUTO: 2 % (ref 0–0.5)
LYMPHOCYTES # BLD: 0.8 K/UL (ref 0.8–3.5)
LYMPHOCYTES NFR BLD: 11 % (ref 12–49)
MCH RBC QN AUTO: 28.4 PG (ref 26–34)
MCHC RBC AUTO-ENTMCNC: 31.7 G/DL (ref 30–36.5)
MCV RBC AUTO: 89.5 FL (ref 80–99)
MONOCYTES # BLD: 0.8 K/UL (ref 0–1)
MONOCYTES NFR BLD: 10 % (ref 5–13)
NEUTS SEG # BLD: 5.8 K/UL (ref 1.8–8)
NEUTS SEG NFR BLD: 77 % (ref 32–75)
NRBC # BLD: 0 K/UL (ref 0–0.01)
NRBC BLD-RTO: 0 PER 100 WBC
PLATELET # BLD AUTO: 435 K/UL (ref 150–400)
PMV BLD AUTO: 9.5 FL (ref 8.9–12.9)
POTASSIUM SERPL-SCNC: 4 MMOL/L (ref 3.5–5.1)
PROT SERPL-MCNC: 6.9 G/DL (ref 6.4–8.2)
RBC # BLD AUTO: 3.91 M/UL (ref 3.8–5.2)
SODIUM SERPL-SCNC: 140 MMOL/L (ref 136–145)
WBC # BLD AUTO: 7.5 K/UL (ref 3.6–11)

## 2023-04-13 PROCEDURE — 36415 COLL VENOUS BLD VENIPUNCTURE: CPT

## 2023-04-13 PROCEDURE — 74011000258 HC RX REV CODE- 258: Performed by: NURSE PRACTITIONER

## 2023-04-13 PROCEDURE — 96413 CHEMO IV INFUSION 1 HR: CPT

## 2023-04-13 PROCEDURE — 74011250636 HC RX REV CODE- 250/636: Performed by: NURSE PRACTITIONER

## 2023-04-13 PROCEDURE — 80053 COMPREHEN METABOLIC PANEL: CPT

## 2023-04-13 PROCEDURE — 77030012965 HC NDL HUBR BBMI -A

## 2023-04-13 PROCEDURE — 85025 COMPLETE CBC W/AUTO DIFF WBC: CPT

## 2023-04-13 RX ORDER — DIPHENHYDRAMINE HYDROCHLORIDE 50 MG/ML
50 INJECTION, SOLUTION INTRAMUSCULAR; INTRAVENOUS AS NEEDED
Status: DISCONTINUED | OUTPATIENT
Start: 2023-04-13 | End: 2023-04-14 | Stop reason: HOSPADM

## 2023-04-13 RX ORDER — SODIUM CHLORIDE 0.9 % (FLUSH) 0.9 %
10 SYRINGE (ML) INJECTION AS NEEDED
Status: DISCONTINUED | OUTPATIENT
Start: 2023-04-13 | End: 2023-04-14 | Stop reason: HOSPADM

## 2023-04-13 RX ORDER — ONDANSETRON 2 MG/ML
8 INJECTION INTRAMUSCULAR; INTRAVENOUS AS NEEDED
Status: DISCONTINUED | OUTPATIENT
Start: 2023-04-13 | End: 2023-04-14 | Stop reason: HOSPADM

## 2023-04-13 RX ORDER — SODIUM CHLORIDE 9 MG/ML
10 INJECTION INTRAVENOUS AS NEEDED
Status: DISCONTINUED | OUTPATIENT
Start: 2023-04-13 | End: 2023-04-14 | Stop reason: HOSPADM

## 2023-04-13 RX ORDER — DEXAMETHASONE SODIUM PHOSPHATE 4 MG/ML
8 INJECTION, SOLUTION INTRA-ARTICULAR; INTRALESIONAL; INTRAMUSCULAR; INTRAVENOUS; SOFT TISSUE ONCE
Status: DISCONTINUED | OUTPATIENT
Start: 2023-04-13 | End: 2023-04-13

## 2023-04-13 RX ORDER — HYDROCORTISONE SODIUM SUCCINATE 100 MG/2ML
100 INJECTION, POWDER, FOR SOLUTION INTRAMUSCULAR; INTRAVENOUS AS NEEDED
Status: DISCONTINUED | OUTPATIENT
Start: 2023-04-13 | End: 2023-04-14 | Stop reason: HOSPADM

## 2023-04-13 RX ORDER — CYANOCOBALAMIN 1000 UG/ML
1000 INJECTION, SOLUTION INTRAMUSCULAR; SUBCUTANEOUS
Status: DISCONTINUED | OUTPATIENT
Start: 2023-04-13 | End: 2023-04-14 | Stop reason: HOSPADM

## 2023-04-13 RX ORDER — EPINEPHRINE 1 MG/ML
0.3 INJECTION, SOLUTION, CONCENTRATE INTRAVENOUS AS NEEDED
Status: DISCONTINUED | OUTPATIENT
Start: 2023-04-13 | End: 2023-04-14 | Stop reason: HOSPADM

## 2023-04-13 RX ORDER — ALBUTEROL SULFATE 0.83 MG/ML
2.5 SOLUTION RESPIRATORY (INHALATION) AS NEEDED
Status: DISCONTINUED | OUTPATIENT
Start: 2023-04-13 | End: 2023-04-14 | Stop reason: HOSPADM

## 2023-04-13 RX ORDER — SODIUM CHLORIDE 9 MG/ML
25 INJECTION, SOLUTION INTRAVENOUS CONTINUOUS
Status: DISCONTINUED | OUTPATIENT
Start: 2023-04-13 | End: 2023-04-14 | Stop reason: HOSPADM

## 2023-04-13 RX ORDER — DIPHENHYDRAMINE HYDROCHLORIDE 50 MG/ML
25 INJECTION, SOLUTION INTRAMUSCULAR; INTRAVENOUS AS NEEDED
Status: DISCONTINUED | OUTPATIENT
Start: 2023-04-13 | End: 2023-04-14 | Stop reason: HOSPADM

## 2023-04-13 RX ORDER — HEPARIN 100 UNIT/ML
300-500 SYRINGE INTRAVENOUS AS NEEDED
Status: DISCONTINUED | OUTPATIENT
Start: 2023-04-13 | End: 2023-04-14 | Stop reason: HOSPADM

## 2023-04-13 RX ORDER — ACETAMINOPHEN 325 MG/1
650 TABLET ORAL AS NEEDED
Status: DISCONTINUED | OUTPATIENT
Start: 2023-04-13 | End: 2023-04-14 | Stop reason: HOSPADM

## 2023-04-13 RX ADMIN — SODIUM CHLORIDE 200 MG: 9 INJECTION, SOLUTION INTRAVENOUS at 15:40

## 2023-04-13 RX ADMIN — HEPARIN 500 UNITS: 100 SYRINGE at 16:25

## 2023-04-13 RX ADMIN — SODIUM CHLORIDE 25 ML/HR: 9 INJECTION, SOLUTION INTRAVENOUS at 15:36

## 2023-04-13 NOTE — PROGRESS NOTES
[facility] - Chemotherapy Progress Note    1330 Pt admit to [facility] for [address], [facility] ambulatory in stable condition accompanied by spouse. Assessment completed by access RN. Concerns voiced. Port accessed by access RN with positive blood return. Labs drawn per order and sent. Line flushed, clamped, Curos Cap applied to end clave. [address] flushed w/ + blood return; NS infusing; Alimta held for today's treatment     Visit Vitals  /70   Pulse 78   Temp 98.4 °F (36.9 °C)   Resp 18   Wt 111.9 kg (246 lb 11.1 oz)   BMI 38.64 kg/m²       Medications Administered       0.9% sodium chloride infusion       Admin Date  04/13/2023 Action  New Bag Dose  25 mL/hr Rate  25 mL/hr Route  IntraVENous Administered By  Carmelina Nieves RN              heparin (porcine) pf 300-500 Units       Admin Date  04/13/2023 Action  Given Dose  500 Units Route  InterCATHeter Administered By  Carmelina Nieves RN              pembrolizumab [facility]) 200 mg in 0.9% sodium chloride 100 mL, overfill volume 10 mL IVPB       Admin Date  04/13/2023 Action  New Bag Dose  200 mg Rate  236 mL/hr Route  IntraVENous Administered By  Carmelina Nieves RN                           1630 Pt tolerated treatment well. Port maintained positive blood return throughout treatment, flushed with positive blood return at conclusion, heparinized and de-accessed. D/c home ambulatory in no distress. Pt aware that she will be contacted by MD office regarding future [facility] appointments.        Recent Results (from the past 12 hour(s))   CBC WITH AUTOMATED DIFF    Collection Time: 04/13/23  1:38 PM   Result Value Ref Range    WBC 7.5 3.6 - 11.0 K/uL    RBC 3.91 3.80 - 5.20 M/uL    HGB 11.1 (L) 11.5 - 16.0 g/dL    HCT 35.0 35.0 - 47.0 %    MCV 89.5 80.0 - 99.0 FL    MCH 28.4 26.0 - 34.0 PG    MCHC 31.7 30.0 - 36.5 g/dL    RDW 16.1 (H) 11.5 - 14.5 %    PLATELET 033 (H) 003 - 400 K/uL    MPV 9.5 8.9 - 12.9 FL    NRBC 0.0 0  WBC    ABSOLUTE NRBC 0.00 0.00 - 0.01 K/uL    NEUTROPHILS 77 (H) 32 - 75 %    LYMPHOCYTES 11 (L) 12 - 49 %    MONOCYTES 10 5 - 13 %    EOSINOPHILS 0 0 - 7 %    BASOPHILS 0 0 - 1 %    IMMATURE GRANULOCYTES 2 (H) 0.0 - 0.5 %    ABS. NEUTROPHILS 5.8 1.8 - 8.0 K/UL    ABS. LYMPHOCYTES 0.8 0.8 - 3.5 K/UL    ABS. MONOCYTES 0.8 0.0 - 1.0 K/UL    ABS. EOSINOPHILS 0.0 0.0 - 0.4 K/UL    ABS. BASOPHILS 0.0 0.0 - 0.1 K/UL    ABS. IMM. GRANS. 0.1 (H) 0.00 - 0.04 K/UL    DF AUTOMATED     METABOLIC PANEL, COMPREHENSIVE    Collection Time: 04/13/23  1:38 PM   Result Value Ref Range    Sodium 140 136 - 145 mmol/L    Potassium 4.0 3.5 - 5.1 mmol/L    Chloride 109 (H) 97 - 108 mmol/L    CO2 24 21 - 32 mmol/L    Anion gap 7 5 - 15 mmol/L    Glucose 134 (H) 65 - 100 mg/dL    BUN 17 6 - 20 MG/DL    Creatinine 1.16 (H) 0.55 - 1.02 MG/DL    BUN/Creatinine ratio 15 12 - 20      eGFR 53 (L) >60 ml/min/1.73m2    Calcium 9.0 8.5 - 10.1 MG/DL    Bilirubin, total 0.6 0.2 - 1.0 MG/DL    ALT (SGPT) 35 12 - 78 U/L    AST (SGOT) 22 15 - 37 U/L    Alk.  phosphatase 74 45 - 117 U/L    Protein, total 6.9 6.4 - 8.2 g/dL    Albumin 3.4 (L) 3.5 - 5.0 g/dL    Globulin 3.5 2.0 - 4.0 g/dL    A-G Ratio 1.0 (L) 1.1 - 2.2

## 2023-04-17 ENCOUNTER — TELEPHONE (OUTPATIENT)
Dept: ONCOLOGY | Age: 63
End: 2023-04-17

## 2023-04-27 ENCOUNTER — HOSPITAL ENCOUNTER (OUTPATIENT)
Dept: CT IMAGING | Age: 63
Discharge: HOME | End: 2023-04-27
Attending: NURSE PRACTITIONER
Payer: MEDICAID

## 2023-04-27 ENCOUNTER — HOSPITAL ENCOUNTER (OUTPATIENT)
Dept: CT IMAGING | Age: 63
End: 2023-04-27
Attending: NURSE PRACTITIONER
Payer: MEDICAID

## 2023-04-27 DIAGNOSIS — C34.90 NSCLC METASTATIC TO ADRENAL GLAND (HCC): ICD-10-CM

## 2023-04-27 DIAGNOSIS — C79.70 NSCLC METASTATIC TO ADRENAL GLAND (HCC): ICD-10-CM

## 2023-04-27 PROCEDURE — 74177 CT ABD & PELVIS W/CONTRAST: CPT

## 2023-04-27 PROCEDURE — 74011000636 HC RX REV CODE- 636: Performed by: NURSE PRACTITIONER

## 2023-04-27 PROCEDURE — 71260 CT THORAX DX C+: CPT

## 2023-04-27 RX ORDER — EPINEPHRINE 1 MG/ML
0.3 INJECTION, SOLUTION, CONCENTRATE INTRAVENOUS AS NEEDED
OUTPATIENT
Start: 2023-05-04

## 2023-04-27 RX ORDER — HYDROCORTISONE SODIUM SUCCINATE 100 MG/2ML
100 INJECTION, POWDER, FOR SOLUTION INTRAMUSCULAR; INTRAVENOUS AS NEEDED
OUTPATIENT
Start: 2023-05-04

## 2023-04-27 RX ORDER — ONDANSETRON 2 MG/ML
8 INJECTION INTRAMUSCULAR; INTRAVENOUS AS NEEDED
OUTPATIENT
Start: 2023-05-04

## 2023-04-27 RX ORDER — ALBUTEROL SULFATE 0.83 MG/ML
2.5 SOLUTION RESPIRATORY (INHALATION) AS NEEDED
Start: 2023-05-04

## 2023-04-27 RX ORDER — ACETAMINOPHEN 325 MG/1
650 TABLET ORAL AS NEEDED
Start: 2023-05-04

## 2023-04-27 RX ORDER — DIPHENHYDRAMINE HYDROCHLORIDE 50 MG/ML
50 INJECTION, SOLUTION INTRAMUSCULAR; INTRAVENOUS AS NEEDED
Start: 2023-05-04

## 2023-04-27 RX ORDER — DIPHENHYDRAMINE HYDROCHLORIDE 50 MG/ML
25 INJECTION, SOLUTION INTRAMUSCULAR; INTRAVENOUS AS NEEDED
Start: 2023-05-04

## 2023-04-27 RX ADMIN — IOPAMIDOL 100 ML: 755 INJECTION, SOLUTION INTRAVENOUS at 09:06

## 2023-05-04 ENCOUNTER — OFFICE VISIT (OUTPATIENT)
Dept: ONCOLOGY | Age: 63
End: 2023-05-04

## 2023-05-04 ENCOUNTER — HOSPITAL ENCOUNTER (OUTPATIENT)
Dept: INFUSION THERAPY | Age: 63
Discharge: HOME | End: 2023-05-04
Payer: MEDICAID

## 2023-05-04 VITALS
SYSTOLIC BLOOD PRESSURE: 140 MMHG | RESPIRATION RATE: 18 BRPM | BODY MASS INDEX: 39.78 KG/M2 | TEMPERATURE: 97.7 F | OXYGEN SATURATION: 98 % | HEART RATE: 70 BPM | WEIGHT: 254 LBS | DIASTOLIC BLOOD PRESSURE: 70 MMHG

## 2023-05-04 VITALS
SYSTOLIC BLOOD PRESSURE: 140 MMHG | BODY MASS INDEX: 39.85 KG/M2 | TEMPERATURE: 97.7 F | WEIGHT: 254.41 LBS | DIASTOLIC BLOOD PRESSURE: 70 MMHG | HEART RATE: 70 BPM | RESPIRATION RATE: 18 BRPM

## 2023-05-04 DIAGNOSIS — Z51.12 ENCOUNTER FOR ANTINEOPLASTIC CHEMOTHERAPY AND IMMUNOTHERAPY: ICD-10-CM

## 2023-05-04 DIAGNOSIS — C34.90 NSCLC METASTATIC TO ADRENAL GLAND (HCC): ICD-10-CM

## 2023-05-04 DIAGNOSIS — C34.90 NSCLC METASTATIC TO ADRENAL GLAND (HCC): Primary | ICD-10-CM

## 2023-05-04 DIAGNOSIS — I82.4Y9 DEEP VEIN THROMBOSIS (DVT) OF PROXIMAL LOWER EXTREMITY, UNSPECIFIED CHRONICITY, UNSPECIFIED LATERALITY (HCC): Primary | ICD-10-CM

## 2023-05-04 DIAGNOSIS — Z95.828 PORT-A-CATH IN PLACE: ICD-10-CM

## 2023-05-04 DIAGNOSIS — R60.9 EDEMA, UNSPECIFIED TYPE: ICD-10-CM

## 2023-05-04 DIAGNOSIS — C79.70 NSCLC METASTATIC TO ADRENAL GLAND (HCC): Primary | ICD-10-CM

## 2023-05-04 DIAGNOSIS — C79.70 NSCLC METASTATIC TO ADRENAL GLAND (HCC): ICD-10-CM

## 2023-05-04 DIAGNOSIS — C34.90 PRIMARY ADENOCARCINOMA OF LUNG, UNSPECIFIED LATERALITY (HCC): Primary | ICD-10-CM

## 2023-05-04 DIAGNOSIS — Z51.11 ENCOUNTER FOR ANTINEOPLASTIC CHEMOTHERAPY AND IMMUNOTHERAPY: ICD-10-CM

## 2023-05-04 LAB
ALBUMIN SERPL-MCNC: 3.6 G/DL (ref 3.5–5)
ALBUMIN/GLOB SERPL: 1 (ref 1.1–2.2)
ALP SERPL-CCNC: 79 U/L (ref 45–117)
ALT SERPL-CCNC: 27 U/L (ref 12–78)
ANION GAP SERPL CALC-SCNC: 8 MMOL/L (ref 5–15)
AST SERPL-CCNC: 18 U/L (ref 15–37)
BASOPHILS # BLD: 0 K/UL (ref 0–0.1)
BASOPHILS NFR BLD: 0 % (ref 0–1)
BILIRUB SERPL-MCNC: 0.8 MG/DL (ref 0.2–1)
BUN SERPL-MCNC: 12 MG/DL (ref 6–20)
BUN/CREAT SERPL: 11 (ref 12–20)
CALCIUM SERPL-MCNC: 9.1 MG/DL (ref 8.5–10.1)
CHLORIDE SERPL-SCNC: 109 MMOL/L (ref 97–108)
CO2 SERPL-SCNC: 22 MMOL/L (ref 21–32)
CREAT SERPL-MCNC: 1.09 MG/DL (ref 0.55–1.02)
DIFFERENTIAL METHOD BLD: ABNORMAL
EOSINOPHIL # BLD: 0 K/UL (ref 0–0.4)
EOSINOPHIL NFR BLD: 0 % (ref 0–7)
ERYTHROCYTE [DISTWIDTH] IN BLOOD BY AUTOMATED COUNT: 16.4 % (ref 11.5–14.5)
GLOBULIN SER CALC-MCNC: 3.5 G/DL (ref 2–4)
GLUCOSE SERPL-MCNC: 203 MG/DL (ref 65–100)
HCT VFR BLD AUTO: 37.1 % (ref 35–47)
HGB BLD-MCNC: 11.8 G/DL (ref 11.5–16)
IMM GRANULOCYTES # BLD AUTO: 0.1 K/UL (ref 0–0.04)
IMM GRANULOCYTES NFR BLD AUTO: 1 % (ref 0–0.5)
LYMPHOCYTES # BLD: 0.8 K/UL (ref 0.8–3.5)
LYMPHOCYTES NFR BLD: 9 % (ref 12–49)
MCH RBC QN AUTO: 28.9 PG (ref 26–34)
MCHC RBC AUTO-ENTMCNC: 31.8 G/DL (ref 30–36.5)
MCV RBC AUTO: 90.9 FL (ref 80–99)
MONOCYTES # BLD: 0.6 K/UL (ref 0–1)
MONOCYTES NFR BLD: 7 % (ref 5–13)
NEUTS SEG # BLD: 7.4 K/UL (ref 1.8–8)
NEUTS SEG NFR BLD: 83 % (ref 32–75)
NRBC # BLD: 0 K/UL (ref 0–0.01)
NRBC BLD-RTO: 0 PER 100 WBC
PLATELET # BLD AUTO: 264 K/UL (ref 150–400)
PMV BLD AUTO: 10.2 FL (ref 8.9–12.9)
POTASSIUM SERPL-SCNC: 4.1 MMOL/L (ref 3.5–5.1)
PROT SERPL-MCNC: 7.1 G/DL (ref 6.4–8.2)
RBC # BLD AUTO: 4.08 M/UL (ref 3.8–5.2)
RBC MORPH BLD: ABNORMAL
RBC MORPH BLD: ABNORMAL
SODIUM SERPL-SCNC: 139 MMOL/L (ref 136–145)
TSH SERPL DL<=0.05 MIU/L-ACNC: 0.38 UIU/ML (ref 0.36–3.74)
WBC # BLD AUTO: 8.9 K/UL (ref 3.6–11)

## 2023-05-04 PROCEDURE — 80053 COMPREHEN METABOLIC PANEL: CPT

## 2023-05-04 PROCEDURE — 74011000258 HC RX REV CODE- 258: Performed by: INTERNAL MEDICINE

## 2023-05-04 PROCEDURE — 74011250636 HC RX REV CODE- 250/636: Performed by: INTERNAL MEDICINE

## 2023-05-04 PROCEDURE — 85025 COMPLETE CBC W/AUTO DIFF WBC: CPT

## 2023-05-04 PROCEDURE — 96413 CHEMO IV INFUSION 1 HR: CPT

## 2023-05-04 PROCEDURE — 77030012965 HC NDL HUBR BBMI -A

## 2023-05-04 PROCEDURE — 96372 THER/PROPH/DIAG INJ SC/IM: CPT

## 2023-05-04 PROCEDURE — 84443 ASSAY THYROID STIM HORMONE: CPT

## 2023-05-04 PROCEDURE — 36415 COLL VENOUS BLD VENIPUNCTURE: CPT

## 2023-05-04 RX ORDER — SODIUM CHLORIDE 9 MG/ML
5-250 INJECTION, SOLUTION INTRAVENOUS PRN
OUTPATIENT
Start: 2023-05-25

## 2023-05-04 RX ORDER — ALBUTEROL SULFATE 90 UG/1
4 AEROSOL, METERED RESPIRATORY (INHALATION) PRN
OUTPATIENT
Start: 2023-05-25

## 2023-05-04 RX ORDER — FUROSEMIDE 20 MG/1
20 TABLET ORAL DAILY
Qty: 3 TABLET | Refills: 0 | Status: SHIPPED | OUTPATIENT
Start: 2023-05-04

## 2023-05-04 RX ORDER — CYANOCOBALAMIN 1000 UG/ML
1000 INJECTION, SOLUTION INTRAMUSCULAR; SUBCUTANEOUS
Status: COMPLETED | OUTPATIENT
Start: 2023-05-04 | End: 2023-05-04

## 2023-05-04 RX ORDER — HEPARIN SODIUM (PORCINE) LOCK FLUSH IV SOLN 100 UNIT/ML 100 UNIT/ML
500 SOLUTION INTRAVENOUS PRN
OUTPATIENT
Start: 2023-05-25

## 2023-05-04 RX ORDER — DEXAMETHASONE SODIUM PHOSPHATE 4 MG/ML
8 INJECTION, SOLUTION INTRA-ARTICULAR; INTRALESIONAL; INTRAMUSCULAR; INTRAVENOUS; SOFT TISSUE ONCE
Status: DISCONTINUED | OUTPATIENT
Start: 2023-05-04 | End: 2023-05-05 | Stop reason: HOSPADM

## 2023-05-04 RX ORDER — SODIUM CHLORIDE 9 MG/ML
25 INJECTION, SOLUTION INTRAVENOUS CONTINUOUS
Status: DISCONTINUED | OUTPATIENT
Start: 2023-05-04 | End: 2023-05-05 | Stop reason: HOSPADM

## 2023-05-04 RX ORDER — LORAZEPAM 2 MG/ML
0.5 INJECTION INTRAMUSCULAR
OUTPATIENT
Start: 2023-05-25

## 2023-05-04 RX ORDER — HEPARIN 100 UNIT/ML
300-500 SYRINGE INTRAVENOUS AS NEEDED
Status: DISCONTINUED | OUTPATIENT
Start: 2023-05-04 | End: 2023-05-05 | Stop reason: HOSPADM

## 2023-05-04 RX ORDER — ONDANSETRON 2 MG/ML
8 INJECTION INTRAMUSCULAR; INTRAVENOUS
OUTPATIENT
Start: 2023-05-25

## 2023-05-04 RX ORDER — SODIUM CHLORIDE 0.9 % (FLUSH) 0.9 %
5-40 SYRINGE (ML) INJECTION PRN
OUTPATIENT
Start: 2023-05-25

## 2023-05-04 RX ORDER — DIPHENHYDRAMINE HYDROCHLORIDE 50 MG/ML
50 INJECTION INTRAMUSCULAR; INTRAVENOUS
OUTPATIENT
Start: 2023-05-25

## 2023-05-04 RX ORDER — ACETAMINOPHEN 325 MG/1
650 TABLET ORAL
OUTPATIENT
Start: 2023-05-25

## 2023-05-04 RX ORDER — DEXAMETHASONE SODIUM PHOSPHATE 4 MG/ML
4 INJECTION, SOLUTION INTRA-ARTICULAR; INTRALESIONAL; INTRAMUSCULAR; INTRAVENOUS; SOFT TISSUE EVERY 6 HOURS
Start: 2023-05-25

## 2023-05-04 RX ORDER — SODIUM CHLORIDE 9 MG/ML
INJECTION, SOLUTION INTRAVENOUS CONTINUOUS
OUTPATIENT
Start: 2023-05-25

## 2023-05-04 RX ORDER — MEPERIDINE HYDROCHLORIDE 25 MG/ML
12.5 INJECTION INTRAMUSCULAR; INTRAVENOUS; SUBCUTANEOUS PRN
OUTPATIENT
Start: 2023-05-25

## 2023-05-04 RX ORDER — SODIUM CHLORIDE 0.9 % (FLUSH) 0.9 %
10 SYRINGE (ML) INJECTION AS NEEDED
Status: DISCONTINUED | OUTPATIENT
Start: 2023-05-04 | End: 2023-05-05 | Stop reason: HOSPADM

## 2023-05-04 RX ORDER — CYANOCOBALAMIN 1000 UG/ML
1000 INJECTION, SOLUTION INTRAMUSCULAR; SUBCUTANEOUS
OUTPATIENT
Start: 2023-05-25

## 2023-05-04 RX ORDER — PROCHLORPERAZINE EDISYLATE 5 MG/ML
10 INJECTION INTRAMUSCULAR; INTRAVENOUS
OUTPATIENT
Start: 2023-05-25

## 2023-05-04 RX ORDER — EPINEPHRINE 1 MG/ML
0.3 INJECTION, SOLUTION, CONCENTRATE INTRAVENOUS PRN
OUTPATIENT
Start: 2023-05-25

## 2023-05-04 RX ORDER — SODIUM CHLORIDE 9 MG/ML
10 INJECTION INTRAVENOUS AS NEEDED
Status: DISCONTINUED | OUTPATIENT
Start: 2023-05-04 | End: 2023-05-05 | Stop reason: HOSPADM

## 2023-05-04 RX ADMIN — SODIUM CHLORIDE 25 ML/HR: 9 INJECTION, SOLUTION INTRAVENOUS at 16:02

## 2023-05-04 RX ADMIN — HEPARIN 500 UNITS: 100 SYRINGE at 16:40

## 2023-05-04 RX ADMIN — CYANOCOBALAMIN 1000 MCG: 1000 INJECTION, SOLUTION INTRAMUSCULAR at 16:03

## 2023-05-04 RX ADMIN — SODIUM CHLORIDE 200 MG: 9 INJECTION, SOLUTION INTRAVENOUS at 16:03

## 2023-05-06 ENCOUNTER — HOSPITAL ENCOUNTER (OUTPATIENT)
Facility: HOSPITAL | Age: 63
End: 2023-05-06
Payer: MEDICAID

## 2023-05-06 DIAGNOSIS — I82.4Y9 DEEP VEIN THROMBOSIS (DVT) OF PROXIMAL LOWER EXTREMITY, UNSPECIFIED CHRONICITY, UNSPECIFIED LATERALITY (HCC): ICD-10-CM

## 2023-05-06 PROCEDURE — 93970 EXTREMITY STUDY: CPT

## 2023-05-09 ENCOUNTER — CLINICAL DOCUMENTATION (OUTPATIENT)
Age: 63
End: 2023-05-09

## 2023-05-10 ENCOUNTER — TELEPHONE (OUTPATIENT)
Age: 63
End: 2023-05-10

## 2023-05-10 NOTE — TELEPHONE ENCOUNTER
[id number]:    Called patient and confirmed x2 identifiers   Informed per TERRY Noe request that Doppler was negative for DVTs    Patient verbalized understanding   No new questions or concerns at this time

## 2023-05-18 RX ORDER — DEXAMETHASONE SODIUM PHOSPHATE 4 MG/ML
8 INJECTION, SOLUTION INTRA-ARTICULAR; INTRALESIONAL; INTRAMUSCULAR; INTRAVENOUS; SOFT TISSUE ONCE
Start: 2023-05-25 | End: 2023-05-25

## 2023-05-25 ENCOUNTER — HOSPITAL ENCOUNTER (OUTPATIENT)
Facility: HOSPITAL | Age: 63
Setting detail: INFUSION SERIES
End: 2023-05-25
Payer: MEDICAID

## 2023-05-25 ENCOUNTER — OFFICE VISIT (OUTPATIENT)
Age: 63
End: 2023-05-25
Payer: MEDICAID

## 2023-05-25 ENCOUNTER — APPOINTMENT (OUTPATIENT)
Dept: INFUSION THERAPY | Age: 63
End: 2023-05-25

## 2023-05-25 VITALS
WEIGHT: 259.2 LBS | SYSTOLIC BLOOD PRESSURE: 103 MMHG | DIASTOLIC BLOOD PRESSURE: 71 MMHG | TEMPERATURE: 97.7 F | RESPIRATION RATE: 18 BRPM | HEART RATE: 96 BPM | BODY MASS INDEX: 40.6 KG/M2

## 2023-05-25 VITALS
BODY MASS INDEX: 40.57 KG/M2 | RESPIRATION RATE: 18 BRPM | WEIGHT: 259 LBS | DIASTOLIC BLOOD PRESSURE: 70 MMHG | SYSTOLIC BLOOD PRESSURE: 138 MMHG | TEMPERATURE: 97.7 F | OXYGEN SATURATION: 99 % | HEART RATE: 102 BPM

## 2023-05-25 DIAGNOSIS — C34.90 NSCLC METASTATIC TO ADRENAL GLAND (HCC): Primary | ICD-10-CM

## 2023-05-25 DIAGNOSIS — C79.70 NSCLC METASTATIC TO ADRENAL GLAND (HCC): Primary | ICD-10-CM

## 2023-05-25 LAB
ALBUMIN SERPL-MCNC: 3.6 G/DL (ref 3.5–5)
ALBUMIN/GLOB SERPL: 1.1 (ref 1.1–2.2)
ALP SERPL-CCNC: 88 U/L (ref 45–117)
ALT SERPL-CCNC: 28 U/L (ref 12–78)
ANION GAP SERPL CALC-SCNC: 10 MMOL/L (ref 5–15)
AST SERPL-CCNC: 18 U/L (ref 15–37)
BASOPHILS # BLD: 0 K/UL (ref 0–0.1)
BASOPHILS NFR BLD: 0 % (ref 0–1)
BILIRUB SERPL-MCNC: 0.6 MG/DL (ref 0.2–1)
BUN SERPL-MCNC: 12 MG/DL (ref 6–20)
BUN/CREAT SERPL: 11 (ref 12–20)
CALCIUM SERPL-MCNC: 9 MG/DL (ref 8.5–10.1)
CHLORIDE SERPL-SCNC: 106 MMOL/L (ref 97–108)
CO2 SERPL-SCNC: 22 MMOL/L (ref 21–32)
CREAT SERPL-MCNC: 1.05 MG/DL (ref 0.55–1.02)
DIFFERENTIAL METHOD BLD: ABNORMAL
EOSINOPHIL # BLD: 0 K/UL (ref 0–0.4)
EOSINOPHIL NFR BLD: 0 % (ref 0–7)
ERYTHROCYTE [DISTWIDTH] IN BLOOD BY AUTOMATED COUNT: 15.2 % (ref 11.5–14.5)
GLOBULIN SER CALC-MCNC: 3.4 G/DL (ref 2–4)
GLUCOSE SERPL-MCNC: 249 MG/DL (ref 65–100)
HCT VFR BLD AUTO: 37.1 % (ref 35–47)
HGB BLD-MCNC: 12 G/DL (ref 11.5–16)
IMM GRANULOCYTES # BLD AUTO: 0.1 K/UL (ref 0–0.04)
IMM GRANULOCYTES NFR BLD AUTO: 1 % (ref 0–0.5)
LYMPHOCYTES # BLD: 0.7 K/UL (ref 0.8–3.5)
LYMPHOCYTES NFR BLD: 8 % (ref 12–49)
MCH RBC QN AUTO: 29.4 PG (ref 26–34)
MCHC RBC AUTO-ENTMCNC: 32.3 G/DL (ref 30–36.5)
MCV RBC AUTO: 90.9 FL (ref 80–99)
MONOCYTES # BLD: 0.4 K/UL (ref 0–1)
MONOCYTES NFR BLD: 5 % (ref 5–13)
NEUTS SEG # BLD: 7 K/UL (ref 1.8–8)
NEUTS SEG NFR BLD: 86 % (ref 32–75)
NRBC # BLD: 0 K/UL (ref 0–0.01)
NRBC BLD-RTO: 0 PER 100 WBC
PLATELET # BLD AUTO: 261 K/UL (ref 150–400)
PMV BLD AUTO: 9.6 FL (ref 8.9–12.9)
POTASSIUM SERPL-SCNC: 3.9 MMOL/L (ref 3.5–5.1)
PROT SERPL-MCNC: 7 G/DL (ref 6.4–8.2)
RBC # BLD AUTO: 4.08 M/UL (ref 3.8–5.2)
RBC MORPH BLD: ABNORMAL
SODIUM SERPL-SCNC: 138 MMOL/L (ref 136–145)
TSH SERPL DL<=0.05 MIU/L-ACNC: 0.31 UIU/ML (ref 0.36–3.74)
WBC # BLD AUTO: 8.2 K/UL (ref 3.6–11)

## 2023-05-25 PROCEDURE — 99214 OFFICE O/P EST MOD 30 MIN: CPT | Performed by: INTERNAL MEDICINE

## 2023-05-25 PROCEDURE — 80053 COMPREHEN METABOLIC PANEL: CPT

## 2023-05-25 PROCEDURE — 2580000003 HC RX 258: Performed by: INTERNAL MEDICINE

## 2023-05-25 PROCEDURE — 85025 COMPLETE CBC W/AUTO DIFF WBC: CPT

## 2023-05-25 PROCEDURE — 6360000002 HC RX W HCPCS: Performed by: INTERNAL MEDICINE

## 2023-05-25 PROCEDURE — 96413 CHEMO IV INFUSION 1 HR: CPT

## 2023-05-25 PROCEDURE — 84443 ASSAY THYROID STIM HORMONE: CPT

## 2023-05-25 PROCEDURE — 36415 COLL VENOUS BLD VENIPUNCTURE: CPT

## 2023-05-25 RX ORDER — SODIUM CHLORIDE 0.9 % (FLUSH) 0.9 %
5-40 SYRINGE (ML) INJECTION PRN
Status: DISCONTINUED | OUTPATIENT
Start: 2023-05-25 | End: 2023-05-26 | Stop reason: HOSPADM

## 2023-05-25 RX ORDER — HEPARIN SODIUM (PORCINE) LOCK FLUSH IV SOLN 100 UNIT/ML 100 UNIT/ML
500 SOLUTION INTRAVENOUS PRN
Status: DISCONTINUED | OUTPATIENT
Start: 2023-05-25 | End: 2023-05-26 | Stop reason: HOSPADM

## 2023-05-25 RX ORDER — AMLODIPINE BESYLATE 5 MG/1
5 TABLET ORAL DAILY
COMMUNITY
Start: 2023-05-02

## 2023-05-25 RX ORDER — ALPRAZOLAM 0.25 MG/1
TABLET ORAL
COMMUNITY

## 2023-05-25 RX ORDER — DEXAMETHASONE 4 MG/1
TABLET ORAL
COMMUNITY
Start: 2023-03-23

## 2023-05-25 RX ORDER — ESCITALOPRAM OXALATE 20 MG/1
20 TABLET ORAL DAILY
COMMUNITY
Start: 2023-03-31

## 2023-05-25 RX ORDER — ASPIRIN 81 MG/1
81 TABLET, CHEWABLE ORAL DAILY
COMMUNITY

## 2023-05-25 RX ORDER — SODIUM CHLORIDE 9 MG/ML
5-250 INJECTION, SOLUTION INTRAVENOUS PRN
Status: DISCONTINUED | OUTPATIENT
Start: 2023-05-25 | End: 2023-05-26 | Stop reason: HOSPADM

## 2023-05-25 RX ORDER — FOLIC ACID 1 MG/1
1000 TABLET ORAL DAILY
COMMUNITY
Start: 2023-05-02

## 2023-05-25 RX ORDER — SIMVASTATIN 20 MG
20 TABLET ORAL NIGHTLY
COMMUNITY
Start: 2023-05-10

## 2023-05-25 RX ADMIN — SODIUM CHLORIDE, PRESERVATIVE FREE 20 ML: 5 INJECTION INTRAVENOUS at 16:22

## 2023-05-25 RX ADMIN — SODIUM CHLORIDE 50 ML/HR: 9 INJECTION, SOLUTION INTRAVENOUS at 15:13

## 2023-05-25 RX ADMIN — SODIUM CHLORIDE 200 MG: 9 INJECTION, SOLUTION INTRAVENOUS at 15:45

## 2023-05-25 NOTE — PROGRESS NOTES
[facility] Short Note                       Date: May 25, 2023    Name: Joe Mendez    MRN: [id number]         : 1960      Pt admit to [facility] for [address] ambulatory in stable condition. Assessment completed and documented in flowsheets. No acute concerns at this time. Port accessed by assessment RN. Please review pending lab results in [address] [place] [address]. Ms. Ayaan Sifuentes vitals were reviewed prior to and after treatment. Patient Vitals for the past 12 hrs:   Temp Pulse Resp BP   23 1617 -- 96 -- 103/71   23 1330 97.7 °F (36.5 °C) (!) 102 18 138/70     Per MD office Janna Mccallum today. Lab results were obtained and reviewed. Labs within parameter for treatment.   Recent Results (from the past 12 hour(s))   Comprehensive metabolic panel    Collection Time: 23  1:37 PM   Result Value Ref Range    Sodium 138 136 - 145 mmol/L    Potassium 3.9 3.5 - 5.1 mmol/L    Chloride 106 97 - 108 mmol/L    CO2 22 21 - 32 mmol/L    Anion Gap 10 5 - 15 mmol/L    Glucose 249 (H) 65 - 100 mg/dL    BUN 12 6 - 20 MG/DL    Creatinine 1.05 (H) 0.55 - 1.02 MG/DL    Bun/Cre Ratio 11 (L) 12 - 20      Est, Glom Filt Rate 60 (L) >60 ml/min/1.73m2    Calcium 9.0 8.5 - 10.1 MG/DL    Total Bilirubin 0.6 0.2 - 1.0 MG/DL    ALT 28 12 - 78 U/L    AST 18 15 - 37 U/L    Alk Phosphatase 88 45 - 117 U/L    Total Protein 7.0 6.4 - 8.2 g/dL    Albumin 3.6 3.5 - 5.0 g/dL    Globulin 3.4 2.0 - 4.0 g/dL    Albumin/Globulin Ratio 1.1 1.1 - 2.2     CBC with Auto Differential    Collection Time: 23  1:37 PM   Result Value Ref Range    WBC 8.2 3.6 - 11.0 K/uL    RBC 4.08 3.80 - 5.20 M/uL    Hemoglobin 12.0 11.5 - 16.0 g/dL    Hematocrit 37.1 35.0 - 47.0 %    MCV 90.9 80.0 - 99.0 FL    MCH 29.4 26.0 - 34.0 PG    MCHC 32.3 30.0 - 36.5 g/dL    RDW 15.2 (H) 11.5 - 14.5 %    Platelets 354 629 - 463 K/uL    MPV 9.6 8.9 - 12.9 FL    Nucleated RBCs 0.0 0  WBC    nRBC 0.00 0.00 - 0.01 K/uL    Neutrophils % 86 (H) 32 - 75 %

## 2023-05-25 NOTE — PROGRESS NOTES
Natasha Mullen is a 60 y.o. female    Chief Complaint   Patient presents with    Follow-up     stage IV NSCLC- PDL1- 15%, no  mutations      1. Have you been to the ER, urgent care clinic since your last visit? Hospitalized since your last visit? No    2. Have you seen or consulted any other health care providers outside of the [address] [place] [address] since your last visit? Include any pap smears or colon screening.  No

## 2023-05-25 NOTE — PROGRESS NOTES
Cancer [place] at [place] [address] [address], [address]: [phone]  F: [phone]          Reason for visit     Guy Canas is a 60 y.o.  female who is seen for follow up of stage IV NSCLC- PDL1- 15%, no  mutations           Treatment History:      5/4/2022: Left thoracentecis- Adenocarcinoma     6/9/2022- 8/11/2022- current: Caroplatin+ Alimta+Keytruda    8/2022- CT with response    9/1/2022: Alimta Keytruda    CT 11/2022: stable     CT 1/2023: stable     CAP CT 4/2023: stable           History of Present Illness:     Patient is a 50-year-old female with diabetes mellitus, hypertension who presented to the [address] [place] [address] emergency room on 5/4/2022 with complaints of shortness of breath x 14 days which  has been progressively getting worse over several weeks with mild cough. Denies any headaches, vision changes, falls trouble swallowing, chest pain, fevers, weight loss, hemoptysis. CTA on admission showed no evidence of pulmonary embolism but she was  noted to have a moderate to large left-sided pleural effusion with a left apical mass measuring 28 x 20 mm, she had small pulmonary nodules on the right, possible hepatic hypodensity. She had ultrasound-guided thoracentesis of the left side on 5/4/2022  and 1300 cc of fluid was aspirated. This was sent for cytology which was positive. Pleural fluid cytology showed more than 100 RBCs, total protein of 5.3, albumin 2.8, . Labs were noted for a bilirubin of 1.1. Comes today for maintenance of Alimta + Keytruda. Last 2 cycles were Keytruda only due to edema. She has less swelling  She has stable breathing. No Rash and no diarrhea. Jonathan her  is with her. She used to work for [employer]. Review of Systems: A complete review of systems was obtained, negative except as described above.           Physical Exam:        Visit Vitals        BP  (!) 140/70 (BP 1

## 2023-05-31 ENCOUNTER — TELEPHONE (OUTPATIENT)
Age: 63
End: 2023-05-31

## 2023-05-31 RX ORDER — NYSTATIN 100000 U/G
CREAM TOPICAL
Qty: 30 G | Refills: 0 | Status: SHIPPED | OUTPATIENT
Start: 2023-05-31

## 2023-05-31 RX ORDER — TRIAMCINOLONE ACETONIDE 0.25 MG/G
OINTMENT TOPICAL
Qty: 30 G | Refills: 0 | Status: SHIPPED | OUTPATIENT
Start: 2023-05-31 | End: 2023-06-07

## 2023-05-31 NOTE — TELEPHONE ENCOUNTER
Oncology Pharmacist Note:    Addy Pichardo is a 57 y. o.female diagnosed with lung cancer. Ms. Flaco Kuhn is being treated with PEMEtrexed, pembrolizumab. Sent in updated orders for Nystatin and triamcinolone cream to pharmacy on file as separate orders instead of combination which is not covered by her insurance.      Shahbaz Parish, PharmD, [facility], [address] Only    Program: Medical Group  CPA in place:  Yes  Recommendation Provided To: Patient/Caregiver: 1 via Telephone  Intervention Detail: Refill(s) Provided  Intervention Accepted By: Patient/Caregiver: 1    Time Spent (min): 10

## 2023-06-15 RX ORDER — ACETAMINOPHEN 325 MG/1
650 TABLET ORAL
Status: CANCELLED | OUTPATIENT
Start: 2023-06-22

## 2023-06-15 RX ORDER — ALBUTEROL SULFATE 90 UG/1
4 AEROSOL, METERED RESPIRATORY (INHALATION) PRN
Status: CANCELLED | OUTPATIENT
Start: 2023-06-22

## 2023-06-15 RX ORDER — ONDANSETRON 2 MG/ML
8 INJECTION INTRAMUSCULAR; INTRAVENOUS
Status: CANCELLED | OUTPATIENT
Start: 2023-06-22

## 2023-06-15 RX ORDER — DIPHENHYDRAMINE HYDROCHLORIDE 50 MG/ML
50 INJECTION INTRAMUSCULAR; INTRAVENOUS
Status: CANCELLED | OUTPATIENT
Start: 2023-06-22

## 2023-06-15 RX ORDER — MEPERIDINE HYDROCHLORIDE 25 MG/ML
12.5 INJECTION INTRAMUSCULAR; INTRAVENOUS; SUBCUTANEOUS PRN
Status: CANCELLED | OUTPATIENT
Start: 2023-06-22

## 2023-06-15 RX ORDER — SODIUM CHLORIDE 9 MG/ML
INJECTION, SOLUTION INTRAVENOUS CONTINUOUS
Status: CANCELLED | OUTPATIENT
Start: 2023-06-22

## 2023-06-15 RX ORDER — EPINEPHRINE 1 MG/ML
0.3 INJECTION, SOLUTION, CONCENTRATE INTRAVENOUS PRN
Status: CANCELLED | OUTPATIENT
Start: 2023-06-22

## 2023-06-22 ENCOUNTER — OFFICE VISIT (OUTPATIENT)
Age: 63
End: 2023-06-22
Payer: MEDICAID

## 2023-06-22 ENCOUNTER — HOSPITAL ENCOUNTER (OUTPATIENT)
Facility: HOSPITAL | Age: 63
Setting detail: INFUSION SERIES
End: 2023-06-22
Payer: MEDICAID

## 2023-06-22 VITALS
WEIGHT: 261 LBS | OXYGEN SATURATION: 98 % | BODY MASS INDEX: 40.97 KG/M2 | TEMPERATURE: 98.7 F | DIASTOLIC BLOOD PRESSURE: 82 MMHG | HEIGHT: 67 IN | SYSTOLIC BLOOD PRESSURE: 144 MMHG | RESPIRATION RATE: 20 BRPM | HEART RATE: 90 BPM

## 2023-06-22 VITALS
OXYGEN SATURATION: 98 % | DIASTOLIC BLOOD PRESSURE: 76 MMHG | SYSTOLIC BLOOD PRESSURE: 130 MMHG | TEMPERATURE: 98.7 F | HEART RATE: 100 BPM | RESPIRATION RATE: 20 BRPM | BODY MASS INDEX: 40.88 KG/M2 | WEIGHT: 261 LBS

## 2023-06-22 DIAGNOSIS — C34.90 NSCLC METASTATIC TO ADRENAL GLAND (HCC): Primary | ICD-10-CM

## 2023-06-22 DIAGNOSIS — C79.70 NSCLC METASTATIC TO ADRENAL GLAND (HCC): Primary | ICD-10-CM

## 2023-06-22 LAB
ALBUMIN SERPL-MCNC: 3.4 G/DL (ref 3.5–5)
ALBUMIN/GLOB SERPL: 0.9 (ref 1.1–2.2)
ALP SERPL-CCNC: 90 U/L (ref 45–117)
ALT SERPL-CCNC: 25 U/L (ref 12–78)
ANION GAP SERPL CALC-SCNC: 8 MMOL/L (ref 5–15)
AST SERPL-CCNC: 10 U/L (ref 15–37)
BASOPHILS # BLD: 0.1 K/UL (ref 0–0.1)
BASOPHILS NFR BLD: 1 % (ref 0–1)
BILIRUB SERPL-MCNC: 0.7 MG/DL (ref 0.2–1)
BUN SERPL-MCNC: 12 MG/DL (ref 6–20)
BUN/CREAT SERPL: 11 (ref 12–20)
CALCIUM SERPL-MCNC: 9 MG/DL (ref 8.5–10.1)
CHLORIDE SERPL-SCNC: 107 MMOL/L (ref 97–108)
CO2 SERPL-SCNC: 22 MMOL/L (ref 21–32)
CREAT SERPL-MCNC: 1.05 MG/DL (ref 0.55–1.02)
DIFFERENTIAL METHOD BLD: ABNORMAL
EOSINOPHIL # BLD: 0 K/UL (ref 0–0.4)
EOSINOPHIL NFR BLD: 0 % (ref 0–7)
ERYTHROCYTE [DISTWIDTH] IN BLOOD BY AUTOMATED COUNT: 13.8 % (ref 11.5–14.5)
GLOBULIN SER CALC-MCNC: 3.7 G/DL (ref 2–4)
GLUCOSE SERPL-MCNC: 309 MG/DL (ref 65–100)
HCT VFR BLD AUTO: 37.2 % (ref 35–47)
HGB BLD-MCNC: 12 G/DL (ref 11.5–16)
IMM GRANULOCYTES # BLD AUTO: 0 K/UL
IMM GRANULOCYTES NFR BLD AUTO: 0 %
LYMPHOCYTES # BLD: 0.4 K/UL (ref 0.8–3.5)
LYMPHOCYTES NFR BLD: 7 % (ref 12–49)
MCH RBC QN AUTO: 28.8 PG (ref 26–34)
MCHC RBC AUTO-ENTMCNC: 32.3 G/DL (ref 30–36.5)
MCV RBC AUTO: 89.4 FL (ref 80–99)
MONOCYTES # BLD: 0 K/UL (ref 0–1)
MONOCYTES NFR BLD: 0 % (ref 5–13)
NEUTS BAND NFR BLD MANUAL: 3 % (ref 0–6)
NEUTS SEG # BLD: 4.6 K/UL (ref 1.8–8)
NEUTS SEG NFR BLD: 89 % (ref 32–75)
NRBC # BLD: 0 K/UL (ref 0–0.01)
NRBC BLD-RTO: 0 PER 100 WBC
PLATELET # BLD AUTO: 277 K/UL (ref 150–400)
PMV BLD AUTO: 9.6 FL (ref 8.9–12.9)
POTASSIUM SERPL-SCNC: 3.8 MMOL/L (ref 3.5–5.1)
PROT SERPL-MCNC: 7.1 G/DL (ref 6.4–8.2)
RBC # BLD AUTO: 4.16 M/UL (ref 3.8–5.2)
RBC MORPH BLD: ABNORMAL
SODIUM SERPL-SCNC: 137 MMOL/L (ref 136–145)
TSH SERPL DL<=0.05 MIU/L-ACNC: 0.27 UIU/ML (ref 0.36–3.74)
WBC # BLD AUTO: 5.1 K/UL (ref 3.6–11)

## 2023-06-22 PROCEDURE — 96411 CHEMO IV PUSH ADDL DRUG: CPT

## 2023-06-22 PROCEDURE — 85025 COMPLETE CBC W/AUTO DIFF WBC: CPT

## 2023-06-22 PROCEDURE — 2580000003 HC RX 258: Performed by: NURSE PRACTITIONER

## 2023-06-22 PROCEDURE — 36415 COLL VENOUS BLD VENIPUNCTURE: CPT

## 2023-06-22 PROCEDURE — 80053 COMPREHEN METABOLIC PANEL: CPT

## 2023-06-22 PROCEDURE — 96375 TX/PRO/DX INJ NEW DRUG ADDON: CPT

## 2023-06-22 PROCEDURE — 6360000002 HC RX W HCPCS: Performed by: NURSE PRACTITIONER

## 2023-06-22 PROCEDURE — 99214 OFFICE O/P EST MOD 30 MIN: CPT | Performed by: INTERNAL MEDICINE

## 2023-06-22 PROCEDURE — 6360000002 HC RX W HCPCS: Performed by: INTERNAL MEDICINE

## 2023-06-22 PROCEDURE — 96413 CHEMO IV INFUSION 1 HR: CPT

## 2023-06-22 PROCEDURE — 2580000003 HC RX 258: Performed by: INTERNAL MEDICINE

## 2023-06-22 PROCEDURE — 84443 ASSAY THYROID STIM HORMONE: CPT

## 2023-06-22 RX ORDER — LORAZEPAM 2 MG/ML
0.5 INJECTION INTRAMUSCULAR
Status: DISCONTINUED | OUTPATIENT
Start: 2023-06-22 | End: 2023-06-23 | Stop reason: HOSPADM

## 2023-06-22 RX ORDER — PROCHLORPERAZINE EDISYLATE 5 MG/ML
10 INJECTION INTRAMUSCULAR; INTRAVENOUS
Status: DISCONTINUED | OUTPATIENT
Start: 2023-06-22 | End: 2023-06-23 | Stop reason: HOSPADM

## 2023-06-22 RX ORDER — HEPARIN 100 UNIT/ML
500 SYRINGE INTRAVENOUS PRN
Status: DISCONTINUED | OUTPATIENT
Start: 2023-06-22 | End: 2023-06-23 | Stop reason: HOSPADM

## 2023-06-22 RX ORDER — SODIUM CHLORIDE 9 MG/ML
5-250 INJECTION, SOLUTION INTRAVENOUS PRN
Status: DISCONTINUED | OUTPATIENT
Start: 2023-06-22 | End: 2023-06-23 | Stop reason: HOSPADM

## 2023-06-22 RX ORDER — SODIUM CHLORIDE 0.9 % (FLUSH) 0.9 %
5-40 SYRINGE (ML) INJECTION PRN
Status: DISCONTINUED | OUTPATIENT
Start: 2023-06-22 | End: 2023-06-23 | Stop reason: HOSPADM

## 2023-06-22 RX ORDER — CYANOCOBALAMIN 1000 UG/ML
1000 INJECTION, SOLUTION INTRAMUSCULAR; SUBCUTANEOUS
Status: DISCONTINUED | OUTPATIENT
Start: 2023-06-22 | End: 2023-06-22

## 2023-06-22 RX ORDER — DEXAMETHASONE SODIUM PHOSPHATE 4 MG/ML
8 INJECTION, SOLUTION INTRA-ARTICULAR; INTRALESIONAL; INTRAMUSCULAR; INTRAVENOUS; SOFT TISSUE ONCE
Status: COMPLETED | OUTPATIENT
Start: 2023-06-22 | End: 2023-06-22

## 2023-06-22 RX ADMIN — PEMETREXED DISODIUM 880 MG: 500 INJECTION, POWDER, LYOPHILIZED, FOR SOLUTION INTRAVENOUS at 13:58

## 2023-06-22 RX ADMIN — SODIUM CHLORIDE, PRESERVATIVE FREE 10 ML: 5 INJECTION INTRAVENOUS at 12:29

## 2023-06-22 RX ADMIN — SODIUM CHLORIDE, PRESERVATIVE FREE 20 ML: 5 INJECTION INTRAVENOUS at 14:22

## 2023-06-22 RX ADMIN — SODIUM CHLORIDE 200 MG: 9 INJECTION, SOLUTION INTRAVENOUS at 13:08

## 2023-06-22 RX ADMIN — DEXAMETHASONE SODIUM PHOSPHATE 8 MG: 4 INJECTION, SOLUTION INTRAMUSCULAR; INTRAVENOUS at 12:31

## 2023-06-22 RX ADMIN — SODIUM CHLORIDE 25 ML/HR: 9 INJECTION, SOLUTION INTRAVENOUS at 12:29

## 2023-06-22 NOTE — PROGRESS NOTES
Quentin Valdez is a 58 y.o. female    Chief Complaint   Patient presents with    Follow-up      stage IV NSCLC- PDL1- 15%, no  mutations           1. Have you been to the ER, urgent care clinic since your last visit? Hospitalized since your last visit? No    2. Have you seen or consulted any other health care providers outside of the [address] [place] [address] since your last visit? Include any pap smears or colon screening.  No

## 2023-06-22 NOTE — PROGRESS NOTES
Cancer [place] at [place] [address] [address], [address], [address]: [phone]  F: [phone]          Reason for visit     Addy Pichardo is a 60 y.o.  female who is seen for follow up of stage IV NSCLC- PDL1- 15%, no  mutations           Treatment History:      5/4/2022: Left thoracentecis- Adenocarcinoma     6/9/2022- 8/11/2022- current: Caroplatin+ Alimta+Keytruda    8/2022- CT with response    9/1/2022: Alimta Keytruda    CT 11/2022: stable     CT 1/2023: stable     CAP CT 4/2023: stable           History of Present Illness:     Patient is a 59-year-old female with diabetes mellitus, hypertension who presented to the [facility] emergency room on 5/4/2022 with complaints of shortness of breath x 14 days which  has been progressively getting worse over several weeks with mild cough. Denies any headaches, vision changes, falls trouble swallowing, chest pain, fevers, weight loss, hemoptysis. CTA on admission showed no evidence of pulmonary embolism but she was  noted to have a moderate to large left-sided pleural effusion with a left apical mass measuring 28 x 20 mm, she had small pulmonary nodules on the right, possible hepatic hypodensity. She had ultrasound-guided thoracentesis of the left side on 5/4/2022  and 1300 cc of fluid was aspirated. This was sent for cytology which was positive. Pleural fluid cytology showed more than 100 RBCs, total protein of 5.3, albumin 2.8, . Labs were noted for a bilirubin of 1.1. Comes today for maintenance of Alimta + Keytruda. Last 2 cycles were Keytruda only due to edema. Stable SOB. No nausea, no HA        Rondy her  is with her. She used to work for [employer] Review of Systems: A complete review of systems was obtained, negative except as described above.           Physical Exam:        Visit Vitals        BP  (!) 140/70 (BP 1 Location: Left upper arm, BP Patient Position:

## 2023-06-22 NOTE — PROGRESS NOTES
integrity of the drug(s)    Please see MAR for specific drug names and time of administration. Patient was monitored appropriately, and vital signs were obtained. Port maintained positive blood return throughout treatment. Flushed and de-accessed per protocol. Pt aware of next appointment scheduled. Tolerated infusion well without issue.     Future Appointments   Date Time Provider Derik Kelley   7/13/2023 10:30 AM D4 [facility] [place] [facility]   7/13/2023 10:45 AM Sharita Melara  N Leslee  BS AMB   8/3/2023 10:30 AM B3 [facility] [place] [facility]   8/24/2023 10:30 AM A1 [facility] [place] [facility]   9/14/2023 10:30 AM B3 [facility] [place] [facility]   10/5/2023 10:30 AM B3 [facility] [place] [facility]   10/26/2023 10:30 AM A1 [facility] [address] [place] [address], RN  June 22, 2023

## 2023-07-05 RX ORDER — SODIUM CHLORIDE 9 MG/ML
5-250 INJECTION, SOLUTION INTRAVENOUS PRN
Status: CANCELLED | OUTPATIENT
Start: 2023-07-13

## 2023-07-05 RX ORDER — EPINEPHRINE 1 MG/ML
0.3 INJECTION, SOLUTION, CONCENTRATE INTRAVENOUS PRN
Status: CANCELLED | OUTPATIENT
Start: 2023-07-13

## 2023-07-05 RX ORDER — PROCHLORPERAZINE EDISYLATE 5 MG/ML
10 INJECTION INTRAMUSCULAR; INTRAVENOUS
Status: CANCELLED | OUTPATIENT
Start: 2023-07-13

## 2023-07-05 RX ORDER — DIPHENHYDRAMINE HYDROCHLORIDE 50 MG/ML
50 INJECTION INTRAMUSCULAR; INTRAVENOUS
Status: CANCELLED | OUTPATIENT
Start: 2023-07-13

## 2023-07-05 RX ORDER — ONDANSETRON 2 MG/ML
8 INJECTION INTRAMUSCULAR; INTRAVENOUS
Status: CANCELLED | OUTPATIENT
Start: 2023-07-13

## 2023-07-05 RX ORDER — HEPARIN 100 UNIT/ML
500 SYRINGE INTRAVENOUS PRN
Status: CANCELLED | OUTPATIENT
Start: 2023-07-13

## 2023-07-05 RX ORDER — ACETAMINOPHEN 325 MG/1
650 TABLET ORAL
Status: CANCELLED | OUTPATIENT
Start: 2023-07-13

## 2023-07-05 RX ORDER — LORAZEPAM 2 MG/ML
0.5 INJECTION INTRAMUSCULAR
Status: CANCELLED | OUTPATIENT
Start: 2023-07-13

## 2023-07-05 RX ORDER — MEPERIDINE HYDROCHLORIDE 25 MG/ML
12.5 INJECTION INTRAMUSCULAR; INTRAVENOUS; SUBCUTANEOUS PRN
Status: CANCELLED | OUTPATIENT
Start: 2023-07-13

## 2023-07-05 RX ORDER — ALBUTEROL SULFATE 90 UG/1
4 AEROSOL, METERED RESPIRATORY (INHALATION) PRN
Status: CANCELLED | OUTPATIENT
Start: 2023-07-13

## 2023-07-05 RX ORDER — SODIUM CHLORIDE 9 MG/ML
INJECTION, SOLUTION INTRAVENOUS CONTINUOUS
Status: CANCELLED | OUTPATIENT
Start: 2023-07-13

## 2023-07-06 ENCOUNTER — APPOINTMENT (OUTPATIENT)
Dept: INFUSION THERAPY | Age: 63
End: 2023-07-06

## 2023-07-13 ENCOUNTER — OFFICE VISIT (OUTPATIENT)
Age: 63
End: 2023-07-13
Payer: MEDICAID

## 2023-07-13 ENCOUNTER — HOSPITAL ENCOUNTER (OUTPATIENT)
Facility: HOSPITAL | Age: 63
Setting detail: INFUSION SERIES
End: 2023-07-13
Payer: MEDICAID

## 2023-07-13 VITALS
WEIGHT: 257 LBS | SYSTOLIC BLOOD PRESSURE: 134 MMHG | DIASTOLIC BLOOD PRESSURE: 74 MMHG | HEART RATE: 93 BPM | TEMPERATURE: 97.5 F | HEIGHT: 67 IN | BODY MASS INDEX: 40.34 KG/M2

## 2023-07-13 VITALS
BODY MASS INDEX: 40.25 KG/M2 | SYSTOLIC BLOOD PRESSURE: 139 MMHG | OXYGEN SATURATION: 99 % | DIASTOLIC BLOOD PRESSURE: 70 MMHG | TEMPERATURE: 97.5 F | HEART RATE: 90 BPM | WEIGHT: 257 LBS | RESPIRATION RATE: 18 BRPM

## 2023-07-13 DIAGNOSIS — C34.90 NSCLC METASTATIC TO ADRENAL GLAND (HCC): Primary | ICD-10-CM

## 2023-07-13 DIAGNOSIS — C79.70 NSCLC METASTATIC TO ADRENAL GLAND (HCC): Primary | ICD-10-CM

## 2023-07-13 DIAGNOSIS — Z51.12 ENCOUNTER FOR ANTINEOPLASTIC IMMUNOTHERAPY: ICD-10-CM

## 2023-07-13 DIAGNOSIS — Z95.828 PORT-A-CATH IN PLACE: ICD-10-CM

## 2023-07-13 DIAGNOSIS — Z51.11 ENCOUNTER FOR ANTINEOPLASTIC CHEMOTHERAPY: ICD-10-CM

## 2023-07-13 LAB
ALBUMIN SERPL-MCNC: 3.6 G/DL (ref 3.5–5)
ALBUMIN/GLOB SERPL: 1.1 (ref 1.1–2.2)
ALP SERPL-CCNC: 88 U/L (ref 45–117)
ALT SERPL-CCNC: 29 U/L (ref 12–78)
ANION GAP SERPL CALC-SCNC: 8 MMOL/L (ref 5–15)
AST SERPL-CCNC: 21 U/L (ref 15–37)
BASOPHILS # BLD: 0 K/UL (ref 0–0.1)
BASOPHILS NFR BLD: 0 % (ref 0–1)
BILIRUB SERPL-MCNC: 0.5 MG/DL (ref 0.2–1)
BUN SERPL-MCNC: 11 MG/DL (ref 6–20)
BUN/CREAT SERPL: 14 (ref 12–20)
CALCIUM SERPL-MCNC: 9.5 MG/DL (ref 8.5–10.1)
CHLORIDE SERPL-SCNC: 106 MMOL/L (ref 97–108)
CO2 SERPL-SCNC: 25 MMOL/L (ref 21–32)
CREAT SERPL-MCNC: 0.78 MG/DL (ref 0.55–1.02)
DIFFERENTIAL METHOD BLD: ABNORMAL
EOSINOPHIL # BLD: 0 K/UL (ref 0–0.4)
EOSINOPHIL NFR BLD: 0 % (ref 0–7)
ERYTHROCYTE [DISTWIDTH] IN BLOOD BY AUTOMATED COUNT: 13.8 % (ref 11.5–14.5)
GLOBULIN SER CALC-MCNC: 3.4 G/DL (ref 2–4)
GLUCOSE SERPL-MCNC: 150 MG/DL (ref 65–100)
HCT VFR BLD AUTO: 37.5 % (ref 35–47)
HGB BLD-MCNC: 11.9 G/DL (ref 11.5–16)
IMM GRANULOCYTES # BLD AUTO: 0.1 K/UL (ref 0–0.04)
IMM GRANULOCYTES NFR BLD AUTO: 1 % (ref 0–0.5)
LYMPHOCYTES # BLD: 1.2 K/UL (ref 0.8–3.5)
LYMPHOCYTES NFR BLD: 17 % (ref 12–49)
MCH RBC QN AUTO: 28.6 PG (ref 26–34)
MCHC RBC AUTO-ENTMCNC: 31.7 G/DL (ref 30–36.5)
MCV RBC AUTO: 90.1 FL (ref 80–99)
MONOCYTES # BLD: 1 K/UL (ref 0–1)
MONOCYTES NFR BLD: 15 % (ref 5–13)
NEUTS SEG # BLD: 4.5 K/UL (ref 1.8–8)
NEUTS SEG NFR BLD: 67 % (ref 32–75)
NRBC # BLD: 0 K/UL (ref 0–0.01)
NRBC BLD-RTO: 0 PER 100 WBC
PLATELET # BLD AUTO: 389 K/UL (ref 150–400)
PMV BLD AUTO: 9.6 FL (ref 8.9–12.9)
POTASSIUM SERPL-SCNC: 3.9 MMOL/L (ref 3.5–5.1)
PROT SERPL-MCNC: 7 G/DL (ref 6.4–8.2)
RBC # BLD AUTO: 4.16 M/UL (ref 3.8–5.2)
SODIUM SERPL-SCNC: 139 MMOL/L (ref 136–145)
TSH SERPL DL<=0.05 MIU/L-ACNC: 0.65 UIU/ML (ref 0.36–3.74)
WBC # BLD AUTO: 6.7 K/UL (ref 3.6–11)

## 2023-07-13 PROCEDURE — 6360000002 HC RX W HCPCS: Performed by: INTERNAL MEDICINE

## 2023-07-13 PROCEDURE — 84443 ASSAY THYROID STIM HORMONE: CPT

## 2023-07-13 PROCEDURE — 36415 COLL VENOUS BLD VENIPUNCTURE: CPT

## 2023-07-13 PROCEDURE — 96375 TX/PRO/DX INJ NEW DRUG ADDON: CPT

## 2023-07-13 PROCEDURE — 2580000003 HC RX 258: Performed by: INTERNAL MEDICINE

## 2023-07-13 PROCEDURE — 96411 CHEMO IV PUSH ADDL DRUG: CPT

## 2023-07-13 PROCEDURE — 96372 THER/PROPH/DIAG INJ SC/IM: CPT

## 2023-07-13 PROCEDURE — 80053 COMPREHEN METABOLIC PANEL: CPT

## 2023-07-13 PROCEDURE — 99214 OFFICE O/P EST MOD 30 MIN: CPT | Performed by: INTERNAL MEDICINE

## 2023-07-13 PROCEDURE — 96413 CHEMO IV INFUSION 1 HR: CPT

## 2023-07-13 PROCEDURE — 85025 COMPLETE CBC W/AUTO DIFF WBC: CPT

## 2023-07-13 RX ORDER — SODIUM CHLORIDE 9 MG/ML
5-250 INJECTION, SOLUTION INTRAVENOUS PRN
Status: DISCONTINUED | OUTPATIENT
Start: 2023-07-13 | End: 2023-07-14 | Stop reason: HOSPADM

## 2023-07-13 RX ORDER — CYANOCOBALAMIN 1000 UG/ML
1000 INJECTION, SOLUTION INTRAMUSCULAR; SUBCUTANEOUS ONCE
Status: COMPLETED | OUTPATIENT
Start: 2023-07-13 | End: 2023-07-13

## 2023-07-13 RX ORDER — DEXAMETHASONE SODIUM PHOSPHATE 4 MG/ML
8 INJECTION, SOLUTION INTRA-ARTICULAR; INTRALESIONAL; INTRAMUSCULAR; INTRAVENOUS; SOFT TISSUE ONCE
Status: COMPLETED | OUTPATIENT
Start: 2023-07-13 | End: 2023-07-13

## 2023-07-13 RX ORDER — SODIUM CHLORIDE 0.9 % (FLUSH) 0.9 %
5-40 SYRINGE (ML) INJECTION PRN
Status: DISCONTINUED | OUTPATIENT
Start: 2023-07-13 | End: 2023-07-14 | Stop reason: HOSPADM

## 2023-07-13 RX ADMIN — CYANOCOBALAMIN 1000 MCG: 1000 INJECTION, SOLUTION INTRAMUSCULAR at 12:46

## 2023-07-13 RX ADMIN — DEXAMETHASONE SODIUM PHOSPHATE 8 MG: 4 INJECTION, SOLUTION INTRAMUSCULAR; INTRAVENOUS at 12:51

## 2023-07-13 RX ADMIN — SODIUM CHLORIDE 200 MG: 9 INJECTION, SOLUTION INTRAVENOUS at 13:30

## 2023-07-13 RX ADMIN — PEMETREXED 880 MG: 100 INJECTION, POWDER, LYOPHILIZED, FOR SOLUTION INTRAVENOUS at 14:01

## 2023-07-13 RX ADMIN — SODIUM CHLORIDE 250 ML/HR: 9 INJECTION, SOLUTION INTRAVENOUS at 12:46

## 2023-07-13 RX ADMIN — SODIUM CHLORIDE, PRESERVATIVE FREE 20 ML: 5 INJECTION INTRAVENOUS at 14:15

## 2023-07-13 NOTE — PROGRESS NOTES
Cancer [place] at [address], [address] [place] [address], [address]: [phone]  F: [phone]          Reason for visit     Homer Salguero is a 60 y.o.  female who is seen for follow up of stage IV NSCLC- PDL1- 15%, no  mutations           Treatment History:      5/4/2022: Left thoracentecis- Adenocarcinoma     6/9/2022- 8/11/2022- current: Caroplatin+ Alimta+Keytruda    8/2022- CT with response    9/1/2022: Alimta Keytruda    CT 11/2022: stable     CT 1/2023: stable     CAP CT 4/2023: stable           History of Present Illness:     Patient is a 26-year-old female with diabetes mellitus, hypertension who presented to the [facility] emergency room on 5/4/2022 with complaints of shortness of breath x 14 days which  has been progressively getting worse over several weeks with mild cough. Denies any headaches, vision changes, falls trouble swallowing, chest pain, fevers, weight loss, hemoptysis. CTA on admission showed no evidence of pulmonary embolism but she was  noted to have a moderate to large left-sided pleural effusion with a left apical mass measuring 28 x 20 mm, she had small pulmonary nodules on the right, possible hepatic hypodensity. She had ultrasound-guided thoracentesis of the left side on 5/4/2022  and 1300 cc of fluid was aspirated. This was sent for cytology which was positive. Pleural fluid cytology showed more than 100 RBCs, total protein of 5.3, albumin 2.8, . Labs were noted for a bilirubin of 1.1. Comes today for maintenance of Alimta + Keytruda. Feels well overall. Does have some dyspnea with exertion, but is stable. Reports she is more active these days. No nausea, no HA        Rondy her  is with her. She used to work for [employer]. Review of Systems: A complete review of systems was obtained, negative except as described above.           Physical Exam:        Visit Vitals        BP  (!) 140/70 (BP

## 2023-07-13 NOTE — PROGRESS NOTES
Maria Gonsalez is a 58 y.o. female    Chief Complaint   Patient presents with    Follow-up     stage IV NSCLC- PDL1- 15%, no  mutations        1. Have you been to the ER, urgent care clinic since your last visit? Hospitalized since your last visit? No    2. Have you seen or consulted any other health care providers outside of the [address] [place] [address] since your last visit? Include any pap smears or colon screening.  No

## 2023-07-27 RX ORDER — ONDANSETRON 2 MG/ML
8 INJECTION INTRAMUSCULAR; INTRAVENOUS
Status: CANCELLED | OUTPATIENT
Start: 2023-08-03

## 2023-07-27 RX ORDER — DIPHENHYDRAMINE HYDROCHLORIDE 50 MG/ML
50 INJECTION INTRAMUSCULAR; INTRAVENOUS
Status: CANCELLED | OUTPATIENT
Start: 2023-08-03

## 2023-07-27 RX ORDER — ACETAMINOPHEN 325 MG/1
650 TABLET ORAL
Status: CANCELLED | OUTPATIENT
Start: 2023-08-03

## 2023-07-27 RX ORDER — MEPERIDINE HYDROCHLORIDE 25 MG/ML
12.5 INJECTION INTRAMUSCULAR; INTRAVENOUS; SUBCUTANEOUS PRN
Status: CANCELLED | OUTPATIENT
Start: 2023-08-03

## 2023-07-27 RX ORDER — CYANOCOBALAMIN 1000 UG/ML
1000 INJECTION, SOLUTION INTRAMUSCULAR; SUBCUTANEOUS
Status: CANCELLED | OUTPATIENT
Start: 2023-08-03

## 2023-07-27 RX ORDER — EPINEPHRINE 1 MG/ML
0.3 INJECTION, SOLUTION, CONCENTRATE INTRAVENOUS PRN
Status: CANCELLED | OUTPATIENT
Start: 2023-08-03

## 2023-07-27 RX ORDER — SODIUM CHLORIDE 9 MG/ML
INJECTION, SOLUTION INTRAVENOUS CONTINUOUS
Status: CANCELLED | OUTPATIENT
Start: 2023-08-03

## 2023-07-27 RX ORDER — ALBUTEROL SULFATE 90 UG/1
4 AEROSOL, METERED RESPIRATORY (INHALATION) PRN
Status: CANCELLED | OUTPATIENT
Start: 2023-08-03

## 2023-08-01 ENCOUNTER — TELEPHONE (OUTPATIENT)
Age: 63
End: 2023-08-01

## 2023-08-01 NOTE — TELEPHONE ENCOUNTER
1217:    Called patient to notify follow-up to be adjusted with next [facility]   Scans are needed   Patient to call [phone] to schedule     No answer   LVM to call office back     Will send MyChart message

## 2023-08-02 ENCOUNTER — TELEPHONE (OUTPATIENT)
Age: 63
End: 2023-08-02

## 2023-08-02 NOTE — TELEPHONE ENCOUNTER
Called patient regarding rescheduling upcoming OV (8/3/23) to next [facility] follow scan.   No answer, left message for patient to call to the office to confirm reschedule to 8/24/23 @ 10:45am.

## 2023-08-03 ENCOUNTER — HOSPITAL ENCOUNTER (OUTPATIENT)
Facility: HOSPITAL | Age: 63
Setting detail: INFUSION SERIES
End: 2023-08-03
Payer: MEDICAID

## 2023-08-03 VITALS
DIASTOLIC BLOOD PRESSURE: 79 MMHG | SYSTOLIC BLOOD PRESSURE: 154 MMHG | RESPIRATION RATE: 20 BRPM | BODY MASS INDEX: 40.82 KG/M2 | HEIGHT: 67 IN | TEMPERATURE: 96.7 F | WEIGHT: 260.1 LBS | HEART RATE: 109 BPM

## 2023-08-03 DIAGNOSIS — C79.70 NSCLC METASTATIC TO ADRENAL GLAND (HCC): Primary | ICD-10-CM

## 2023-08-03 DIAGNOSIS — C34.90 NSCLC METASTATIC TO ADRENAL GLAND (HCC): Primary | ICD-10-CM

## 2023-08-03 LAB
ALBUMIN SERPL-MCNC: 3.6 G/DL (ref 3.5–5)
ALBUMIN/GLOB SERPL: 0.9 (ref 1.1–2.2)
ALP SERPL-CCNC: 96 U/L (ref 45–117)
ALT SERPL-CCNC: 37 U/L (ref 12–78)
ANION GAP SERPL CALC-SCNC: 10 MMOL/L (ref 5–15)
AST SERPL-CCNC: 29 U/L (ref 15–37)
BASOPHILS # BLD: 0 K/UL (ref 0–0.1)
BASOPHILS NFR BLD: 0 % (ref 0–1)
BILIRUB SERPL-MCNC: 0.6 MG/DL (ref 0.2–1)
BUN SERPL-MCNC: 13 MG/DL (ref 6–20)
BUN/CREAT SERPL: 13 (ref 12–20)
CALCIUM SERPL-MCNC: 9.3 MG/DL (ref 8.5–10.1)
CHLORIDE SERPL-SCNC: 107 MMOL/L (ref 97–108)
CO2 SERPL-SCNC: 22 MMOL/L (ref 21–32)
CREAT SERPL-MCNC: 0.98 MG/DL (ref 0.55–1.02)
DIFFERENTIAL METHOD BLD: ABNORMAL
EOSINOPHIL # BLD: 0 K/UL (ref 0–0.4)
EOSINOPHIL NFR BLD: 0 % (ref 0–7)
ERYTHROCYTE [DISTWIDTH] IN BLOOD BY AUTOMATED COUNT: 13.8 % (ref 11.5–14.5)
GLOBULIN SER CALC-MCNC: 4.2 G/DL (ref 2–4)
GLUCOSE SERPL-MCNC: 185 MG/DL (ref 65–100)
HCT VFR BLD AUTO: 36.8 % (ref 35–47)
HGB BLD-MCNC: 11.9 G/DL (ref 11.5–16)
IMM GRANULOCYTES # BLD AUTO: 0.1 K/UL (ref 0–0.04)
IMM GRANULOCYTES NFR BLD AUTO: 1 % (ref 0–0.5)
LYMPHOCYTES # BLD: 0.7 K/UL (ref 0.8–3.5)
LYMPHOCYTES NFR BLD: 10 % (ref 12–49)
MCH RBC QN AUTO: 28.7 PG (ref 26–34)
MCHC RBC AUTO-ENTMCNC: 32.3 G/DL (ref 30–36.5)
MCV RBC AUTO: 88.7 FL (ref 80–99)
MONOCYTES # BLD: 0.4 K/UL (ref 0–1)
MONOCYTES NFR BLD: 6 % (ref 5–13)
NEUTS SEG # BLD: 6.2 K/UL (ref 1.8–8)
NEUTS SEG NFR BLD: 83 % (ref 32–75)
NRBC # BLD: 0 K/UL (ref 0–0.01)
NRBC BLD-RTO: 0 PER 100 WBC
PLATELET # BLD AUTO: 369 K/UL (ref 150–400)
PMV BLD AUTO: 9.9 FL (ref 8.9–12.9)
POTASSIUM SERPL-SCNC: 3.6 MMOL/L (ref 3.5–5.1)
PROT SERPL-MCNC: 7.8 G/DL (ref 6.4–8.2)
RBC # BLD AUTO: 4.15 M/UL (ref 3.8–5.2)
RBC MORPH BLD: ABNORMAL
SODIUM SERPL-SCNC: 139 MMOL/L (ref 136–145)
TSH SERPL DL<=0.05 MIU/L-ACNC: 0.42 UIU/ML (ref 0.36–3.74)
WBC # BLD AUTO: 7.4 K/UL (ref 3.6–11)

## 2023-08-03 PROCEDURE — 2580000003 HC RX 258: Performed by: INTERNAL MEDICINE

## 2023-08-03 PROCEDURE — 85025 COMPLETE CBC W/AUTO DIFF WBC: CPT

## 2023-08-03 PROCEDURE — 6360000002 HC RX W HCPCS: Performed by: INTERNAL MEDICINE

## 2023-08-03 PROCEDURE — 96375 TX/PRO/DX INJ NEW DRUG ADDON: CPT

## 2023-08-03 PROCEDURE — 36415 COLL VENOUS BLD VENIPUNCTURE: CPT

## 2023-08-03 PROCEDURE — 80053 COMPREHEN METABOLIC PANEL: CPT

## 2023-08-03 PROCEDURE — 84443 ASSAY THYROID STIM HORMONE: CPT

## 2023-08-03 PROCEDURE — 96411 CHEMO IV PUSH ADDL DRUG: CPT

## 2023-08-03 PROCEDURE — 96413 CHEMO IV INFUSION 1 HR: CPT

## 2023-08-03 RX ORDER — SODIUM CHLORIDE 9 MG/ML
5-250 INJECTION, SOLUTION INTRAVENOUS PRN
Status: DISCONTINUED | OUTPATIENT
Start: 2023-08-03 | End: 2023-08-04 | Stop reason: HOSPADM

## 2023-08-03 RX ORDER — PROCHLORPERAZINE EDISYLATE 5 MG/ML
10 INJECTION INTRAMUSCULAR; INTRAVENOUS
Status: DISCONTINUED | OUTPATIENT
Start: 2023-08-03 | End: 2023-08-04 | Stop reason: HOSPADM

## 2023-08-03 RX ORDER — LORAZEPAM 2 MG/ML
0.5 INJECTION INTRAMUSCULAR
Status: DISCONTINUED | OUTPATIENT
Start: 2023-08-03 | End: 2023-08-04 | Stop reason: HOSPADM

## 2023-08-03 RX ORDER — DEXAMETHASONE SODIUM PHOSPHATE 4 MG/ML
8 INJECTION, SOLUTION INTRA-ARTICULAR; INTRALESIONAL; INTRAMUSCULAR; INTRAVENOUS; SOFT TISSUE ONCE
Status: COMPLETED | OUTPATIENT
Start: 2023-08-03 | End: 2023-08-03

## 2023-08-03 RX ORDER — HEPARIN 100 UNIT/ML
500 SYRINGE INTRAVENOUS PRN
Status: DISCONTINUED | OUTPATIENT
Start: 2023-08-03 | End: 2023-08-04 | Stop reason: HOSPADM

## 2023-08-03 RX ORDER — DIPHENHYDRAMINE HYDROCHLORIDE 50 MG/ML
50 INJECTION INTRAMUSCULAR; INTRAVENOUS
Status: DISCONTINUED | OUTPATIENT
Start: 2023-08-03 | End: 2023-08-04 | Stop reason: HOSPADM

## 2023-08-03 RX ORDER — SODIUM CHLORIDE 0.9 % (FLUSH) 0.9 %
5-40 SYRINGE (ML) INJECTION PRN
Status: DISCONTINUED | OUTPATIENT
Start: 2023-08-03 | End: 2023-08-04 | Stop reason: HOSPADM

## 2023-08-03 RX ORDER — ACETAMINOPHEN 325 MG/1
650 TABLET ORAL
Status: DISCONTINUED | OUTPATIENT
Start: 2023-08-03 | End: 2023-08-04 | Stop reason: HOSPADM

## 2023-08-03 RX ADMIN — SODIUM CHLORIDE, PRESERVATIVE FREE 20 ML: 5 INJECTION INTRAVENOUS at 11:27

## 2023-08-03 RX ADMIN — SODIUM CHLORIDE 200 MG: 9 INJECTION, SOLUTION INTRAVENOUS at 13:45

## 2023-08-03 RX ADMIN — SODIUM CHLORIDE 50 ML/HR: 9 INJECTION, SOLUTION INTRAVENOUS at 13:43

## 2023-08-03 RX ADMIN — PEMETREXED DISODIUM 880 MG: 500 INJECTION, POWDER, LYOPHILIZED, FOR SOLUTION INTRAVENOUS at 14:35

## 2023-08-03 RX ADMIN — DEXAMETHASONE SODIUM PHOSPHATE 8 MG: 4 INJECTION, SOLUTION INTRAMUSCULAR; INTRAVENOUS at 14:20

## 2023-08-03 ASSESSMENT — PAIN SCALES - GENERAL: PAINLEVEL_OUTOF10: 0

## 2023-08-04 ENCOUNTER — TELEPHONE (OUTPATIENT)
Age: 63
End: 2023-08-04

## 2023-08-04 DIAGNOSIS — C34.90 NSCLC METASTATIC TO ADRENAL GLAND (HCC): ICD-10-CM

## 2023-08-04 DIAGNOSIS — C79.70 NSCLC METASTATIC TO ADRENAL GLAND (HCC): ICD-10-CM

## 2023-08-04 DIAGNOSIS — C34.90 NSCLC METASTATIC TO ADRENAL GLAND (HCC): Primary | ICD-10-CM

## 2023-08-04 DIAGNOSIS — C79.70 NSCLC METASTATIC TO ADRENAL GLAND (HCC): Primary | ICD-10-CM

## 2023-08-04 DIAGNOSIS — Z95.828 PORT-A-CATH IN PLACE: Primary | ICD-10-CM

## 2023-08-04 RX ORDER — NYSTATIN 100000 U/G
CREAM TOPICAL
Qty: 30 G | Refills: 0 | Status: SHIPPED | OUTPATIENT
Start: 2023-08-04

## 2023-08-04 RX ORDER — LIDOCAINE AND PRILOCAINE 25; 25 MG/G; MG/G
CREAM TOPICAL PRN
Qty: 30 G | Refills: 1 | Status: SHIPPED | OUTPATIENT
Start: 2023-08-04

## 2023-08-04 RX ORDER — NYSTATIN 100000 U/G
CREAM TOPICAL
Qty: 30 G | Refills: 0 | OUTPATIENT
Start: 2023-08-04

## 2023-08-04 NOTE — TELEPHONE ENCOUNTER
Patient called stated she no longer using [facility] on [address] . She would like Lidocaine to be filled at [facility] on [address].

## 2023-08-04 NOTE — TELEPHONE ENCOUNTER
Oncology Pharmacist Note:     Casandra Wise is a 58 y. o.female diagnosed with lung cancer. Ms. Anne-Marie Stockton is being treated with PEMEtrexed, pembrolizumab. Refill for emla cream sent to pharmacy on file.        Sharmin Baker, PharmD, [facility], [address]    Program: Medical Group  CPA in place:  Yes  Recommendation Provided To: Patient/Caregiver: 1 via Telephone  Intervention Detail: Refill(s) Provided  Intervention Accepted By: Patient/Caregiver: 1    Time Spent (min): 10

## 2023-08-07 ENCOUNTER — HOSPITAL ENCOUNTER (OUTPATIENT)
Age: 63
Discharge: HOME | End: 2023-08-10
Payer: MEDICAID

## 2023-08-07 DIAGNOSIS — C34.90 NSCLC METASTATIC TO ADRENAL GLAND (HCC): ICD-10-CM

## 2023-08-07 DIAGNOSIS — C79.70 NSCLC METASTATIC TO ADRENAL GLAND (HCC): ICD-10-CM

## 2023-08-07 PROCEDURE — 71260 CT THORAX DX C+: CPT

## 2023-08-07 PROCEDURE — 6360000004 HC RX CONTRAST MEDICATION: Performed by: RADIOLOGY

## 2023-08-07 RX ADMIN — IOPAMIDOL 100 ML: 755 INJECTION, SOLUTION INTRAVENOUS at 08:53

## 2023-08-17 DIAGNOSIS — C79.70 NSCLC METASTATIC TO ADRENAL GLAND (HCC): Primary | ICD-10-CM

## 2023-08-17 DIAGNOSIS — C34.90 NSCLC METASTATIC TO ADRENAL GLAND (HCC): Primary | ICD-10-CM

## 2023-08-17 RX ORDER — DIPHENHYDRAMINE HYDROCHLORIDE 50 MG/ML
50 INJECTION INTRAMUSCULAR; INTRAVENOUS
OUTPATIENT
Start: 2023-08-24

## 2023-08-17 RX ORDER — ACETAMINOPHEN 325 MG/1
650 TABLET ORAL
OUTPATIENT
Start: 2023-08-24

## 2023-08-17 RX ORDER — ACETAMINOPHEN 325 MG/1
650 TABLET ORAL
OUTPATIENT
Start: 2023-09-14

## 2023-08-17 RX ORDER — SODIUM CHLORIDE 9 MG/ML
INJECTION, SOLUTION INTRAVENOUS CONTINUOUS
OUTPATIENT
Start: 2023-09-14

## 2023-08-17 RX ORDER — FAMOTIDINE 10 MG/ML
20 INJECTION, SOLUTION INTRAVENOUS
OUTPATIENT
Start: 2023-08-24

## 2023-08-17 RX ORDER — HEPARIN SODIUM (PORCINE) LOCK FLUSH IV SOLN 100 UNIT/ML 100 UNIT/ML
500 SOLUTION INTRAVENOUS PRN
OUTPATIENT
Start: 2023-08-24

## 2023-08-17 RX ORDER — ONDANSETRON 2 MG/ML
8 INJECTION INTRAMUSCULAR; INTRAVENOUS
OUTPATIENT
Start: 2023-08-24

## 2023-08-17 RX ORDER — SODIUM CHLORIDE 9 MG/ML
5-250 INJECTION, SOLUTION INTRAVENOUS PRN
OUTPATIENT
Start: 2023-09-14

## 2023-08-17 RX ORDER — CYANOCOBALAMIN 1000 UG/ML
1000 INJECTION, SOLUTION INTRAMUSCULAR; SUBCUTANEOUS
OUTPATIENT
Start: 2023-09-14

## 2023-08-17 RX ORDER — LORAZEPAM 2 MG/ML
0.5 INJECTION INTRAMUSCULAR
OUTPATIENT
Start: 2023-08-24

## 2023-08-17 RX ORDER — LORAZEPAM 2 MG/ML
0.5 INJECTION INTRAMUSCULAR
OUTPATIENT
Start: 2023-09-14

## 2023-08-17 RX ORDER — PROCHLORPERAZINE EDISYLATE 5 MG/ML
10 INJECTION INTRAMUSCULAR; INTRAVENOUS
OUTPATIENT
Start: 2023-08-24

## 2023-08-17 RX ORDER — SODIUM CHLORIDE 9 MG/ML
INJECTION, SOLUTION INTRAVENOUS CONTINUOUS
OUTPATIENT
Start: 2023-08-24

## 2023-08-17 RX ORDER — HEPARIN SODIUM (PORCINE) LOCK FLUSH IV SOLN 100 UNIT/ML 100 UNIT/ML
500 SOLUTION INTRAVENOUS PRN
OUTPATIENT
Start: 2023-09-14

## 2023-08-17 RX ORDER — SODIUM CHLORIDE 9 MG/ML
5-250 INJECTION, SOLUTION INTRAVENOUS PRN
OUTPATIENT
Start: 2023-08-24

## 2023-08-17 RX ORDER — EPINEPHRINE 1 MG/ML
0.3 INJECTION, SOLUTION, CONCENTRATE INTRAVENOUS PRN
OUTPATIENT
Start: 2023-08-24

## 2023-08-17 RX ORDER — MEPERIDINE HYDROCHLORIDE 50 MG/ML
12.5 INJECTION INTRAMUSCULAR; INTRAVENOUS; SUBCUTANEOUS PRN
OUTPATIENT
Start: 2023-09-14

## 2023-08-17 RX ORDER — DIPHENHYDRAMINE HYDROCHLORIDE 50 MG/ML
50 INJECTION INTRAMUSCULAR; INTRAVENOUS
OUTPATIENT
Start: 2023-09-14

## 2023-08-17 RX ORDER — DEXAMETHASONE SODIUM PHOSPHATE 4 MG/ML
8 INJECTION, SOLUTION INTRA-ARTICULAR; INTRALESIONAL; INTRAMUSCULAR; INTRAVENOUS; SOFT TISSUE ONCE
OUTPATIENT
Start: 2023-08-24 | End: 2023-08-24

## 2023-08-17 RX ORDER — FAMOTIDINE 10 MG/ML
20 INJECTION, SOLUTION INTRAVENOUS
OUTPATIENT
Start: 2023-09-14

## 2023-08-17 RX ORDER — SODIUM CHLORIDE 0.9 % (FLUSH) 0.9 %
5-40 SYRINGE (ML) INJECTION PRN
OUTPATIENT
Start: 2023-09-14

## 2023-08-17 RX ORDER — SODIUM CHLORIDE 0.9 % (FLUSH) 0.9 %
5-40 SYRINGE (ML) INJECTION PRN
OUTPATIENT
Start: 2023-08-24

## 2023-08-17 RX ORDER — CYANOCOBALAMIN 1000 UG/ML
1000 INJECTION, SOLUTION INTRAMUSCULAR; SUBCUTANEOUS
OUTPATIENT
Start: 2023-08-24

## 2023-08-17 RX ORDER — ALBUTEROL SULFATE 90 UG/1
4 AEROSOL, METERED RESPIRATORY (INHALATION) PRN
OUTPATIENT
Start: 2023-09-14

## 2023-08-17 RX ORDER — EPINEPHRINE 1 MG/ML
0.3 INJECTION, SOLUTION, CONCENTRATE INTRAVENOUS PRN
OUTPATIENT
Start: 2023-09-14

## 2023-08-17 RX ORDER — PROCHLORPERAZINE EDISYLATE 5 MG/ML
10 INJECTION INTRAMUSCULAR; INTRAVENOUS
OUTPATIENT
Start: 2023-09-14

## 2023-08-17 RX ORDER — ALBUTEROL SULFATE 90 UG/1
4 AEROSOL, METERED RESPIRATORY (INHALATION) PRN
OUTPATIENT
Start: 2023-08-24

## 2023-08-17 RX ORDER — DEXAMETHASONE SODIUM PHOSPHATE 4 MG/ML
8 INJECTION, SOLUTION INTRA-ARTICULAR; INTRALESIONAL; INTRAMUSCULAR; INTRAVENOUS; SOFT TISSUE ONCE
OUTPATIENT
Start: 2023-09-14 | End: 2023-09-14

## 2023-08-17 RX ORDER — MEPERIDINE HYDROCHLORIDE 50 MG/ML
12.5 INJECTION INTRAMUSCULAR; INTRAVENOUS; SUBCUTANEOUS PRN
OUTPATIENT
Start: 2023-08-24

## 2023-08-17 RX ORDER — ONDANSETRON 2 MG/ML
8 INJECTION INTRAMUSCULAR; INTRAVENOUS
OUTPATIENT
Start: 2023-09-14

## 2023-08-23 NOTE — PROGRESS NOTES
Cancer [place] at [address], [address] [place] [address], [address]: [phone]  F: [phone]          Reason for visit     Nasrin Gottlieb is a 60 y.o.  female who is seen for follow up of stage IV NSCLC- PDL1- 15%, no  mutations           Treatment History:      5/4/2022: Left thoracentecis- Adenocarcinoma     6/9/2022- 8/11/2022- current: Caroplatin+ Alimta+Keytruda    8/2022- CT with response    9/1/2022: Alimta Keytruda    CT 11/2022: stable     CT 1/2023: stable     CAP CT 4/2023: stable , 8/2023: Stable per RECIST          History of Present Illness:     Patient is a 69-year-old female with diabetes mellitus, hypertension who presented to the [facility] emergency room on 5/4/2022 with complaints of shortness of breath x 14 days which  has been progressively getting worse over several weeks with mild cough. Denies any headaches, vision changes, falls trouble swallowing, chest pain, fevers, weight loss, hemoptysis. CTA on admission showed no evidence of pulmonary embolism but she was  noted to have a moderate to large left-sided pleural effusion with a left apical mass measuring 28 x 20 mm, she had small pulmonary nodules on the right, possible hepatic hypodensity. She had ultrasound-guided thoracentesis of the left side on 5/4/2022  and 1300 cc of fluid was aspirated. This was sent for cytology which was positive. Pleural fluid cytology showed more than 100 RBCs, total protein of 5.3, albumin 2.8, . Labs were noted for a bilirubin of 1.1. Comes today for maintenance of Alimta + Keytruda. Had scans. She still has some HOLLEY, she has  no swelling, no cough, no HA. Rondy her  is with her. She used to work for [employer]. Review of Systems: A complete review of systems was obtained, negative except as described above.           Physical Exam:        Visit Vitals        BP  (!) 140/70 (BP 1 Location: Left upper arm,

## 2023-08-24 ENCOUNTER — HOSPITAL ENCOUNTER (OUTPATIENT)
Facility: HOSPITAL | Age: 63
Setting detail: INFUSION SERIES
End: 2023-08-24
Payer: MEDICAID

## 2023-08-24 ENCOUNTER — OFFICE VISIT (OUTPATIENT)
Age: 63
End: 2023-08-24
Payer: MEDICAID

## 2023-08-24 VITALS
DIASTOLIC BLOOD PRESSURE: 73 MMHG | RESPIRATION RATE: 18 BRPM | TEMPERATURE: 97.8 F | HEART RATE: 100 BPM | WEIGHT: 260 LBS | SYSTOLIC BLOOD PRESSURE: 137 MMHG | HEIGHT: 67 IN | BODY MASS INDEX: 40.81 KG/M2

## 2023-08-24 VITALS
BODY MASS INDEX: 40.81 KG/M2 | SYSTOLIC BLOOD PRESSURE: 139 MMHG | RESPIRATION RATE: 18 BRPM | HEART RATE: 100 BPM | HEIGHT: 67 IN | OXYGEN SATURATION: 99 % | WEIGHT: 260 LBS | DIASTOLIC BLOOD PRESSURE: 67 MMHG | TEMPERATURE: 97.8 F

## 2023-08-24 DIAGNOSIS — C79.70 NSCLC METASTATIC TO ADRENAL GLAND (HCC): Primary | ICD-10-CM

## 2023-08-24 DIAGNOSIS — C34.90 NSCLC METASTATIC TO ADRENAL GLAND (HCC): Primary | ICD-10-CM

## 2023-08-24 LAB
ALBUMIN SERPL-MCNC: 3.7 G/DL (ref 3.5–5)
ALBUMIN/GLOB SERPL: 1 (ref 1.1–2.2)
ALP SERPL-CCNC: 93 U/L (ref 45–117)
ALT SERPL-CCNC: 37 U/L (ref 12–78)
ANION GAP SERPL CALC-SCNC: 7 MMOL/L (ref 5–15)
AST SERPL-CCNC: 26 U/L (ref 15–37)
BASOPHILS # BLD: 0 K/UL (ref 0–0.1)
BASOPHILS NFR BLD: 0 % (ref 0–1)
BILIRUB SERPL-MCNC: 0.7 MG/DL (ref 0.2–1)
BUN SERPL-MCNC: 9 MG/DL (ref 6–20)
BUN/CREAT SERPL: 10 (ref 12–20)
CALCIUM SERPL-MCNC: 9.5 MG/DL (ref 8.5–10.1)
CHLORIDE SERPL-SCNC: 108 MMOL/L (ref 97–108)
CO2 SERPL-SCNC: 23 MMOL/L (ref 21–32)
CREAT SERPL-MCNC: 0.89 MG/DL (ref 0.55–1.02)
DIFFERENTIAL METHOD BLD: ABNORMAL
EOSINOPHIL # BLD: 0 K/UL (ref 0–0.4)
EOSINOPHIL NFR BLD: 0 % (ref 0–7)
ERYTHROCYTE [DISTWIDTH] IN BLOOD BY AUTOMATED COUNT: 14.6 % (ref 11.5–14.5)
GLOBULIN SER CALC-MCNC: 3.6 G/DL (ref 2–4)
GLUCOSE SERPL-MCNC: 190 MG/DL (ref 65–100)
HCT VFR BLD AUTO: 37.7 % (ref 35–47)
HGB BLD-MCNC: 11.9 G/DL (ref 11.5–16)
IMM GRANULOCYTES # BLD AUTO: 0.1 K/UL (ref 0–0.04)
IMM GRANULOCYTES NFR BLD AUTO: 1 % (ref 0–0.5)
LYMPHOCYTES # BLD: 0.8 K/UL (ref 0.8–3.5)
LYMPHOCYTES NFR BLD: 10 % (ref 12–49)
MCH RBC QN AUTO: 28 PG (ref 26–34)
MCHC RBC AUTO-ENTMCNC: 31.6 G/DL (ref 30–36.5)
MCV RBC AUTO: 88.7 FL (ref 80–99)
MONOCYTES # BLD: 0.6 K/UL (ref 0–1)
MONOCYTES NFR BLD: 8 % (ref 5–13)
NEUTS SEG # BLD: 6.1 K/UL (ref 1.8–8)
NEUTS SEG NFR BLD: 81 % (ref 32–75)
NRBC # BLD: 0 K/UL (ref 0–0.01)
NRBC BLD-RTO: 0 PER 100 WBC
PLATELET # BLD AUTO: 379 K/UL (ref 150–400)
PMV BLD AUTO: 9.9 FL (ref 8.9–12.9)
POTASSIUM SERPL-SCNC: 4.2 MMOL/L (ref 3.5–5.1)
PROT SERPL-MCNC: 7.3 G/DL (ref 6.4–8.2)
RBC # BLD AUTO: 4.25 M/UL (ref 3.8–5.2)
RBC MORPH BLD: ABNORMAL
SODIUM SERPL-SCNC: 138 MMOL/L (ref 136–145)
TSH SERPL DL<=0.05 MIU/L-ACNC: 0.5 UIU/ML (ref 0.36–3.74)
WBC # BLD AUTO: 7.6 K/UL (ref 3.6–11)

## 2023-08-24 PROCEDURE — 80053 COMPREHEN METABOLIC PANEL: CPT

## 2023-08-24 PROCEDURE — 96375 TX/PRO/DX INJ NEW DRUG ADDON: CPT

## 2023-08-24 PROCEDURE — 36415 COLL VENOUS BLD VENIPUNCTURE: CPT

## 2023-08-24 PROCEDURE — 85025 COMPLETE CBC W/AUTO DIFF WBC: CPT

## 2023-08-24 PROCEDURE — 99215 OFFICE O/P EST HI 40 MIN: CPT | Performed by: INTERNAL MEDICINE

## 2023-08-24 PROCEDURE — 96411 CHEMO IV PUSH ADDL DRUG: CPT

## 2023-08-24 PROCEDURE — 2580000003 HC RX 258: Performed by: NURSE PRACTITIONER

## 2023-08-24 PROCEDURE — 6360000002 HC RX W HCPCS: Performed by: NURSE PRACTITIONER

## 2023-08-24 PROCEDURE — 96413 CHEMO IV INFUSION 1 HR: CPT

## 2023-08-24 PROCEDURE — 84443 ASSAY THYROID STIM HORMONE: CPT

## 2023-08-24 RX ORDER — LORAZEPAM 2 MG/ML
0.5 INJECTION INTRAMUSCULAR
Status: DISCONTINUED | OUTPATIENT
Start: 2023-08-24 | End: 2023-08-25 | Stop reason: HOSPADM

## 2023-08-24 RX ORDER — DULAGLUTIDE 0.75 MG/.5ML
INJECTION, SOLUTION SUBCUTANEOUS
COMMUNITY
Start: 2023-08-17

## 2023-08-24 RX ORDER — SODIUM CHLORIDE 9 MG/ML
5-250 INJECTION, SOLUTION INTRAVENOUS PRN
Status: DISCONTINUED | OUTPATIENT
Start: 2023-08-24 | End: 2023-08-25 | Stop reason: HOSPADM

## 2023-08-24 RX ORDER — PROCHLORPERAZINE EDISYLATE 5 MG/ML
10 INJECTION INTRAMUSCULAR; INTRAVENOUS
Status: DISCONTINUED | OUTPATIENT
Start: 2023-08-24 | End: 2023-08-25 | Stop reason: HOSPADM

## 2023-08-24 RX ORDER — DIPHENHYDRAMINE HYDROCHLORIDE 50 MG/ML
50 INJECTION INTRAMUSCULAR; INTRAVENOUS
Status: DISCONTINUED | OUTPATIENT
Start: 2023-08-24 | End: 2023-08-25 | Stop reason: HOSPADM

## 2023-08-24 RX ORDER — ACETAMINOPHEN AND CODEINE PHOSPHATE 300; 30 MG/1; MG/1
500 TABLET ORAL EVERY 6 HOURS PRN
COMMUNITY

## 2023-08-24 RX ORDER — DEXAMETHASONE SODIUM PHOSPHATE 4 MG/ML
8 INJECTION, SOLUTION INTRA-ARTICULAR; INTRALESIONAL; INTRAMUSCULAR; INTRAVENOUS; SOFT TISSUE ONCE
Status: COMPLETED | OUTPATIENT
Start: 2023-08-24 | End: 2023-08-24

## 2023-08-24 RX ORDER — HEPARIN 100 UNIT/ML
500 SYRINGE INTRAVENOUS PRN
Status: DISCONTINUED | OUTPATIENT
Start: 2023-08-24 | End: 2023-08-25 | Stop reason: HOSPADM

## 2023-08-24 RX ORDER — SODIUM CHLORIDE 0.9 % (FLUSH) 0.9 %
5-40 SYRINGE (ML) INJECTION PRN
Status: DISCONTINUED | OUTPATIENT
Start: 2023-08-24 | End: 2023-08-25 | Stop reason: HOSPADM

## 2023-08-24 RX ORDER — ACETAMINOPHEN 325 MG/1
650 TABLET ORAL
Status: DISCONTINUED | OUTPATIENT
Start: 2023-08-24 | End: 2023-08-25 | Stop reason: HOSPADM

## 2023-08-24 RX ORDER — CYANOCOBALAMIN 1000 UG/ML
1000 INJECTION, SOLUTION INTRAMUSCULAR; SUBCUTANEOUS
Status: DISCONTINUED | OUTPATIENT
Start: 2023-08-24 | End: 2023-08-24

## 2023-08-24 RX ADMIN — PEMETREXED DISODIUM 880 MG: 100 INJECTION, POWDER, LYOPHILIZED, FOR SOLUTION INTRAVENOUS at 13:57

## 2023-08-24 RX ADMIN — SODIUM CHLORIDE 25 ML/HR: 9 INJECTION, SOLUTION INTRAVENOUS at 13:16

## 2023-08-24 RX ADMIN — SODIUM CHLORIDE 200 MG: 9 INJECTION, SOLUTION INTRAVENOUS at 13:19

## 2023-08-24 RX ADMIN — DEXAMETHASONE SODIUM PHOSPHATE 8 MG: 4 INJECTION, SOLUTION INTRAMUSCULAR; INTRAVENOUS at 13:53

## 2023-08-24 ASSESSMENT — PAIN SCALES - GENERAL: PAINLEVEL_OUTOF10: 0

## 2023-09-14 ENCOUNTER — HOSPITAL ENCOUNTER (OUTPATIENT)
Facility: HOSPITAL | Age: 63
Setting detail: INFUSION SERIES
End: 2023-09-14
Payer: MEDICAID

## 2023-09-14 ENCOUNTER — TRANSCRIBE ORDERS (OUTPATIENT)
Facility: HOSPITAL | Age: 63
End: 2023-09-14

## 2023-09-14 VITALS
DIASTOLIC BLOOD PRESSURE: 64 MMHG | BODY MASS INDEX: 41.09 KG/M2 | SYSTOLIC BLOOD PRESSURE: 121 MMHG | TEMPERATURE: 97.3 F | HEART RATE: 95 BPM | HEIGHT: 67 IN | RESPIRATION RATE: 18 BRPM | WEIGHT: 261.8 LBS

## 2023-09-14 DIAGNOSIS — C34.90 NSCLC METASTATIC TO ADRENAL GLAND (HCC): Primary | ICD-10-CM

## 2023-09-14 DIAGNOSIS — C79.70 NSCLC METASTATIC TO ADRENAL GLAND (HCC): Primary | ICD-10-CM

## 2023-09-14 DIAGNOSIS — Z12.31 VISIT FOR SCREENING MAMMOGRAM: Primary | ICD-10-CM

## 2023-09-14 LAB
ALBUMIN SERPL-MCNC: 3.6 G/DL (ref 3.5–5)
ALBUMIN/GLOB SERPL: 1 (ref 1.1–2.2)
ALP SERPL-CCNC: 96 U/L (ref 45–117)
ALT SERPL-CCNC: 37 U/L (ref 12–78)
ANION GAP SERPL CALC-SCNC: 6 MMOL/L (ref 5–15)
AST SERPL-CCNC: 23 U/L (ref 15–37)
BASO+EOS+MONOS # BLD AUTO: 0.4 K/UL (ref 0.2–1.2)
BASO+EOS+MONOS NFR BLD AUTO: 5 % (ref 3.2–16.9)
BILIRUB SERPL-MCNC: 0.8 MG/DL (ref 0.2–1)
BUN SERPL-MCNC: 11 MG/DL (ref 6–20)
BUN/CREAT SERPL: 12 (ref 12–20)
CALCIUM SERPL-MCNC: 9.2 MG/DL (ref 8.5–10.1)
CHLORIDE SERPL-SCNC: 106 MMOL/L (ref 97–108)
CO2 SERPL-SCNC: 25 MMOL/L (ref 21–32)
CREAT SERPL-MCNC: 0.91 MG/DL (ref 0.55–1.02)
DIFFERENTIAL METHOD BLD: ABNORMAL
ERYTHROCYTE [DISTWIDTH] IN BLOOD BY AUTOMATED COUNT: 15.6 % (ref 11.8–15.8)
GLOBULIN SER CALC-MCNC: 3.6 G/DL (ref 2–4)
GLUCOSE SERPL-MCNC: 209 MG/DL (ref 65–100)
HCT VFR BLD AUTO: 36.3 % (ref 35–47)
HGB BLD-MCNC: 12 G/DL (ref 11.5–16)
LYMPHOCYTES # BLD: 0.7 K/UL (ref 0.8–3.5)
LYMPHOCYTES NFR BLD: 9 % (ref 12–49)
MCH RBC QN AUTO: 28.6 PG (ref 26–34)
MCHC RBC AUTO-ENTMCNC: 33.1 G/DL (ref 30–36.5)
MCV RBC AUTO: 86.4 FL (ref 80–99)
NEUTS SEG # BLD: 6.8 K/UL (ref 1.8–8)
NEUTS SEG NFR BLD: 86 % (ref 32–75)
PLATELET # BLD AUTO: 357 K/UL (ref 150–400)
POTASSIUM SERPL-SCNC: 4 MMOL/L (ref 3.5–5.1)
PROT SERPL-MCNC: 7.2 G/DL (ref 6.4–8.2)
RBC # BLD AUTO: 4.2 M/UL (ref 3.8–5.2)
SODIUM SERPL-SCNC: 137 MMOL/L (ref 136–145)
TSH SERPL DL<=0.05 MIU/L-ACNC: 0.31 UIU/ML (ref 0.36–3.74)
WBC # BLD AUTO: 7.9 K/UL (ref 3.6–11)

## 2023-09-14 PROCEDURE — 84443 ASSAY THYROID STIM HORMONE: CPT

## 2023-09-14 PROCEDURE — 36415 COLL VENOUS BLD VENIPUNCTURE: CPT

## 2023-09-14 PROCEDURE — 2580000003 HC RX 258: Performed by: NURSE PRACTITIONER

## 2023-09-14 PROCEDURE — 96413 CHEMO IV INFUSION 1 HR: CPT

## 2023-09-14 PROCEDURE — 6360000002 HC RX W HCPCS: Performed by: NURSE PRACTITIONER

## 2023-09-14 PROCEDURE — 96411 CHEMO IV PUSH ADDL DRUG: CPT

## 2023-09-14 PROCEDURE — 80053 COMPREHEN METABOLIC PANEL: CPT

## 2023-09-14 PROCEDURE — 96375 TX/PRO/DX INJ NEW DRUG ADDON: CPT

## 2023-09-14 PROCEDURE — 85025 COMPLETE CBC W/AUTO DIFF WBC: CPT

## 2023-09-14 PROCEDURE — 96372 THER/PROPH/DIAG INJ SC/IM: CPT

## 2023-09-14 RX ORDER — DEXAMETHASONE SODIUM PHOSPHATE 4 MG/ML
8 INJECTION, SOLUTION INTRA-ARTICULAR; INTRALESIONAL; INTRAMUSCULAR; INTRAVENOUS; SOFT TISSUE ONCE
Status: COMPLETED | OUTPATIENT
Start: 2023-09-14 | End: 2023-09-14

## 2023-09-14 RX ORDER — DIPHENHYDRAMINE HYDROCHLORIDE 50 MG/ML
50 INJECTION INTRAMUSCULAR; INTRAVENOUS
Status: DISCONTINUED | OUTPATIENT
Start: 2023-09-14 | End: 2023-09-15 | Stop reason: HOSPADM

## 2023-09-14 RX ORDER — MEPERIDINE HYDROCHLORIDE 25 MG/ML
12.5 INJECTION INTRAMUSCULAR; INTRAVENOUS; SUBCUTANEOUS PRN
Status: DISCONTINUED | OUTPATIENT
Start: 2023-09-14 | End: 2023-10-26 | Stop reason: HOSPADM

## 2023-09-14 RX ORDER — HEPARIN 100 UNIT/ML
500 SYRINGE INTRAVENOUS PRN
Status: DISCONTINUED | OUTPATIENT
Start: 2023-09-14 | End: 2023-09-15 | Stop reason: HOSPADM

## 2023-09-14 RX ORDER — CYANOCOBALAMIN 1000 UG/ML
1000 INJECTION, SOLUTION INTRAMUSCULAR; SUBCUTANEOUS
Status: COMPLETED | OUTPATIENT
Start: 2023-09-14 | End: 2023-09-14

## 2023-09-14 RX ORDER — PROCHLORPERAZINE EDISYLATE 5 MG/ML
10 INJECTION INTRAMUSCULAR; INTRAVENOUS
Status: DISCONTINUED | OUTPATIENT
Start: 2023-09-14 | End: 2023-09-15 | Stop reason: HOSPADM

## 2023-09-14 RX ORDER — ALBUTEROL SULFATE 90 UG/1
4 AEROSOL, METERED RESPIRATORY (INHALATION) PRN
Status: DISCONTINUED | OUTPATIENT
Start: 2023-09-14 | End: 2023-09-15 | Stop reason: HOSPADM

## 2023-09-14 RX ORDER — LORAZEPAM 2 MG/ML
0.5 INJECTION INTRAMUSCULAR
Status: DISCONTINUED | OUTPATIENT
Start: 2023-09-14 | End: 2023-09-15 | Stop reason: HOSPADM

## 2023-09-14 RX ORDER — ACETAMINOPHEN 325 MG/1
650 TABLET ORAL
Status: DISCONTINUED | OUTPATIENT
Start: 2023-09-14 | End: 2023-09-15 | Stop reason: HOSPADM

## 2023-09-14 RX ORDER — SODIUM CHLORIDE 9 MG/ML
5-250 INJECTION, SOLUTION INTRAVENOUS PRN
Status: DISCONTINUED | OUTPATIENT
Start: 2023-09-14 | End: 2023-09-15 | Stop reason: HOSPADM

## 2023-09-14 RX ORDER — EPINEPHRINE 1 MG/ML
0.3 INJECTION, SOLUTION, CONCENTRATE INTRAVENOUS PRN
Status: DISCONTINUED | OUTPATIENT
Start: 2023-09-14 | End: 2023-10-26 | Stop reason: HOSPADM

## 2023-09-14 RX ORDER — SODIUM CHLORIDE 0.9 % (FLUSH) 0.9 %
5-40 SYRINGE (ML) INJECTION PRN
Status: DISCONTINUED | OUTPATIENT
Start: 2023-09-14 | End: 2023-09-15 | Stop reason: HOSPADM

## 2023-09-14 RX ORDER — SODIUM CHLORIDE 9 MG/ML
INJECTION, SOLUTION INTRAVENOUS CONTINUOUS
Status: DISCONTINUED | OUTPATIENT
Start: 2023-09-14 | End: 2023-09-15 | Stop reason: HOSPADM

## 2023-09-14 RX ORDER — ONDANSETRON 2 MG/ML
8 INJECTION INTRAMUSCULAR; INTRAVENOUS
Status: DISCONTINUED | OUTPATIENT
Start: 2023-09-14 | End: 2023-09-15 | Stop reason: HOSPADM

## 2023-09-14 RX ADMIN — DEXAMETHASONE SODIUM PHOSPHATE 8 MG: 4 INJECTION, SOLUTION INTRAMUSCULAR; INTRAVENOUS at 14:05

## 2023-09-14 RX ADMIN — SODIUM CHLORIDE, PRESERVATIVE FREE 20 ML: 5 INJECTION INTRAVENOUS at 14:06

## 2023-09-14 RX ADMIN — SODIUM CHLORIDE 200 MG: 9 INJECTION, SOLUTION INTRAVENOUS at 13:33

## 2023-09-14 RX ADMIN — PEMETREXED DISODIUM 880 MG: 100 INJECTION, POWDER, LYOPHILIZED, FOR SOLUTION INTRAVENOUS at 14:09

## 2023-09-14 RX ADMIN — CYANOCOBALAMIN 1000 MCG: 1000 INJECTION, SOLUTION INTRAMUSCULAR at 13:07

## 2023-09-14 RX ADMIN — SODIUM CHLORIDE 25 ML/HR: 9 INJECTION, SOLUTION INTRAVENOUS at 13:08

## 2023-09-21 ENCOUNTER — TELEPHONE (OUTPATIENT)
Age: 63
End: 2023-09-21

## 2023-09-21 DIAGNOSIS — C34.90 NSCLC METASTATIC TO ADRENAL GLAND (HCC): Primary | ICD-10-CM

## 2023-09-21 DIAGNOSIS — C79.70 NSCLC METASTATIC TO ADRENAL GLAND (HCC): Primary | ICD-10-CM

## 2023-09-21 RX ORDER — PANTOPRAZOLE SODIUM 40 MG/1
40 TABLET, DELAYED RELEASE ORAL
Qty: 90 TABLET | Refills: 3 | Status: SHIPPED | OUTPATIENT
Start: 2023-09-21

## 2023-09-21 NOTE — TELEPHONE ENCOUNTER
Oncology Pharmacist Note:     Ariadne Zapata is a 58 y. o.female diagnosed with lung cancer. Ms. Branden Winslow is being treated with PEMEtrexed, pembrolizumab. Refill for pantoprazole sent to pharmacy on file.        Maki Keating, PharmD, [facility], [address]    Program: Medical Group  CPA in place:  Yes  Recommendation Provided To: Patient/Caregiver: 1 via Telephone  Intervention Detail: Refill(s) Provided  Intervention Accepted By: Patient/Caregiver: 1    Time Spent (min): 10

## 2023-09-21 NOTE — TELEPHONE ENCOUNTER
Patient called requesting a refill of Pantoprazole 40mg, 90 Day Supply to be sent to [facility] on [facility].

## 2023-09-22 NOTE — TELEPHONE ENCOUNTER
1200:    Called patient and confirmed x2 identifiers   Informed Rx sent to pharmacy     Patient verbalized understanding   No new questions or concerns at this time

## 2023-09-28 DIAGNOSIS — C34.90 NSCLC METASTATIC TO ADRENAL GLAND (HCC): Primary | ICD-10-CM

## 2023-09-28 DIAGNOSIS — C79.70 NSCLC METASTATIC TO ADRENAL GLAND (HCC): Primary | ICD-10-CM

## 2023-09-28 RX ORDER — MEPERIDINE HYDROCHLORIDE 25 MG/ML
12.5 INJECTION INTRAMUSCULAR; INTRAVENOUS; SUBCUTANEOUS PRN
Status: CANCELLED | OUTPATIENT
Start: 2023-10-05

## 2023-09-28 RX ORDER — SODIUM CHLORIDE 9 MG/ML
INJECTION, SOLUTION INTRAVENOUS CONTINUOUS
Status: CANCELLED | OUTPATIENT
Start: 2023-10-05

## 2023-09-28 RX ORDER — DIPHENHYDRAMINE HYDROCHLORIDE 50 MG/ML
50 INJECTION INTRAMUSCULAR; INTRAVENOUS
Status: CANCELLED | OUTPATIENT
Start: 2023-10-05

## 2023-09-28 RX ORDER — ACETAMINOPHEN 325 MG/1
650 TABLET ORAL
Status: CANCELLED | OUTPATIENT
Start: 2023-10-05

## 2023-09-28 RX ORDER — FOLIC ACID 1 MG/1
1000 TABLET ORAL DAILY
Qty: 30 TABLET | Refills: 6 | Status: SHIPPED | OUTPATIENT
Start: 2023-09-28

## 2023-09-28 RX ORDER — EPINEPHRINE 1 MG/ML
0.3 INJECTION, SOLUTION, CONCENTRATE INTRAVENOUS PRN
Status: CANCELLED | OUTPATIENT
Start: 2023-10-05

## 2023-09-28 RX ORDER — ONDANSETRON 2 MG/ML
8 INJECTION INTRAMUSCULAR; INTRAVENOUS
Status: CANCELLED | OUTPATIENT
Start: 2023-10-05

## 2023-09-28 RX ORDER — ALBUTEROL SULFATE 90 UG/1
4 AEROSOL, METERED RESPIRATORY (INHALATION) PRN
Status: CANCELLED | OUTPATIENT
Start: 2023-10-05

## 2023-09-28 NOTE — TELEPHONE ENCOUNTER
Oncology Pharmacist Note:     Kate Kaiser is a 58 y. o.female diagnosed with lung cancer. [facility] is being treated with PEMEtrexed, pembrolizumab. Refill for folic acid sent to pharmacy on file.        Alvarez Schulz, PharmD, [facility], [address]    Program: Medical Group  CPA in place:  Yes  Recommendation Provided To: Patient/Caregiver: 1 via Telephone  Intervention Detail: Refill(s) Provided  Intervention Accepted By: Patient/Caregiver: 1    Time Spent (min): 10

## 2023-10-02 ENCOUNTER — HOSPITAL ENCOUNTER (OUTPATIENT)
Facility: HOSPITAL | Age: 63
Discharge: HOME | End: 2023-10-05
Payer: MEDICAID

## 2023-10-02 VITALS — BODY MASS INDEX: 38.65 KG/M2 | HEIGHT: 68 IN | WEIGHT: 255 LBS

## 2023-10-02 DIAGNOSIS — Z12.31 VISIT FOR SCREENING MAMMOGRAM: ICD-10-CM

## 2023-10-02 PROCEDURE — 77067 SCR MAMMO BI INCL CAD: CPT

## 2023-10-05 ENCOUNTER — HOSPITAL ENCOUNTER (OUTPATIENT)
Facility: HOSPITAL | Age: 63
Setting detail: INFUSION SERIES
End: 2023-10-05
Payer: MEDICAID

## 2023-10-05 ENCOUNTER — OFFICE VISIT (OUTPATIENT)
Age: 63
End: 2023-10-05
Payer: MEDICAID

## 2023-10-05 ENCOUNTER — CLINICAL DOCUMENTATION (OUTPATIENT)
Age: 63
End: 2023-10-05

## 2023-10-05 VITALS
SYSTOLIC BLOOD PRESSURE: 131 MMHG | OXYGEN SATURATION: 27 % | TEMPERATURE: 98.5 F | DIASTOLIC BLOOD PRESSURE: 76 MMHG | RESPIRATION RATE: 20 BRPM | HEART RATE: 90 BPM | BODY MASS INDEX: 39.67 KG/M2 | WEIGHT: 261.78 LBS | HEIGHT: 68 IN

## 2023-10-05 VITALS
DIASTOLIC BLOOD PRESSURE: 60 MMHG | OXYGEN SATURATION: 97 % | TEMPERATURE: 98.5 F | HEART RATE: 92 BPM | RESPIRATION RATE: 20 BRPM | WEIGHT: 261.78 LBS | BODY MASS INDEX: 39.67 KG/M2 | SYSTOLIC BLOOD PRESSURE: 124 MMHG | HEIGHT: 68 IN

## 2023-10-05 DIAGNOSIS — C34.90 NSCLC METASTATIC TO ADRENAL GLAND (HCC): Primary | ICD-10-CM

## 2023-10-05 DIAGNOSIS — C79.70 NSCLC METASTATIC TO ADRENAL GLAND (HCC): Primary | ICD-10-CM

## 2023-10-05 LAB
ALBUMIN SERPL-MCNC: 3.6 G/DL (ref 3.5–5)
ALBUMIN/GLOB SERPL: 0.9 (ref 1.1–2.2)
ALP SERPL-CCNC: 88 U/L (ref 45–117)
ALT SERPL-CCNC: 35 U/L (ref 12–78)
ANION GAP SERPL CALC-SCNC: 6 MMOL/L (ref 5–15)
AST SERPL-CCNC: 23 U/L (ref 15–37)
BASO+EOS+MONOS # BLD AUTO: 0.6 K/UL (ref 0.2–1.2)
BASO+EOS+MONOS NFR BLD AUTO: 9 % (ref 3.2–16.9)
BILIRUB SERPL-MCNC: 0.6 MG/DL (ref 0.2–1)
BUN SERPL-MCNC: 11 MG/DL (ref 6–20)
BUN/CREAT SERPL: 12 (ref 12–20)
CALCIUM SERPL-MCNC: 9.5 MG/DL (ref 8.5–10.1)
CHLORIDE SERPL-SCNC: 109 MMOL/L (ref 97–108)
CO2 SERPL-SCNC: 25 MMOL/L (ref 21–32)
CREAT SERPL-MCNC: 0.89 MG/DL (ref 0.55–1.02)
DIFFERENTIAL METHOD BLD: ABNORMAL
ERYTHROCYTE [DISTWIDTH] IN BLOOD BY AUTOMATED COUNT: 16.2 % (ref 11.8–15.8)
GLOBULIN SER CALC-MCNC: 4 G/DL (ref 2–4)
GLUCOSE SERPL-MCNC: 159 MG/DL (ref 65–100)
HCT VFR BLD AUTO: 35.5 % (ref 35–47)
HGB BLD-MCNC: 11.7 G/DL (ref 11.5–16)
LYMPHOCYTES # BLD: 0.6 K/UL (ref 0.8–3.5)
LYMPHOCYTES NFR BLD: 9 % (ref 12–49)
MCH RBC QN AUTO: 28.6 PG (ref 26–34)
MCHC RBC AUTO-ENTMCNC: 33 G/DL (ref 30–36.5)
MCV RBC AUTO: 86.8 FL (ref 80–99)
NEUTS SEG # BLD: 5.6 K/UL (ref 1.8–8)
NEUTS SEG NFR BLD: 82 % (ref 32–75)
PLATELET # BLD AUTO: 362 K/UL (ref 150–400)
POTASSIUM SERPL-SCNC: 3.9 MMOL/L (ref 3.5–5.1)
PROT SERPL-MCNC: 7.6 G/DL (ref 6.4–8.2)
RBC # BLD AUTO: 4.09 M/UL (ref 3.8–5.2)
SODIUM SERPL-SCNC: 140 MMOL/L (ref 136–145)
TSH SERPL DL<=0.05 MIU/L-ACNC: 0.57 UIU/ML (ref 0.36–3.74)
WBC # BLD AUTO: 6.8 K/UL (ref 3.6–11)

## 2023-10-05 PROCEDURE — 96413 CHEMO IV INFUSION 1 HR: CPT

## 2023-10-05 PROCEDURE — 99214 OFFICE O/P EST MOD 30 MIN: CPT | Performed by: INTERNAL MEDICINE

## 2023-10-05 PROCEDURE — 80053 COMPREHEN METABOLIC PANEL: CPT

## 2023-10-05 PROCEDURE — 6360000002 HC RX W HCPCS: Performed by: INTERNAL MEDICINE

## 2023-10-05 PROCEDURE — 36415 COLL VENOUS BLD VENIPUNCTURE: CPT

## 2023-10-05 PROCEDURE — 84443 ASSAY THYROID STIM HORMONE: CPT

## 2023-10-05 PROCEDURE — 96411 CHEMO IV PUSH ADDL DRUG: CPT

## 2023-10-05 PROCEDURE — 85025 COMPLETE CBC W/AUTO DIFF WBC: CPT

## 2023-10-05 PROCEDURE — 2580000003 HC RX 258: Performed by: INTERNAL MEDICINE

## 2023-10-05 PROCEDURE — 96375 TX/PRO/DX INJ NEW DRUG ADDON: CPT

## 2023-10-05 RX ORDER — SODIUM CHLORIDE 9 MG/ML
5-250 INJECTION, SOLUTION INTRAVENOUS PRN
Status: DISCONTINUED | OUTPATIENT
Start: 2023-10-05 | End: 2023-10-06 | Stop reason: HOSPADM

## 2023-10-05 RX ORDER — LORAZEPAM 2 MG/ML
0.5 INJECTION INTRAMUSCULAR
Status: DISCONTINUED | OUTPATIENT
Start: 2023-10-05 | End: 2023-10-06 | Stop reason: HOSPADM

## 2023-10-05 RX ORDER — DEXAMETHASONE SODIUM PHOSPHATE 4 MG/ML
8 INJECTION, SOLUTION INTRA-ARTICULAR; INTRALESIONAL; INTRAMUSCULAR; INTRAVENOUS; SOFT TISSUE ONCE
Status: COMPLETED | OUTPATIENT
Start: 2023-10-05 | End: 2023-10-05

## 2023-10-05 RX ORDER — DEXAMETHASONE 4 MG/1
TABLET ORAL
Status: CANCELLED | OUTPATIENT
Start: 2023-10-05

## 2023-10-05 RX ORDER — HEPARIN 100 UNIT/ML
500 SYRINGE INTRAVENOUS PRN
Status: DISCONTINUED | OUTPATIENT
Start: 2023-10-05 | End: 2023-10-06 | Stop reason: HOSPADM

## 2023-10-05 RX ORDER — ACETAMINOPHEN 325 MG/1
650 TABLET ORAL
Status: DISCONTINUED | OUTPATIENT
Start: 2023-10-05 | End: 2023-10-06 | Stop reason: HOSPADM

## 2023-10-05 RX ORDER — CYANOCOBALAMIN 1000 UG/ML
1000 INJECTION, SOLUTION INTRAMUSCULAR; SUBCUTANEOUS
Status: DISCONTINUED | OUTPATIENT
Start: 2023-10-05 | End: 2023-10-05

## 2023-10-05 RX ORDER — DEXAMETHASONE 4 MG/1
TABLET ORAL
Qty: 30 TABLET | Refills: 1 | Status: SHIPPED | OUTPATIENT
Start: 2023-10-05

## 2023-10-05 RX ORDER — PROCHLORPERAZINE EDISYLATE 5 MG/ML
10 INJECTION INTRAMUSCULAR; INTRAVENOUS
Status: DISCONTINUED | OUTPATIENT
Start: 2023-10-05 | End: 2023-10-06 | Stop reason: HOSPADM

## 2023-10-05 RX ORDER — SODIUM CHLORIDE 0.9 % (FLUSH) 0.9 %
5-40 SYRINGE (ML) INJECTION PRN
Status: DISCONTINUED | OUTPATIENT
Start: 2023-10-05 | End: 2023-10-06 | Stop reason: HOSPADM

## 2023-10-05 RX ORDER — DIPHENHYDRAMINE HYDROCHLORIDE 50 MG/ML
50 INJECTION INTRAMUSCULAR; INTRAVENOUS
Status: DISCONTINUED | OUTPATIENT
Start: 2023-10-05 | End: 2023-10-06 | Stop reason: HOSPADM

## 2023-10-05 RX ADMIN — PEMETREXED DISODIUM 880 MG: 500 INJECTION, POWDER, LYOPHILIZED, FOR SOLUTION INTRAVENOUS at 14:08

## 2023-10-05 RX ADMIN — SODIUM CHLORIDE 200 MG: 9 INJECTION, SOLUTION INTRAVENOUS at 13:11

## 2023-10-05 RX ADMIN — SODIUM CHLORIDE 25 ML/HR: 9 INJECTION, SOLUTION INTRAVENOUS at 13:07

## 2023-10-05 RX ADMIN — DEXAMETHASONE SODIUM PHOSPHATE 8 MG: 4 INJECTION, SOLUTION INTRAMUSCULAR; INTRAVENOUS at 13:55

## 2023-10-05 ASSESSMENT — PATIENT HEALTH QUESTIONNAIRE - PHQ9
SUM OF ALL RESPONSES TO PHQ QUESTIONS 1-9: 0
2. FEELING DOWN, DEPRESSED OR HOPELESS: 0
1. LITTLE INTEREST OR PLEASURE IN DOING THINGS: 0
SUM OF ALL RESPONSES TO PHQ QUESTIONS 1-9: 0
SUM OF ALL RESPONSES TO PHQ9 QUESTIONS 1 & 2: 0
SUM OF ALL RESPONSES TO PHQ QUESTIONS 1-9: 0
SUM OF ALL RESPONSES TO PHQ QUESTIONS 1-9: 0

## 2023-10-05 NOTE — PROGRESS NOTES
Oncology Pharmacist Note:     Rambo Burciaga is a 58 y. o.female diagnosed with lung cancer. Ms. Mari Arellano is being treated with PEMEtrexed, pembrolizumab.      Refill dexamethasone to Jose Maria DawkinsD, [facility], [address]    Program: Medical Group  CPA in place:  Yes  Recommendation Provided To: Patient/Caregiver: 1 via Telephone  Intervention Detail: Refill(s) Provided  Intervention Accepted By: Patient/Caregiver: 1    Time Spent (min): 10

## 2023-10-06 ENCOUNTER — TELEPHONE (OUTPATIENT)
Facility: HOSPITAL | Age: 63
End: 2023-10-06

## 2023-10-13 ENCOUNTER — TELEPHONE (OUTPATIENT)
Age: 63
End: 2023-10-13

## 2023-10-13 DIAGNOSIS — C34.90 NSCLC METASTATIC TO ADRENAL GLAND (HCC): Primary | ICD-10-CM

## 2023-10-13 DIAGNOSIS — C79.70 NSCLC METASTATIC TO ADRENAL GLAND (HCC): Primary | ICD-10-CM

## 2023-10-13 RX ORDER — UMECLIDINIUM BROMIDE AND VILANTEROL TRIFENATATE 62.5; 25 UG/1; UG/1
1 POWDER RESPIRATORY (INHALATION) DAILY
Qty: 60 EACH | Refills: 0 | Status: SHIPPED | OUTPATIENT
Start: 2023-10-13

## 2023-10-13 NOTE — TELEPHONE ENCOUNTER
Oncology Pharmacist Note:     Leyda Villafuerte is a 58 y. o.female diagnosed with lung cancer. Ms. India Anne is being treated with PEMEtrexed, pembrolizumab. Refill Anoro ellipta inhaler to [facility] with permission from Dr. Ean Huerta until patient hears back from pulmonary provider.       Lena Saleh, PharmD, [facility], [address]    Program: Medical Group  CPA in place:  Yes  Recommendation Provided To: Patient/Caregiver: 1 via Telephone  Intervention Detail: Refill(s) Provided  Intervention Accepted By: Patient/Caregiver: 1    Time Spent (min): 10

## 2023-10-13 NOTE — TELEPHONE ENCOUNTER
Patient called to see if provider would refill her inhaler.   She is not getting any call back from the Pulmonary      Cb # [phone]

## 2023-10-19 RX ORDER — DIPHENHYDRAMINE HYDROCHLORIDE 50 MG/ML
50 INJECTION INTRAMUSCULAR; INTRAVENOUS
Status: CANCELLED | OUTPATIENT
Start: 2023-10-26

## 2023-10-19 RX ORDER — ACETAMINOPHEN 325 MG/1
650 TABLET ORAL
Status: CANCELLED | OUTPATIENT
Start: 2023-10-26

## 2023-10-19 RX ORDER — MEPERIDINE HYDROCHLORIDE 25 MG/ML
12.5 INJECTION INTRAMUSCULAR; INTRAVENOUS; SUBCUTANEOUS PRN
Status: CANCELLED | OUTPATIENT
Start: 2023-10-26

## 2023-10-19 RX ORDER — ONDANSETRON 2 MG/ML
8 INJECTION INTRAMUSCULAR; INTRAVENOUS
Status: CANCELLED | OUTPATIENT
Start: 2023-10-26

## 2023-10-19 RX ORDER — EPINEPHRINE 1 MG/ML
0.3 INJECTION, SOLUTION, CONCENTRATE INTRAVENOUS PRN
Status: CANCELLED | OUTPATIENT
Start: 2023-10-26

## 2023-10-19 RX ORDER — SODIUM CHLORIDE 9 MG/ML
INJECTION, SOLUTION INTRAVENOUS CONTINUOUS
Status: CANCELLED | OUTPATIENT
Start: 2023-10-26

## 2023-10-19 RX ORDER — ALBUTEROL SULFATE 90 UG/1
4 AEROSOL, METERED RESPIRATORY (INHALATION) PRN
Status: CANCELLED | OUTPATIENT
Start: 2023-10-26

## 2023-10-26 ENCOUNTER — HOSPITAL ENCOUNTER (OUTPATIENT)
Facility: HOSPITAL | Age: 63
Setting detail: INFUSION SERIES
Discharge: HOME | End: 2023-10-26
Payer: MEDICAID

## 2023-10-26 VITALS
TEMPERATURE: 97 F | HEART RATE: 85 BPM | HEIGHT: 67 IN | BODY MASS INDEX: 41 KG/M2 | SYSTOLIC BLOOD PRESSURE: 124 MMHG | DIASTOLIC BLOOD PRESSURE: 84 MMHG | OXYGEN SATURATION: 94 % | WEIGHT: 261.2 LBS

## 2023-10-26 DIAGNOSIS — C34.90 NSCLC METASTATIC TO ADRENAL GLAND (HCC): Primary | ICD-10-CM

## 2023-10-26 DIAGNOSIS — C79.70 NSCLC METASTATIC TO ADRENAL GLAND (HCC): Primary | ICD-10-CM

## 2023-10-26 LAB
ALBUMIN SERPL-MCNC: 3.2 G/DL (ref 3.5–5)
ALBUMIN/GLOB SERPL: 0.9 (ref 1.1–2.2)
ALP SERPL-CCNC: 97 U/L (ref 45–117)
ALT SERPL-CCNC: 24 U/L (ref 12–78)
ANION GAP SERPL CALC-SCNC: 8 MMOL/L (ref 5–15)
AST SERPL-CCNC: 21 U/L (ref 15–37)
BASOPHILS # BLD: 0 K/UL (ref 0–0.1)
BASOPHILS NFR BLD: 0 % (ref 0–1)
BILIRUB SERPL-MCNC: 0.6 MG/DL (ref 0.2–1)
BUN SERPL-MCNC: 12 MG/DL (ref 6–20)
BUN/CREAT SERPL: 13 (ref 12–20)
CALCIUM SERPL-MCNC: 8.9 MG/DL (ref 8.5–10.1)
CHLORIDE SERPL-SCNC: 108 MMOL/L (ref 97–108)
CO2 SERPL-SCNC: 22 MMOL/L (ref 21–32)
CREAT SERPL-MCNC: 0.89 MG/DL (ref 0.55–1.02)
DIFFERENTIAL METHOD BLD: ABNORMAL
EOSINOPHIL # BLD: 0 K/UL (ref 0–0.4)
EOSINOPHIL NFR BLD: 0 % (ref 0–7)
ERYTHROCYTE [DISTWIDTH] IN BLOOD BY AUTOMATED COUNT: 15.6 % (ref 11.5–14.5)
GLOBULIN SER CALC-MCNC: 3.7 G/DL (ref 2–4)
GLUCOSE SERPL-MCNC: 233 MG/DL (ref 65–100)
HCT VFR BLD AUTO: 34.1 % (ref 35–47)
HGB BLD-MCNC: 11 G/DL (ref 11.5–16)
IMM GRANULOCYTES # BLD AUTO: 0.1 K/UL (ref 0–0.04)
IMM GRANULOCYTES NFR BLD AUTO: 1 % (ref 0–0.5)
LYMPHOCYTES # BLD: 0.6 K/UL (ref 0.8–3.5)
LYMPHOCYTES NFR BLD: 9 % (ref 12–49)
MCH RBC QN AUTO: 28.3 PG (ref 26–34)
MCHC RBC AUTO-ENTMCNC: 32.3 G/DL (ref 30–36.5)
MCV RBC AUTO: 87.7 FL (ref 80–99)
MONOCYTES # BLD: 0.4 K/UL (ref 0–1)
MONOCYTES NFR BLD: 6 % (ref 5–13)
NEUTS SEG # BLD: 5.1 K/UL (ref 1.8–8)
NEUTS SEG NFR BLD: 84 % (ref 32–75)
NRBC # BLD: 0 K/UL (ref 0–0.01)
NRBC BLD-RTO: 0 PER 100 WBC
PLATELET # BLD AUTO: 383 K/UL (ref 150–400)
PMV BLD AUTO: 9.7 FL (ref 8.9–12.9)
POTASSIUM SERPL-SCNC: 4 MMOL/L (ref 3.5–5.1)
PROT SERPL-MCNC: 6.9 G/DL (ref 6.4–8.2)
RBC # BLD AUTO: 3.89 M/UL (ref 3.8–5.2)
RBC MORPH BLD: ABNORMAL
SODIUM SERPL-SCNC: 138 MMOL/L (ref 136–145)
TSH SERPL DL<=0.05 MIU/L-ACNC: 0.43 UIU/ML (ref 0.36–3.74)
WBC # BLD AUTO: 6.2 K/UL (ref 3.6–11)

## 2023-10-26 PROCEDURE — 2580000003 HC RX 258: Performed by: INTERNAL MEDICINE

## 2023-10-26 PROCEDURE — 96375 TX/PRO/DX INJ NEW DRUG ADDON: CPT

## 2023-10-26 PROCEDURE — 96411 CHEMO IV PUSH ADDL DRUG: CPT

## 2023-10-26 PROCEDURE — 84443 ASSAY THYROID STIM HORMONE: CPT

## 2023-10-26 PROCEDURE — 80053 COMPREHEN METABOLIC PANEL: CPT

## 2023-10-26 PROCEDURE — 85025 COMPLETE CBC W/AUTO DIFF WBC: CPT

## 2023-10-26 PROCEDURE — 36415 COLL VENOUS BLD VENIPUNCTURE: CPT

## 2023-10-26 PROCEDURE — 96413 CHEMO IV INFUSION 1 HR: CPT

## 2023-10-26 PROCEDURE — 6360000002 HC RX W HCPCS: Performed by: INTERNAL MEDICINE

## 2023-10-26 RX ORDER — SODIUM CHLORIDE 0.9 % (FLUSH) 0.9 %
5-40 SYRINGE (ML) INJECTION PRN
Status: DISCONTINUED | OUTPATIENT
Start: 2023-10-26 | End: 2023-10-27 | Stop reason: HOSPADM

## 2023-10-26 RX ORDER — DIPHENHYDRAMINE HYDROCHLORIDE 50 MG/ML
50 INJECTION INTRAMUSCULAR; INTRAVENOUS
Status: DISCONTINUED | OUTPATIENT
Start: 2023-10-26 | End: 2023-10-27 | Stop reason: HOSPADM

## 2023-10-26 RX ORDER — SODIUM CHLORIDE 9 MG/ML
5-250 INJECTION, SOLUTION INTRAVENOUS PRN
Status: DISCONTINUED | OUTPATIENT
Start: 2023-10-26 | End: 2023-10-27 | Stop reason: HOSPADM

## 2023-10-26 RX ORDER — ACETAMINOPHEN 325 MG/1
650 TABLET ORAL
Status: DISCONTINUED | OUTPATIENT
Start: 2023-10-26 | End: 2023-10-27 | Stop reason: HOSPADM

## 2023-10-26 RX ORDER — PROCHLORPERAZINE EDISYLATE 5 MG/ML
10 INJECTION INTRAMUSCULAR; INTRAVENOUS
Status: DISCONTINUED | OUTPATIENT
Start: 2023-10-26 | End: 2023-10-27 | Stop reason: HOSPADM

## 2023-10-26 RX ORDER — CYANOCOBALAMIN 1000 UG/ML
1000 INJECTION, SOLUTION INTRAMUSCULAR; SUBCUTANEOUS
Status: DISCONTINUED | OUTPATIENT
Start: 2023-10-26 | End: 2023-10-26

## 2023-10-26 RX ORDER — LORAZEPAM 2 MG/ML
0.5 INJECTION INTRAMUSCULAR
Status: DISCONTINUED | OUTPATIENT
Start: 2023-10-26 | End: 2023-10-27 | Stop reason: HOSPADM

## 2023-10-26 RX ORDER — DEXAMETHASONE SODIUM PHOSPHATE 4 MG/ML
8 INJECTION, SOLUTION INTRA-ARTICULAR; INTRALESIONAL; INTRAMUSCULAR; INTRAVENOUS; SOFT TISSUE ONCE
Status: COMPLETED | OUTPATIENT
Start: 2023-10-26 | End: 2023-10-26

## 2023-10-26 RX ORDER — HEPARIN 100 UNIT/ML
500 SYRINGE INTRAVENOUS PRN
Status: DISCONTINUED | OUTPATIENT
Start: 2023-10-26 | End: 2023-10-27 | Stop reason: HOSPADM

## 2023-10-26 RX ADMIN — SODIUM CHLORIDE 200 MG: 9 INJECTION, SOLUTION INTRAVENOUS at 13:24

## 2023-10-26 RX ADMIN — PEMETREXED DISODIUM 880 MG: 500 INJECTION, POWDER, LYOPHILIZED, FOR SOLUTION INTRAVENOUS at 14:30

## 2023-10-26 RX ADMIN — DEXAMETHASONE SODIUM PHOSPHATE 8 MG: 4 INJECTION, SOLUTION INTRAMUSCULAR; INTRAVENOUS at 14:22

## 2023-10-26 RX ADMIN — SODIUM CHLORIDE 25 ML/HR: 9 INJECTION, SOLUTION INTRAVENOUS at 13:00

## 2023-10-26 RX ADMIN — SODIUM CHLORIDE, PRESERVATIVE FREE 20 ML: 5 INJECTION INTRAVENOUS at 11:15

## 2023-10-26 ASSESSMENT — PAIN SCALES - GENERAL: PAINLEVEL_OUTOF10: 0

## 2023-11-08 RX ORDER — MEPERIDINE HYDROCHLORIDE 25 MG/ML
12.5 INJECTION INTRAMUSCULAR; INTRAVENOUS; SUBCUTANEOUS PRN
Status: CANCELLED | OUTPATIENT
Start: 2023-11-16

## 2023-11-08 RX ORDER — SODIUM CHLORIDE 9 MG/ML
INJECTION, SOLUTION INTRAVENOUS CONTINUOUS
Status: CANCELLED | OUTPATIENT
Start: 2023-11-16

## 2023-11-08 RX ORDER — EPINEPHRINE 1 MG/ML
0.3 INJECTION, SOLUTION, CONCENTRATE INTRAVENOUS PRN
Status: CANCELLED | OUTPATIENT
Start: 2023-11-16

## 2023-11-08 RX ORDER — ALBUTEROL SULFATE 90 UG/1
4 AEROSOL, METERED RESPIRATORY (INHALATION) PRN
Status: CANCELLED | OUTPATIENT
Start: 2023-11-16

## 2023-11-08 RX ORDER — LORAZEPAM 2 MG/ML
0.5 INJECTION INTRAMUSCULAR
Status: CANCELLED | OUTPATIENT
Start: 2023-11-16

## 2023-11-08 RX ORDER — DIPHENHYDRAMINE HYDROCHLORIDE 50 MG/ML
50 INJECTION INTRAMUSCULAR; INTRAVENOUS
Status: CANCELLED | OUTPATIENT
Start: 2023-11-16

## 2023-11-08 RX ORDER — PROCHLORPERAZINE EDISYLATE 5 MG/ML
10 INJECTION INTRAMUSCULAR; INTRAVENOUS
Status: CANCELLED | OUTPATIENT
Start: 2023-11-16

## 2023-11-08 RX ORDER — ACETAMINOPHEN 325 MG/1
650 TABLET ORAL
Status: CANCELLED | OUTPATIENT
Start: 2023-11-16

## 2023-11-08 RX ORDER — ONDANSETRON 2 MG/ML
8 INJECTION INTRAMUSCULAR; INTRAVENOUS
Status: CANCELLED | OUTPATIENT
Start: 2023-11-16

## 2023-11-09 ENCOUNTER — HOSPITAL ENCOUNTER (OUTPATIENT)
Age: 63
Discharge: HOME | End: 2023-11-09
Payer: MEDICAID

## 2023-11-09 DIAGNOSIS — C79.70 NSCLC METASTATIC TO ADRENAL GLAND (HCC): ICD-10-CM

## 2023-11-09 DIAGNOSIS — C34.90 NSCLC METASTATIC TO ADRENAL GLAND (HCC): ICD-10-CM

## 2023-11-09 PROCEDURE — 71260 CT THORAX DX C+: CPT

## 2023-11-09 PROCEDURE — 6360000004 HC RX CONTRAST MEDICATION: Performed by: RADIOLOGY

## 2023-11-09 RX ADMIN — IOPAMIDOL 100 ML: 755 INJECTION, SOLUTION INTRAVENOUS at 08:56

## 2023-11-16 ENCOUNTER — HOSPITAL ENCOUNTER (OUTPATIENT)
Facility: HOSPITAL | Age: 63
Setting detail: INFUSION SERIES
Discharge: HOME | End: 2023-11-16
Payer: MEDICAID

## 2023-11-16 ENCOUNTER — OFFICE VISIT (OUTPATIENT)
Age: 63
End: 2023-11-16
Payer: MEDICAID

## 2023-11-16 VITALS
HEIGHT: 67 IN | WEIGHT: 253 LBS | BODY MASS INDEX: 39.71 KG/M2 | OXYGEN SATURATION: 96 % | DIASTOLIC BLOOD PRESSURE: 80 MMHG | HEART RATE: 89 BPM | SYSTOLIC BLOOD PRESSURE: 123 MMHG | RESPIRATION RATE: 20 BRPM | TEMPERATURE: 96.8 F

## 2023-11-16 VITALS
SYSTOLIC BLOOD PRESSURE: 119 MMHG | HEART RATE: 88 BPM | DIASTOLIC BLOOD PRESSURE: 67 MMHG | RESPIRATION RATE: 20 BRPM | HEIGHT: 67 IN | TEMPERATURE: 96.8 F | BODY MASS INDEX: 39.71 KG/M2 | WEIGHT: 253 LBS

## 2023-11-16 DIAGNOSIS — C79.70 NSCLC METASTATIC TO ADRENAL GLAND (HCC): Primary | ICD-10-CM

## 2023-11-16 DIAGNOSIS — Z51.12 ENCOUNTER FOR ANTINEOPLASTIC IMMUNOTHERAPY: ICD-10-CM

## 2023-11-16 DIAGNOSIS — E11.9 DIABETES MELLITUS TYPE 2, NONINSULIN DEPENDENT (HCC): ICD-10-CM

## 2023-11-16 DIAGNOSIS — C34.90 NSCLC METASTATIC TO ADRENAL GLAND (HCC): Primary | ICD-10-CM

## 2023-11-16 DIAGNOSIS — Z95.828 PORT-A-CATH IN PLACE: ICD-10-CM

## 2023-11-16 LAB
ALBUMIN SERPL-MCNC: 3.2 G/DL (ref 3.5–5)
ALBUMIN/GLOB SERPL: 0.7 (ref 1.1–2.2)
ALP SERPL-CCNC: 101 U/L (ref 45–117)
ALT SERPL-CCNC: 20 U/L (ref 12–78)
ANION GAP SERPL CALC-SCNC: 7 MMOL/L (ref 5–15)
AST SERPL-CCNC: 16 U/L (ref 15–37)
BASO+EOS+MONOS # BLD AUTO: 0.6 K/UL (ref 0.2–1.2)
BASO+EOS+MONOS NFR BLD AUTO: 10 % (ref 3.2–16.9)
BILIRUB SERPL-MCNC: 0.4 MG/DL (ref 0.2–1)
BUN SERPL-MCNC: 13 MG/DL (ref 6–20)
BUN/CREAT SERPL: 16 (ref 12–20)
CALCIUM SERPL-MCNC: 9.3 MG/DL (ref 8.5–10.1)
CHLORIDE SERPL-SCNC: 108 MMOL/L (ref 97–108)
CO2 SERPL-SCNC: 24 MMOL/L (ref 21–32)
CREAT SERPL-MCNC: 0.83 MG/DL (ref 0.55–1.02)
DIFFERENTIAL METHOD BLD: ABNORMAL
ERYTHROCYTE [DISTWIDTH] IN BLOOD BY AUTOMATED COUNT: 14.8 % (ref 11.8–15.8)
GLOBULIN SER CALC-MCNC: 4.8 G/DL (ref 2–4)
GLUCOSE SERPL-MCNC: 165 MG/DL (ref 65–100)
HCT VFR BLD AUTO: 35.1 % (ref 35–47)
HGB BLD-MCNC: 11.3 G/DL (ref 11.5–16)
LYMPHOCYTES # BLD: 0.9 K/UL (ref 0.8–3.5)
LYMPHOCYTES NFR BLD: 14 % (ref 12–49)
MCH RBC QN AUTO: 27.6 PG (ref 26–34)
MCHC RBC AUTO-ENTMCNC: 32.2 G/DL (ref 30–36.5)
MCV RBC AUTO: 85.8 FL (ref 80–99)
NEUTS SEG # BLD: 4.8 K/UL (ref 1.8–8)
NEUTS SEG NFR BLD: 76 % (ref 32–75)
PLATELET # BLD AUTO: 520 K/UL (ref 150–400)
POTASSIUM SERPL-SCNC: 3.8 MMOL/L (ref 3.5–5.1)
PROT SERPL-MCNC: 8 G/DL (ref 6.4–8.2)
RBC # BLD AUTO: 4.09 M/UL (ref 3.8–5.2)
SODIUM SERPL-SCNC: 139 MMOL/L (ref 136–145)
TSH SERPL DL<=0.05 MIU/L-ACNC: 0.66 UIU/ML (ref 0.36–3.74)
WBC # BLD AUTO: 6.3 K/UL (ref 3.6–11)

## 2023-11-16 PROCEDURE — 85025 COMPLETE CBC W/AUTO DIFF WBC: CPT

## 2023-11-16 PROCEDURE — 80053 COMPREHEN METABOLIC PANEL: CPT

## 2023-11-16 PROCEDURE — 99213 OFFICE O/P EST LOW 20 MIN: CPT | Performed by: NURSE PRACTITIONER

## 2023-11-16 PROCEDURE — 84443 ASSAY THYROID STIM HORMONE: CPT

## 2023-11-16 PROCEDURE — 96375 TX/PRO/DX INJ NEW DRUG ADDON: CPT

## 2023-11-16 PROCEDURE — 96413 CHEMO IV INFUSION 1 HR: CPT

## 2023-11-16 PROCEDURE — 36415 COLL VENOUS BLD VENIPUNCTURE: CPT

## 2023-11-16 PROCEDURE — 96372 THER/PROPH/DIAG INJ SC/IM: CPT

## 2023-11-16 PROCEDURE — 96411 CHEMO IV PUSH ADDL DRUG: CPT

## 2023-11-16 PROCEDURE — 2580000003 HC RX 258: Performed by: INTERNAL MEDICINE

## 2023-11-16 PROCEDURE — 6360000002 HC RX W HCPCS: Performed by: INTERNAL MEDICINE

## 2023-11-16 RX ORDER — CYANOCOBALAMIN 1000 UG/ML
1000 INJECTION, SOLUTION INTRAMUSCULAR; SUBCUTANEOUS ONCE
Status: COMPLETED | OUTPATIENT
Start: 2023-11-16 | End: 2023-11-16

## 2023-11-16 RX ORDER — SODIUM CHLORIDE 9 MG/ML
5-250 INJECTION, SOLUTION INTRAVENOUS PRN
Status: DISCONTINUED | OUTPATIENT
Start: 2023-11-16 | End: 2023-11-17 | Stop reason: HOSPADM

## 2023-11-16 RX ORDER — HEPARIN 100 UNIT/ML
500 SYRINGE INTRAVENOUS PRN
Status: DISCONTINUED | OUTPATIENT
Start: 2023-11-16 | End: 2023-11-17 | Stop reason: HOSPADM

## 2023-11-16 RX ORDER — DEXAMETHASONE SODIUM PHOSPHATE 4 MG/ML
8 INJECTION, SOLUTION INTRA-ARTICULAR; INTRALESIONAL; INTRAMUSCULAR; INTRAVENOUS; SOFT TISSUE ONCE
Status: COMPLETED | OUTPATIENT
Start: 2023-11-16 | End: 2023-11-16

## 2023-11-16 RX ORDER — SODIUM CHLORIDE 0.9 % (FLUSH) 0.9 %
5-40 SYRINGE (ML) INJECTION PRN
Status: DISCONTINUED | OUTPATIENT
Start: 2023-11-16 | End: 2023-11-17 | Stop reason: HOSPADM

## 2023-11-16 RX ADMIN — SODIUM CHLORIDE, PRESERVATIVE FREE 20 ML: 5 INJECTION INTRAVENOUS at 14:07

## 2023-11-16 RX ADMIN — SODIUM CHLORIDE 25 ML/HR: 9 INJECTION, SOLUTION INTRAVENOUS at 11:49

## 2023-11-16 RX ADMIN — DEXAMETHASONE SODIUM PHOSPHATE 8 MG: 4 INJECTION INTRA-ARTICULAR; INTRALESIONAL; INTRAMUSCULAR; INTRAVENOUS; SOFT TISSUE at 13:25

## 2023-11-16 RX ADMIN — CYANOCOBALAMIN 1000 MCG: 1000 INJECTION, SOLUTION INTRAMUSCULAR at 13:22

## 2023-11-16 RX ADMIN — SODIUM CHLORIDE 200 MG: 9 INJECTION, SOLUTION INTRAVENOUS at 12:45

## 2023-11-16 RX ADMIN — PEMETREXED 880 MG: 100 INJECTION, POWDER, LYOPHILIZED, FOR SOLUTION INTRAVENOUS at 13:47

## 2023-11-16 ASSESSMENT — PATIENT HEALTH QUESTIONNAIRE - PHQ9
SUM OF ALL RESPONSES TO PHQ QUESTIONS 1-9: 0
1. LITTLE INTEREST OR PLEASURE IN DOING THINGS: 0
SUM OF ALL RESPONSES TO PHQ QUESTIONS 1-9: 0
2. FEELING DOWN, DEPRESSED OR HOPELESS: 0
SUM OF ALL RESPONSES TO PHQ9 QUESTIONS 1 & 2: 0

## 2023-11-16 NOTE — PROGRESS NOTES
Cancer [place] at [address], [address] [place] [address], [address]: [phone]  F: [phone]       Reason for visit     Janice Umana is a 60 y.o.  female who is seen for follow up of stage IV NSCLC- PDL1- 15%, no  mutations           Treatment History:   5/4/2022: Left thoracentecis- Adenocarcinoma   6/9/2022- 8/11/2022- current: Carboplatin+ Alimta+Keytruda  8/2022- CT with response  9/1/2022: Alimta Keytruda maintenance   CT 11/2022: stable   CT 1/2023: stable   CAP CT 4/2023: stable ,   CT 8/2023: Stable per RECIST  CT CAP 11/9/2023: Stable           History of Present Illness:     Patient is a 62-year-old female with diabetes mellitus, hypertension who presented to the [facility] emergency room on 5/4/2022 with complaints of shortness of breath x 14 days which  has been progressively getting worse over several weeks with mild cough. Denies any headaches, vision changes, falls trouble swallowing, chest pain, fevers, weight loss, hemoptysis. CTA on admission showed no evidence of pulmonary embolism but she was  noted to have a moderate to large left-sided pleural effusion with a left apical mass measuring 28 x 20 mm, she had small pulmonary nodules on the right, possible hepatic hypodensity. She had ultrasound-guided thoracentesis of the left side on 5/4/2022  and 1300 cc of fluid was aspirated. This was sent for cytology which was positive. Pleural fluid cytology showed more than 100 RBCs, total protein of 5.3, albumin 2.8, . Labs were noted for a bilirubin of 1.1. Interval history:  Here today for follow up, on maintenance Alimta + Keytruda, had scans. She reports feeling pretty well. Shortness of breath with exertion may be a little worse but she is also being more active. Sweating seems better. Had to use inhaler once for breathing but otherwise recovers well.   Reports some chest discomfort the night of her CT scan, took as aspirin

## 2023-11-30 RX ORDER — ACETAMINOPHEN 325 MG/1
650 TABLET ORAL
Status: CANCELLED | OUTPATIENT
Start: 2023-12-07

## 2023-11-30 RX ORDER — ONDANSETRON 2 MG/ML
8 INJECTION INTRAMUSCULAR; INTRAVENOUS
Status: CANCELLED | OUTPATIENT
Start: 2023-12-07

## 2023-11-30 RX ORDER — MEPERIDINE HYDROCHLORIDE 25 MG/ML
12.5 INJECTION INTRAMUSCULAR; INTRAVENOUS; SUBCUTANEOUS PRN
Status: CANCELLED | OUTPATIENT
Start: 2023-12-07

## 2023-11-30 RX ORDER — PROCHLORPERAZINE EDISYLATE 5 MG/ML
10 INJECTION INTRAMUSCULAR; INTRAVENOUS
Status: CANCELLED | OUTPATIENT
Start: 2023-12-07

## 2023-11-30 RX ORDER — EPINEPHRINE 1 MG/ML
0.3 INJECTION, SOLUTION INTRAMUSCULAR; SUBCUTANEOUS PRN
Status: CANCELLED | OUTPATIENT
Start: 2023-12-07

## 2023-11-30 RX ORDER — CYANOCOBALAMIN 1000 UG/ML
1000 INJECTION, SOLUTION INTRAMUSCULAR; SUBCUTANEOUS
Status: CANCELLED | OUTPATIENT
Start: 2023-12-07

## 2023-11-30 RX ORDER — ALBUTEROL SULFATE 90 UG/1
4 AEROSOL, METERED RESPIRATORY (INHALATION) PRN
Status: CANCELLED | OUTPATIENT
Start: 2023-12-07

## 2023-11-30 RX ORDER — DIPHENHYDRAMINE HYDROCHLORIDE 50 MG/ML
50 INJECTION INTRAMUSCULAR; INTRAVENOUS
Status: CANCELLED | OUTPATIENT
Start: 2023-12-07

## 2023-11-30 RX ORDER — SODIUM CHLORIDE 9 MG/ML
INJECTION, SOLUTION INTRAVENOUS CONTINUOUS
Status: CANCELLED | OUTPATIENT
Start: 2023-12-07

## 2023-11-30 RX ORDER — LORAZEPAM 2 MG/ML
0.5 INJECTION INTRAMUSCULAR
Status: CANCELLED | OUTPATIENT
Start: 2023-12-07

## 2023-12-07 ENCOUNTER — HOSPITAL ENCOUNTER (OUTPATIENT)
Facility: HOSPITAL | Age: 63
Setting detail: INFUSION SERIES
Discharge: HOME | End: 2023-12-07
Payer: MEDICAID

## 2023-12-07 VITALS
BODY MASS INDEX: 39.39 KG/M2 | RESPIRATION RATE: 18 BRPM | SYSTOLIC BLOOD PRESSURE: 127 MMHG | DIASTOLIC BLOOD PRESSURE: 76 MMHG | HEIGHT: 67 IN | WEIGHT: 251 LBS | HEART RATE: 96 BPM | TEMPERATURE: 97.1 F

## 2023-12-07 DIAGNOSIS — C34.90 NSCLC METASTATIC TO ADRENAL GLAND (HCC): Primary | ICD-10-CM

## 2023-12-07 DIAGNOSIS — C79.70 NSCLC METASTATIC TO ADRENAL GLAND (HCC): Primary | ICD-10-CM

## 2023-12-07 LAB
ALBUMIN SERPL-MCNC: 3.4 G/DL (ref 3.5–5)
ALBUMIN/GLOB SERPL: 0.8 (ref 1.1–2.2)
ALP SERPL-CCNC: 92 U/L (ref 45–117)
ALT SERPL-CCNC: 28 U/L (ref 12–78)
ANION GAP SERPL CALC-SCNC: 9 MMOL/L (ref 5–15)
AST SERPL-CCNC: 19 U/L (ref 15–37)
BASO+EOS+MONOS # BLD AUTO: 0.4 K/UL (ref 0.2–1.2)
BASO+EOS+MONOS NFR BLD AUTO: 4 % (ref 3.2–16.9)
BILIRUB SERPL-MCNC: 0.6 MG/DL (ref 0.2–1)
BUN SERPL-MCNC: 10 MG/DL (ref 6–20)
BUN/CREAT SERPL: 11 (ref 12–20)
CALCIUM SERPL-MCNC: 8.8 MG/DL (ref 8.5–10.1)
CHLORIDE SERPL-SCNC: 107 MMOL/L (ref 97–108)
CO2 SERPL-SCNC: 23 MMOL/L (ref 21–32)
CREAT SERPL-MCNC: 0.91 MG/DL (ref 0.55–1.02)
DIFFERENTIAL METHOD BLD: ABNORMAL
ERYTHROCYTE [DISTWIDTH] IN BLOOD BY AUTOMATED COUNT: 15.7 % (ref 11.8–15.8)
GLOBULIN SER CALC-MCNC: 4.1 G/DL (ref 2–4)
GLUCOSE SERPL-MCNC: 186 MG/DL (ref 65–100)
HCT VFR BLD AUTO: 37.1 % (ref 35–47)
HGB BLD-MCNC: 11.8 G/DL (ref 11.5–16)
LYMPHOCYTES # BLD: 0.7 K/UL (ref 0.8–3.5)
LYMPHOCYTES NFR BLD: 9 % (ref 12–49)
MCH RBC QN AUTO: 27.3 PG (ref 26–34)
MCHC RBC AUTO-ENTMCNC: 31.8 G/DL (ref 30–36.5)
MCV RBC AUTO: 85.7 FL (ref 80–99)
NEUTS SEG # BLD: 7 K/UL (ref 1.8–8)
NEUTS SEG NFR BLD: 87 % (ref 32–75)
PLATELET # BLD AUTO: 397 K/UL (ref 150–400)
POTASSIUM SERPL-SCNC: 3.7 MMOL/L (ref 3.5–5.1)
PROT SERPL-MCNC: 7.5 G/DL (ref 6.4–8.2)
RBC # BLD AUTO: 4.33 M/UL (ref 3.8–5.2)
SODIUM SERPL-SCNC: 139 MMOL/L (ref 136–145)
TSH SERPL DL<=0.05 MIU/L-ACNC: 0.45 UIU/ML (ref 0.36–3.74)
WBC # BLD AUTO: 8.1 K/UL (ref 3.6–11)

## 2023-12-07 PROCEDURE — 96413 CHEMO IV INFUSION 1 HR: CPT

## 2023-12-07 PROCEDURE — 85025 COMPLETE CBC W/AUTO DIFF WBC: CPT

## 2023-12-07 PROCEDURE — 80053 COMPREHEN METABOLIC PANEL: CPT

## 2023-12-07 PROCEDURE — 96417 CHEMO IV INFUS EACH ADDL SEQ: CPT

## 2023-12-07 PROCEDURE — 96411 CHEMO IV PUSH ADDL DRUG: CPT

## 2023-12-07 PROCEDURE — 96375 TX/PRO/DX INJ NEW DRUG ADDON: CPT

## 2023-12-07 PROCEDURE — 6360000002 HC RX W HCPCS: Performed by: INTERNAL MEDICINE

## 2023-12-07 PROCEDURE — 2580000003 HC RX 258: Performed by: INTERNAL MEDICINE

## 2023-12-07 PROCEDURE — 96409 CHEMO IV PUSH SNGL DRUG: CPT

## 2023-12-07 PROCEDURE — 36415 COLL VENOUS BLD VENIPUNCTURE: CPT

## 2023-12-07 PROCEDURE — 84443 ASSAY THYROID STIM HORMONE: CPT

## 2023-12-07 RX ORDER — SODIUM CHLORIDE 0.9 % (FLUSH) 0.9 %
5-40 SYRINGE (ML) INJECTION PRN
Status: DISCONTINUED | OUTPATIENT
Start: 2023-12-07 | End: 2023-12-08 | Stop reason: HOSPADM

## 2023-12-07 RX ORDER — HEPARIN 100 UNIT/ML
500 SYRINGE INTRAVENOUS PRN
Status: DISCONTINUED | OUTPATIENT
Start: 2023-12-07 | End: 2023-12-08 | Stop reason: HOSPADM

## 2023-12-07 RX ORDER — ACETAMINOPHEN 325 MG/1
650 TABLET ORAL
Status: DISCONTINUED | OUTPATIENT
Start: 2023-12-07 | End: 2023-12-08 | Stop reason: HOSPADM

## 2023-12-07 RX ORDER — SODIUM CHLORIDE 9 MG/ML
5-250 INJECTION, SOLUTION INTRAVENOUS PRN
Status: DISCONTINUED | OUTPATIENT
Start: 2023-12-07 | End: 2023-12-08 | Stop reason: HOSPADM

## 2023-12-07 RX ORDER — DIPHENHYDRAMINE HYDROCHLORIDE 50 MG/ML
50 INJECTION INTRAMUSCULAR; INTRAVENOUS
Status: DISCONTINUED | OUTPATIENT
Start: 2023-12-07 | End: 2023-12-08 | Stop reason: HOSPADM

## 2023-12-07 RX ORDER — DEXAMETHASONE SODIUM PHOSPHATE 4 MG/ML
8 INJECTION, SOLUTION INTRA-ARTICULAR; INTRALESIONAL; INTRAMUSCULAR; INTRAVENOUS; SOFT TISSUE ONCE
Status: COMPLETED | OUTPATIENT
Start: 2023-12-07 | End: 2023-12-07

## 2023-12-07 RX ADMIN — DEXAMETHASONE SODIUM PHOSPHATE 8 MG: 4 INJECTION, SOLUTION INTRAMUSCULAR; INTRAVENOUS at 14:07

## 2023-12-07 RX ADMIN — SODIUM CHLORIDE, PRESERVATIVE FREE 10 ML: 5 INJECTION INTRAVENOUS at 14:32

## 2023-12-07 RX ADMIN — PEMETREXED DISODIUM 880 MG: 500 INJECTION, POWDER, LYOPHILIZED, FOR SOLUTION INTRAVENOUS at 14:15

## 2023-12-07 RX ADMIN — SODIUM CHLORIDE 25 ML/HR: 9 INJECTION, SOLUTION INTRAVENOUS at 13:30

## 2023-12-07 RX ADMIN — SODIUM CHLORIDE 200 MG: 9 INJECTION, SOLUTION INTRAVENOUS at 13:36

## 2023-12-14 ENCOUNTER — TELEPHONE (OUTPATIENT)
Age: 63
End: 2023-12-14

## 2023-12-14 DIAGNOSIS — C79.70 NSCLC METASTATIC TO ADRENAL GLAND (HCC): ICD-10-CM

## 2023-12-14 DIAGNOSIS — C34.90 NSCLC METASTATIC TO ADRENAL GLAND (HCC): ICD-10-CM

## 2023-12-14 RX ORDER — UMECLIDINIUM BROMIDE AND VILANTEROL TRIFENATATE 62.5; 25 UG/1; UG/1
POWDER RESPIRATORY (INHALATION)
Qty: 60 EACH | Refills: 0 | Status: SHIPPED | OUTPATIENT
Start: 2023-12-14

## 2023-12-14 NOTE — TELEPHONE ENCOUNTER
Oncology Pharmacist Note:     Shelia MilesFluvanna is a 58 y. o.female diagnosed with lung cancer. Ms. Kay Maria is being treated with PEMEtrexed, pembrolizumab. Refill Anoro ellipta inhaler to [facility] 1 more time - future refills will need to come from pulmonary physician or her PCP.       Char Hampton, PharmD, [facility], [address]    Program: Medical Group  CPA in place:  Yes  Recommendation Provided To: Patient/Caregiver: 1 via Telephone  Intervention Detail: Refill(s) Provided  Intervention Accepted By: Patient/Caregiver: 1    Time Spent (min): 10

## 2023-12-14 NOTE — TELEPHONE ENCOUNTER
2:39pm: pt verified x2, spoke with pt regarding her inhaler informed her that her pulmonary doctor needs to fill this said that she has only seen him once and she only has enough medication to get through Saturday. Told her I would follow up with our team and get back to her.

## 2023-12-14 NOTE — TELEPHONE ENCOUNTER
Pt called to request refill for Anuro inhaler; stated pharmacy reached out but had not received anything as of yet. Call back number is [phone].

## 2023-12-14 NOTE — TELEPHONE ENCOUNTER
3:20pm: pt verified x2, informed pt we will fill inhaler script one more time but after tat pulmonary or PCP will have to fill.

## 2023-12-14 NOTE — TELEPHONE ENCOUNTER
Patient left a voicemail returning a call back in regards to her RX:umeclidinium-vilanterol (ANORO ELLIPTA) 62.5-25 MCG/ACT inhaler   That should be going to [facility] but she did states that their was confusion.        # [phone]

## 2023-12-20 RX ORDER — HEPARIN 100 UNIT/ML
500 SYRINGE INTRAVENOUS PRN
Status: CANCELLED | OUTPATIENT
Start: 2023-12-28

## 2023-12-20 RX ORDER — MEPERIDINE HYDROCHLORIDE 25 MG/ML
12.5 INJECTION INTRAMUSCULAR; INTRAVENOUS; SUBCUTANEOUS PRN
Status: CANCELLED | OUTPATIENT
Start: 2023-12-28

## 2023-12-20 RX ORDER — ALBUTEROL SULFATE 90 UG/1
4 AEROSOL, METERED RESPIRATORY (INHALATION) PRN
Status: CANCELLED | OUTPATIENT
Start: 2023-12-28

## 2023-12-20 RX ORDER — ACETAMINOPHEN 325 MG/1
650 TABLET ORAL
Status: CANCELLED | OUTPATIENT
Start: 2023-12-28

## 2023-12-20 RX ORDER — DIPHENHYDRAMINE HYDROCHLORIDE 50 MG/ML
50 INJECTION INTRAMUSCULAR; INTRAVENOUS
Status: CANCELLED | OUTPATIENT
Start: 2023-12-28

## 2023-12-20 RX ORDER — SODIUM CHLORIDE 9 MG/ML
INJECTION, SOLUTION INTRAVENOUS CONTINUOUS
Status: CANCELLED | OUTPATIENT
Start: 2023-12-28

## 2023-12-20 RX ORDER — PROCHLORPERAZINE EDISYLATE 5 MG/ML
10 INJECTION INTRAMUSCULAR; INTRAVENOUS
Status: CANCELLED | OUTPATIENT
Start: 2023-12-28

## 2023-12-20 RX ORDER — ONDANSETRON 2 MG/ML
8 INJECTION INTRAMUSCULAR; INTRAVENOUS
Status: CANCELLED | OUTPATIENT
Start: 2023-12-28

## 2023-12-20 RX ORDER — LORAZEPAM 2 MG/ML
0.5 INJECTION INTRAMUSCULAR
Status: CANCELLED | OUTPATIENT
Start: 2023-12-28

## 2023-12-20 RX ORDER — SODIUM CHLORIDE 9 MG/ML
5-250 INJECTION, SOLUTION INTRAVENOUS PRN
Status: CANCELLED | OUTPATIENT
Start: 2023-12-28

## 2023-12-20 RX ORDER — EPINEPHRINE 1 MG/ML
0.3 INJECTION, SOLUTION INTRAMUSCULAR; SUBCUTANEOUS PRN
Status: CANCELLED | OUTPATIENT
Start: 2023-12-28

## 2023-12-27 NOTE — PROGRESS NOTES
Cancer [place] at [address], [address] [place] [address], [address]: [phone]  F: [phone]     Reason for visit   López Allen is a 58 y.o.  female who is seen for follow up of stage IV NSCLC- PDL1- 15%, no  mutations           Treatment History:   5/4/2022: Left thoracentecis- Adenocarcinoma   6/9/2022- 8/11/2022- current: Carboplatin+ Alimta+Keytruda  8/2022- CT with response  9/1/2022: Alimta Keytruda maintenance   CT 11/2022: stable   CT 1/2023: stable   CAP CT 4/2023: stable ,   CT 8/2023: Stable per RECIST  CT CAP 11/9/2023: Stable           History of Present Illness:   Patient is a 58 y.o. female with diabetes mellitus, hypertension who presented to the [facility] emergency room on 5/4/2022 with complaints of shortness of breath x 14 days which  has been progressively getting worse over several weeks with mild cough. Denies any headaches, vision changes, falls trouble swallowing, chest pain, fevers, weight loss, hemoptysis. CTA on admission showed no evidence of pulmonary embolism but she was  noted to have a moderate to large left-sided pleural effusion with a left apical mass measuring 28 x 20 mm, she had small pulmonary nodules on the right, possible hepatic hypodensity. She had ultrasound-guided thoracentesis of the left side on 5/4/2022  and 1300 cc of fluid was aspirated. This was sent for cytology which was positive. Pleural fluid cytology showed more than 100 RBCs, total protein of 5.3, albumin 2.8, . Labs were noted for a bilirubin of 1.1. Interval history:  Here today for follow up, on maintenance Alimta + Keytruda. Has some post nasal drip, on Xyzal. Has intermittent lower leg swelling after standing for long periods of time. Denies diarrhea. Denies skin rash/pruritus. Denies new or worsening SOB, cough. She presents with her niece today. She used to work for [employer].       Review of systems was obtained and

## 2023-12-28 ENCOUNTER — OFFICE VISIT (OUTPATIENT)
Age: 63
End: 2023-12-28
Payer: MEDICAID

## 2023-12-28 ENCOUNTER — HOSPITAL ENCOUNTER (OUTPATIENT)
Facility: HOSPITAL | Age: 63
Setting detail: INFUSION SERIES
Discharge: HOME | End: 2023-12-28
Payer: MEDICAID

## 2023-12-28 VITALS
HEART RATE: 87 BPM | DIASTOLIC BLOOD PRESSURE: 70 MMHG | SYSTOLIC BLOOD PRESSURE: 136 MMHG | RESPIRATION RATE: 18 BRPM | WEIGHT: 256 LBS | TEMPERATURE: 97.6 F | HEIGHT: 67 IN | OXYGEN SATURATION: 96 % | BODY MASS INDEX: 40.18 KG/M2

## 2023-12-28 VITALS
TEMPERATURE: 96.9 F | HEART RATE: 98 BPM | DIASTOLIC BLOOD PRESSURE: 80 MMHG | RESPIRATION RATE: 18 BRPM | OXYGEN SATURATION: 96 % | BODY MASS INDEX: 40.1 KG/M2 | SYSTOLIC BLOOD PRESSURE: 153 MMHG | WEIGHT: 256 LBS

## 2023-12-28 DIAGNOSIS — C34.90 NSCLC METASTATIC TO ADRENAL GLAND (HCC): Primary | ICD-10-CM

## 2023-12-28 DIAGNOSIS — C79.70 NSCLC METASTATIC TO ADRENAL GLAND (HCC): Primary | ICD-10-CM

## 2023-12-28 LAB
ALBUMIN SERPL-MCNC: 3.4 G/DL (ref 3.5–5)
ALBUMIN/GLOB SERPL: 0.9 (ref 1.1–2.2)
ALP SERPL-CCNC: 90 U/L (ref 45–117)
ALT SERPL-CCNC: 27 U/L (ref 12–78)
ANION GAP SERPL CALC-SCNC: 5 MMOL/L (ref 5–15)
AST SERPL-CCNC: 25 U/L (ref 15–37)
BASOPHILS # BLD: 0 K/UL (ref 0–0.1)
BASOPHILS NFR BLD: 0 % (ref 0–1)
BILIRUB SERPL-MCNC: 0.7 MG/DL (ref 0.2–1)
BUN SERPL-MCNC: 12 MG/DL (ref 6–20)
BUN/CREAT SERPL: 13 (ref 12–20)
CALCIUM SERPL-MCNC: 8.6 MG/DL (ref 8.5–10.1)
CHLORIDE SERPL-SCNC: 107 MMOL/L (ref 97–108)
CO2 SERPL-SCNC: 26 MMOL/L (ref 21–32)
CREAT SERPL-MCNC: 0.94 MG/DL (ref 0.55–1.02)
DIFFERENTIAL METHOD BLD: ABNORMAL
EOSINOPHIL # BLD: 0 K/UL (ref 0–0.4)
EOSINOPHIL NFR BLD: 0 % (ref 0–7)
ERYTHROCYTE [DISTWIDTH] IN BLOOD BY AUTOMATED COUNT: 15.6 % (ref 11.5–14.5)
GLOBULIN SER CALC-MCNC: 3.6 G/DL (ref 2–4)
GLUCOSE SERPL-MCNC: 218 MG/DL (ref 65–100)
HCT VFR BLD AUTO: 38.3 % (ref 35–47)
HGB BLD-MCNC: 11.9 G/DL (ref 11.5–16)
IMM GRANULOCYTES # BLD AUTO: 0.1 K/UL (ref 0–0.04)
IMM GRANULOCYTES NFR BLD AUTO: 1 % (ref 0–0.5)
LYMPHOCYTES # BLD: 0.5 K/UL (ref 0.8–3.5)
LYMPHOCYTES NFR BLD: 9 % (ref 12–49)
MCH RBC QN AUTO: 27.2 PG (ref 26–34)
MCHC RBC AUTO-ENTMCNC: 31.1 G/DL (ref 30–36.5)
MCV RBC AUTO: 87.4 FL (ref 80–99)
MONOCYTES # BLD: 0.1 K/UL (ref 0–1)
MONOCYTES NFR BLD: 2 % (ref 5–13)
NEUTS SEG # BLD: 5.2 K/UL (ref 1.8–8)
NEUTS SEG NFR BLD: 88 % (ref 32–75)
NRBC # BLD: 0 K/UL (ref 0–0.01)
NRBC BLD-RTO: 0 PER 100 WBC
PLATELET # BLD AUTO: 354 K/UL (ref 150–400)
PMV BLD AUTO: 9.6 FL (ref 8.9–12.9)
POTASSIUM SERPL-SCNC: 4.2 MMOL/L (ref 3.5–5.1)
PROT SERPL-MCNC: 7 G/DL (ref 6.4–8.2)
RBC # BLD AUTO: 4.38 M/UL (ref 3.8–5.2)
RBC MORPH BLD: ABNORMAL
SODIUM SERPL-SCNC: 138 MMOL/L (ref 136–145)
TSH SERPL DL<=0.05 MIU/L-ACNC: 0.44 UIU/ML (ref 0.36–3.74)
WBC # BLD AUTO: 5.9 K/UL (ref 3.6–11)

## 2023-12-28 PROCEDURE — 99214 OFFICE O/P EST MOD 30 MIN: CPT

## 2023-12-28 PROCEDURE — 96413 CHEMO IV INFUSION 1 HR: CPT

## 2023-12-28 PROCEDURE — 85025 COMPLETE CBC W/AUTO DIFF WBC: CPT

## 2023-12-28 PROCEDURE — 6360000002 HC RX W HCPCS: Performed by: INTERNAL MEDICINE

## 2023-12-28 PROCEDURE — 96375 TX/PRO/DX INJ NEW DRUG ADDON: CPT

## 2023-12-28 PROCEDURE — 80053 COMPREHEN METABOLIC PANEL: CPT

## 2023-12-28 PROCEDURE — 36415 COLL VENOUS BLD VENIPUNCTURE: CPT

## 2023-12-28 PROCEDURE — 84443 ASSAY THYROID STIM HORMONE: CPT

## 2023-12-28 PROCEDURE — 2580000003 HC RX 258: Performed by: INTERNAL MEDICINE

## 2023-12-28 PROCEDURE — 96411 CHEMO IV PUSH ADDL DRUG: CPT

## 2023-12-28 RX ORDER — CYANOCOBALAMIN 1000 UG/ML
1000 INJECTION, SOLUTION INTRAMUSCULAR; SUBCUTANEOUS
Status: DISCONTINUED | OUTPATIENT
Start: 2023-12-28 | End: 2023-12-28

## 2023-12-28 RX ORDER — DEXAMETHASONE SODIUM PHOSPHATE 4 MG/ML
8 INJECTION, SOLUTION INTRA-ARTICULAR; INTRALESIONAL; INTRAMUSCULAR; INTRAVENOUS; SOFT TISSUE ONCE
Status: COMPLETED | OUTPATIENT
Start: 2023-12-28 | End: 2023-12-28

## 2023-12-28 RX ORDER — SODIUM CHLORIDE 9 MG/ML
5-250 INJECTION, SOLUTION INTRAVENOUS PRN
Status: DISCONTINUED | OUTPATIENT
Start: 2023-12-28 | End: 2023-12-29 | Stop reason: HOSPADM

## 2023-12-28 RX ORDER — SODIUM CHLORIDE 0.9 % (FLUSH) 0.9 %
5-40 SYRINGE (ML) INJECTION PRN
Status: DISCONTINUED | OUTPATIENT
Start: 2023-12-28 | End: 2023-12-29 | Stop reason: HOSPADM

## 2023-12-28 RX ADMIN — SODIUM CHLORIDE 25 ML/HR: 9 INJECTION, SOLUTION INTRAVENOUS at 12:45

## 2023-12-28 RX ADMIN — SODIUM CHLORIDE, PRESERVATIVE FREE 10 ML: 5 INJECTION INTRAVENOUS at 12:45

## 2023-12-28 RX ADMIN — DEXAMETHASONE SODIUM PHOSPHATE 8 MG: 4 INJECTION INTRA-ARTICULAR; INTRALESIONAL; INTRAMUSCULAR; INTRAVENOUS; SOFT TISSUE at 12:55

## 2023-12-28 RX ADMIN — PEMETREXED DISODIUM 880 MG: 500 INJECTION, POWDER, LYOPHILIZED, FOR SOLUTION INTRAVENOUS at 13:44

## 2023-12-28 RX ADMIN — SODIUM CHLORIDE 200 MG: 9 INJECTION, SOLUTION INTRAVENOUS at 13:05

## 2023-12-28 RX ADMIN — SODIUM CHLORIDE, PRESERVATIVE FREE 20 ML: 5 INJECTION INTRAVENOUS at 14:00

## 2023-12-28 NOTE — PROGRESS NOTES
[facility] Chemotherapy/Immunotherapy Progress Note    Date: 2023  Name: Stephie Michaud  MRN: [id number]       : 1960    Pt arrived ambulatory and in no acute distress to [facility] for Keytruda/Alimta   Denies SOB, fever, cough, N/V. Denies Covid-like symptoms.     Chest port accessed with positive blood return. Labs within treatment parameters.     Ms. Michaud's vitals were reviewed.  Patient Vitals for the past 12 hrs:   Temp Pulse Resp BP SpO2   23 1355 97.6 °F (36.4 °C) 87 18 136/70 --   23 1258 97.6 °F (36.4 °C) 89 18 (!) 122/50 --   23 1026 96.9 °F (36.1 °C) 98 18 (!) 153/80 96 %     Pre-medications were administered as ordered and chemotherapy was initiated. Please see MAR for specific drug names and time of administration.  Medications Administered         0.9 % sodium chloride infusion Admin Date  2023 Action  New Bag Dose  25 mL/hr Rate  25 mL/hr Route  IntraVENous Administered By  Roxi Cortez RN        dexAMETHasone (DECADRON) injection 8 mg Admin Date  2023 Action  Given Dose  8 mg Rate   Route  IntraVENous Administered By  Roxi Cortez RN        pembrolizumab (KEYTRUDA) 200 mg in sodium chloride 0.9 % 100 mL IVPB Admin Date  2023 Action  New Bag Dose  200 mg Rate  236 mL/hr Route  IntraVENous Administered By  Roxi Cortez RN        PEMEtrexed (ALIMTA) 880 mg in sodium chloride 0.9 % 145.2 mL chemo infusion Admin Date  2023 Action  New Bag Dose  880 mg Rate  871.2 mL/hr Route  IntraVENous Administered By  Roxi Cortez RN        sodium chloride flush 0.9 % injection 5-40 mL Admin Date  2023 Action  Given Dose  10 mL Rate   Route  IntraVENous Administered By  Roxi Cortez RN        sodium chloride flush 0.9 % injection 5-40 mL Admin Date  2023 Action  Given Dose  20 mL Rate   Route  IntraVENous Administered By  Roxi Cortez RN          Prior to chemotherapy/immunotherapy administration the following were verified with  a second chemotherapy/immunotherapy certified nurse: Patient name, Patient  or CSN, Drug name, Drug dose, Infusion/drug volume, Rate of administration, Route of administration, Expiration dates/times, Appearance and physical integrity of the drug(s). Chest port maintained positive blood return throughout treatment. Flushed with 20 ml Normal Saline and de-accessed per protocol. Bandaid and Gauze applied to site. Pt aware of next appointment scheduled set for [facility]. Tolerated procedure well without issue. Declined post- monitoring time.  Education given and reinforced prior to [id number] Yajaira Chaudhry RN  2023

## 2023-12-28 NOTE — PROGRESS NOTES
Wallace Cline is a 58 y.o. female    Chief Complaint   Patient presents with    Follow-up     stage IV NSCLC- PDL1- 15%, no  mutations             1. Have you been to the ER, urgent care clinic since your last visit? Hospitalized since your last visit? No    2. Have you seen or consulted any other health care providers outside of the [address] [place] [address] since your last visit? Include any pap smears or colon screening.  No

## 2024-01-11 RX ORDER — MEPERIDINE HYDROCHLORIDE 25 MG/ML
12.5 INJECTION INTRAMUSCULAR; INTRAVENOUS; SUBCUTANEOUS PRN
Status: CANCELLED | OUTPATIENT
Start: 2024-01-18

## 2024-01-11 RX ORDER — DIPHENHYDRAMINE HYDROCHLORIDE 50 MG/ML
50 INJECTION INTRAMUSCULAR; INTRAVENOUS
Status: CANCELLED | OUTPATIENT
Start: 2024-01-18

## 2024-01-11 RX ORDER — SODIUM CHLORIDE 9 MG/ML
INJECTION, SOLUTION INTRAVENOUS CONTINUOUS
Status: CANCELLED | OUTPATIENT
Start: 2024-01-18

## 2024-01-11 RX ORDER — ACETAMINOPHEN 325 MG/1
650 TABLET ORAL
Status: CANCELLED | OUTPATIENT
Start: 2024-01-18

## 2024-01-11 RX ORDER — EPINEPHRINE 1 MG/ML
0.3 INJECTION, SOLUTION INTRAMUSCULAR; SUBCUTANEOUS PRN
Status: CANCELLED | OUTPATIENT
Start: 2024-01-18

## 2024-01-11 RX ORDER — ALBUTEROL SULFATE 90 UG/1
4 AEROSOL, METERED RESPIRATORY (INHALATION) PRN
Status: CANCELLED | OUTPATIENT
Start: 2024-01-18

## 2024-01-11 RX ORDER — ONDANSETRON 2 MG/ML
8 INJECTION INTRAMUSCULAR; INTRAVENOUS
Status: CANCELLED | OUTPATIENT
Start: 2024-01-18

## 2024-01-15 DIAGNOSIS — C34.90 NSCLC METASTATIC TO ADRENAL GLAND (HCC): ICD-10-CM

## 2024-01-15 DIAGNOSIS — C79.70 NSCLC METASTATIC TO ADRENAL GLAND (HCC): ICD-10-CM

## 2024-01-15 RX ORDER — NYSTATIN 100000 U/G
CREAM TOPICAL
Qty: 30 G | Refills: 0 | Status: SHIPPED | OUTPATIENT
Start: 2024-01-15

## 2024-01-18 ENCOUNTER — HOSPITAL ENCOUNTER (OUTPATIENT)
Facility: HOSPITAL | Age: 64
Setting detail: INFUSION SERIES
Discharge: HOME | End: 2024-01-18
Payer: MEDICAID

## 2024-01-18 VITALS
HEART RATE: 93 BPM | RESPIRATION RATE: 18 BRPM | WEIGHT: 253.8 LBS | BODY MASS INDEX: 39.75 KG/M2 | OXYGEN SATURATION: 98 % | TEMPERATURE: 97.7 F | DIASTOLIC BLOOD PRESSURE: 83 MMHG | SYSTOLIC BLOOD PRESSURE: 132 MMHG

## 2024-01-18 DIAGNOSIS — C79.70 NSCLC METASTATIC TO ADRENAL GLAND (HCC): Primary | ICD-10-CM

## 2024-01-18 DIAGNOSIS — C34.90 NSCLC METASTATIC TO ADRENAL GLAND (HCC): Primary | ICD-10-CM

## 2024-01-18 LAB
ALBUMIN SERPL-MCNC: 3.6 G/DL (ref 3.5–5)
ALBUMIN/GLOB SERPL: 1 (ref 1.1–2.2)
ALP SERPL-CCNC: 96 U/L (ref 45–117)
ALT SERPL-CCNC: 31 U/L (ref 12–78)
ANION GAP SERPL CALC-SCNC: 11 MMOL/L (ref 5–15)
AST SERPL-CCNC: 25 U/L (ref 15–37)
BASOPHILS # BLD: 0 K/UL (ref 0–0.1)
BASOPHILS NFR BLD: 0 % (ref 0–1)
BILIRUB SERPL-MCNC: 0.7 MG/DL (ref 0.2–1)
BUN SERPL-MCNC: 11 MG/DL (ref 6–20)
BUN/CREAT SERPL: 11 (ref 12–20)
CALCIUM SERPL-MCNC: 9 MG/DL (ref 8.5–10.1)
CHLORIDE SERPL-SCNC: 107 MMOL/L (ref 97–108)
CO2 SERPL-SCNC: 22 MMOL/L (ref 21–32)
CREAT SERPL-MCNC: 0.97 MG/DL (ref 0.55–1.02)
DIFFERENTIAL METHOD BLD: ABNORMAL
EOSINOPHIL # BLD: 0 K/UL (ref 0–0.4)
EOSINOPHIL NFR BLD: 0 % (ref 0–7)
ERYTHROCYTE [DISTWIDTH] IN BLOOD BY AUTOMATED COUNT: 16.1 % (ref 11.5–14.5)
GLOBULIN SER CALC-MCNC: 3.5 G/DL (ref 2–4)
GLUCOSE SERPL-MCNC: 236 MG/DL (ref 65–100)
HCT VFR BLD AUTO: 39.1 % (ref 35–47)
HGB BLD-MCNC: 12.2 G/DL (ref 11.5–16)
IMM GRANULOCYTES # BLD AUTO: 0.1 K/UL (ref 0–0.04)
IMM GRANULOCYTES NFR BLD AUTO: 1 % (ref 0–0.5)
LYMPHOCYTES # BLD: 0.5 K/UL (ref 0.8–3.5)
LYMPHOCYTES NFR BLD: 9 % (ref 12–49)
MCH RBC QN AUTO: 27.3 PG (ref 26–34)
MCHC RBC AUTO-ENTMCNC: 31.2 G/DL (ref 30–36.5)
MCV RBC AUTO: 87.5 FL (ref 80–99)
MONOCYTES # BLD: 0.2 K/UL (ref 0–1)
MONOCYTES NFR BLD: 3 % (ref 5–13)
NEUTS SEG # BLD: 5.1 K/UL (ref 1.8–8)
NEUTS SEG NFR BLD: 87 % (ref 32–75)
NRBC # BLD: 0 K/UL (ref 0–0.01)
NRBC BLD-RTO: 0 PER 100 WBC
PLATELET # BLD AUTO: 395 K/UL (ref 150–400)
PMV BLD AUTO: 9.5 FL (ref 8.9–12.9)
POTASSIUM SERPL-SCNC: 4.3 MMOL/L (ref 3.5–5.1)
PROT SERPL-MCNC: 7.1 G/DL (ref 6.4–8.2)
RBC # BLD AUTO: 4.47 M/UL (ref 3.8–5.2)
RBC MORPH BLD: ABNORMAL
SODIUM SERPL-SCNC: 140 MMOL/L (ref 136–145)
TSH SERPL DL<=0.05 MIU/L-ACNC: 0.41 UIU/ML (ref 0.36–3.74)
WBC # BLD AUTO: 5.9 K/UL (ref 3.6–11)

## 2024-01-18 PROCEDURE — 85025 COMPLETE CBC W/AUTO DIFF WBC: CPT

## 2024-01-18 PROCEDURE — 96372 THER/PROPH/DIAG INJ SC/IM: CPT

## 2024-01-18 PROCEDURE — 36415 COLL VENOUS BLD VENIPUNCTURE: CPT

## 2024-01-18 PROCEDURE — 96413 CHEMO IV INFUSION 1 HR: CPT

## 2024-01-18 PROCEDURE — 84443 ASSAY THYROID STIM HORMONE: CPT

## 2024-01-18 PROCEDURE — 96411 CHEMO IV PUSH ADDL DRUG: CPT

## 2024-01-18 PROCEDURE — 2580000003 HC RX 258: Performed by: INTERNAL MEDICINE

## 2024-01-18 PROCEDURE — 6360000002 HC RX W HCPCS: Performed by: INTERNAL MEDICINE

## 2024-01-18 PROCEDURE — 96375 TX/PRO/DX INJ NEW DRUG ADDON: CPT

## 2024-01-18 PROCEDURE — 80053 COMPREHEN METABOLIC PANEL: CPT

## 2024-01-18 RX ORDER — LORAZEPAM 2 MG/ML
0.5 INJECTION INTRAMUSCULAR
Status: DISCONTINUED | OUTPATIENT
Start: 2024-01-18 | End: 2024-01-19 | Stop reason: HOSPADM

## 2024-01-18 RX ORDER — DEXAMETHASONE SODIUM PHOSPHATE 4 MG/ML
8 INJECTION, SOLUTION INTRA-ARTICULAR; INTRALESIONAL; INTRAMUSCULAR; INTRAVENOUS; SOFT TISSUE ONCE
Status: COMPLETED | OUTPATIENT
Start: 2024-01-18 | End: 2024-01-18

## 2024-01-18 RX ORDER — SODIUM CHLORIDE 0.9 % (FLUSH) 0.9 %
5-40 SYRINGE (ML) INJECTION PRN
Status: DISCONTINUED | OUTPATIENT
Start: 2024-01-18 | End: 2024-01-19 | Stop reason: HOSPADM

## 2024-01-18 RX ORDER — SODIUM CHLORIDE 9 MG/ML
5-250 INJECTION, SOLUTION INTRAVENOUS PRN
Status: DISCONTINUED | OUTPATIENT
Start: 2024-01-18 | End: 2024-01-19 | Stop reason: HOSPADM

## 2024-01-18 RX ORDER — CYANOCOBALAMIN 1000 UG/ML
1000 INJECTION, SOLUTION INTRAMUSCULAR; SUBCUTANEOUS
Status: COMPLETED | OUTPATIENT
Start: 2024-01-18 | End: 2024-01-18

## 2024-01-18 RX ORDER — HEPARIN 100 UNIT/ML
500 SYRINGE INTRAVENOUS PRN
Status: DISCONTINUED | OUTPATIENT
Start: 2024-01-18 | End: 2024-01-19 | Stop reason: HOSPADM

## 2024-01-18 RX ORDER — PROCHLORPERAZINE EDISYLATE 5 MG/ML
10 INJECTION INTRAMUSCULAR; INTRAVENOUS
Status: DISCONTINUED | OUTPATIENT
Start: 2024-01-18 | End: 2024-01-19 | Stop reason: HOSPADM

## 2024-01-18 RX ADMIN — DEXAMETHASONE SODIUM PHOSPHATE 8 MG: 4 INJECTION INTRA-ARTICULAR; INTRALESIONAL; INTRAMUSCULAR; INTRAVENOUS; SOFT TISSUE at 15:18

## 2024-01-18 RX ADMIN — CYANOCOBALAMIN 1000 MCG: 1000 INJECTION INTRAMUSCULAR; SUBCUTANEOUS at 15:25

## 2024-01-18 RX ADMIN — SODIUM CHLORIDE 200 MG: 9 INJECTION, SOLUTION INTRAVENOUS at 14:40

## 2024-01-18 RX ADMIN — PEMETREXED DISODIUM 880 MG: 500 INJECTION, POWDER, LYOPHILIZED, FOR SOLUTION INTRAVENOUS at 15:30

## 2024-01-18 RX ADMIN — SODIUM CHLORIDE 25 ML/HR: 9 INJECTION, SOLUTION INTRAVENOUS at 14:00

## 2024-01-18 ASSESSMENT — PAIN SCALES - GENERAL: PAINLEVEL_OUTOF10: 0

## 2024-01-18 NOTE — PROGRESS NOTES
[facility] Chemo Progress Note      [facility]    Date: 2024    Name: Stephie Michaud    MRN: [id number]         : 1960    Ms. Michaud Arrived ambulatory and in no distress for cycle 28 day 1 of Keytruda/Alimta.      Assessment, Port access, and Lab draw was completed and documented in flowsheets by AMEENA Ramon. Line was also flushed, clamped, and Curos Cap applied to end clave.    Follow Up: Proceed with treatment      Ms. Nguyen vitals were reviewed.  Patient Vitals for the past 12 hrs:   Temp Pulse Resp BP SpO2   24 1545 -- 93 -- 132/83 --   24 1100 97.7 °F (36.5 °C) 78 18 133/68 98 %         Lab results were obtained and reviewed.  Labs within parameter for treatment.   Recent Results (from the past 12 hour(s))   CBC with Auto Differential    Collection Time: 24 11:14 AM   Result Value Ref Range    WBC 5.9 3.6 - 11.0 K/uL    RBC 4.47 3.80 - 5.20 M/uL    Hemoglobin 12.2 11.5 - 16.0 g/dL    Hematocrit 39.1 35.0 - 47.0 %    MCV 87.5 80.0 - 99.0 FL    MCH 27.3 26.0 - 34.0 PG    MCHC 31.2 30.0 - 36.5 g/dL    RDW 16.1 (H) 11.5 - 14.5 %    Platelets 395 150 - 400 K/uL    MPV 9.5 8.9 - 12.9 FL    Nucleated RBCs 0.0 0  WBC    nRBC 0.00 0.00 - 0.01 K/uL    Neutrophils % 87 (H) 32 - 75 %    Lymphocytes % 9 (L) 12 - 49 %    Monocytes % 3 (L) 5 - 13 %    Eosinophils % 0 0 - 7 %    Basophils % 0 0 - 1 %    Immature Granulocytes 1 (H) 0.0 - 0.5 %    Neutrophils Absolute 5.1 1.8 - 8.0 K/UL    Lymphocytes Absolute 0.5 (L) 0.8 - 3.5 K/UL    Monocytes Absolute 0.2 0.0 - 1.0 K/UL    Eosinophils Absolute 0.0 0.0 - 0.4 K/UL    Basophils Absolute 0.0 0.0 - 0.1 K/UL    Absolute Immature Granulocyte 0.1 (H) 0.00 - 0.04 K/UL    Differential Type SMEAR SCANNED      RBC Comment ANISOCYTOSIS  1+       Comprehensive metabolic panel    Collection Time: 24 11:14 AM   Result Value Ref Range    Sodium 140 136 - 145 mmol/L    Potassium 4.3 3.5 - 5.1 mmol/L

## 2024-01-22 ENCOUNTER — TELEPHONE (OUTPATIENT)
Age: 64
End: 2024-01-22

## 2024-01-22 NOTE — TELEPHONE ENCOUNTER
1100  R/t call to pt and left VM that pt should be seen in 6 weeks, not 6 mths per last OV note. A message has been sent to scheduling to adjust pts appt [facility]   Also advised pt to have scans done prior to that appt, number to scheduling left on VM.

## 2024-01-22 NOTE — TELEPHONE ENCOUNTER
Pt called and lvm stating she is used to seeing Dr. Leo every 6 weeks but last time she was at the office she was told 6 months.    Pt wants to verify timeframe and CT scan for appt    Cb # [phone]

## 2024-01-29 ENCOUNTER — HOSPITAL ENCOUNTER (OUTPATIENT)
Age: 64
Discharge: HOME | End: 2024-02-01
Payer: MEDICAID

## 2024-01-29 DIAGNOSIS — C34.90 NSCLC METASTATIC TO ADRENAL GLAND (HCC): ICD-10-CM

## 2024-01-29 DIAGNOSIS — C79.70 NSCLC METASTATIC TO ADRENAL GLAND (HCC): ICD-10-CM

## 2024-01-29 PROCEDURE — 74177 CT ABD & PELVIS W/CONTRAST: CPT

## 2024-01-29 PROCEDURE — 6360000004 HC RX CONTRAST MEDICATION: Performed by: RADIOLOGY

## 2024-01-29 RX ADMIN — IOPAMIDOL 100 ML: 755 INJECTION, SOLUTION INTRAVENOUS at 09:13

## 2024-02-01 RX ORDER — ACETAMINOPHEN 325 MG/1
650 TABLET ORAL
OUTPATIENT
Start: 2024-02-08

## 2024-02-01 RX ORDER — DIPHENHYDRAMINE HYDROCHLORIDE 50 MG/ML
50 INJECTION INTRAMUSCULAR; INTRAVENOUS
OUTPATIENT
Start: 2024-02-08

## 2024-02-01 RX ORDER — ALBUTEROL SULFATE 90 UG/1
4 AEROSOL, METERED RESPIRATORY (INHALATION) PRN
OUTPATIENT
Start: 2024-02-08

## 2024-02-01 RX ORDER — MEPERIDINE HYDROCHLORIDE 25 MG/ML
12.5 INJECTION INTRAMUSCULAR; INTRAVENOUS; SUBCUTANEOUS PRN
OUTPATIENT
Start: 2024-02-08

## 2024-02-01 RX ORDER — EPINEPHRINE 1 MG/ML
0.3 INJECTION, SOLUTION INTRAMUSCULAR; SUBCUTANEOUS PRN
OUTPATIENT
Start: 2024-02-08

## 2024-02-01 RX ORDER — ONDANSETRON 2 MG/ML
8 INJECTION INTRAMUSCULAR; INTRAVENOUS
OUTPATIENT
Start: 2024-02-08

## 2024-02-01 RX ORDER — SODIUM CHLORIDE 9 MG/ML
INJECTION, SOLUTION INTRAVENOUS CONTINUOUS
OUTPATIENT
Start: 2024-02-08

## 2024-02-08 ENCOUNTER — HOSPITAL ENCOUNTER (OUTPATIENT)
Facility: HOSPITAL | Age: 64
Setting detail: INFUSION SERIES
Discharge: HOME | End: 2024-02-08
Payer: MEDICAID

## 2024-02-08 ENCOUNTER — OFFICE VISIT (OUTPATIENT)
Age: 64
End: 2024-02-08
Payer: MEDICAID

## 2024-02-08 VITALS
HEART RATE: 94 BPM | RESPIRATION RATE: 18 BRPM | SYSTOLIC BLOOD PRESSURE: 122 MMHG | DIASTOLIC BLOOD PRESSURE: 66 MMHG | OXYGEN SATURATION: 97 % | TEMPERATURE: 97.3 F | WEIGHT: 252 LBS | BODY MASS INDEX: 39.47 KG/M2

## 2024-02-08 VITALS
DIASTOLIC BLOOD PRESSURE: 80 MMHG | WEIGHT: 252.4 LBS | RESPIRATION RATE: 18 BRPM | BODY MASS INDEX: 39.62 KG/M2 | HEIGHT: 67 IN | HEART RATE: 92 BPM | SYSTOLIC BLOOD PRESSURE: 127 MMHG | TEMPERATURE: 97.3 F

## 2024-02-08 DIAGNOSIS — C79.70 NSCLC METASTATIC TO ADRENAL GLAND (HCC): Primary | ICD-10-CM

## 2024-02-08 DIAGNOSIS — C34.90 NSCLC METASTATIC TO ADRENAL GLAND (HCC): Primary | ICD-10-CM

## 2024-02-08 LAB
ALBUMIN SERPL-MCNC: 3.7 G/DL (ref 3.5–5)
ALBUMIN/GLOB SERPL: 1 (ref 1.1–2.2)
ALP SERPL-CCNC: 80 U/L (ref 45–117)
ALT SERPL-CCNC: 27 U/L (ref 12–78)
ANION GAP SERPL CALC-SCNC: 4 MMOL/L (ref 5–15)
AST SERPL-CCNC: 22 U/L (ref 15–37)
BASO+EOS+MONOS # BLD AUTO: 0.4 K/UL (ref 0.2–1.2)
BASO+EOS+MONOS NFR BLD AUTO: 6 % (ref 3.2–16.9)
BILIRUB SERPL-MCNC: 0.7 MG/DL (ref 0.2–1)
BUN SERPL-MCNC: 14 MG/DL (ref 6–20)
BUN/CREAT SERPL: 14 (ref 12–20)
CALCIUM SERPL-MCNC: 9.5 MG/DL (ref 8.5–10.1)
CHLORIDE SERPL-SCNC: 108 MMOL/L (ref 97–108)
CO2 SERPL-SCNC: 26 MMOL/L (ref 21–32)
CREAT SERPL-MCNC: 0.98 MG/DL (ref 0.55–1.02)
DIFFERENTIAL METHOD BLD: ABNORMAL
ERYTHROCYTE [DISTWIDTH] IN BLOOD BY AUTOMATED COUNT: 16.8 % (ref 11.8–15.8)
GLOBULIN SER CALC-MCNC: 3.8 G/DL (ref 2–4)
GLUCOSE SERPL-MCNC: 152 MG/DL (ref 65–100)
HCT VFR BLD AUTO: 36.9 % (ref 35–47)
HGB BLD-MCNC: 11.6 G/DL (ref 11.5–16)
LYMPHOCYTES # BLD: 0.6 K/UL (ref 0.8–3.5)
LYMPHOCYTES NFR BLD: 10 % (ref 12–49)
MCH RBC QN AUTO: 26.7 PG (ref 26–34)
MCHC RBC AUTO-ENTMCNC: 31.4 G/DL (ref 30–36.5)
MCV RBC AUTO: 85 FL (ref 80–99)
NEUTS SEG # BLD: 5 K/UL (ref 1.8–8)
NEUTS SEG NFR BLD: 84 % (ref 32–75)
PLATELET # BLD AUTO: 378 K/UL (ref 150–400)
POTASSIUM SERPL-SCNC: 3.9 MMOL/L (ref 3.5–5.1)
PROT SERPL-MCNC: 7.5 G/DL (ref 6.4–8.2)
RBC # BLD AUTO: 4.34 M/UL (ref 3.8–5.2)
SODIUM SERPL-SCNC: 138 MMOL/L (ref 136–145)
TSH SERPL DL<=0.05 MIU/L-ACNC: 0.39 UIU/ML (ref 0.36–3.74)
WBC # BLD AUTO: 6 K/UL (ref 3.6–11)

## 2024-02-08 PROCEDURE — 84443 ASSAY THYROID STIM HORMONE: CPT

## 2024-02-08 PROCEDURE — 96411 CHEMO IV PUSH ADDL DRUG: CPT

## 2024-02-08 PROCEDURE — 6360000002 HC RX W HCPCS: Performed by: INTERNAL MEDICINE

## 2024-02-08 PROCEDURE — 2580000003 HC RX 258: Performed by: INTERNAL MEDICINE

## 2024-02-08 PROCEDURE — 99215 OFFICE O/P EST HI 40 MIN: CPT | Performed by: INTERNAL MEDICINE

## 2024-02-08 PROCEDURE — 80053 COMPREHEN METABOLIC PANEL: CPT

## 2024-02-08 PROCEDURE — 96413 CHEMO IV INFUSION 1 HR: CPT

## 2024-02-08 PROCEDURE — 36415 COLL VENOUS BLD VENIPUNCTURE: CPT

## 2024-02-08 PROCEDURE — 85025 COMPLETE CBC W/AUTO DIFF WBC: CPT

## 2024-02-08 PROCEDURE — 96375 TX/PRO/DX INJ NEW DRUG ADDON: CPT

## 2024-02-08 RX ORDER — SODIUM CHLORIDE 0.9 % (FLUSH) 0.9 %
5-40 SYRINGE (ML) INJECTION PRN
Status: DISCONTINUED | OUTPATIENT
Start: 2024-02-08 | End: 2024-02-09 | Stop reason: HOSPADM

## 2024-02-08 RX ORDER — DEXAMETHASONE SODIUM PHOSPHATE 4 MG/ML
8 INJECTION, SOLUTION INTRA-ARTICULAR; INTRALESIONAL; INTRAMUSCULAR; INTRAVENOUS; SOFT TISSUE ONCE
Status: COMPLETED | OUTPATIENT
Start: 2024-02-08 | End: 2024-02-08

## 2024-02-08 RX ORDER — LORAZEPAM 2 MG/ML
0.5 INJECTION INTRAMUSCULAR
Status: DISCONTINUED | OUTPATIENT
Start: 2024-02-08 | End: 2024-02-09 | Stop reason: HOSPADM

## 2024-02-08 RX ORDER — DIPHENHYDRAMINE HYDROCHLORIDE 50 MG/ML
50 INJECTION INTRAMUSCULAR; INTRAVENOUS
Status: DISCONTINUED | OUTPATIENT
Start: 2024-02-08 | End: 2024-02-09 | Stop reason: HOSPADM

## 2024-02-08 RX ORDER — PROCHLORPERAZINE EDISYLATE 5 MG/ML
10 INJECTION INTRAMUSCULAR; INTRAVENOUS
Status: DISCONTINUED | OUTPATIENT
Start: 2024-02-08 | End: 2024-02-09 | Stop reason: HOSPADM

## 2024-02-08 RX ORDER — SODIUM CHLORIDE 9 MG/ML
5-250 INJECTION, SOLUTION INTRAVENOUS PRN
Status: DISCONTINUED | OUTPATIENT
Start: 2024-02-08 | End: 2024-02-09 | Stop reason: HOSPADM

## 2024-02-08 RX ORDER — ACETAMINOPHEN 325 MG/1
650 TABLET ORAL
Status: DISCONTINUED | OUTPATIENT
Start: 2024-02-08 | End: 2024-02-09 | Stop reason: HOSPADM

## 2024-02-08 RX ORDER — HEPARIN 100 UNIT/ML
500 SYRINGE INTRAVENOUS PRN
Status: DISCONTINUED | OUTPATIENT
Start: 2024-02-08 | End: 2024-02-09 | Stop reason: HOSPADM

## 2024-02-08 RX ORDER — CYANOCOBALAMIN 1000 UG/ML
1000 INJECTION, SOLUTION INTRAMUSCULAR; SUBCUTANEOUS
Status: DISCONTINUED | OUTPATIENT
Start: 2024-02-08 | End: 2024-02-08

## 2024-02-08 RX ADMIN — PEMETREXED DISODIUM 880 MG: 500 INJECTION, POWDER, LYOPHILIZED, FOR SOLUTION INTRAVENOUS at 15:13

## 2024-02-08 RX ADMIN — SODIUM CHLORIDE 200 MG: 9 INJECTION, SOLUTION INTRAVENOUS at 14:35

## 2024-02-08 RX ADMIN — DEXAMETHASONE SODIUM PHOSPHATE 8 MG: 4 INJECTION, SOLUTION INTRAMUSCULAR; INTRAVENOUS at 13:51

## 2024-02-08 RX ADMIN — SODIUM CHLORIDE 50 ML/HR: 9 INJECTION, SOLUTION INTRAVENOUS at 13:29

## 2024-02-08 ASSESSMENT — PAIN SCALES - GENERAL: PAINLEVEL_OUTOF10: 0

## 2024-02-08 NOTE — PROGRESS NOTES
[facility] [place] [facility] Progress Note    Ms. Michaud Arrived ambulatory and in no distress for cycle 29 day 1 of Keytruda/ Alimta  regimen.  Assessment was completed, no acute issues at this time, no new complaints voiced.  Port  accessed without difficulty, labs drawn and processed.         No data to display                  Ms. Michaud's vitals were reviewed.  Patient Vitals for the past 12 hrs:   Temp Pulse Resp BP   02/08/24 1123 97.3 °F (36.3 °C) 94 18 122/66         Lab results were obtained and reviewed.  Recent Results (from the past 12 hour(s))   TSH without Reflex    Collection Time: 02/08/24 11:42 AM   Result Value Ref Range    TSH, 3RD GENERATION 0.39 0.36 - 3.74 uIU/mL   Comprehensive metabolic panel    Collection Time: 02/08/24 11:42 AM   Result Value Ref Range    Sodium 138 136 - 145 mmol/L    Potassium 3.9 3.5 - 5.1 mmol/L    Chloride 108 97 - 108 mmol/L    CO2 26 21 - 32 mmol/L    Anion Gap 4 (L) 5 - 15 mmol/L    Glucose 152 (H) 65 - 100 mg/dL    BUN 14 6 - 20 MG/DL    Creatinine 0.98 0.55 - 1.02 MG/DL    Bun/Cre Ratio 14 12 - 20      Est, Glom Filt Rate >60 >60 ml/min/1.73m2    Calcium 9.5 8.5 - 10.1 MG/DL    Total Bilirubin 0.7 0.2 - 1.0 MG/DL    ALT 27 12 - 78 U/L    AST 22 15 - 37 U/L    Alk Phosphatase 80 45 - 117 U/L    Total Protein 7.5 6.4 - 8.2 g/dL    Albumin 3.7 3.5 - 5.0 g/dL    Globulin 3.8 2.0 - 4.0 g/dL    Albumin/Globulin Ratio 1.0 (L) 1.1 - 2.2     CBC with Partial Differential    Collection Time: 02/08/24 11:43 AM   Result Value Ref Range    WBC 6.0 3.6 - 11.0 K/uL    RBC 4.34 3.80 - 5.20 M/uL    Hemoglobin 11.6 11.5 - 16.0 g/dL    Hematocrit 36.9 35.0 - 47.0 %    MCV 85.0 80.0 - 99.0 FL    MCH 26.7 26.0 - 34.0 PG    MCHC 31.4 30.0 - 36.5 g/dL    RDW 16.8 (H) 11.8 - 15.8 %    Platelets 378 150 - 400 K/uL    Neutrophils % 84 (H) 32 - 75 %    Mixed Cells 6 3.2 - 16.9 %    Lymphocytes % 10 (L) 12 - 49 %    Neutrophils Absolute 5.0 1.8 - 8.0 K/UL    ABSOLUTE MIXED CELLS 0.4 0.2 - 1.2 K/uL

## 2024-02-08 NOTE — PROGRESS NOTES
Stephie Michaud is a 63 y.o. female    Chief Complaint   Patient presents with    Follow-up      stage IV NSCLC- PDL1- 15%, no  mutations        1. Have you been to the ER, urgent care clinic since your last visit?  Hospitalized since your last visit?No    2. Have you seen or consulted any other health care providers outside of the [facility] since your last visit?  Include any pap smears or colon screening. Yes, GI      
Position: Sitting)     Pulse  70     Temp  97.7 °F (36.5 °C)     Resp  18     Wt  254 lb (115.2 kg)     SpO2  98%        BMI              ECOG PS: 1   General: No distress   Eyes:  PERRL, anicteric sclerae   HENT: Atraumatic   Neck: Supple   Resp:  normal respiratory effort    CV: No edema              Results:          Lab Results   Component Value Date    WBC 6.0 02/08/2024    HGB 11.6 02/08/2024    HCT 36.9 02/08/2024    MCV 85.0 02/08/2024     02/08/2024    LYMPHOPCT 10 (L) 02/08/2024    RBC 4.34 02/08/2024    MCH 26.7 02/08/2024    MCHC 31.4 02/08/2024    RDW 16.8 (H) 02/08/2024          Lab Results   Component Value Date     01/18/2024    K 4.3 01/18/2024     01/18/2024    CO2 22 01/18/2024    BUN 11 01/18/2024    CREATININE 0.97 01/18/2024    GLUCOSE 236 (H) 01/18/2024    CALCIUM 9.0 01/18/2024    PROT 7.1 01/18/2024    LABALBU 3.6 01/18/2024    BILITOT 0.7 01/18/2024    ALKPHOS 96 01/18/2024    AST 25 01/18/2024    ALT 31 01/18/2024    LABGLOM >60 01/18/2024    GFRAA >60 09/22/2022    AGRATIO 1.0 (L) 01/18/2024    GLOB 3.5 01/18/2024         Lab Results   Component Value Date/Time     05/04/2022 10:06 AM    TSH 0.38 05/04/2023 01:15 PM       No results found for: \"INR\", \"APTT\", \"DDIME\", \"439561\", \"F8LT\", \"F8RALT\", \"F8B1LT\", \"VONWLT\", \"817611\", \"ATHRLT\", \"PROSLT\", \"PROSLF\", \"PRSFLT\", \"PRTCLT\", \"PRCFLT\", \"410651\", \"426751\", \"033536\", \"212285\", \"076307\"  Lab Results   Component Value Date/Time    WBC 6.0 02/08/2024 11:43 AM    HGB 11.6 02/08/2024 11:43 AM    HCT 36.9 02/08/2024 11:43 AM     02/08/2024 11:43 AM    MCV 85.0 02/08/2024 11:43 AM     Lab Results   Component Value Date/Time     01/18/2024 11:14 AM    K 4.3 01/18/2024 11:14 AM     01/18/2024 11:14 AM    CO2 22 01/18/2024 11:14 AM    BUN 11 01/18/2024 11:14 AM    GFRAA >60 09/22/2022 10:09 AM     Lab Results   Component Value Date/Time    ALT 31 01/18/2024 11:14 AM    AST 25 01/18/2024 11:14 AM    GLOB 3.5

## 2024-02-15 ENCOUNTER — TELEPHONE (OUTPATIENT)
Age: 64
End: 2024-02-15

## 2024-02-15 NOTE — TELEPHONE ENCOUNTER
6127  Call placed to pt HIPAA verified x2  Received message that pts guardant sample was not able to be ran. Confirmed with pt that she is okay coming in next week for a repeat lab draw, pt confirmed.  I will contact pt with appt date/time.

## 2024-02-16 ENCOUNTER — TELEPHONE (OUTPATIENT)
Facility: HOSPITAL | Age: 64
End: 2024-02-16

## 2024-02-19 ENCOUNTER — HOSPITAL ENCOUNTER (OUTPATIENT)
Facility: HOSPITAL | Age: 64
Setting detail: INFUSION SERIES
Discharge: HOME | End: 2024-02-19

## 2024-02-19 DIAGNOSIS — C79.70 NSCLC METASTATIC TO ADRENAL GLAND (HCC): Primary | ICD-10-CM

## 2024-02-19 DIAGNOSIS — C34.90 NSCLC METASTATIC TO ADRENAL GLAND (HCC): Primary | ICD-10-CM

## 2024-02-19 DIAGNOSIS — R91.8 LUNG MASS: ICD-10-CM

## 2024-02-19 NOTE — PROGRESS NOTES
[facility] Progress Note    Date: 2024    Name: Stephie Michaud    MRN: [id number]         : 1960      Pt arrived ambulatory and in no distress, for lab visit.  Labs drawn via right AC for guardant testing, patient tolerated well.        Please check Epic for Results.     Departed [facility] ambulatory and in no distress.    Future Appointments   Date Time Provider Department Center   2024 10:30 AM A1 [facility]   3/21/2024 10:00 AM C1 [facility]   3/21/2024 10:15 AM Marlen Leo MD [facility]   2024 10:30 AM C1 [facility]   2024 10:30 AM A1 [facility]   2024 10:30 AM A1 [facility]   2024 10:30 AM A1 [facility]       Lety Ritchie RN  2024

## 2024-02-21 RX ORDER — DIPHENHYDRAMINE HYDROCHLORIDE 50 MG/ML
50 INJECTION INTRAMUSCULAR; INTRAVENOUS
Status: CANCELLED | OUTPATIENT
Start: 2024-02-29

## 2024-02-21 RX ORDER — ALBUTEROL SULFATE 90 UG/1
4 AEROSOL, METERED RESPIRATORY (INHALATION) PRN
Status: CANCELLED | OUTPATIENT
Start: 2024-02-29

## 2024-02-21 RX ORDER — MEPERIDINE HYDROCHLORIDE 25 MG/ML
12.5 INJECTION INTRAMUSCULAR; INTRAVENOUS; SUBCUTANEOUS PRN
Status: CANCELLED | OUTPATIENT
Start: 2024-02-29

## 2024-02-21 RX ORDER — ACETAMINOPHEN 325 MG/1
650 TABLET ORAL
Status: CANCELLED | OUTPATIENT
Start: 2024-02-29

## 2024-02-21 RX ORDER — SODIUM CHLORIDE 9 MG/ML
INJECTION, SOLUTION INTRAVENOUS CONTINUOUS
Status: CANCELLED | OUTPATIENT
Start: 2024-02-29

## 2024-02-21 RX ORDER — EPINEPHRINE 1 MG/ML
0.3 INJECTION, SOLUTION INTRAMUSCULAR; SUBCUTANEOUS PRN
Status: CANCELLED | OUTPATIENT
Start: 2024-02-29

## 2024-02-21 RX ORDER — ONDANSETRON 2 MG/ML
8 INJECTION INTRAMUSCULAR; INTRAVENOUS
Status: CANCELLED | OUTPATIENT
Start: 2024-02-29

## 2024-02-21 RX ORDER — PROCHLORPERAZINE EDISYLATE 5 MG/ML
10 INJECTION INTRAMUSCULAR; INTRAVENOUS
Status: CANCELLED | OUTPATIENT
Start: 2024-02-29

## 2024-02-21 RX ORDER — LORAZEPAM 2 MG/ML
0.5 INJECTION INTRAMUSCULAR
Status: CANCELLED | OUTPATIENT
Start: 2024-02-29

## 2024-02-29 ENCOUNTER — HOSPITAL ENCOUNTER (OUTPATIENT)
Facility: HOSPITAL | Age: 64
Setting detail: INFUSION SERIES
Discharge: HOME | End: 2024-02-29
Payer: MEDICAID

## 2024-02-29 VITALS
TEMPERATURE: 97.4 F | DIASTOLIC BLOOD PRESSURE: 77 MMHG | BODY MASS INDEX: 40.43 KG/M2 | HEIGHT: 67 IN | HEART RATE: 97 BPM | SYSTOLIC BLOOD PRESSURE: 135 MMHG | WEIGHT: 257.6 LBS

## 2024-02-29 DIAGNOSIS — C34.90 NSCLC METASTATIC TO ADRENAL GLAND (HCC): Primary | ICD-10-CM

## 2024-02-29 DIAGNOSIS — C79.70 NSCLC METASTATIC TO ADRENAL GLAND (HCC): Primary | ICD-10-CM

## 2024-02-29 LAB
ALBUMIN SERPL-MCNC: 3.4 G/DL (ref 3.5–5)
ALBUMIN/GLOB SERPL: 1 (ref 1.1–2.2)
ALP SERPL-CCNC: 100 U/L (ref 45–117)
ALT SERPL-CCNC: 27 U/L (ref 12–78)
ANION GAP SERPL CALC-SCNC: 5 MMOL/L (ref 5–15)
AST SERPL-CCNC: 18 U/L (ref 15–37)
BASOPHILS # BLD: 0 K/UL (ref 0–0.1)
BASOPHILS NFR BLD: 0 % (ref 0–1)
BILIRUB SERPL-MCNC: 0.8 MG/DL (ref 0.2–1)
BUN SERPL-MCNC: 9 MG/DL (ref 6–20)
BUN/CREAT SERPL: 9 (ref 12–20)
CALCIUM SERPL-MCNC: 9.4 MG/DL (ref 8.5–10.1)
CHLORIDE SERPL-SCNC: 108 MMOL/L (ref 97–108)
CO2 SERPL-SCNC: 24 MMOL/L (ref 21–32)
CREAT SERPL-MCNC: 0.97 MG/DL (ref 0.55–1.02)
DIFFERENTIAL METHOD BLD: ABNORMAL
EOSINOPHIL # BLD: 0 K/UL (ref 0–0.4)
EOSINOPHIL NFR BLD: 0 % (ref 0–7)
ERYTHROCYTE [DISTWIDTH] IN BLOOD BY AUTOMATED COUNT: 16.6 % (ref 11.5–14.5)
GLOBULIN SER CALC-MCNC: 3.5 G/DL (ref 2–4)
GLUCOSE SERPL-MCNC: 247 MG/DL (ref 65–100)
HCT VFR BLD AUTO: 34.5 % (ref 35–47)
HGB BLD-MCNC: 11.5 G/DL (ref 11.5–16)
IMM GRANULOCYTES # BLD AUTO: 0.1 K/UL (ref 0–0.04)
IMM GRANULOCYTES NFR BLD AUTO: 2 % (ref 0–0.5)
LYMPHOCYTES # BLD: 0.5 K/UL (ref 0.8–3.5)
LYMPHOCYTES NFR BLD: 10 % (ref 12–49)
MCH RBC QN AUTO: 28.1 PG (ref 26–34)
MCHC RBC AUTO-ENTMCNC: 33.3 G/DL (ref 30–36.5)
MCV RBC AUTO: 84.4 FL (ref 80–99)
MONOCYTES # BLD: 0.2 K/UL (ref 0–1)
MONOCYTES NFR BLD: 3 % (ref 5–13)
NEUTS SEG # BLD: 4.5 K/UL (ref 1.8–8)
NEUTS SEG NFR BLD: 85 % (ref 32–75)
NRBC # BLD: 0 K/UL (ref 0–0.01)
NRBC BLD-RTO: 0 PER 100 WBC
PLATELET # BLD AUTO: 344 K/UL (ref 150–400)
PMV BLD AUTO: 9.9 FL (ref 8.9–12.9)
POTASSIUM SERPL-SCNC: 4 MMOL/L (ref 3.5–5.1)
PROT SERPL-MCNC: 6.9 G/DL (ref 6.4–8.2)
RBC # BLD AUTO: 4.09 M/UL (ref 3.8–5.2)
RBC MORPH BLD: ABNORMAL
SODIUM SERPL-SCNC: 137 MMOL/L (ref 136–145)
TSH SERPL DL<=0.05 MIU/L-ACNC: 0.33 UIU/ML (ref 0.36–3.74)
WBC # BLD AUTO: 5.3 K/UL (ref 3.6–11)

## 2024-02-29 PROCEDURE — 96375 TX/PRO/DX INJ NEW DRUG ADDON: CPT

## 2024-02-29 PROCEDURE — 36415 COLL VENOUS BLD VENIPUNCTURE: CPT

## 2024-02-29 PROCEDURE — 96411 CHEMO IV PUSH ADDL DRUG: CPT

## 2024-02-29 PROCEDURE — 6360000002 HC RX W HCPCS

## 2024-02-29 PROCEDURE — 84443 ASSAY THYROID STIM HORMONE: CPT

## 2024-02-29 PROCEDURE — 96413 CHEMO IV INFUSION 1 HR: CPT

## 2024-02-29 PROCEDURE — 80053 COMPREHEN METABOLIC PANEL: CPT

## 2024-02-29 PROCEDURE — 85025 COMPLETE CBC W/AUTO DIFF WBC: CPT

## 2024-02-29 PROCEDURE — 2580000003 HC RX 258

## 2024-02-29 RX ORDER — CYANOCOBALAMIN 1000 UG/ML
1000 INJECTION, SOLUTION INTRAMUSCULAR; SUBCUTANEOUS
Status: DISCONTINUED | OUTPATIENT
Start: 2024-02-29 | End: 2024-02-29

## 2024-02-29 RX ORDER — SODIUM CHLORIDE 9 MG/ML
5-250 INJECTION, SOLUTION INTRAVENOUS PRN
Status: DISCONTINUED | OUTPATIENT
Start: 2024-02-29 | End: 2024-03-01 | Stop reason: HOSPADM

## 2024-02-29 RX ORDER — DEXAMETHASONE SODIUM PHOSPHATE 4 MG/ML
8 INJECTION, SOLUTION INTRA-ARTICULAR; INTRALESIONAL; INTRAMUSCULAR; INTRAVENOUS; SOFT TISSUE ONCE
Status: COMPLETED | OUTPATIENT
Start: 2024-02-29 | End: 2024-02-29

## 2024-02-29 RX ORDER — HEPARIN 100 UNIT/ML
500 SYRINGE INTRAVENOUS PRN
Status: DISCONTINUED | OUTPATIENT
Start: 2024-02-29 | End: 2024-03-01 | Stop reason: HOSPADM

## 2024-02-29 RX ORDER — SODIUM CHLORIDE 0.9 % (FLUSH) 0.9 %
5-40 SYRINGE (ML) INJECTION PRN
Status: DISCONTINUED | OUTPATIENT
Start: 2024-02-29 | End: 2024-03-01 | Stop reason: HOSPADM

## 2024-02-29 RX ADMIN — SODIUM CHLORIDE, PRESERVATIVE FREE 20 ML: 5 INJECTION INTRAVENOUS at 11:03

## 2024-02-29 RX ADMIN — PEMETREXED DISODIUM 880 MG: 500 INJECTION, POWDER, LYOPHILIZED, FOR SOLUTION INTRAVENOUS at 14:27

## 2024-02-29 RX ADMIN — SODIUM CHLORIDE, PRESERVATIVE FREE 20 ML: 5 INJECTION INTRAVENOUS at 14:43

## 2024-02-29 RX ADMIN — SODIUM CHLORIDE 50 ML/HR: 9 INJECTION, SOLUTION INTRAVENOUS at 13:16

## 2024-02-29 RX ADMIN — DEXAMETHASONE SODIUM PHOSPHATE 8 MG: 4 INJECTION INTRA-ARTICULAR; INTRALESIONAL; INTRAMUSCULAR; INTRAVENOUS; SOFT TISSUE at 14:10

## 2024-02-29 RX ADMIN — SODIUM CHLORIDE 200 MG: 9 INJECTION, SOLUTION INTRAVENOUS at 13:36

## 2024-02-29 ASSESSMENT — PAIN SCALES - GENERAL: PAINLEVEL_OUTOF10: 0

## 2024-03-14 DIAGNOSIS — C79.70 NSCLC METASTATIC TO ADRENAL GLAND (HCC): Primary | ICD-10-CM

## 2024-03-14 DIAGNOSIS — C34.90 NSCLC METASTATIC TO ADRENAL GLAND (HCC): Primary | ICD-10-CM

## 2024-03-14 RX ORDER — ALBUTEROL SULFATE 90 UG/1
4 AEROSOL, METERED RESPIRATORY (INHALATION) PRN
OUTPATIENT
Start: 2024-03-21

## 2024-03-14 RX ORDER — EPINEPHRINE 1 MG/ML
0.3 INJECTION, SOLUTION, CONCENTRATE INTRAVENOUS PRN
OUTPATIENT
Start: 2024-03-21

## 2024-03-14 RX ORDER — DIPHENHYDRAMINE HYDROCHLORIDE 50 MG/ML
50 INJECTION INTRAMUSCULAR; INTRAVENOUS
OUTPATIENT
Start: 2024-03-21

## 2024-03-14 RX ORDER — ACETAMINOPHEN 325 MG/1
650 TABLET ORAL
OUTPATIENT
Start: 2024-03-21

## 2024-03-14 RX ORDER — HEPARIN 100 UNIT/ML
500 SYRINGE INTRAVENOUS PRN
OUTPATIENT
Start: 2024-03-21

## 2024-03-14 RX ORDER — SODIUM CHLORIDE 9 MG/ML
5-250 INJECTION, SOLUTION INTRAVENOUS PRN
OUTPATIENT
Start: 2024-03-21

## 2024-03-14 RX ORDER — MEPERIDINE HYDROCHLORIDE 25 MG/ML
12.5 INJECTION INTRAMUSCULAR; INTRAVENOUS; SUBCUTANEOUS PRN
OUTPATIENT
Start: 2024-03-21

## 2024-03-14 RX ORDER — SODIUM CHLORIDE 9 MG/ML
INJECTION, SOLUTION INTRAVENOUS CONTINUOUS
OUTPATIENT
Start: 2024-03-21

## 2024-03-14 RX ORDER — CYANOCOBALAMIN 1000 UG/ML
1000 INJECTION, SOLUTION INTRAMUSCULAR; SUBCUTANEOUS
OUTPATIENT
Start: 2024-03-21

## 2024-03-14 RX ORDER — PROCHLORPERAZINE EDISYLATE 5 MG/ML
10 INJECTION INTRAMUSCULAR; INTRAVENOUS
OUTPATIENT
Start: 2024-03-21

## 2024-03-14 RX ORDER — ONDANSETRON 2 MG/ML
8 INJECTION INTRAMUSCULAR; INTRAVENOUS
OUTPATIENT
Start: 2024-03-21

## 2024-03-14 RX ORDER — DEXAMETHASONE SODIUM PHOSPHATE 4 MG/ML
8 INJECTION, SOLUTION INTRA-ARTICULAR; INTRALESIONAL; INTRAMUSCULAR; INTRAVENOUS; SOFT TISSUE ONCE
OUTPATIENT
Start: 2024-03-21 | End: 2024-03-21

## 2024-03-14 RX ORDER — LORAZEPAM 2 MG/ML
0.5 INJECTION INTRAMUSCULAR
OUTPATIENT
Start: 2024-03-21

## 2024-03-14 RX ORDER — SODIUM CHLORIDE 0.9 % (FLUSH) 0.9 %
5-40 SYRINGE (ML) INJECTION PRN
OUTPATIENT
Start: 2024-03-21

## 2024-03-21 ENCOUNTER — OFFICE VISIT (OUTPATIENT)
Age: 64
End: 2024-03-21
Payer: MEDICAID

## 2024-03-21 ENCOUNTER — HOSPITAL ENCOUNTER (OUTPATIENT)
Facility: HOSPITAL | Age: 64
Setting detail: INFUSION SERIES
Discharge: HOME | End: 2024-03-21
Payer: MEDICAID

## 2024-03-21 VITALS
BODY MASS INDEX: 38.95 KG/M2 | WEIGHT: 257 LBS | HEIGHT: 68 IN | DIASTOLIC BLOOD PRESSURE: 79 MMHG | TEMPERATURE: 98.5 F | SYSTOLIC BLOOD PRESSURE: 121 MMHG | HEART RATE: 101 BPM

## 2024-03-21 VITALS
DIASTOLIC BLOOD PRESSURE: 76 MMHG | BODY MASS INDEX: 39.66 KG/M2 | TEMPERATURE: 98.1 F | HEART RATE: 99 BPM | RESPIRATION RATE: 18 BRPM | OXYGEN SATURATION: 98 % | WEIGHT: 257 LBS | SYSTOLIC BLOOD PRESSURE: 115 MMHG

## 2024-03-21 DIAGNOSIS — C34.90 NSCLC METASTATIC TO ADRENAL GLAND (HCC): Primary | ICD-10-CM

## 2024-03-21 DIAGNOSIS — C79.70 NSCLC METASTATIC TO ADRENAL GLAND (HCC): Primary | ICD-10-CM

## 2024-03-21 LAB
ALBUMIN SERPL-MCNC: 3.6 G/DL (ref 3.5–5)
ALBUMIN/GLOB SERPL: 1 (ref 1.1–2.2)
ALP SERPL-CCNC: 104 U/L (ref 45–117)
ALT SERPL-CCNC: 31 U/L (ref 12–78)
ANION GAP SERPL CALC-SCNC: 8 MMOL/L (ref 5–15)
AST SERPL-CCNC: 20 U/L (ref 15–37)
BASOPHILS # BLD: 0 K/UL (ref 0–0.1)
BASOPHILS NFR BLD: 0 % (ref 0–1)
BILIRUB SERPL-MCNC: 0.7 MG/DL (ref 0.2–1)
BUN SERPL-MCNC: 11 MG/DL (ref 6–20)
BUN/CREAT SERPL: 11 (ref 12–20)
CALCIUM SERPL-MCNC: 9.3 MG/DL (ref 8.5–10.1)
CHLORIDE SERPL-SCNC: 108 MMOL/L (ref 97–108)
CO2 SERPL-SCNC: 23 MMOL/L (ref 21–32)
CREAT SERPL-MCNC: 1.03 MG/DL (ref 0.55–1.02)
DIFFERENTIAL METHOD BLD: ABNORMAL
EOSINOPHIL # BLD: 0 K/UL (ref 0–0.4)
EOSINOPHIL NFR BLD: 0 % (ref 0–7)
ERYTHROCYTE [DISTWIDTH] IN BLOOD BY AUTOMATED COUNT: 15.9 % (ref 11.5–14.5)
GLOBULIN SER CALC-MCNC: 3.7 G/DL (ref 2–4)
GLUCOSE SERPL-MCNC: 244 MG/DL (ref 65–100)
HCT VFR BLD AUTO: 36.5 % (ref 35–47)
HGB BLD-MCNC: 11.6 G/DL (ref 11.5–16)
IMM GRANULOCYTES # BLD AUTO: 0.1 K/UL (ref 0–0.04)
IMM GRANULOCYTES NFR BLD AUTO: 1 % (ref 0–0.5)
LYMPHOCYTES # BLD: 0.7 K/UL (ref 0.8–3.5)
LYMPHOCYTES NFR BLD: 13 % (ref 12–49)
MCH RBC QN AUTO: 27.8 PG (ref 26–34)
MCHC RBC AUTO-ENTMCNC: 31.8 G/DL (ref 30–36.5)
MCV RBC AUTO: 87.5 FL (ref 80–99)
MONOCYTES # BLD: 0.2 K/UL (ref 0–1)
MONOCYTES NFR BLD: 4 % (ref 5–13)
NEUTS SEG # BLD: 4 K/UL (ref 1.8–8)
NEUTS SEG NFR BLD: 82 % (ref 32–75)
NRBC # BLD: 0 K/UL (ref 0–0.01)
NRBC BLD-RTO: 0 PER 100 WBC
PLATELET # BLD AUTO: 382 K/UL (ref 150–400)
PMV BLD AUTO: 9.9 FL (ref 8.9–12.9)
POTASSIUM SERPL-SCNC: 4 MMOL/L (ref 3.5–5.1)
PROT SERPL-MCNC: 7.3 G/DL (ref 6.4–8.2)
RBC # BLD AUTO: 4.17 M/UL (ref 3.8–5.2)
RBC MORPH BLD: ABNORMAL
SODIUM SERPL-SCNC: 139 MMOL/L (ref 136–145)
TSH SERPL DL<=0.05 MIU/L-ACNC: 0.41 UIU/ML (ref 0.36–3.74)
WBC # BLD AUTO: 5 K/UL (ref 3.6–11)

## 2024-03-21 PROCEDURE — 80053 COMPREHEN METABOLIC PANEL: CPT

## 2024-03-21 PROCEDURE — 96411 CHEMO IV PUSH ADDL DRUG: CPT

## 2024-03-21 PROCEDURE — 96372 THER/PROPH/DIAG INJ SC/IM: CPT

## 2024-03-21 PROCEDURE — 84443 ASSAY THYROID STIM HORMONE: CPT

## 2024-03-21 PROCEDURE — 99214 OFFICE O/P EST MOD 30 MIN: CPT | Performed by: INTERNAL MEDICINE

## 2024-03-21 PROCEDURE — 2580000003 HC RX 258

## 2024-03-21 PROCEDURE — 85025 COMPLETE CBC W/AUTO DIFF WBC: CPT

## 2024-03-21 PROCEDURE — 96375 TX/PRO/DX INJ NEW DRUG ADDON: CPT

## 2024-03-21 PROCEDURE — 96413 CHEMO IV INFUSION 1 HR: CPT

## 2024-03-21 PROCEDURE — 6360000002 HC RX W HCPCS

## 2024-03-21 PROCEDURE — 96409 CHEMO IV PUSH SNGL DRUG: CPT

## 2024-03-21 PROCEDURE — 36415 COLL VENOUS BLD VENIPUNCTURE: CPT

## 2024-03-21 RX ORDER — SODIUM CHLORIDE 9 MG/ML
5-250 INJECTION, SOLUTION INTRAVENOUS PRN
Status: DISCONTINUED | OUTPATIENT
Start: 2024-03-21 | End: 2024-03-22 | Stop reason: HOSPADM

## 2024-03-21 RX ORDER — LORAZEPAM 2 MG/ML
0.5 INJECTION INTRAMUSCULAR
Status: DISCONTINUED | OUTPATIENT
Start: 2024-03-21 | End: 2024-03-22 | Stop reason: HOSPADM

## 2024-03-21 RX ORDER — HEPARIN 100 UNIT/ML
500 SYRINGE INTRAVENOUS PRN
Status: DISCONTINUED | OUTPATIENT
Start: 2024-03-21 | End: 2024-03-22 | Stop reason: HOSPADM

## 2024-03-21 RX ORDER — CYANOCOBALAMIN 1000 UG/ML
1000 INJECTION, SOLUTION INTRAMUSCULAR; SUBCUTANEOUS
Status: COMPLETED | OUTPATIENT
Start: 2024-03-21 | End: 2024-03-21

## 2024-03-21 RX ORDER — PROCHLORPERAZINE EDISYLATE 5 MG/ML
10 INJECTION INTRAMUSCULAR; INTRAVENOUS
Status: DISCONTINUED | OUTPATIENT
Start: 2024-03-21 | End: 2024-03-22 | Stop reason: HOSPADM

## 2024-03-21 RX ORDER — SODIUM CHLORIDE 0.9 % (FLUSH) 0.9 %
5-40 SYRINGE (ML) INJECTION PRN
Status: DISCONTINUED | OUTPATIENT
Start: 2024-03-21 | End: 2024-03-22 | Stop reason: HOSPADM

## 2024-03-21 RX ORDER — EXENATIDE 250 UG/ML
INJECTION SUBCUTANEOUS
COMMUNITY
Start: 2024-03-19

## 2024-03-21 RX ORDER — DEXAMETHASONE SODIUM PHOSPHATE 4 MG/ML
8 INJECTION, SOLUTION INTRA-ARTICULAR; INTRALESIONAL; INTRAMUSCULAR; INTRAVENOUS; SOFT TISSUE ONCE
Status: COMPLETED | OUTPATIENT
Start: 2024-03-21 | End: 2024-03-21

## 2024-03-21 RX ADMIN — SODIUM CHLORIDE 200 MG: 9 INJECTION, SOLUTION INTRAVENOUS at 14:22

## 2024-03-21 RX ADMIN — PEMETREXED DISODIUM 880 MG: 500 INJECTION, POWDER, LYOPHILIZED, FOR SOLUTION INTRAVENOUS at 14:54

## 2024-03-21 RX ADMIN — DEXAMETHASONE SODIUM PHOSPHATE 8 MG: 4 INJECTION INTRA-ARTICULAR; INTRALESIONAL; INTRAMUSCULAR; INTRAVENOUS; SOFT TISSUE at 13:33

## 2024-03-21 RX ADMIN — CYANOCOBALAMIN 1000 MCG: 1000 INJECTION, SOLUTION INTRAMUSCULAR; SUBCUTANEOUS at 13:53

## 2024-03-21 ASSESSMENT — PAIN SCALES - GENERAL: PAINLEVEL_OUTOF10: 0

## 2024-03-21 NOTE — PROGRESS NOTES
[facility] Short Note                       Date: 2024    Name: Stephei Michaud    MRN: [id number]         : 1960      1000 Pt admit to [facility] for Keytruda/Alimta C31 ambulatory in stable condition. Assessment completed. No new concerns voiced.      Ms. Nguyen vitals were reviewed prior to and after treatment.   Patient Vitals for the past 12 hrs:   Temp Pulse BP   24 1530 -- -- 121/79   24 1004 98.5 °F (36.9 °C) (!) 101 (!) 144/78         Lab results were obtained and reviewed.  Recent Results (from the past 12 hour(s))   CBC with Auto Differential    Collection Time: 24 10:06 AM   Result Value Ref Range    WBC 5.0 3.6 - 11.0 K/uL    RBC 4.17 3.80 - 5.20 M/uL    Hemoglobin 11.6 11.5 - 16.0 g/dL    Hematocrit 36.5 35.0 - 47.0 %    MCV 87.5 80.0 - 99.0 FL    MCH 27.8 26.0 - 34.0 PG    MCHC 31.8 30.0 - 36.5 g/dL    RDW 15.9 (H) 11.5 - 14.5 %    Platelets 382 150 - 400 K/uL    MPV 9.9 8.9 - 12.9 FL    Nucleated RBCs 0.0 0  WBC    nRBC 0.00 0.00 - 0.01 K/uL    Neutrophils % 82 (H) 32 - 75 %    Lymphocytes % 13 12 - 49 %    Monocytes % 4 (L) 5 - 13 %    Eosinophils % 0 0 - 7 %    Basophils % 0 0 - 1 %    Immature Granulocytes 1 (H) 0.0 - 0.5 %    Neutrophils Absolute 4.0 1.8 - 8.0 K/UL    Lymphocytes Absolute 0.7 (L) 0.8 - 3.5 K/UL    Monocytes Absolute 0.2 0.0 - 1.0 K/UL    Eosinophils Absolute 0.0 0.0 - 0.4 K/UL    Basophils Absolute 0.0 0.0 - 0.1 K/UL    Absolute Immature Granulocyte 0.1 (H) 0.00 - 0.04 K/UL    Differential Type SMEAR SCANNED      RBC Comment NORMOCYTIC, NORMOCHROMIC     Comprehensive metabolic panel    Collection Time: 24 10:06 AM   Result Value Ref Range    Sodium 139 136 - 145 mmol/L    Potassium 4.0 3.5 - 5.1 mmol/L    Chloride 108 97 - 108 mmol/L    CO2 23 21 - 32 mmol/L    Anion Gap 8 5 - 15 mmol/L    Glucose 244 (H) 65 - 100 mg/dL    BUN 11 6 - 20 MG/DL    Creatinine 1.03 (H) 0.55 - 1.02 MG/DL    Bun/Cre Ratio 11 (L) 12 - 20      EstRaul

## 2024-03-21 NOTE — PROGRESS NOTES
Stephie Michaud is a 63 y.o. female    Chief Complaint   Patient presents with    Follow-up     stage IV NSCLC- PDL1- 15%, no  mutations             1. Have you been to the ER, urgent care clinic since your last visit?  Hospitalized since your last visit?No    2. Have you seen or consulted any other health care providers outside of the [facility] since your last visit?  Include any pap smears or colon screening. No      
RECIST  Scans 1/2024 reviewed personally  and stable per RECIST though there is millimetric increase in the pleural based nodularity. She has no reaccumulation of fluid  Will continue Alimta and Keytruda  Guardant 2/2024 nothing actionable TPS 30%  Scans APRIL End      2) Left pleural effusion   S/p thoracentesis x 2, not enough for a drain  No recurrence     3) H/P R parotidectomy for Warthin's tumor    Has persistent multiple Warthin's tumors       4) Depression   On Lexapro        5) bilateral lower extremity swelling   Due to alimta grade 1       8) Encounter for high risk medications like chemotherapy and high risk disease         Plan:       Alimta at 400 mg/m2 today with Keytruda every 3 weeks until progression.   Follow edema  Antiemetics, steroids, b12, FA per protocol  Topical hydrocortisone   Protonix daily  Fu with pcp  Guardant reviewed   RTC 6 weeks with scans and opic every 3 weeks         Marlen Leo MD, MS [facility]

## 2024-04-02 ENCOUNTER — HOSPITAL ENCOUNTER (OUTPATIENT)
Facility: HOSPITAL | Age: 64
Setting detail: OUTPATIENT SURGERY
Discharge: HOME | End: 2024-04-02
Attending: INTERNAL MEDICINE | Admitting: INTERNAL MEDICINE
Payer: MEDICAID

## 2024-04-02 ENCOUNTER — ANESTHESIA (OUTPATIENT)
Facility: HOSPITAL | Age: 64
End: 2024-04-02
Payer: MEDICAID

## 2024-04-02 ENCOUNTER — ANESTHESIA EVENT (OUTPATIENT)
Facility: HOSPITAL | Age: 64
End: 2024-04-02
Payer: MEDICAID

## 2024-04-02 VITALS
OXYGEN SATURATION: 99 % | HEART RATE: 72 BPM | TEMPERATURE: 97.3 F | DIASTOLIC BLOOD PRESSURE: 77 MMHG | BODY MASS INDEX: 39.5 KG/M2 | WEIGHT: 256 LBS | RESPIRATION RATE: 22 BRPM | SYSTOLIC BLOOD PRESSURE: 119 MMHG

## 2024-04-02 PROCEDURE — 2500000003 HC RX 250 WO HCPCS: Performed by: NURSE ANESTHETIST, CERTIFIED REGISTERED

## 2024-04-02 PROCEDURE — 7100000010 HC PHASE II RECOVERY - FIRST 15 MIN: Performed by: INTERNAL MEDICINE

## 2024-04-02 PROCEDURE — 2580000003 HC RX 258: Performed by: INTERNAL MEDICINE

## 2024-04-02 PROCEDURE — 6360000002 HC RX W HCPCS: Performed by: NURSE ANESTHETIST, CERTIFIED REGISTERED

## 2024-04-02 PROCEDURE — 7100000011 HC PHASE II RECOVERY - ADDTL 15 MIN: Performed by: INTERNAL MEDICINE

## 2024-04-02 PROCEDURE — 3600007502: Performed by: INTERNAL MEDICINE

## 2024-04-02 PROCEDURE — 3700000001 HC ADD 15 MINUTES (ANESTHESIA): Performed by: INTERNAL MEDICINE

## 2024-04-02 PROCEDURE — 3600007512: Performed by: INTERNAL MEDICINE

## 2024-04-02 PROCEDURE — 3700000000 HC ANESTHESIA ATTENDED CARE: Performed by: INTERNAL MEDICINE

## 2024-04-02 RX ORDER — SODIUM CHLORIDE 9 MG/ML
INJECTION, SOLUTION INTRAVENOUS CONTINUOUS
Status: DISCONTINUED | OUTPATIENT
Start: 2024-04-02 | End: 2024-04-02 | Stop reason: HOSPADM

## 2024-04-02 RX ORDER — SODIUM CHLORIDE 0.9 % (FLUSH) 0.9 %
5-40 SYRINGE (ML) INJECTION EVERY 12 HOURS SCHEDULED
Status: DISCONTINUED | OUTPATIENT
Start: 2024-04-02 | End: 2024-04-02 | Stop reason: HOSPADM

## 2024-04-02 RX ORDER — SODIUM CHLORIDE 9 MG/ML
25 INJECTION, SOLUTION INTRAVENOUS PRN
Status: DISCONTINUED | OUTPATIENT
Start: 2024-04-02 | End: 2024-04-02 | Stop reason: HOSPADM

## 2024-04-02 RX ORDER — SODIUM CHLORIDE 0.9 % (FLUSH) 0.9 %
5-40 SYRINGE (ML) INJECTION PRN
Status: DISCONTINUED | OUTPATIENT
Start: 2024-04-02 | End: 2024-04-02 | Stop reason: HOSPADM

## 2024-04-02 RX ORDER — LIDOCAINE HYDROCHLORIDE 20 MG/ML
INJECTION, SOLUTION EPIDURAL; INFILTRATION; INTRACAUDAL; PERINEURAL PRN
Status: DISCONTINUED | OUTPATIENT
Start: 2024-04-02 | End: 2024-04-02 | Stop reason: SDUPTHER

## 2024-04-02 RX ADMIN — PROPOFOL 50 MG: 10 INJECTION, EMULSION INTRAVENOUS at 09:35

## 2024-04-02 RX ADMIN — SODIUM CHLORIDE: 9 INJECTION, SOLUTION INTRAVENOUS at 08:52

## 2024-04-02 RX ADMIN — PROPOFOL 20 MG: 10 INJECTION, EMULSION INTRAVENOUS at 09:44

## 2024-04-02 RX ADMIN — LIDOCAINE HYDROCHLORIDE 50 MG: 20 INJECTION, SOLUTION EPIDURAL; INFILTRATION; INTRACAUDAL; PERINEURAL at 09:33

## 2024-04-02 RX ADMIN — PROPOFOL 30 MG: 10 INJECTION, EMULSION INTRAVENOUS at 09:37

## 2024-04-02 RX ADMIN — PROPOFOL 30 MG: 10 INJECTION, EMULSION INTRAVENOUS at 09:41

## 2024-04-02 RX ADMIN — PROPOFOL 100 MG: 10 INJECTION, EMULSION INTRAVENOUS at 09:33

## 2024-04-02 RX ADMIN — PROPOFOL 30 MG: 10 INJECTION, EMULSION INTRAVENOUS at 09:39

## 2024-04-02 RX ADMIN — PROPOFOL 20 MG: 10 INJECTION, EMULSION INTRAVENOUS at 09:40

## 2024-04-02 RX ADMIN — PROPOFOL 50 MG: 10 INJECTION, EMULSION INTRAVENOUS at 09:34

## 2024-04-02 RX ADMIN — PROPOFOL 20 MG: 10 INJECTION, EMULSION INTRAVENOUS at 09:46

## 2024-04-02 RX ADMIN — PROPOFOL 30 MG: 10 INJECTION, EMULSION INTRAVENOUS at 09:36

## 2024-04-02 RX ADMIN — PROPOFOL 30 MG: 10 INJECTION, EMULSION INTRAVENOUS at 09:38

## 2024-04-02 RX ADMIN — PROPOFOL 20 MG: 10 INJECTION, EMULSION INTRAVENOUS at 09:43

## 2024-04-02 ASSESSMENT — PAIN - FUNCTIONAL ASSESSMENT: PAIN_FUNCTIONAL_ASSESSMENT: NONE - DENIES PAIN

## 2024-04-02 ASSESSMENT — PAIN SCALES - GENERAL
PAINLEVEL_OUTOF10: 0

## 2024-04-02 NOTE — H&P
[place] [id number] [phone]        History and Physical     NAME:  Stephie Michaud   :  1960   MRN:  [id number]         HPI:  Stephie Michaud is a 63 y.o. female here for colonoscopy. Patient has history of colon polyps. Last colonoscopy 3 years back. No family history of colon cancer. No GI symptoms.     Past Surgical History:   Procedure Laterality Date    ANTERIOR CRUCIATE LIGAMENT REPAIR      COLONOSCOPY      COLONOSCOPY N/A 2021    .COLONOSCOPY  :- performed by Miguel Omalley MD at [facility] ENDOSCOPY    HYSTERECTOMY (CERVIX STATUS UNKNOWN)      HYSTERECTOMY, VAGINAL      IR INSERT TUNNELED PLEURA CATH W CUFF  2022    IR INSERT TUNNELED PLEURA CATH W CUFF 2022 [facility] RAD ANGIO IR    IR INSERT TUNNELED PLEURA CATH W CUFF  2022    IR PORT PLACEMENT EQUAL OR GREATER THAN 5 YEARS  2022    IR PORT PLACEMENT EQUAL OR GREATER THAN 5 YEARS 2022 [facility] RAD ANGIO IR    IR PORT PLACEMENT EQUAL OR GREATER THAN 5 YEARS  2022    IR REMOVE LUNG/PLEURAL DRAIN/CATHETER  2022     Past Medical History:   Diagnosis Date    COPD (chronic obstructive pulmonary disease) (HCC)     Diabetes mellitus type 2, noninsulin dependent (HCC)     Generalized anxiety disorder     Hypercholesterolemia     Hypertension     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

## 2024-04-02 NOTE — DISCHARGE INSTRUCTIONS
[facility]  [address] [place] [id number]          Stephie Michaud  [id number]  1960    COLON DISCHARGE INSTRUCTIONS    DISCOMFORT:  Redness at IV site- apply warm compress to area; if redness or soreness persist- contact your physician  There may be a slight amount of blood passed from the rectum  Gaseous discomfort- walking, belching will help relieve any discomfort    DIET:   Regular diet.     ACTIVITY:  You may resume your normal daily activities it is recommended that you spend the remainder of the day resting -  avoid any strenuous activity.  You may not operate a vehicle for 12 hours  You may not engage in an occupation involving machinery or appliances for rest of today  You may not drink alcoholic beverages for at least 12 hours  Avoid making any critical decisions for at least 24 hour    CALL M.D.  ANY SIGN OF:   Increasing pain, nausea, vomiting  Abdominal distension (swelling)  New increased bleeding (oral or rectal)  Fever (chills)  Pain in chest area  Bloody discharge from nose or mouth  Shortness of breath     Follow-up Instructions:   Call Dr. Miguel Omalley for any questions or problems.   Telephone # [phone]  Biopsy results will be available in  5 to 7 days    Impression:  -Normal colon exam without any polyps.     Recommendations:   -Resume normal medication(s).  -Repeat colonoscopy in 5 years.  -Regular diet.  -Follow up with primary care physician.        Miguel Omalley MD

## 2024-04-02 NOTE — ANESTHESIA POSTPROCEDURE EVALUATION
Department of Anesthesiology  Postprocedure Note    Patient: Stephie Michaud  MRN: [id number]  YOB: 1960  Date of evaluation: 4/2/2024    Procedure Summary       Date: 04/02/24 Room / Location: [facility] 02 / [facility] ENDOSCOPY    Anesthesia Start: 0926 Anesthesia Stop: 0953    Procedure: COLONOSCOPY Diagnosis:       Personal history of colonic polyps      (Personal history of colonic polyps [Z86.010])    Surgeons: Miguel Omalley MD Responsible Provider: Adilson Marina MD    Anesthesia Type: MAC ASA Status: 3            Anesthesia Type: No value filed.    Piper Phase I: Piper Score: 10    Piper Phase II: Piper Score: 10    Anesthesia Post Evaluation    Patient location during evaluation: bedside  Nausea & Vomiting: no nausea  Cardiovascular status: blood pressure returned to baseline  Respiratory status: acceptable    No notable events documented.

## 2024-04-02 NOTE — ANESTHESIA PRE PROCEDURE
Department of Anesthesiology  Preprocedure Note       Name:  Stephie Michaud   Age:  63 y.o.  :  1960                                          MRN:  [id number]         Date:  2024      Surgeon: Surgeon(s):  Miguel Omalley MD    Procedure: Procedure(s):  COLONOSCOPY    Medications prior to admission:   Prior to Admission medications    Medication Sig Start Date End Date Taking? Authorizing Provider   exenatide (BYETTA 5 MCG PEN) 5 MCG/0.02ML injection Inject within 30 mins before eating Subcutaneous Twice a day for 30 days 3/19/24   Ezekiel Forde MD   nystatin (MYCOSTATIN) 344637 UNIT/GM cream Apply topically 2 times daily. 1/15/24   Carolyn Anna APRN - TERRY   umeclidinium-vilanterol (ANORO ELLIPTA) 62.5-25 MCG/ACT inhaler INHALE 1 DOSE BY MOUTH DAILY 23   Marlen Leo MD   dexamethasone (DECADRON) 4 MG tablet TAKE 1 TABLET BY MOUTH TWICE DAILY THE DAY BEFORE BEFORE CHEMOTHERAPY AND THE DAY AFTER CHEMOTHERAPY 10/5/23   Marlen Leo MD   folic acid (FOLVITE) 1 MG tablet TAKE 1 TABLET BY MOUTH DAILY 23   Marlen Leo MD   pantoprazole (PROTONIX) 40 MG tablet Take 1 tablet by mouth every morning (before breakfast) 23   Marlen Leo MD   TRULICITY 0.75 MG/0.5ML SOPN  23   Ezekiel Forde MD   acetaminophen-codeine (TYLENOL #3) 300-30 MG per tablet Take 500 mg by mouth every 6 hours as needed. Max Daily Amount: 2,000 mg  Patient not taking: Reported on 2024    Ezekiel Forde MD   lidocaine-prilocaine (EMLA) 2.5-2.5 % cream Apply topically as needed (port access) Apply a thin layer to port 30-60 minutes prior to arrival for chemotherapy treatment. 23   Marlen Leo MD   ALPRAZolam (XANAX) 0.25 MG tablet Take by mouth.    Ezekiel Forde MD   amLODIPine (NORVASC) 5 MG tablet Take 1 tablet by mouth daily 23   Ezekiel Forde MD   aspirin 81 MG chewable tablet Take 1 tablet by mouth daily    Ezekiel Forde MD   escitalopram

## 2024-04-02 NOTE — PROGRESS NOTES
Patient stopped Trulicity 1 month ago and switched to Byetta. Began taking Byetta 3/24/2024 and took doses twice daily until last dose Saturday 3/30/24. Dr. Marina and Dr. Omalley notified and okay to proceed. Risks of aspiration and repeat colonoscopy if prep quality is not adequate.   Patient understands risks and would like to proceed with colonoscopy today.

## 2024-04-02 NOTE — PROGRESS NOTES

## 2024-04-03 ENCOUNTER — HOSPITAL ENCOUNTER (OUTPATIENT)
Age: 64
Discharge: HOME | End: 2024-04-06
Payer: MEDICAID

## 2024-04-03 DIAGNOSIS — C79.70 NSCLC METASTATIC TO ADRENAL GLAND (HCC): ICD-10-CM

## 2024-04-03 DIAGNOSIS — C34.90 NSCLC METASTATIC TO ADRENAL GLAND (HCC): ICD-10-CM

## 2024-04-03 PROCEDURE — 6360000004 HC RX CONTRAST MEDICATION: Performed by: RADIOLOGY

## 2024-04-03 PROCEDURE — 74177 CT ABD & PELVIS W/CONTRAST: CPT

## 2024-04-03 RX ADMIN — IOPAMIDOL 100 ML: 755 INJECTION, SOLUTION INTRAVENOUS at 09:03

## 2024-04-04 RX ORDER — SODIUM CHLORIDE 9 MG/ML
INJECTION, SOLUTION INTRAVENOUS CONTINUOUS
Status: CANCELLED | OUTPATIENT
Start: 2024-04-11

## 2024-04-04 RX ORDER — ACETAMINOPHEN 325 MG/1
650 TABLET ORAL
Status: CANCELLED | OUTPATIENT
Start: 2024-04-11

## 2024-04-04 RX ORDER — EPINEPHRINE 1 MG/ML
0.3 INJECTION, SOLUTION INTRAMUSCULAR; SUBCUTANEOUS PRN
Status: CANCELLED | OUTPATIENT
Start: 2024-04-11

## 2024-04-04 RX ORDER — DIPHENHYDRAMINE HYDROCHLORIDE 50 MG/ML
50 INJECTION INTRAMUSCULAR; INTRAVENOUS
Status: CANCELLED | OUTPATIENT
Start: 2024-04-11

## 2024-04-04 RX ORDER — PROCHLORPERAZINE EDISYLATE 5 MG/ML
10 INJECTION INTRAMUSCULAR; INTRAVENOUS
Status: CANCELLED | OUTPATIENT
Start: 2024-04-11

## 2024-04-04 RX ORDER — ALBUTEROL SULFATE 90 UG/1
4 AEROSOL, METERED RESPIRATORY (INHALATION) PRN
Status: CANCELLED | OUTPATIENT
Start: 2024-04-11

## 2024-04-04 RX ORDER — ONDANSETRON 2 MG/ML
8 INJECTION INTRAMUSCULAR; INTRAVENOUS
Status: CANCELLED | OUTPATIENT
Start: 2024-04-11

## 2024-04-11 ENCOUNTER — HOSPITAL ENCOUNTER (OUTPATIENT)
Facility: HOSPITAL | Age: 64
Setting detail: INFUSION SERIES
Discharge: HOME | End: 2024-04-11
Payer: MEDICAID

## 2024-04-11 VITALS
BODY MASS INDEX: 38.8 KG/M2 | HEIGHT: 68 IN | OXYGEN SATURATION: 100 % | DIASTOLIC BLOOD PRESSURE: 71 MMHG | TEMPERATURE: 98 F | WEIGHT: 256 LBS | SYSTOLIC BLOOD PRESSURE: 126 MMHG | RESPIRATION RATE: 18 BRPM | HEART RATE: 96 BPM

## 2024-04-11 DIAGNOSIS — C79.70 NSCLC METASTATIC TO ADRENAL GLAND (HCC): Primary | ICD-10-CM

## 2024-04-11 DIAGNOSIS — C34.90 NSCLC METASTATIC TO ADRENAL GLAND (HCC): Primary | ICD-10-CM

## 2024-04-11 LAB
ALBUMIN SERPL-MCNC: 3.5 G/DL (ref 3.5–5)
ALBUMIN/GLOB SERPL: 0.9 (ref 1.1–2.2)
ALP SERPL-CCNC: 96 U/L (ref 45–117)
ALT SERPL-CCNC: 25 U/L (ref 12–78)
ANION GAP SERPL CALC-SCNC: 6 MMOL/L (ref 5–15)
AST SERPL-CCNC: 22 U/L (ref 15–37)
BASO+EOS+MONOS # BLD AUTO: 0.4 K/UL (ref 0.2–1.2)
BASO+EOS+MONOS NFR BLD AUTO: 6 % (ref 3.2–16.9)
BILIRUB SERPL-MCNC: 0.7 MG/DL (ref 0.2–1)
BUN SERPL-MCNC: 12 MG/DL (ref 6–20)
BUN/CREAT SERPL: 14 (ref 12–20)
CALCIUM SERPL-MCNC: 9.4 MG/DL (ref 8.5–10.1)
CHLORIDE SERPL-SCNC: 107 MMOL/L (ref 97–108)
CO2 SERPL-SCNC: 25 MMOL/L (ref 21–32)
CREAT SERPL-MCNC: 0.87 MG/DL (ref 0.55–1.02)
DIFFERENTIAL METHOD BLD: ABNORMAL
ERYTHROCYTE [DISTWIDTH] IN BLOOD BY AUTOMATED COUNT: 16.4 % (ref 11.8–15.8)
GLOBULIN SER CALC-MCNC: 4.1 G/DL (ref 2–4)
GLUCOSE SERPL-MCNC: 184 MG/DL (ref 65–100)
HCT VFR BLD AUTO: 35.8 % (ref 35–47)
HGB BLD-MCNC: 11.5 G/DL (ref 11.5–16)
LYMPHOCYTES # BLD: 0.7 K/UL (ref 0.8–3.5)
LYMPHOCYTES NFR BLD: 11 % (ref 12–49)
MCH RBC QN AUTO: 27.6 PG (ref 26–34)
MCHC RBC AUTO-ENTMCNC: 32.1 G/DL (ref 30–36.5)
MCV RBC AUTO: 86.1 FL (ref 80–99)
NEUTS SEG # BLD: 5.7 K/UL (ref 1.8–8)
NEUTS SEG NFR BLD: 83 % (ref 32–75)
PLATELET # BLD AUTO: 402 K/UL (ref 150–400)
POTASSIUM SERPL-SCNC: 4 MMOL/L (ref 3.5–5.1)
PROT SERPL-MCNC: 7.6 G/DL (ref 6.4–8.2)
RBC # BLD AUTO: 4.16 M/UL (ref 3.8–5.2)
SODIUM SERPL-SCNC: 138 MMOL/L (ref 136–145)
TSH SERPL DL<=0.05 MIU/L-ACNC: 0.45 UIU/ML (ref 0.36–3.74)
WBC # BLD AUTO: 6.8 K/UL (ref 3.6–11)

## 2024-04-11 PROCEDURE — 84443 ASSAY THYROID STIM HORMONE: CPT

## 2024-04-11 PROCEDURE — 96413 CHEMO IV INFUSION 1 HR: CPT

## 2024-04-11 PROCEDURE — 96375 TX/PRO/DX INJ NEW DRUG ADDON: CPT

## 2024-04-11 PROCEDURE — 6360000002 HC RX W HCPCS: Performed by: INTERNAL MEDICINE

## 2024-04-11 PROCEDURE — 85025 COMPLETE CBC W/AUTO DIFF WBC: CPT

## 2024-04-11 PROCEDURE — 80053 COMPREHEN METABOLIC PANEL: CPT

## 2024-04-11 PROCEDURE — 96411 CHEMO IV PUSH ADDL DRUG: CPT

## 2024-04-11 PROCEDURE — 2580000003 HC RX 258: Performed by: INTERNAL MEDICINE

## 2024-04-11 PROCEDURE — 36415 COLL VENOUS BLD VENIPUNCTURE: CPT

## 2024-04-11 RX ORDER — CYANOCOBALAMIN 1000 UG/ML
1000 INJECTION, SOLUTION INTRAMUSCULAR; SUBCUTANEOUS
Status: DISCONTINUED | OUTPATIENT
Start: 2024-04-11 | End: 2024-04-12 | Stop reason: HOSPADM

## 2024-04-11 RX ORDER — DEXAMETHASONE SODIUM PHOSPHATE 4 MG/ML
8 INJECTION, SOLUTION INTRA-ARTICULAR; INTRALESIONAL; INTRAMUSCULAR; INTRAVENOUS; SOFT TISSUE ONCE
Status: COMPLETED | OUTPATIENT
Start: 2024-04-11 | End: 2024-04-11

## 2024-04-11 RX ORDER — SODIUM CHLORIDE 0.9 % (FLUSH) 0.9 %
5-40 SYRINGE (ML) INJECTION PRN
Status: DISCONTINUED | OUTPATIENT
Start: 2024-04-11 | End: 2024-04-12 | Stop reason: HOSPADM

## 2024-04-11 RX ORDER — HEPARIN 100 UNIT/ML
500 SYRINGE INTRAVENOUS PRN
Status: DISCONTINUED | OUTPATIENT
Start: 2024-04-11 | End: 2024-04-12 | Stop reason: HOSPADM

## 2024-04-11 RX ORDER — SODIUM CHLORIDE 9 MG/ML
5-250 INJECTION, SOLUTION INTRAVENOUS PRN
Status: DISCONTINUED | OUTPATIENT
Start: 2024-04-11 | End: 2024-04-12 | Stop reason: HOSPADM

## 2024-04-11 RX ADMIN — PEMETREXED DISODIUM 880 MG: 500 INJECTION, POWDER, LYOPHILIZED, FOR SOLUTION INTRAVENOUS at 14:39

## 2024-04-11 RX ADMIN — DEXAMETHASONE SODIUM PHOSPHATE 8 MG: 4 INJECTION INTRA-ARTICULAR; INTRALESIONAL; INTRAMUSCULAR; INTRAVENOUS; SOFT TISSUE at 14:20

## 2024-04-11 RX ADMIN — SODIUM CHLORIDE 200 MG: 9 INJECTION, SOLUTION INTRAVENOUS at 13:42

## 2024-04-11 NOTE — PROGRESS NOTES
[facility] Chemotherapy Progress Note    Date: 2024    Name: Stephie Michaud    MRN: [id number]         : 1960      1030 Pt admit to [facility] for C32D1 Keytruda/Alimta ambulatory in stable condition. Assessment completed. No new concerns voiced. Port with positive blood return. Labs sent for processing.             Ms. Michaud's vitals were reviewed.  Patient Vitals for the past 12 hrs:   Temp Pulse Resp BP SpO2   24 1500 -- -- -- 126/71 --   24 1015 98 °F (36.7 °C) 96 18 (!) 136/93 100 %         Lab results were obtained and reviewed.  Recent Results (from the past 12 hour(s))   TSH without Reflex    Collection Time: 24 10:42 AM   Result Value Ref Range    TSH, 3RD GENERATION 0.45 0.36 - 3.74 uIU/mL   Comprehensive metabolic panel    Collection Time: 24 10:42 AM   Result Value Ref Range    Sodium 138 136 - 145 mmol/L    Potassium 4.0 3.5 - 5.1 mmol/L    Chloride 107 97 - 108 mmol/L    CO2 25 21 - 32 mmol/L    Anion Gap 6 5 - 15 mmol/L    Glucose 184 (H) 65 - 100 mg/dL    BUN 12 6 - 20 MG/DL    Creatinine 0.87 0.55 - 1.02 MG/DL    Bun/Cre Ratio 14 12 - 20      Est, Glom Filt Rate 74 >60 ml/min/1.73m2    Calcium 9.4 8.5 - 10.1 MG/DL    Total Bilirubin 0.7 0.2 - 1.0 MG/DL    ALT 25 12 - 78 U/L    AST 22 15 - 37 U/L    Alk Phosphatase 96 45 - 117 U/L    Total Protein 7.6 6.4 - 8.2 g/dL    Albumin 3.5 3.5 - 5.0 g/dL    Globulin 4.1 (H) 2.0 - 4.0 g/dL    Albumin/Globulin Ratio 0.9 (L) 1.1 - 2.2     CBC with Partial Differential    Collection Time: 24 10:42 AM   Result Value Ref Range    WBC 6.8 3.6 - 11.0 K/uL    RBC 4.16 3.80 - 5.20 M/uL    Hemoglobin 11.5 11.5 - 16.0 g/dL    Hematocrit 35.8 35.0 - 47.0 %    MCV 86.1 80.0 - 99.0 FL    MCH 27.6 26.0 - 34.0 PG    MCHC 32.1 30.0 - 36.5 g/dL    RDW 16.4 (H) 11.8 - 15.8 %    Platelets 402 (H) 150 - 400 K/uL    Neutrophils % 83 (H) 32 - 75 %    Mixed Cells 6 3.2 - 16.9 %    Lymphocytes % 11 (L) 12 - 49 %    Neutrophils Absolute 5.7

## 2024-04-25 RX ORDER — ACETAMINOPHEN 325 MG/1
650 TABLET ORAL
Status: CANCELLED | OUTPATIENT
Start: 2024-05-02

## 2024-04-25 RX ORDER — SODIUM CHLORIDE 9 MG/ML
INJECTION, SOLUTION INTRAVENOUS CONTINUOUS
Status: CANCELLED | OUTPATIENT
Start: 2024-05-02

## 2024-04-25 RX ORDER — PROCHLORPERAZINE EDISYLATE 5 MG/ML
10 INJECTION INTRAMUSCULAR; INTRAVENOUS
Status: CANCELLED | OUTPATIENT
Start: 2024-05-02

## 2024-04-25 RX ORDER — EPINEPHRINE 1 MG/ML
0.3 INJECTION, SOLUTION INTRAMUSCULAR; SUBCUTANEOUS PRN
Status: CANCELLED | OUTPATIENT
Start: 2024-05-02

## 2024-04-25 RX ORDER — ALBUTEROL SULFATE 90 UG/1
4 AEROSOL, METERED RESPIRATORY (INHALATION) PRN
Status: CANCELLED | OUTPATIENT
Start: 2024-05-02

## 2024-04-25 RX ORDER — ONDANSETRON 2 MG/ML
8 INJECTION INTRAMUSCULAR; INTRAVENOUS
Status: CANCELLED | OUTPATIENT
Start: 2024-05-02

## 2024-04-25 RX ORDER — DEXAMETHASONE SODIUM PHOSPHATE 4 MG/ML
8 INJECTION, SOLUTION INTRA-ARTICULAR; INTRALESIONAL; INTRAMUSCULAR; INTRAVENOUS; SOFT TISSUE ONCE
Status: CANCELLED | OUTPATIENT
Start: 2024-05-02 | End: 2024-05-02

## 2024-04-25 RX ORDER — LORAZEPAM 2 MG/ML
0.5 INJECTION INTRAMUSCULAR
Status: CANCELLED | OUTPATIENT
Start: 2024-05-02

## 2024-04-25 RX ORDER — DIPHENHYDRAMINE HYDROCHLORIDE 50 MG/ML
50 INJECTION INTRAMUSCULAR; INTRAVENOUS
Status: CANCELLED | OUTPATIENT
Start: 2024-05-02

## 2024-04-30 ENCOUNTER — CLINICAL DOCUMENTATION (OUTPATIENT)
Age: 64
End: 2024-04-30

## 2024-05-02 ENCOUNTER — OFFICE VISIT (OUTPATIENT)
Age: 64
End: 2024-05-02
Payer: MEDICAID

## 2024-05-02 ENCOUNTER — HOSPITAL ENCOUNTER (OUTPATIENT)
Facility: HOSPITAL | Age: 64
Setting detail: INFUSION SERIES
Discharge: HOME | End: 2024-05-02
Payer: MEDICAID

## 2024-05-02 VITALS
TEMPERATURE: 97.9 F | SYSTOLIC BLOOD PRESSURE: 132 MMHG | RESPIRATION RATE: 18 BRPM | OXYGEN SATURATION: 99 % | HEIGHT: 68 IN | HEART RATE: 91 BPM | BODY MASS INDEX: 38.49 KG/M2 | DIASTOLIC BLOOD PRESSURE: 63 MMHG | WEIGHT: 254 LBS

## 2024-05-02 VITALS
TEMPERATURE: 97.9 F | DIASTOLIC BLOOD PRESSURE: 71 MMHG | HEART RATE: 91 BPM | RESPIRATION RATE: 18 BRPM | WEIGHT: 254 LBS | SYSTOLIC BLOOD PRESSURE: 125 MMHG | BODY MASS INDEX: 39.19 KG/M2 | OXYGEN SATURATION: 99 %

## 2024-05-02 DIAGNOSIS — R91.8 LUNG MASS: ICD-10-CM

## 2024-05-02 DIAGNOSIS — C79.70 NSCLC METASTATIC TO ADRENAL GLAND (HCC): Primary | ICD-10-CM

## 2024-05-02 DIAGNOSIS — C34.90 NSCLC METASTATIC TO ADRENAL GLAND (HCC): Primary | ICD-10-CM

## 2024-05-02 LAB
ALBUMIN SERPL-MCNC: 3.6 G/DL (ref 3.5–5)
ALBUMIN/GLOB SERPL: 1 (ref 1.1–2.2)
ALP SERPL-CCNC: 93 U/L (ref 45–117)
ALT SERPL-CCNC: 24 U/L (ref 12–78)
ANION GAP SERPL CALC-SCNC: 6 MMOL/L (ref 5–15)
AST SERPL-CCNC: 24 U/L (ref 15–37)
BASO+EOS+MONOS # BLD AUTO: 0.5 K/UL (ref 0.2–1.2)
BASO+EOS+MONOS NFR BLD AUTO: 9 % (ref 3.2–16.9)
BILIRUB SERPL-MCNC: 0.6 MG/DL (ref 0.2–1)
BUN SERPL-MCNC: 17 MG/DL (ref 6–20)
BUN/CREAT SERPL: 18 (ref 12–20)
CALCIUM SERPL-MCNC: 9.4 MG/DL (ref 8.5–10.1)
CHLORIDE SERPL-SCNC: 108 MMOL/L (ref 97–108)
CO2 SERPL-SCNC: 25 MMOL/L (ref 21–32)
CREAT SERPL-MCNC: 0.95 MG/DL (ref 0.55–1.02)
DIFFERENTIAL METHOD BLD: ABNORMAL
ERYTHROCYTE [DISTWIDTH] IN BLOOD BY AUTOMATED COUNT: 16.4 % (ref 11.8–15.8)
GLOBULIN SER CALC-MCNC: 3.6 G/DL (ref 2–4)
GLUCOSE SERPL-MCNC: 138 MG/DL (ref 65–100)
HCT VFR BLD AUTO: 35 % (ref 35–47)
HGB BLD-MCNC: 11.1 G/DL (ref 11.5–16)
LYMPHOCYTES # BLD: 0.7 K/UL (ref 0.8–3.5)
LYMPHOCYTES NFR BLD: 11 % (ref 12–49)
MCH RBC QN AUTO: 27.5 PG (ref 26–34)
MCHC RBC AUTO-ENTMCNC: 31.7 G/DL (ref 30–36.5)
MCV RBC AUTO: 86.8 FL (ref 80–99)
NEUTS SEG # BLD: 4.8 K/UL (ref 1.8–8)
NEUTS SEG NFR BLD: 80 % (ref 32–75)
PLATELET # BLD AUTO: 390 K/UL (ref 150–400)
POTASSIUM SERPL-SCNC: 4.2 MMOL/L (ref 3.5–5.1)
PROT SERPL-MCNC: 7.2 G/DL (ref 6.4–8.2)
RBC # BLD AUTO: 4.03 M/UL (ref 3.8–5.2)
SODIUM SERPL-SCNC: 139 MMOL/L (ref 136–145)
TSH SERPL DL<=0.05 MIU/L-ACNC: 0.29 UIU/ML (ref 0.36–3.74)
WBC # BLD AUTO: 6 K/UL (ref 3.6–11)

## 2024-05-02 PROCEDURE — 99215 OFFICE O/P EST HI 40 MIN: CPT | Performed by: INTERNAL MEDICINE

## 2024-05-02 PROCEDURE — 2580000003 HC RX 258: Performed by: INTERNAL MEDICINE

## 2024-05-02 PROCEDURE — 85025 COMPLETE CBC W/AUTO DIFF WBC: CPT

## 2024-05-02 PROCEDURE — 6360000002 HC RX W HCPCS: Performed by: INTERNAL MEDICINE

## 2024-05-02 PROCEDURE — 80053 COMPREHEN METABOLIC PANEL: CPT

## 2024-05-02 PROCEDURE — 36415 COLL VENOUS BLD VENIPUNCTURE: CPT

## 2024-05-02 PROCEDURE — 84443 ASSAY THYROID STIM HORMONE: CPT

## 2024-05-02 PROCEDURE — 96413 CHEMO IV INFUSION 1 HR: CPT

## 2024-05-02 RX ORDER — CYANOCOBALAMIN 1000 UG/ML
1000 INJECTION, SOLUTION INTRAMUSCULAR; SUBCUTANEOUS
Status: DISCONTINUED | OUTPATIENT
Start: 2024-05-02 | End: 2024-05-02

## 2024-05-02 RX ORDER — SODIUM CHLORIDE 9 MG/ML
5-250 INJECTION, SOLUTION INTRAVENOUS PRN
Status: DISCONTINUED | OUTPATIENT
Start: 2024-05-02 | End: 2024-05-03 | Stop reason: HOSPADM

## 2024-05-02 RX ORDER — HEPARIN 100 UNIT/ML
500 SYRINGE INTRAVENOUS PRN
Status: DISCONTINUED | OUTPATIENT
Start: 2024-05-02 | End: 2024-05-03 | Stop reason: HOSPADM

## 2024-05-02 RX ORDER — SODIUM CHLORIDE 0.9 % (FLUSH) 0.9 %
5-40 SYRINGE (ML) INJECTION PRN
Status: DISCONTINUED | OUTPATIENT
Start: 2024-05-02 | End: 2024-05-03 | Stop reason: HOSPADM

## 2024-05-02 RX ADMIN — SODIUM CHLORIDE, PRESERVATIVE FREE 20 ML: 5 INJECTION INTRAVENOUS at 15:14

## 2024-05-02 RX ADMIN — SODIUM CHLORIDE 200 MG: 9 INJECTION, SOLUTION INTRAVENOUS at 12:59

## 2024-05-02 RX ADMIN — SODIUM CHLORIDE 25 ML/HR: 9 INJECTION, SOLUTION INTRAVENOUS at 12:54

## 2024-05-02 NOTE — PROGRESS NOTES
[facility] [place] Progress Note    1030 Ms. Michaud Arrived ambulatory and in no distress for Keytruda/Alimta (C33D1) regimen.  Access RN completed baseline vital signs, assessment, port access and labs.     1215 Labs reviewed and criteria for treatment was met. Per Carolyn Anna NP, holding Alimta    Ms. Michaud's vitals were reviewed.  Patient Vitals for the past 12 hrs:   Temp Pulse Resp BP SpO2   05/02/24 1343 -- 91 -- 132/63 --   05/02/24 1034 97.9 °F (36.6 °C) 91 18 125/71 99 %     Lab results were obtained and reviewed.  Recent Results (from the past 12 hour(s))   CBC with Partial Differential    Collection Time: 05/02/24 10:43 AM   Result Value Ref Range    WBC 6.0 3.6 - 11.0 K/uL    RBC 4.03 3.80 - 5.20 M/uL    Hemoglobin 11.1 (L) 11.5 - 16.0 g/dL    Hematocrit 35.0 35.0 - 47.0 %    MCV 86.8 80.0 - 99.0 FL    MCH 27.5 26.0 - 34.0 PG    MCHC 31.7 30.0 - 36.5 g/dL    RDW 16.4 (H) 11.8 - 15.8 %    Platelets 390 150 - 400 K/uL    Neutrophils % 80 (H) 32 - 75 %    Mixed Cells 9 3.2 - 16.9 %    Lymphocytes % 11 (L) 12 - 49 %    Neutrophils Absolute 4.8 1.8 - 8.0 K/UL    ABSOLUTE MIXED CELLS 0.5 0.2 - 1.2 K/uL    Lymphocytes Absolute 0.7 (L) 0.8 - 3.5 K/UL    Differential Type AUTOMATED     Comprehensive metabolic panel    Collection Time: 05/02/24 10:43 AM   Result Value Ref Range    Sodium 139 136 - 145 mmol/L    Potassium 4.2 3.5 - 5.1 mmol/L    Chloride 108 97 - 108 mmol/L    CO2 25 21 - 32 mmol/L    Anion Gap 6 5 - 15 mmol/L    Glucose 138 (H) 65 - 100 mg/dL    BUN 17 6 - 20 MG/DL    Creatinine 0.95 0.55 - 1.02 MG/DL    Bun/Cre Ratio 18 12 - 20      Est, Glom Filt Rate 67 >60 ml/min/1.73m2    Calcium 9.4 8.5 - 10.1 MG/DL    Total Bilirubin 0.6 0.2 - 1.0 MG/DL    ALT 24 12 - 78 U/L    AST 24 15 - 37 U/L    Alk Phosphatase 93 45 - 117 U/L    Total Protein 7.2 6.4 - 8.2 g/dL    Albumin 3.6 3.5 - 5.0 g/dL    Globulin 3.6 2.0 - 4.0 g/dL    Albumin/Globulin Ratio 1.0 (L) 1.1 - 2.2     TSH without Reflex    Collection

## 2024-05-02 NOTE — PROGRESS NOTES
Stephie Michaud is a 63 y.o. female    Chief Complaint   Patient presents with    Follow-up     stage IV NSCLC- PDL1- 15%, no  mutations        1. Have you been to the ER, urgent care clinic since your last visit?  Hospitalized since your last visit?No    2. Have you seen or consulted any other health care providers outside of the [facility] since your last visit?  Include any pap smears or colon screening. Yes, PCP

## 2024-05-02 NOTE — PROGRESS NOTES
[facility] [place] [facility]   [address], [address] [place] [id number]   W: [phone]  F: [phone]          Reason for visit     Stephie Michaud is a 62 y.o.  female who is seen for follow up of stage IV NSCLC- PDL1- 15%, no  mutations           Treatment History:      5/4/2022: Left thoracentecis- Adenocarcinoma     6/9/2022- 8/11/2022- current: Caroplatin+ Alimta+Keytruda    8/2022- CT with response    9/1/2022: Alimta Keytruda    CT 11/2022: stable     CT 1/2023: stable     CAP CT 4/2023: stable , 8/2023: Stable per RECIST          History of Present Illness:     Patient is a 61-year-old female with diabetes mellitus, hypertension who presented to the [place] emergency room on 5/4/2022 with complaints of shortness of breath x 14 days which  has been progressively getting worse over several weeks with mild cough.  Denies any headaches, vision changes, falls trouble swallowing, chest pain, fevers, weight loss, hemoptysis.  CTA on admission showed no evidence of pulmonary embolism but she was  noted to have a moderate to large left-sided pleural effusion with a left apical mass measuring 28 x 20 mm, she had small pulmonary nodules on the right, possible hepatic hypodensity.  She had ultrasound-guided thoracentesis of the left side on 5/4/2022  and 1300 cc of fluid was aspirated.  This was sent for cytology which was positive.  Pleural fluid cytology showed more than 100 RBCs, total protein of 5.3, albumin 2.8, .  Labs were noted for a bilirubin of 1.1.  Dx with stage IV NSCLC. She is on palliative treatments.       Comes today for maintenance of Alimta + Keytruda.   She feels good. Had scans  No new pain, edema mild   She has stable HOLLEY. No change. She has no pain with deep breathing. She has stable ankle edema.  No new MONGE     Rondy her  is with her.       She used to work for insurance.        Review of Systems: A complete review of systems was

## 2024-05-03 ENCOUNTER — TELEPHONE (OUTPATIENT)
Age: 64
End: 2024-05-03

## 2024-05-03 NOTE — TELEPHONE ENCOUNTER
Kamryn from Radiology called requesting pt to get INR/PTT draw.She can possibly get pt scheduled as soon as Tuesday but she would need to have these complete. Call back number is [phone].

## 2024-05-06 DIAGNOSIS — C34.90 NSCLC METASTATIC TO ADRENAL GLAND (HCC): Primary | ICD-10-CM

## 2024-05-06 DIAGNOSIS — R91.8 LUNG MASS: ICD-10-CM

## 2024-05-06 DIAGNOSIS — C79.70 NSCLC METASTATIC TO ADRENAL GLAND (HCC): ICD-10-CM

## 2024-05-06 DIAGNOSIS — C34.90 NSCLC METASTATIC TO ADRENAL GLAND (HCC): ICD-10-CM

## 2024-05-06 DIAGNOSIS — C79.70 NSCLC METASTATIC TO ADRENAL GLAND (HCC): Primary | ICD-10-CM

## 2024-05-07 DIAGNOSIS — C34.90 NSCLC METASTATIC TO ADRENAL GLAND (HCC): ICD-10-CM

## 2024-05-07 DIAGNOSIS — C79.70 NSCLC METASTATIC TO ADRENAL GLAND (HCC): ICD-10-CM

## 2024-05-07 LAB
ALBUMIN SERPL-MCNC: 3.4 G/DL (ref 3.5–5)
ALBUMIN/GLOB SERPL: 0.9 (ref 1.1–2.2)
ALP SERPL-CCNC: 88 U/L (ref 45–117)
ALT SERPL-CCNC: 31 U/L (ref 12–78)
ANION GAP SERPL CALC-SCNC: 6 MMOL/L (ref 5–15)
AST SERPL-CCNC: 30 U/L (ref 15–37)
BASOPHILS # BLD: 0 K/UL (ref 0–0.1)
BASOPHILS NFR BLD: 1 % (ref 0–1)
BILIRUB SERPL-MCNC: 0.7 MG/DL (ref 0.2–1)
BUN SERPL-MCNC: 12 MG/DL (ref 6–20)
BUN/CREAT SERPL: 12 (ref 12–20)
CALCIUM SERPL-MCNC: 9.2 MG/DL (ref 8.5–10.1)
CHLORIDE SERPL-SCNC: 107 MMOL/L (ref 97–108)
CO2 SERPL-SCNC: 27 MMOL/L (ref 21–32)
CREAT SERPL-MCNC: 1.02 MG/DL (ref 0.55–1.02)
DIFFERENTIAL METHOD BLD: ABNORMAL
EOSINOPHIL # BLD: 0.2 K/UL (ref 0–0.4)
EOSINOPHIL NFR BLD: 2 % (ref 0–7)
ERYTHROCYTE [DISTWIDTH] IN BLOOD BY AUTOMATED COUNT: 15.9 % (ref 11.5–14.5)
GLOBULIN SER CALC-MCNC: 3.8 G/DL (ref 2–4)
GLUCOSE SERPL-MCNC: 104 MG/DL (ref 65–100)
HCT VFR BLD AUTO: 37.8 % (ref 35–47)
HGB BLD-MCNC: 11.8 G/DL (ref 11.5–16)
IMM GRANULOCYTES # BLD AUTO: 0 K/UL (ref 0–0.04)
IMM GRANULOCYTES NFR BLD AUTO: 0 % (ref 0–0.5)
INR PPP: 1 (ref 0.9–1.1)
LYMPHOCYTES # BLD: 1.6 K/UL (ref 0.8–3.5)
LYMPHOCYTES NFR BLD: 21 % (ref 12–49)
MCH RBC QN AUTO: 27.6 PG (ref 26–34)
MCHC RBC AUTO-ENTMCNC: 31.2 G/DL (ref 30–36.5)
MCV RBC AUTO: 88.5 FL (ref 80–99)
MONOCYTES # BLD: 0.8 K/UL (ref 0–1)
MONOCYTES NFR BLD: 11 % (ref 5–13)
NEUTS SEG # BLD: 5 K/UL (ref 1.8–8)
NEUTS SEG NFR BLD: 65 % (ref 32–75)
NRBC # BLD: 0 K/UL (ref 0–0.01)
NRBC BLD-RTO: 0 PER 100 WBC
PLATELET # BLD AUTO: 373 K/UL (ref 150–400)
PMV BLD AUTO: 10.1 FL (ref 8.9–12.9)
POTASSIUM SERPL-SCNC: 3.7 MMOL/L (ref 3.5–5.1)
PROT SERPL-MCNC: 7.2 G/DL (ref 6.4–8.2)
PROTHROMBIN TIME: 10.7 SEC (ref 9–11.1)
RBC # BLD AUTO: 4.27 M/UL (ref 3.8–5.2)
SODIUM SERPL-SCNC: 140 MMOL/L (ref 136–145)
WBC # BLD AUTO: 7.6 K/UL (ref 3.6–11)

## 2024-05-08 ENCOUNTER — HOSPITAL ENCOUNTER (OUTPATIENT)
Facility: HOSPITAL | Age: 64
Discharge: HOME | End: 2024-05-11
Payer: MEDICAID

## 2024-05-08 VITALS
TEMPERATURE: 99.9 F | DIASTOLIC BLOOD PRESSURE: 90 MMHG | BODY MASS INDEX: 39.87 KG/M2 | WEIGHT: 254 LBS | SYSTOLIC BLOOD PRESSURE: 145 MMHG | HEIGHT: 67 IN | RESPIRATION RATE: 23 BRPM | OXYGEN SATURATION: 91 % | HEART RATE: 77 BPM

## 2024-05-08 DIAGNOSIS — C79.70 NSCLC METASTATIC TO ADRENAL GLAND (HCC): ICD-10-CM

## 2024-05-08 DIAGNOSIS — C34.90 NSCLC METASTATIC TO ADRENAL GLAND (HCC): ICD-10-CM

## 2024-05-08 PROCEDURE — 71045 X-RAY EXAM CHEST 1 VIEW: CPT

## 2024-05-08 PROCEDURE — 2580000003 HC RX 258

## 2024-05-08 PROCEDURE — 88333 PATH CONSLTJ SURG CYTO XM 1: CPT

## 2024-05-08 PROCEDURE — 88305 TISSUE EXAM BY PATHOLOGIST: CPT

## 2024-05-08 PROCEDURE — 6360000002 HC RX W HCPCS

## 2024-05-08 PROCEDURE — 99152 MOD SED SAME PHYS/QHP 5/>YRS: CPT

## 2024-05-08 RX ORDER — SODIUM CHLORIDE 9 MG/ML
INJECTION, SOLUTION INTRAVENOUS CONTINUOUS PRN
Status: COMPLETED | OUTPATIENT
Start: 2024-05-08 | End: 2024-05-08

## 2024-05-08 RX ORDER — LOSARTAN POTASSIUM 25 MG/1
25 TABLET ORAL DAILY
COMMUNITY

## 2024-05-08 RX ORDER — NYSTATIN 100000 U/G
CREAM TOPICAL
Qty: 30 G | Refills: 0 | Status: SHIPPED | OUTPATIENT
Start: 2024-05-08

## 2024-05-08 RX ORDER — DEXAMETHASONE 4 MG/1
TABLET ORAL
Qty: 30 TABLET | Refills: 1 | Status: SHIPPED | OUTPATIENT
Start: 2024-05-08

## 2024-05-08 RX ORDER — FENTANYL CITRATE 50 UG/ML
INJECTION, SOLUTION INTRAMUSCULAR; INTRAVENOUS PRN
Status: COMPLETED | OUTPATIENT
Start: 2024-05-08 | End: 2024-05-08

## 2024-05-08 RX ORDER — MIDAZOLAM HYDROCHLORIDE 2 MG/2ML
INJECTION, SOLUTION INTRAMUSCULAR; INTRAVENOUS PRN
Status: COMPLETED | OUTPATIENT
Start: 2024-05-08 | End: 2024-05-08

## 2024-05-08 RX ADMIN — SODIUM CHLORIDE 125 ML/HR: 9 INJECTION, SOLUTION INTRAVENOUS at 09:16

## 2024-05-08 RX ADMIN — FENTANYL CITRATE 25 MCG: 0.05 INJECTION, SOLUTION INTRAMUSCULAR; INTRAVENOUS at 09:25

## 2024-05-08 RX ADMIN — FENTANYL CITRATE 50 MCG: 0.05 INJECTION, SOLUTION INTRAMUSCULAR; INTRAVENOUS at 09:22

## 2024-05-08 RX ADMIN — MIDAZOLAM HYDROCHLORIDE 1 MG: 1 INJECTION, SOLUTION INTRAMUSCULAR; INTRAVENOUS at 09:22

## 2024-05-08 RX ADMIN — MIDAZOLAM HYDROCHLORIDE 0.5 MG: 1 INJECTION, SOLUTION INTRAMUSCULAR; INTRAVENOUS at 09:25

## 2024-05-08 NOTE — PROGRESS NOTES
0740: Pt arrives ambulatory to angio department accompanied by  for CT guided lung bx procedure. All assessments completed and consent was reviewed.  Education given was regarding procedure, moderate sedation, post-procedure care and  management/follow-up. Opportunity for questions was provided and all questions and concerns were addressed.     1000: pt in recovery

## 2024-05-08 NOTE — PROGRESS NOTES
1217: Repeat chest xray at bedside.    1230: Patient given copy of discharge instructions and verbalized understanding.  Patient wheeled to exit via wheelchair. Patient in NAD. No bleeding noted at puncture site.

## 2024-05-08 NOTE — DISCHARGE INSTRUCTIONS
You have received sedation medications today. YOU SHOULD NOT DRIVE FOR 24 HOURS, DO NOT OPERATE HEAVY MACHINERY, DO NOT MAKE ANY LEGAL DECISIONS OR SIGN LEGAL DOCUMENTS FOR 24 HOURS. DO NOT DRINK ALCOHOL, TAKE ANY MEDICATIONS UNLESS PRESCRIBED BY YOUR DOCTOR. IF YOU ARE A CAREGIVER, SOMEONE SHOULD TAKE THAT ROLE FOR 24 HOURS.       Side effects of sedation medications and other medications used today have been reviewed  Those side effects can include but are not limited to: dizziness, drowsiness, poor balance, fatigue, sleepiness. Take precautions at home to prevent falls, such as assistance with walking or stairs if allowed and /or when needed or position changes. Allergic or adverse reactions could include nausea, itching, hives, dizziness, or anything else out of the ordinary.       Should you experience any of these significant changes, please call [phone] between the hours of 7:30 am and 3:30 pm or [phone] after hours. After hours, ask the  to page the X-ray Technologist, and describe the problem to the technologist. If you are experiencing chest pain, shortness of breath, altered mental state, unusual bleeding or any other emergent symptom you should call [phone] immediately.           Percutaneous Lung Biopsy: What to Expect at Home  Your Recovery     A needle biopsy of the lung is a procedure to take a sample (biopsy) of lung tissue. The doctor puts a long needle through your chest wall to get the sample. Another doctor will look at the lung tissue with a microscope to check for infection, cancer, or other lung problems.  You may be sore where the doctor made the cut (incision) in your skin and put in the biopsy needle. You may feel some pain in your lung when you take a deep breath. These symptoms usually get better in a few days. If you cough up mucus, there may be streaks of blood in the mucus for the first week after the procedure.  You may need to take it easy at home for a day or two

## 2024-05-08 NOTE — PROGRESS NOTES
INTERVENTIONAL RADIOLOGY  Preoperative History and Physical      Patient:  Stephie Michaud  :  1960  Age:  63 y.o.  MRN:  [id number]  Today's Date:  2024      CC / HPI   Stephie Michaud is a 63 y.o. female with a history of lung cancer with progression of disease who presents for CT guided lung biopsy.    PAST MEDICAL HISTORY  Past Medical History:   Diagnosis Date    COPD (chronic obstructive pulmonary disease) (HCC)     Diabetes mellitus type 2, noninsulin dependent (HCC)     Generalized anxiety disorder     Hypercholesterolemia     Hypertension     Lung cancer (HCC) 2022    Neoplasm of major salivary gland        PAST SURGICAL HISTORY  Past Surgical History:   Procedure Laterality Date    ANTERIOR CRUCIATE LIGAMENT REPAIR      COLONOSCOPY      COLONOSCOPY N/A 2021    .COLONOSCOPY  :- performed by Miguel Omalley MD at [facility] ENDOSCOPY    COLONOSCOPY N/A 2024    COLONOSCOPY performed by Miguel Omalley MD at [facility] ENDOSCOPY    HYSTERECTOMY (CERVIX STATUS UNKNOWN)      HYSTERECTOMY, VAGINAL      IR INSERT TUNNELED PLEURA CATH W CUFF  2022    IR INSERT TUNNELED PLEURA CATH W CUFF 2022 [facility] RAD ANGIO IR    IR INSERT TUNNELED PLEURA CATH W CUFF  2022    IR PORT PLACEMENT EQUAL OR GREATER THAN 5 YEARS  2022    IR PORT PLACEMENT EQUAL OR GREATER THAN 5 YEARS 2022 [facility] RAD ANGIO IR    IR PORT PLACEMENT EQUAL OR GREATER THAN 5 YEARS  2022    IR REMOVE LUNG/PLEURAL DRAIN/CATHETER  2022       SOCIAL HISTORY  Social History     Socioeconomic History    Marital status:      Spouse name: Not on file    Number of children: Not on file    Years of education: Not on file    Highest education level: Not on file   Occupational History    Not on file   Tobacco Use    Smoking status: Former     Current packs/day: 0.00     Average packs/day: 0.5 packs/day for 15.0 years (7.5 ttl pk-yrs)     Types: Cigarettes     Start date: 2007     Quit date: 2022     Years

## 2024-05-21 ENCOUNTER — TELEPHONE (OUTPATIENT)
Facility: HOSPITAL | Age: 64
End: 2024-05-21

## 2024-05-23 ENCOUNTER — HOSPITAL ENCOUNTER (OUTPATIENT)
Facility: HOSPITAL | Age: 64
Setting detail: INFUSION SERIES
End: 2024-05-23

## 2024-06-13 DIAGNOSIS — C79.70 NSCLC METASTATIC TO ADRENAL GLAND (HCC): ICD-10-CM

## 2024-06-13 DIAGNOSIS — C34.90 NSCLC METASTATIC TO ADRENAL GLAND (HCC): ICD-10-CM

## 2024-06-13 RX ORDER — FOLIC ACID 1 MG/1
1000 TABLET ORAL DAILY
Qty: 30 TABLET | Refills: 6 | Status: SHIPPED | OUTPATIENT
Start: 2024-06-13

## 2024-06-13 RX ORDER — FOLIC ACID 1 MG/1
1000 TABLET ORAL DAILY
Qty: 30 TABLET | Refills: 6 | OUTPATIENT
Start: 2024-06-13

## 2024-07-23 NOTE — ED TRIAGE NOTES
Pt arrives with c/o shortness of breath. Pt able to get out short sentences with some difficulty. Denies CP but has had chest discomfort.  Denies Cough/fever
Left arm;

## 2024-08-08 ENCOUNTER — HOSPITAL ENCOUNTER (EMERGENCY)
Facility: HOSPITAL | Age: 64
Discharge: HOME | End: 2024-08-08
Attending: STUDENT IN AN ORGANIZED HEALTH CARE EDUCATION/TRAINING PROGRAM
Payer: MEDICARE

## 2024-08-08 VITALS
OXYGEN SATURATION: 100 % | TEMPERATURE: 97.9 F | HEART RATE: 71 BPM | BODY MASS INDEX: 37.81 KG/M2 | RESPIRATION RATE: 16 BRPM | SYSTOLIC BLOOD PRESSURE: 125 MMHG | WEIGHT: 241.4 LBS | DIASTOLIC BLOOD PRESSURE: 76 MMHG

## 2024-08-08 DIAGNOSIS — M54.50 CHRONIC RIGHT-SIDED LOW BACK PAIN WITHOUT SCIATICA: Primary | ICD-10-CM

## 2024-08-08 DIAGNOSIS — G89.29 CHRONIC RIGHT-SIDED LOW BACK PAIN WITHOUT SCIATICA: Primary | ICD-10-CM

## 2024-08-08 PROCEDURE — 6370000000 HC RX 637 (ALT 250 FOR IP): Performed by: STUDENT IN AN ORGANIZED HEALTH CARE EDUCATION/TRAINING PROGRAM

## 2024-08-08 PROCEDURE — 99284 EMERGENCY DEPT VISIT MOD MDM: CPT

## 2024-08-08 PROCEDURE — 96372 THER/PROPH/DIAG INJ SC/IM: CPT

## 2024-08-08 PROCEDURE — 6360000002 HC RX W HCPCS: Performed by: STUDENT IN AN ORGANIZED HEALTH CARE EDUCATION/TRAINING PROGRAM

## 2024-08-08 RX ORDER — LIDOCAINE 4 G/G
1 PATCH TOPICAL DAILY
Qty: 7 EACH | Refills: 0 | Status: SHIPPED | OUTPATIENT
Start: 2024-08-08 | End: 2024-08-15

## 2024-08-08 RX ORDER — KETOROLAC TROMETHAMINE 30 MG/ML
30 INJECTION, SOLUTION INTRAMUSCULAR; INTRAVENOUS
Status: COMPLETED | OUTPATIENT
Start: 2024-08-08 | End: 2024-08-08

## 2024-08-08 RX ORDER — ACETAMINOPHEN 500 MG
1000 TABLET ORAL
Status: COMPLETED | OUTPATIENT
Start: 2024-08-08 | End: 2024-08-08

## 2024-08-08 RX ORDER — ACETAMINOPHEN 325 MG/1
650 TABLET ORAL EVERY 6 HOURS PRN
Qty: 56 TABLET | Refills: 0 | Status: SHIPPED | OUTPATIENT
Start: 2024-08-08 | End: 2024-08-15

## 2024-08-08 RX ORDER — OXYCODONE HYDROCHLORIDE 5 MG/1
5 TABLET ORAL EVERY 8 HOURS PRN
Qty: 6 TABLET | Refills: 0 | Status: SHIPPED | OUTPATIENT
Start: 2024-08-08 | End: 2024-08-10

## 2024-08-08 RX ORDER — LIDOCAINE 4 G/G
1 PATCH TOPICAL
Status: DISCONTINUED | OUTPATIENT
Start: 2024-08-08 | End: 2024-08-08 | Stop reason: HOSPADM

## 2024-08-08 RX ADMIN — ACETAMINOPHEN 1000 MG: 500 TABLET ORAL at 13:40

## 2024-08-08 RX ADMIN — KETOROLAC TROMETHAMINE 30 MG: 30 INJECTION, SOLUTION INTRAMUSCULAR at 13:40

## 2024-08-08 ASSESSMENT — PAIN SCALES - GENERAL
PAINLEVEL_OUTOF10: 8
PAINLEVEL_OUTOF10: 7

## 2024-08-08 NOTE — ED PROVIDER NOTES
Rash       CURRENT MEDICATIONS      Previous Medications    ACETAMINOPHEN-CODEINE (TYLENOL #3) 300-30 MG PER TABLET    Take 500 mg by mouth every 6 hours as needed.    ALPRAZOLAM (XANAX) 0.25 MG TABLET    Take by mouth.    AMLODIPINE (NORVASC) 5 MG TABLET    Take 1 tablet by mouth daily    ASPIRIN 81 MG CHEWABLE TABLET    Take 1 tablet by mouth daily    DEXAMETHASONE (DECADRON) 4 MG TABLET    TAKE ONE TABLET BY MOUTH TWICE A DAY (THE DAY BEFORE CHEMOTHERAPY AND THE DAY AFTER CHEMOTHERAPY)    ESCITALOPRAM (LEXAPRO) 20 MG TABLET    Take 1 tablet by mouth daily    EXENATIDE (BYETTA 5 MCG PEN) 5 MCG/0.02ML INJECTION    Inject within 30 mins before eating Subcutaneous Twice a day for 30 days    FOLIC ACID (FOLVITE) 1 MG TABLET    Take 1 tablet by mouth daily    LIDOCAINE-PRILOCAINE (EMLA) 2.5-2.5 % CREAM    Apply topically as needed (port access) Apply a thin layer to port 30-60 minutes prior to arrival for chemotherapy treatment.    LOSARTAN (COZAAR) 25 MG TABLET    Take 1 tablet by mouth daily    METFORMIN (GLUCOPHAGE) 850 MG TABLET    Take 1 tablet by mouth 2 times daily (with meals)    NYSTATIN (MYCOSTATIN) 111203 UNIT/GM CREAM    APPLY TO THE AFFECTED AREA(S) 2 TIMES A DAY    PANTOPRAZOLE (PROTONIX) 40 MG TABLET    Take 1 tablet by mouth every morning (before breakfast)    SIMVASTATIN (ZOCOR) 20 MG TABLET    Take 1 tablet by mouth nightly    TRULICITY 0.75 MG/0.5ML SOPN        UMECLIDINIUM-VILANTEROL (ANORO ELLIPTA) 62.5-25 MCG/ACT INHALER    INHALE 1 DOSE BY MOUTH DAILY       SCREENINGS               No data recorded         PHYSICAL EXAM      ED Triage Vitals   Enc Vitals Group      BP 08/08/24 1254 125/76      Pulse 08/08/24 1254 71      Respirations 08/08/24 1254 16      Temp 08/08/24 1254 97.9 °F (36.6 °C)      Temp src --       SpO2 08/08/24 1254 100 %      Weight - Scale 08/08/24 1245 109.5 kg (241 lb 6.5 oz)      Height --       Head Circumference --       Peak Flow --       Pain Score --       Pain Loc --

## 2024-08-08 NOTE — ED NOTES
Pt given dc instructions and education. 4 prescriptions sent over to preferred pharmacy. Pt verbalized to follow up with pcp and return to ED  should symptoms worsen.

## 2024-09-10 DIAGNOSIS — C34.90 NSCLC METASTATIC TO ADRENAL GLAND (HCC): ICD-10-CM

## 2024-09-10 DIAGNOSIS — C79.70 NSCLC METASTATIC TO ADRENAL GLAND (HCC): ICD-10-CM

## 2024-09-10 RX ORDER — PANTOPRAZOLE SODIUM 40 MG/1
40 TABLET, DELAYED RELEASE ORAL
Qty: 90 TABLET | Refills: 3 | Status: SHIPPED | OUTPATIENT
Start: 2024-09-10

## 2025-04-08 ENCOUNTER — TELEPHONE (OUTPATIENT)
Age: 65
End: 2025-04-08

## 2025-04-08 ENCOUNTER — OFFICE VISIT (OUTPATIENT)
Age: 65
End: 2025-04-08
Payer: MEDICARE

## 2025-04-08 ENCOUNTER — CLINICAL DOCUMENTATION (OUTPATIENT)
Age: 65
End: 2025-04-08

## 2025-04-08 VITALS
SYSTOLIC BLOOD PRESSURE: 138 MMHG | HEART RATE: 84 BPM | WEIGHT: 223 LBS | RESPIRATION RATE: 20 BRPM | TEMPERATURE: 97.4 F | OXYGEN SATURATION: 96 % | DIASTOLIC BLOOD PRESSURE: 85 MMHG | BODY MASS INDEX: 34.93 KG/M2

## 2025-04-08 DIAGNOSIS — C34.90 NSCLC METASTATIC TO ADRENAL GLAND (HCC): Primary | ICD-10-CM

## 2025-04-08 DIAGNOSIS — Z79.899 OTHER LONG TERM (CURRENT) DRUG THERAPY: ICD-10-CM

## 2025-04-08 DIAGNOSIS — C79.70 NSCLC METASTATIC TO ADRENAL GLAND (HCC): Primary | ICD-10-CM

## 2025-04-08 PROCEDURE — 1090F PRES/ABSN URINE INCON ASSESS: CPT | Performed by: INTERNAL MEDICINE

## 2025-04-08 PROCEDURE — G8400 PT W/DXA NO RESULTS DOC: HCPCS | Performed by: INTERNAL MEDICINE

## 2025-04-08 PROCEDURE — 99215 OFFICE O/P EST HI 40 MIN: CPT | Performed by: INTERNAL MEDICINE

## 2025-04-08 PROCEDURE — G8417 CALC BMI ABV UP PARAM F/U: HCPCS | Performed by: INTERNAL MEDICINE

## 2025-04-08 PROCEDURE — 1036F TOBACCO NON-USER: CPT | Performed by: INTERNAL MEDICINE

## 2025-04-08 PROCEDURE — 1123F ACP DISCUSS/DSCN MKR DOCD: CPT | Performed by: INTERNAL MEDICINE

## 2025-04-08 PROCEDURE — 3017F COLORECTAL CA SCREEN DOC REV: CPT | Performed by: INTERNAL MEDICINE

## 2025-04-08 PROCEDURE — G8428 CUR MEDS NOT DOCUMENT: HCPCS | Performed by: INTERNAL MEDICINE

## 2025-04-08 RX ORDER — OXYCODONE HYDROCHLORIDE 5 MG/1
5 CAPSULE ORAL NIGHTLY PRN
Qty: 30 CAPSULE | Refills: 0 | Status: SHIPPED | OUTPATIENT
Start: 2025-04-08 | End: 2025-04-09

## 2025-04-08 RX ORDER — FERROUS SULFATE 325(65) MG
325 TABLET ORAL EVERY OTHER DAY
COMMUNITY
Start: 2025-03-27

## 2025-04-08 RX ORDER — ONDANSETRON 8 MG/1
8 TABLET, ORALLY DISINTEGRATING ORAL EVERY 8 HOURS PRN
Qty: 30 TABLET | Refills: 2 | Status: SHIPPED | OUTPATIENT
Start: 2025-04-08

## 2025-04-08 NOTE — TELEPHONE ENCOUNTER
HIPAA x 3  SW Nhung in [place] to see about scheduling pt for Left Supraclav USG biopsy, Nhung let me know order will need to be changed to USG biopsy lymph node.  She also let me know pt will need to call scheduling to get this scheduled.     HIPAA x 3  SW pt to let her know that she needs to call Central Scheduling to schedule her appt. Provided pt with number [phone]. Pt voiced understanding and was appreciative of my call.

## 2025-04-08 NOTE — PROGRESS NOTES
Pharmacy Note- Immunotherapy Education    Stephie Michaud is a  64 y.o.female  diagnosed with NSCLC here today for  immunotherapy counseling and consent. Ms. Mcihaud is being treated with ramucirumab and pembrolizumab and premedications - diphenhydramine.     Refill for ondansetron sent to pharmacy.     [place] plan built for ramucirumab and pembrolizumab.    Ms. Michaud was provided information regarding the risks of immune-mediated adverse reactions secondary to pembrolizumab that may require interruption/delay of treatment and possible use of corticosteroids.  These reactions include, but are not limited to: colitis (diarrhea or severe abdominal pain); hepatitis (jaundice, severe nausea, or vomiting, easy bruising, and/or bleeding); hypophysitis (persistent or unusual headache, extreme weakness, dizziness/fainting, and/or vision changes); dermatitis (skin rash, mouth sores, skin blisters, and/or skin peeling); ocular toxicity (uveitis, iritis, and/or episcleritis); neuropathy (motor or sensory); hyper/hypothyroidism.    Side effects of oncology treatment reviewed included s/s  appetite changes,  fatigue, skin and nail changes, diarrhea/constipation, infusion reactions, impaired wound healing, kidney changes, bleeding/clot risk and high blood pressure.     Patient given ways to manage these side effects and when to contact office.     [facility] [place] Handout of medications provided to patient. Ms. Michaud verbalized understanding of the information presented and all of the patient's questions were answered.    Francisca Marquez, PharmD, BCPS, BCOP    For Pharmacy Admin Tracking Only    Program: Medical Group  CPA in place:  Yes  Recommendation Provided To: Patient/Caregiver: 2 via In person  Intervention Detail: Refill(s) Provided  Intervention Accepted By: Patient/Caregiver: 2    Time Spent (min): 20

## 2025-04-08 NOTE — PROGRESS NOTES
Stephie Michaud is a 64 y.o. female    Chief Complaint   Patient presents with    Follow-up      stage IV NSCLC- PDL1- 15%, no  mutations        1. Have you been to the ER, urgent care clinic since your last visit?  Hospitalized since your last visit?No    2. Have you seen or consulted any other health care providers outside of the [facility] since your last visit?  Include any pap smears or colon screening. Yes, [facility]      
Results   Component Value Date/Time     05/06/2024 02:08 PM    K 3.7 05/06/2024 02:08 PM     05/06/2024 02:08 PM    CO2 27 05/06/2024 02:08 PM    BUN 12 05/06/2024 02:08 PM    GFRAA >60 09/22/2022 10:09 AM     Lab Results   Component Value Date/Time    ALT 31 05/06/2024 02:08 PM    AST 30 05/06/2024 02:08 PM    GLOB 3.8 05/06/2024 02:08 PM            Records reviewed and summarized above.   Pathology report(s) reviewed above.   Radiology report(s) reviewed above.   CT chest abdomen pelvis 5/5/2022      IMPRESSION   Diminished left-sided pleural effusion, status post thoracentesis with stable   mass lesion at the left apex.    Mass lesion demonstrated at the left apex is not amenable to percutaneous   sampling.    There are numerous additional nodular densities along the pleural margin   consistent with pleural carcinomatosis.    There are additional small nodular densities demonstrated in the peritoneum,   these most consistent with peritoneal carcinomatosis. Small adrenal nodule on   the left likely an additional metastatic focus.   No definitive target for percutaneous sampling. Currently awaiting results of   pleural fluid analysis/cytology and cellblock.      MRI brain 5/2022         1. No evidence of intracranial metastases.   2. Minimal chronic microvascular ischemic disease. No acute intracranial   abnormality.   3. Enhancing lesions in the bilateral parotid glands, largest in the left   parotid gland measuring 3.6 cm. 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 The mass lesion left lung apex is noted and has mildly decreased.   2. Pleural metastases on left side and decreased in size. Left pleural effusion   has decreased and there is improved aeration in the left lung.   3. Left adrenal nodule is stable.   4. Linear irregular nodular densities in the anterior

## 2025-04-09 ENCOUNTER — TELEPHONE (OUTPATIENT)
Age: 65
End: 2025-04-09

## 2025-04-09 DIAGNOSIS — C34.90 NSCLC METASTATIC TO ADRENAL GLAND (HCC): Primary | ICD-10-CM

## 2025-04-09 DIAGNOSIS — C79.70 NSCLC METASTATIC TO ADRENAL GLAND (HCC): Primary | ICD-10-CM

## 2025-04-09 RX ORDER — OXYCODONE HYDROCHLORIDE 5 MG/1
5 TABLET ORAL NIGHTLY
Qty: 30 TABLET | Refills: 0 | Status: SHIPPED | OUTPATIENT
Start: 2025-04-09 | End: 2025-05-09

## 2025-04-09 NOTE — TELEPHONE ENCOUNTER
Lorraine from [facility] pharmacy called and stated that the patients insurance will not pay for the oxycodone capsules and wanted to know if it could be resent for the tablets.     # [phone]

## 2025-04-11 ENCOUNTER — CLINICAL DOCUMENTATION (OUTPATIENT)
Age: 65
End: 2025-04-11

## 2025-04-11 NOTE — PROGRESS NOTES
Received denial for Ramucirumab and pembrolizumab.   Appeal to overturn decision written and faxed to [facility] Appeals department.

## 2025-04-16 ENCOUNTER — TELEPHONE (OUTPATIENT)
Age: 65
End: 2025-04-16

## 2025-04-16 DIAGNOSIS — C34.90 NSCLC METASTATIC TO ADRENAL GLAND (HCC): Primary | ICD-10-CM

## 2025-04-16 DIAGNOSIS — C79.70 NSCLC METASTATIC TO ADRENAL GLAND (HCC): Primary | ICD-10-CM

## 2025-04-16 NOTE — TELEPHONE ENCOUNTER
Pt called to inquire when she would begin her chemo medications again; stated she was told she needed insurance authorization and if waiting until after scans completed. Pt also inquired if there's anything she can take for energy? 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 Call back number is [phone].     Pt stated if she does not answer then please leave vm because she's more than likely taking care of mother.

## 2025-04-16 NOTE — TELEPHONE ENCOUNTER
HIPAA x 3  SW pt to let her know that we're still awaiting the appeal for her treatment and to also let her know that she will need to see palliative care to discuss medication to increase energy. I inquired if she is established with palliative care or if she would need a referral. Pt stated she will need a referral for palliative care. I let her know I will let Carolyn Anna NP know to place a referral. Inquired if pt ok to see [facility] palliative care pt stated [facility] palliative would be fine. Pt voiced understanding and was appreciative of my call.

## 2025-04-17 ENCOUNTER — TELEPHONE (OUTPATIENT)
Age: 65
End: 2025-04-17

## 2025-04-28 DIAGNOSIS — Z79.899 OTHER LONG TERM (CURRENT) DRUG THERAPY: Primary | ICD-10-CM

## 2025-04-28 DIAGNOSIS — C34.90 NSCLC METASTATIC TO ADRENAL GLAND (HCC): ICD-10-CM

## 2025-04-28 DIAGNOSIS — C79.70 NSCLC METASTATIC TO ADRENAL GLAND (HCC): ICD-10-CM

## 2025-04-28 RX ORDER — EPINEPHRINE 1 MG/ML
0.3 INJECTION, SOLUTION, CONCENTRATE INTRAVENOUS PRN
OUTPATIENT
Start: 2025-05-08

## 2025-04-28 RX ORDER — SODIUM CHLORIDE 9 MG/ML
INJECTION, SOLUTION INTRAVENOUS CONTINUOUS
OUTPATIENT
Start: 2025-05-08

## 2025-04-28 RX ORDER — ALBUTEROL SULFATE 90 UG/1
4 INHALANT RESPIRATORY (INHALATION) PRN
OUTPATIENT
Start: 2025-05-08

## 2025-04-28 RX ORDER — SODIUM CHLORIDE 9 MG/ML
5-250 INJECTION, SOLUTION INTRAVENOUS PRN
OUTPATIENT
Start: 2025-05-08

## 2025-04-28 RX ORDER — HYDROCORTISONE SODIUM SUCCINATE 100 MG/2ML
100 INJECTION INTRAMUSCULAR; INTRAVENOUS
OUTPATIENT
Start: 2025-05-08

## 2025-04-28 RX ORDER — DIPHENHYDRAMINE HYDROCHLORIDE 50 MG/ML
50 INJECTION, SOLUTION INTRAMUSCULAR; INTRAVENOUS ONCE
OUTPATIENT
Start: 2025-05-08 | End: 2025-05-08

## 2025-04-28 RX ORDER — SODIUM CHLORIDE 0.9 % (FLUSH) 0.9 %
5-40 SYRINGE (ML) INJECTION PRN
OUTPATIENT
Start: 2025-05-08

## 2025-04-28 RX ORDER — ONDANSETRON 2 MG/ML
8 INJECTION INTRAMUSCULAR; INTRAVENOUS
OUTPATIENT
Start: 2025-05-08

## 2025-04-28 RX ORDER — MEPERIDINE HYDROCHLORIDE 25 MG/ML
12.5 INJECTION INTRAMUSCULAR; INTRAVENOUS; SUBCUTANEOUS PRN
OUTPATIENT
Start: 2025-05-08

## 2025-04-28 RX ORDER — ACETAMINOPHEN 325 MG/1
650 TABLET ORAL
OUTPATIENT
Start: 2025-05-08

## 2025-04-28 RX ORDER — HEPARIN 100 UNIT/ML
500 SYRINGE INTRAVENOUS PRN
OUTPATIENT
Start: 2025-05-08

## 2025-04-28 RX ORDER — DIPHENHYDRAMINE HYDROCHLORIDE 50 MG/ML
50 INJECTION, SOLUTION INTRAMUSCULAR; INTRAVENOUS
OUTPATIENT
Start: 2025-05-08

## 2025-04-29 ENCOUNTER — HOSPITAL ENCOUNTER (OUTPATIENT)
Facility: HOSPITAL | Age: 65
Discharge: HOME | End: 2025-05-02
Payer: MEDICARE

## 2025-04-29 ENCOUNTER — HOSPITAL ENCOUNTER (OUTPATIENT)
Facility: HOSPITAL | Age: 65
Discharge: HOME | End: 2025-05-02
Attending: INTERNAL MEDICINE
Payer: MEDICARE

## 2025-04-29 DIAGNOSIS — C79.70 NSCLC METASTATIC TO ADRENAL GLAND (HCC): ICD-10-CM

## 2025-04-29 DIAGNOSIS — C34.90 NSCLC METASTATIC TO ADRENAL GLAND (HCC): ICD-10-CM

## 2025-04-29 PROCEDURE — 70553 MRI BRAIN STEM W/O & W/DYE: CPT

## 2025-04-29 PROCEDURE — 88342 IMHCHEM/IMCYTCHM 1ST ANTB: CPT

## 2025-04-29 PROCEDURE — 76942 ECHO GUIDE FOR BIOPSY: CPT

## 2025-04-29 PROCEDURE — 88333 PATH CONSLTJ SURG CYTO XM 1: CPT

## 2025-04-29 PROCEDURE — 88341 IMHCHEM/IMCYTCHM EA ADD ANTB: CPT

## 2025-04-29 PROCEDURE — 6360000004 HC RX CONTRAST MEDICATION: Performed by: INTERNAL MEDICINE

## 2025-04-29 PROCEDURE — 88305 TISSUE EXAM BY PATHOLOGIST: CPT

## 2025-04-29 PROCEDURE — A9579 GAD-BASE MR CONTRAST NOS,1ML: HCPCS | Performed by: INTERNAL MEDICINE

## 2025-04-29 RX ADMIN — GADOTERIDOL 20 ML: 279.3 INJECTION, SOLUTION INTRAVENOUS at 15:22

## 2025-04-29 NOTE — TELEPHONE ENCOUNTER
[facility] Palliative Medicine Office  Nursing Note  [phone]  Fax [phone]      Name:  Stephie Michaud  YOB: 1960    Received outpatient Palliative Medicine referral from Carolyn Anna NP to see patient for symptom management and supportive care. Chart  reviewed. Stephie Michaud is a 64 y.o. female with NSCLC with mets to adrenal gland.      Nurse called patient to explain [facility] outpatient Palliative Medicine services and to schedule appointment.  No answer, nurse left message requesting call back.     Carole Bhakta RN, Gerontological Nursing-BC, PN

## 2025-05-05 ENCOUNTER — TELEPHONE (OUTPATIENT)
Age: 65
End: 2025-05-05

## 2025-05-05 NOTE — TELEPHONE ENCOUNTER
Pt called to inquire if she should still take the 2 tablets of dexamethasone 4mg prior to and after chemo treatment (starts Thursday). Pt also stated she has 1  refill of 30 tablets that expires 5/7/25 but she has plenty. Call back number is [phone]. Pt said to leave message with her sister or VM as she has an appt today at 12.

## 2025-05-06 ENCOUNTER — TELEPHONE (OUTPATIENT)
Age: 65
End: 2025-05-06

## 2025-05-06 NOTE — TELEPHONE ENCOUNTER
[facility] Outpatient Palliative Medicine Office  Nursing Note  [phone]  Fax [phone]     Name:  Stephie Michaud  YOB: 1960     Call #2 to follow up on outpatient Palliative Medicine referral.  Patient states that she was referred to outpatient Palliative Medicine due to lack of energy.  Patient reports that she is feeling much better since her chemo stopped.  She would like to hold off on seeing Palliative Medicine.    Carole Bhakta RN, Gerontological Nursing-[facility]  Palliative Medicine  [phone]

## 2025-05-08 ENCOUNTER — CLINICAL DOCUMENTATION (OUTPATIENT)
Age: 65
End: 2025-05-08

## 2025-05-08 ENCOUNTER — OFFICE VISIT (OUTPATIENT)
Age: 65
End: 2025-05-08
Payer: MEDICARE

## 2025-05-08 ENCOUNTER — HOSPITAL ENCOUNTER (OUTPATIENT)
Facility: HOSPITAL | Age: 65
Setting detail: INFUSION SERIES
Discharge: HOME | End: 2025-05-08
Payer: MEDICARE

## 2025-05-08 VITALS
RESPIRATION RATE: 18 BRPM | BODY MASS INDEX: 33.52 KG/M2 | DIASTOLIC BLOOD PRESSURE: 84 MMHG | TEMPERATURE: 97.6 F | OXYGEN SATURATION: 95 % | HEART RATE: 84 BPM | WEIGHT: 214 LBS | SYSTOLIC BLOOD PRESSURE: 128 MMHG

## 2025-05-08 VITALS
WEIGHT: 214.2 LBS | BODY MASS INDEX: 33.62 KG/M2 | RESPIRATION RATE: 18 BRPM | HEIGHT: 67 IN | SYSTOLIC BLOOD PRESSURE: 145 MMHG | HEART RATE: 90 BPM | TEMPERATURE: 97.6 F | OXYGEN SATURATION: 95 % | DIASTOLIC BLOOD PRESSURE: 91 MMHG

## 2025-05-08 VITALS — SYSTOLIC BLOOD PRESSURE: 151 MMHG | DIASTOLIC BLOOD PRESSURE: 79 MMHG | HEART RATE: 75 BPM

## 2025-05-08 DIAGNOSIS — C34.90 NSCLC METASTATIC TO ADRENAL GLAND (HCC): Primary | ICD-10-CM

## 2025-05-08 DIAGNOSIS — C79.70 NSCLC METASTATIC TO ADRENAL GLAND (HCC): ICD-10-CM

## 2025-05-08 DIAGNOSIS — Z79.899 OTHER LONG TERM (CURRENT) DRUG THERAPY: ICD-10-CM

## 2025-05-08 DIAGNOSIS — C79.70 NSCLC METASTATIC TO ADRENAL GLAND (HCC): Primary | ICD-10-CM

## 2025-05-08 DIAGNOSIS — C34.90 NSCLC METASTATIC TO ADRENAL GLAND (HCC): ICD-10-CM

## 2025-05-08 LAB
ALBUMIN SERPL-MCNC: 3.1 G/DL (ref 3.5–5)
ALBUMIN/GLOB SERPL: 0.7 (ref 1.1–2.2)
ALP SERPL-CCNC: 94 U/L (ref 45–117)
ALT SERPL-CCNC: 11 U/L (ref 12–78)
ANION GAP SERPL CALC-SCNC: 5 MMOL/L (ref 2–12)
APPEARANCE UR: CLEAR
AST SERPL-CCNC: 14 U/L (ref 15–37)
BACTERIA URNS QL MICRO: NEGATIVE /HPF
BASOPHILS # BLD: 0.03 K/UL (ref 0–0.1)
BASOPHILS NFR BLD: 0.4 % (ref 0–1)
BILIRUB SERPL-MCNC: 0.8 MG/DL (ref 0.2–1)
BILIRUB UR QL CFM: NEGATIVE
BUN SERPL-MCNC: 10 MG/DL (ref 6–20)
BUN/CREAT SERPL: 14 (ref 12–20)
CALCIUM SERPL-MCNC: 8.8 MG/DL (ref 8.5–10.1)
CHLORIDE SERPL-SCNC: 106 MMOL/L (ref 97–108)
CO2 SERPL-SCNC: 27 MMOL/L (ref 21–32)
COLOR UR: ABNORMAL
CORTIS SERPL-MCNC: 11.8 UG/DL
CREAT SERPL-MCNC: 0.72 MG/DL (ref 0.55–1.02)
DIFFERENTIAL METHOD BLD: ABNORMAL
EOSINOPHIL # BLD: 0.24 K/UL (ref 0–0.4)
EOSINOPHIL NFR BLD: 3.1 % (ref 0–7)
EPITH CASTS URNS QL MICRO: ABNORMAL /LPF
ERYTHROCYTE [DISTWIDTH] IN BLOOD BY AUTOMATED COUNT: 15.9 % (ref 11.5–14.5)
GLOBULIN SER CALC-MCNC: 4.3 G/DL (ref 2–4)
GLUCOSE SERPL-MCNC: 104 MG/DL (ref 65–100)
GLUCOSE UR STRIP.AUTO-MCNC: NEGATIVE MG/DL
HCT VFR BLD AUTO: 32.3 % (ref 35–47)
HGB BLD-MCNC: 10 G/DL (ref 11.5–16)
HGB UR QL STRIP: NEGATIVE
HYALINE CASTS URNS QL MICRO: ABNORMAL /LPF (ref 0–5)
IMM GRANULOCYTES # BLD AUTO: 0.02 K/UL (ref 0–0.04)
IMM GRANULOCYTES NFR BLD AUTO: 0.3 % (ref 0–0.5)
KETONES UR QL STRIP.AUTO: ABNORMAL MG/DL
LEUKOCYTE ESTERASE UR QL STRIP.AUTO: ABNORMAL
LYMPHOCYTES # BLD: 1.16 K/UL (ref 0.8–3.5)
LYMPHOCYTES NFR BLD: 15.1 % (ref 12–49)
MCH RBC QN AUTO: 25.4 PG (ref 26–34)
MCHC RBC AUTO-ENTMCNC: 31 G/DL (ref 30–36.5)
MCV RBC AUTO: 82.2 FL (ref 80–99)
MONOCYTES # BLD: 0.58 K/UL (ref 0–1)
MONOCYTES NFR BLD: 7.6 % (ref 5–13)
NEUTS SEG # BLD: 5.65 K/UL (ref 1.8–8)
NEUTS SEG NFR BLD: 73.5 % (ref 32–75)
NITRITE UR QL STRIP.AUTO: NEGATIVE
NRBC # BLD: 0 K/UL (ref 0–0.01)
NRBC BLD-RTO: 0 PER 100 WBC
PH UR STRIP: 6 (ref 5–8)
PLATELET # BLD AUTO: 342 K/UL (ref 150–400)
PMV BLD AUTO: 10 FL (ref 8.9–12.9)
POTASSIUM SERPL-SCNC: 3.1 MMOL/L (ref 3.5–5.1)
PROT SERPL-MCNC: 7.4 G/DL (ref 6.4–8.2)
PROT UR STRIP-MCNC: 30 MG/DL
RBC # BLD AUTO: 3.93 M/UL (ref 3.8–5.2)
RBC #/AREA URNS HPF: ABNORMAL /HPF (ref 0–5)
SODIUM SERPL-SCNC: 138 MMOL/L (ref 136–145)
SP GR UR REFRACTOMETRY: 1.03 (ref 1–1.03)
TSH SERPL DL<=0.05 MIU/L-ACNC: 1.32 UIU/ML (ref 0.36–3.74)
UROBILINOGEN UR QL STRIP.AUTO: 1 EU/DL (ref 0.2–1)
WBC # BLD AUTO: 7.7 K/UL (ref 3.6–11)
WBC URNS QL MICRO: ABNORMAL /HPF (ref 0–4)

## 2025-05-08 PROCEDURE — 1123F ACP DISCUSS/DSCN MKR DOCD: CPT | Performed by: INTERNAL MEDICINE

## 2025-05-08 PROCEDURE — 1036F TOBACCO NON-USER: CPT | Performed by: INTERNAL MEDICINE

## 2025-05-08 PROCEDURE — G8417 CALC BMI ABV UP PARAM F/U: HCPCS | Performed by: INTERNAL MEDICINE

## 2025-05-08 PROCEDURE — 6360000002 HC RX W HCPCS: Performed by: INTERNAL MEDICINE

## 2025-05-08 PROCEDURE — 96417 CHEMO IV INFUS EACH ADDL SEQ: CPT

## 2025-05-08 PROCEDURE — 1090F PRES/ABSN URINE INCON ASSESS: CPT | Performed by: INTERNAL MEDICINE

## 2025-05-08 PROCEDURE — 99215 OFFICE O/P EST HI 40 MIN: CPT | Performed by: INTERNAL MEDICINE

## 2025-05-08 PROCEDURE — 3017F COLORECTAL CA SCREEN DOC REV: CPT | Performed by: INTERNAL MEDICINE

## 2025-05-08 PROCEDURE — 6370000000 HC RX 637 (ALT 250 FOR IP): Performed by: INTERNAL MEDICINE

## 2025-05-08 PROCEDURE — 80053 COMPREHEN METABOLIC PANEL: CPT

## 2025-05-08 PROCEDURE — 81001 URINALYSIS AUTO W/SCOPE: CPT

## 2025-05-08 PROCEDURE — 96375 TX/PRO/DX INJ NEW DRUG ADDON: CPT

## 2025-05-08 PROCEDURE — 85025 COMPLETE CBC W/AUTO DIFF WBC: CPT

## 2025-05-08 PROCEDURE — 84443 ASSAY THYROID STIM HORMONE: CPT

## 2025-05-08 PROCEDURE — 82533 TOTAL CORTISOL: CPT

## 2025-05-08 PROCEDURE — G8428 CUR MEDS NOT DOCUMENT: HCPCS | Performed by: INTERNAL MEDICINE

## 2025-05-08 PROCEDURE — 96413 CHEMO IV INFUSION 1 HR: CPT

## 2025-05-08 PROCEDURE — G8400 PT W/DXA NO RESULTS DOC: HCPCS | Performed by: INTERNAL MEDICINE

## 2025-05-08 PROCEDURE — 2580000003 HC RX 258: Performed by: INTERNAL MEDICINE

## 2025-05-08 RX ORDER — ACETAMINOPHEN 325 MG/1
650 TABLET ORAL
Status: DISCONTINUED | OUTPATIENT
Start: 2025-05-08 | End: 2025-05-09 | Stop reason: HOSPADM

## 2025-05-08 RX ORDER — SODIUM CHLORIDE 9 MG/ML
INJECTION, SOLUTION INTRAVENOUS CONTINUOUS
Status: DISCONTINUED | OUTPATIENT
Start: 2025-05-08 | End: 2025-05-09 | Stop reason: HOSPADM

## 2025-05-08 RX ORDER — EPINEPHRINE 1 MG/ML
0.3 INJECTION, SOLUTION INTRAMUSCULAR; SUBCUTANEOUS PRN
Status: DISCONTINUED | OUTPATIENT
Start: 2025-05-08 | End: 2025-05-09 | Stop reason: HOSPADM

## 2025-05-08 RX ORDER — SODIUM CHLORIDE 9 MG/ML
5-250 INJECTION, SOLUTION INTRAVENOUS PRN
Status: DISCONTINUED | OUTPATIENT
Start: 2025-05-08 | End: 2025-05-09 | Stop reason: HOSPADM

## 2025-05-08 RX ORDER — DIPHENHYDRAMINE HYDROCHLORIDE 50 MG/ML
50 INJECTION, SOLUTION INTRAMUSCULAR; INTRAVENOUS
Status: DISCONTINUED | OUTPATIENT
Start: 2025-05-08 | End: 2025-05-09 | Stop reason: HOSPADM

## 2025-05-08 RX ORDER — ONDANSETRON 2 MG/ML
8 INJECTION INTRAMUSCULAR; INTRAVENOUS
Status: DISCONTINUED | OUTPATIENT
Start: 2025-05-08 | End: 2025-05-09 | Stop reason: HOSPADM

## 2025-05-08 RX ORDER — HEPARIN 100 UNIT/ML
500 SYRINGE INTRAVENOUS PRN
Status: DISCONTINUED | OUTPATIENT
Start: 2025-05-08 | End: 2025-05-09 | Stop reason: HOSPADM

## 2025-05-08 RX ORDER — DIPHENHYDRAMINE HYDROCHLORIDE 50 MG/ML
50 INJECTION, SOLUTION INTRAMUSCULAR; INTRAVENOUS ONCE
Status: COMPLETED | OUTPATIENT
Start: 2025-05-08 | End: 2025-05-08

## 2025-05-08 RX ORDER — HYDROCORTISONE SODIUM SUCCINATE 100 MG/2ML
100 INJECTION INTRAMUSCULAR; INTRAVENOUS
Status: DISCONTINUED | OUTPATIENT
Start: 2025-05-08 | End: 2025-05-09 | Stop reason: HOSPADM

## 2025-05-08 RX ORDER — MEPERIDINE HYDROCHLORIDE 25 MG/ML
12.5 INJECTION INTRAMUSCULAR; INTRAVENOUS; SUBCUTANEOUS PRN
Status: DISCONTINUED | OUTPATIENT
Start: 2025-05-08 | End: 2025-05-09 | Stop reason: HOSPADM

## 2025-05-08 RX ORDER — SODIUM CHLORIDE 0.9 % (FLUSH) 0.9 %
5-40 SYRINGE (ML) INJECTION PRN
Status: DISCONTINUED | OUTPATIENT
Start: 2025-05-08 | End: 2025-05-09 | Stop reason: HOSPADM

## 2025-05-08 RX ORDER — POTASSIUM CHLORIDE 750 MG/1
40 TABLET, EXTENDED RELEASE ORAL ONCE
Status: COMPLETED | OUTPATIENT
Start: 2025-05-08 | End: 2025-05-08

## 2025-05-08 RX ORDER — ALBUTEROL SULFATE 90 UG/1
4 INHALANT RESPIRATORY (INHALATION) PRN
Status: DISCONTINUED | OUTPATIENT
Start: 2025-05-08 | End: 2025-05-09 | Stop reason: HOSPADM

## 2025-05-08 RX ADMIN — DIPHENHYDRAMINE HYDROCHLORIDE 50 MG: 50 INJECTION INTRAMUSCULAR; INTRAVENOUS at 13:47

## 2025-05-08 RX ADMIN — SODIUM CHLORIDE 200 MG: 9 INJECTION, SOLUTION INTRAVENOUS at 13:11

## 2025-05-08 RX ADMIN — SODIUM CHLORIDE 1000 MG: 0.9 INJECTION, SOLUTION INTRAVENOUS at 14:08

## 2025-05-08 RX ADMIN — POTASSIUM CHLORIDE 40 MEQ: 750 TABLET, FILM COATED, EXTENDED RELEASE ORAL at 13:07

## 2025-05-08 RX ADMIN — SODIUM CHLORIDE 50 ML/HR: 0.9 INJECTION, SOLUTION INTRAVENOUS at 12:56

## 2025-05-08 NOTE — PROGRESS NOTES
Stephie Michaud is a 64 y.o. female    Chief Complaint   Patient presents with    Follow-up     stage IV NSCLC- PDL1- 15%, no  mutations        1. Have you been to the ER, urgent care clinic since your last visit?  Hospitalized since your last visit?No    2. Have you seen or consulted any other health care providers outside of the [facility] since your last visit?  Include any pap smears or colon screening. No

## 2025-05-08 NOTE — PROGRESS NOTES
[facility] Chemo Progress Note    Date: May 8, 2025    Name: Stephie Michaud    MRN: [id number]         : 1960    Ms. Michaud Arrived ambulatory and in no distress for cycle 1 day 1 of Keytruda/Cyramza.      Assessment was completed and documented in flowsheets by access RN. Port accessed without difficulty by access RN, labs drawn and processed.      Ms. Michaud's vitals were reviewed.  Patient Vitals for the past 12 hrs:   Pulse BP   25 1513 75 (!) 151/79         Lab results were obtained and reviewed.  Labs within parameter for treatment.   Recent Results (from the past 12 hours)   TSH    Collection Time: 25 10:01 AM   Result Value Ref Range    TSH, 3rd Generation 1.32 0.36 - 3.74 uIU/mL   Cortisol Total    Collection Time: 25 10:01 AM   Result Value Ref Range    Cortisol, Random 11.8 ug/dL   Urinalysis    Collection Time: 25 10:01 AM   Result Value Ref Range    Color, UA DARK YELLOW      Appearance CLEAR CLEAR      Specific Gravity, UA 1.029 1.003 - 1.030      pH, Urine 6.0 5.0 - 8.0      Protein, UA 30 (A) NEG mg/dL    Glucose, Ur Negative NEG mg/dL    Ketones, Urine TRACE (A) NEG mg/dL    Blood, Urine Negative NEG      Urobilinogen, Urine 1.0 0.2 - 1.0 EU/dL    Nitrite, Urine Negative NEG      Leukocyte Esterase, Urine TRACE (A) NEG      WBC, UA 0-4 0 - 4 /hpf    RBC, UA 0-5 0 - 5 /hpf    Epithelial Cells, UA FEW FEW /lpf    BACTERIA, URINE Negative NEG /hpf    Hyaline Casts, UA 2-5 0 - 5 /lpf   Comprehensive metabolic panel    Collection Time: 25 10:01 AM   Result Value Ref Range    Sodium 138 136 - 145 mmol/L    Potassium 3.1 (L) 3.5 - 5.1 mmol/L    Chloride 106 97 - 108 mmol/L    CO2 27 21 - 32 mmol/L    Anion Gap 5 2 - 12 mmol/L    Glucose 104 (H) 65 - 100 mg/dL    BUN 10 6 - 20 MG/DL    Creatinine 0.72 0.55 - 1.02 MG/DL    BUN/Creatinine Ratio 14 12 - 20      Est, Glom Filt Rate >90 >60 ml/min/1.73m2    Calcium 8.8 8.5 - 10.1 MG/DL    Total Bilirubin 0.8 0.2 - 1.0

## 2025-05-08 NOTE — PROGRESS NOTES
Pharmacy Note- Immunotherapy Education    Stephie Michaud is a  64 y.o.female  diagnosed with NSCLC here today for Cycle 1, Day 1  immunotherapy counseling and consent. Ms. Michaud is being treated with ramucirumab and pembrolizumab and premedications - diphenhydramine.     Provided Ms. Michaud with symptom management handouts.      Potassium level 3.1 mmol/L. Replacement ordered in [facility] with Potassium chloride 40 mEq oral      Francisca Marquez, PharmD, BCPS, BCOP    For Pharmacy Admin Tracking Only    Program: Medical Group  CPA in place:  Yes  Recommendation Provided To: Patient/Caregiver: 2 via In person  Intervention Detail: New Rx: 1, reason: Needs Additional Therapy  Intervention Accepted By: Patient/Caregiver: 2    Time Spent (min): 20

## 2025-05-20 RX ORDER — SODIUM CHLORIDE 9 MG/ML
INJECTION, SOLUTION INTRAVENOUS CONTINUOUS
OUTPATIENT
Start: 2025-05-29

## 2025-05-20 RX ORDER — ALBUTEROL SULFATE 90 UG/1
4 INHALANT RESPIRATORY (INHALATION) PRN
OUTPATIENT
Start: 2025-05-29

## 2025-05-20 RX ORDER — SODIUM CHLORIDE 9 MG/ML
5-250 INJECTION, SOLUTION INTRAVENOUS PRN
OUTPATIENT
Start: 2025-05-29

## 2025-05-20 RX ORDER — DIPHENHYDRAMINE HYDROCHLORIDE 50 MG/ML
50 INJECTION, SOLUTION INTRAMUSCULAR; INTRAVENOUS
OUTPATIENT
Start: 2025-05-29

## 2025-05-20 RX ORDER — HEPARIN 100 UNIT/ML
500 SYRINGE INTRAVENOUS PRN
OUTPATIENT
Start: 2025-05-29

## 2025-05-20 RX ORDER — HYDROCORTISONE SODIUM SUCCINATE 100 MG/2ML
100 INJECTION INTRAMUSCULAR; INTRAVENOUS
OUTPATIENT
Start: 2025-05-29

## 2025-05-20 RX ORDER — ACETAMINOPHEN 325 MG/1
650 TABLET ORAL
OUTPATIENT
Start: 2025-05-29

## 2025-05-20 RX ORDER — ONDANSETRON 2 MG/ML
8 INJECTION INTRAMUSCULAR; INTRAVENOUS
OUTPATIENT
Start: 2025-05-29

## 2025-05-20 RX ORDER — EPINEPHRINE 1 MG/ML
0.3 INJECTION, SOLUTION INTRAMUSCULAR; SUBCUTANEOUS PRN
OUTPATIENT
Start: 2025-05-29

## 2025-05-28 NOTE — PROGRESS NOTES
[facility] [place] at [facility]   [address] [facility], [address] [place] [address]   W: [phone]  F: [phone]     Reason for visit   Stephie Michaud is a 64 y.o.   female who is seen for follow up of stage IV NSCLC- PDL1- 15%, no  mutations           Treatment History:   5/4/2022: Left thoracentecis- Adenocarcinoma   6/9/2022- 8/11/2022- current: Caroplatin+ Alimta+Keytruda  8/2022- CT with response  9/1/2022: Alimta Keytruda  CT 11/2022: stable   CT 1/2023: stable   CAP CT 4/2023: stable , 8/2023: Stable per RECIST  4/2024: progressive disease- HSR 407355, SPYK04 1mg PO QD at [facility]: 6/12/24 - 3/27/25   3/2025- progressed   5/8/2025: Keytruda and Ramucurimab           History of Present Illness:     Patient is a 64 y.o.  female with diabetes mellitus, hypertension who presented to the [place] emergency room on 5/4/2022 with complaints of shortness of breath x 14 days which  has been progressively getting worse over several weeks with mild cough.  Denies any headaches, vision changes, falls trouble swallowing, chest pain, fevers, weight loss, hemoptysis.  CTA on admission showed no evidence of pulmonary embolism but she was  noted to have a moderate to large left-sided pleural effusion with a left apical mass measuring 28 x 20 mm, she had small pulmonary nodules on the right, possible hepatic hypodensity.  She had ultrasound-guided thoracentesis of the left side on 5/4/2022  and 1300 cc of fluid was aspirated.  This was sent for cytology which was positive.  Pleural fluid cytology showed more than 100 RBCs, total protein of 5.3, albumin 2.8, .  Labs were noted for a bilirubin of 1.1.  Dx with stage IV NSCLC. She is on palliative treatments. See above. Recently progressed on SPYK04 trial at [facility]. Started on Keytruda and ramucurimab.     Today is C2. She has been feeling well. Started taking sea hubbard a week ago. Has improved her energy. BP diary at home with ranges

## 2025-05-29 ENCOUNTER — CLINICAL DOCUMENTATION (OUTPATIENT)
Age: 65
End: 2025-05-29

## 2025-05-29 ENCOUNTER — HOSPITAL ENCOUNTER (OUTPATIENT)
Facility: HOSPITAL | Age: 65
Setting detail: INFUSION SERIES
Discharge: HOME | End: 2025-05-29
Payer: MEDICARE

## 2025-05-29 ENCOUNTER — OFFICE VISIT (OUTPATIENT)
Age: 65
End: 2025-05-29
Payer: MEDICARE

## 2025-05-29 VITALS
OXYGEN SATURATION: 94 % | DIASTOLIC BLOOD PRESSURE: 88 MMHG | SYSTOLIC BLOOD PRESSURE: 142 MMHG | HEIGHT: 68 IN | TEMPERATURE: 98.4 F | RESPIRATION RATE: 18 BRPM | BODY MASS INDEX: 31.83 KG/M2 | HEART RATE: 92 BPM | WEIGHT: 210 LBS

## 2025-05-29 VITALS
OXYGEN SATURATION: 98 % | RESPIRATION RATE: 17 BRPM | SYSTOLIC BLOOD PRESSURE: 141 MMHG | TEMPERATURE: 97.6 F | HEIGHT: 67 IN | BODY MASS INDEX: 32.96 KG/M2 | WEIGHT: 210 LBS | DIASTOLIC BLOOD PRESSURE: 84 MMHG | HEART RATE: 91 BPM

## 2025-05-29 DIAGNOSIS — C79.70 NSCLC METASTATIC TO ADRENAL GLAND (HCC): ICD-10-CM

## 2025-05-29 DIAGNOSIS — Z79.899 OTHER LONG TERM (CURRENT) DRUG THERAPY: Primary | ICD-10-CM

## 2025-05-29 DIAGNOSIS — C79.70 NSCLC METASTATIC TO ADRENAL GLAND (HCC): Primary | ICD-10-CM

## 2025-05-29 DIAGNOSIS — C34.90 NSCLC METASTATIC TO ADRENAL GLAND (HCC): ICD-10-CM

## 2025-05-29 DIAGNOSIS — C34.90 NSCLC METASTATIC TO ADRENAL GLAND (HCC): Primary | ICD-10-CM

## 2025-05-29 LAB
ALBUMIN SERPL-MCNC: 3.1 G/DL (ref 3.5–5)
ALBUMIN/GLOB SERPL: 0.7 (ref 1.1–2.2)
ALP SERPL-CCNC: 93 U/L (ref 45–117)
ALT SERPL-CCNC: 14 U/L (ref 12–78)
ANION GAP SERPL CALC-SCNC: 8 MMOL/L (ref 2–12)
APPEARANCE UR: CLEAR
AST SERPL-CCNC: 18 U/L (ref 15–37)
BACTERIA URNS QL MICRO: ABNORMAL /HPF
BASO+EOS+MONOS # BLD AUTO: 0.6 K/UL (ref 0.2–1.2)
BASO+EOS+MONOS NFR BLD AUTO: 8 % (ref 3.2–16.9)
BILIRUB SERPL-MCNC: 0.9 MG/DL (ref 0.2–1)
BILIRUB UR QL: NEGATIVE
BUN SERPL-MCNC: 12 MG/DL (ref 6–20)
BUN/CREAT SERPL: 15 (ref 12–20)
CALCIUM SERPL-MCNC: 9.5 MG/DL (ref 8.5–10.1)
CHLORIDE SERPL-SCNC: 106 MMOL/L (ref 97–108)
CO2 SERPL-SCNC: 26 MMOL/L (ref 21–32)
COLOR UR: ABNORMAL
CREAT SERPL-MCNC: 0.82 MG/DL (ref 0.55–1.02)
DIFFERENTIAL METHOD BLD: ABNORMAL
EPITH CASTS URNS QL MICRO: ABNORMAL /LPF
ERYTHROCYTE [DISTWIDTH] IN BLOOD BY AUTOMATED COUNT: 16.8 % (ref 11.8–15.8)
GLOBULIN SER CALC-MCNC: 4.4 G/DL (ref 2–4)
GLUCOSE SERPL-MCNC: 113 MG/DL (ref 65–100)
GLUCOSE UR STRIP.AUTO-MCNC: NEGATIVE MG/DL
HCT VFR BLD AUTO: 35 % (ref 35–47)
HGB BLD-MCNC: 10.8 G/DL (ref 11.5–16)
HGB UR QL STRIP: NEGATIVE
KETONES UR QL STRIP.AUTO: ABNORMAL MG/DL
LEUKOCYTE ESTERASE UR QL STRIP.AUTO: ABNORMAL
LYMPHOCYTES # BLD: 1.6 K/UL (ref 0.8–3.5)
LYMPHOCYTES NFR BLD: 23.9 % (ref 12–49)
MCH RBC QN AUTO: 25.2 PG (ref 26–34)
MCHC RBC AUTO-ENTMCNC: 30.9 G/DL (ref 30–36.5)
MCV RBC AUTO: 81.8 FL (ref 80–99)
NEUTS SEG # BLD: 4.7 K/UL (ref 1.8–8)
NEUTS SEG NFR BLD: 68.1 % (ref 32–75)
NITRITE UR QL STRIP.AUTO: NEGATIVE
PH UR STRIP: 6 (ref 5–8)
PLATELET # BLD AUTO: 298 K/UL (ref 150–400)
POTASSIUM SERPL-SCNC: 3.2 MMOL/L (ref 3.5–5.1)
PROT SERPL-MCNC: 7.5 G/DL (ref 6.4–8.2)
PROT UR STRIP-MCNC: 30 MG/DL
RBC # BLD AUTO: 4.28 M/UL (ref 3.8–5.2)
RBC #/AREA URNS HPF: ABNORMAL /HPF (ref 0–5)
SODIUM SERPL-SCNC: 140 MMOL/L (ref 136–145)
SP GR UR REFRACTOMETRY: 1.02 (ref 1–1.03)
UROBILINOGEN UR QL STRIP.AUTO: 1 EU/DL (ref 0.2–1)
WBC # BLD AUTO: 6.9 K/UL (ref 3.6–11)
WBC URNS QL MICRO: ABNORMAL /HPF (ref 0–4)

## 2025-05-29 PROCEDURE — 96417 CHEMO IV INFUS EACH ADDL SEQ: CPT

## 2025-05-29 PROCEDURE — 1036F TOBACCO NON-USER: CPT

## 2025-05-29 PROCEDURE — 81001 URINALYSIS AUTO W/SCOPE: CPT

## 2025-05-29 PROCEDURE — G8417 CALC BMI ABV UP PARAM F/U: HCPCS

## 2025-05-29 PROCEDURE — 99214 OFFICE O/P EST MOD 30 MIN: CPT

## 2025-05-29 PROCEDURE — G8427 DOCREV CUR MEDS BY ELIG CLIN: HCPCS

## 2025-05-29 PROCEDURE — 80053 COMPREHEN METABOLIC PANEL: CPT

## 2025-05-29 PROCEDURE — 96375 TX/PRO/DX INJ NEW DRUG ADDON: CPT

## 2025-05-29 PROCEDURE — G8400 PT W/DXA NO RESULTS DOC: HCPCS

## 2025-05-29 PROCEDURE — 6360000002 HC RX W HCPCS: Performed by: INTERNAL MEDICINE

## 2025-05-29 PROCEDURE — 3017F COLORECTAL CA SCREEN DOC REV: CPT

## 2025-05-29 PROCEDURE — 6370000000 HC RX 637 (ALT 250 FOR IP): Performed by: INTERNAL MEDICINE

## 2025-05-29 PROCEDURE — 96413 CHEMO IV INFUSION 1 HR: CPT

## 2025-05-29 PROCEDURE — 85025 COMPLETE CBC W/AUTO DIFF WBC: CPT

## 2025-05-29 PROCEDURE — 1090F PRES/ABSN URINE INCON ASSESS: CPT

## 2025-05-29 PROCEDURE — 1123F ACP DISCUSS/DSCN MKR DOCD: CPT

## 2025-05-29 PROCEDURE — 2580000003 HC RX 258: Performed by: INTERNAL MEDICINE

## 2025-05-29 RX ORDER — SODIUM CHLORIDE 0.9 % (FLUSH) 0.9 %
5-40 SYRINGE (ML) INJECTION PRN
Status: DISCONTINUED | OUTPATIENT
Start: 2025-05-29 | End: 2025-05-30 | Stop reason: HOSPADM

## 2025-05-29 RX ORDER — SODIUM CHLORIDE 9 MG/ML
5-250 INJECTION, SOLUTION INTRAVENOUS PRN
Status: DISCONTINUED | OUTPATIENT
Start: 2025-05-29 | End: 2025-05-30 | Stop reason: HOSPADM

## 2025-05-29 RX ORDER — POTASSIUM CHLORIDE 750 MG/1
40 TABLET, EXTENDED RELEASE ORAL ONCE
Status: COMPLETED | OUTPATIENT
Start: 2025-05-29 | End: 2025-05-29

## 2025-05-29 RX ORDER — ACETAMINOPHEN 325 MG/1
650 TABLET ORAL
Status: DISCONTINUED | OUTPATIENT
Start: 2025-05-29 | End: 2025-05-30 | Stop reason: HOSPADM

## 2025-05-29 RX ORDER — DIPHENHYDRAMINE HYDROCHLORIDE 50 MG/ML
50 INJECTION, SOLUTION INTRAMUSCULAR; INTRAVENOUS ONCE
Status: COMPLETED | OUTPATIENT
Start: 2025-05-29 | End: 2025-05-29

## 2025-05-29 RX ADMIN — SODIUM CHLORIDE 200 MG: 9 INJECTION, SOLUTION INTRAVENOUS at 12:29

## 2025-05-29 RX ADMIN — POTASSIUM CHLORIDE 40 MEQ: 750 TABLET, FILM COATED, EXTENDED RELEASE ORAL at 13:02

## 2025-05-29 RX ADMIN — SODIUM CHLORIDE 50 ML/HR: 0.9 INJECTION, SOLUTION INTRAVENOUS at 12:24

## 2025-05-29 RX ADMIN — DIPHENHYDRAMINE HYDROCHLORIDE 50 MG: 50 INJECTION INTRAMUSCULAR; INTRAVENOUS at 13:00

## 2025-05-29 RX ADMIN — SODIUM CHLORIDE 1000 MG: 0.9 INJECTION, SOLUTION INTRAVENOUS at 13:22

## 2025-05-29 ASSESSMENT — PAIN SCALES - GENERAL: PAINLEVEL_OUTOF10: 0

## 2025-05-29 NOTE — PROGRESS NOTES
[facility] Chemotherapy Progress Note    Date: May 29, 2025    Name: Stephie Michaud    MRN: [id number]         : 1960       Pt admit to [facility] for Keytruda/Cyramza ambulatory in stable condition. Assessment completed. No new concerns voiced. Port with positive blood return. Labs sent for processing.       Lab results were obtained and reviewed.  Recent Results (from the past 12 hours)   Urinalysis    Collection Time: 25 10:03 AM   Result Value Ref Range    Color, UA DARK YELLOW      Appearance CLEAR CLEAR      Specific Gravity, UA 1.022 1.003 - 1.030      pH, Urine 6.0 5.0 - 8.0      Protein, UA 30 (A) NEG mg/dL    Glucose, Ur Negative NEG mg/dL    Ketones, Urine TRACE (A) NEG mg/dL    Bilirubin, Urine Negative NEG      Blood, Urine Negative NEG      Urobilinogen, Urine 1.0 0.2 - 1.0 EU/dL    Nitrite, Urine Negative NEG      Leukocyte Esterase, Urine SMALL (A) NEG      WBC, UA 0-4 0 - 4 /hpf    RBC, UA 0-5 0 - 5 /hpf    Epithelial Cells, UA FEW FEW /lpf    BACTERIA, URINE 1+ (A) NEG /hpf   Comprehensive metabolic panel    Collection Time: 25 10:03 AM   Result Value Ref Range    Sodium 140 136 - 145 mmol/L    Potassium 3.2 (L) 3.5 - 5.1 mmol/L    Chloride 106 97 - 108 mmol/L    CO2 26 21 - 32 mmol/L    Anion Gap 8 2 - 12 mmol/L    Glucose 113 (H) 65 - 100 mg/dL    BUN 12 6 - 20 MG/DL    Creatinine 0.82 0.55 - 1.02 MG/DL    BUN/Creatinine Ratio 15 12 - 20      Est, Glom Filt Rate 79 >60 ml/min/1.73m2    Calcium 9.5 8.5 - 10.1 MG/DL    Total Bilirubin 0.9 0.2 - 1.0 MG/DL    ALT 14 12 - 78 U/L    AST 18 15 - 37 U/L    Alk Phosphatase 93 45 - 117 U/L    Total Protein 7.5 6.4 - 8.2 g/dL    Albumin 3.1 (L) 3.5 - 5.0 g/dL    Globulin 4.4 (H) 2.0 - 4.0 g/dL    Albumin/Globulin Ratio 0.7 (L) 1.1 - 2.2     CBC with Partial Differential    Collection Time: 25 10:04 AM   Result Value Ref Range    WBC 6.9 3.6 - 11.0 K/uL    RBC 4.28 3.80 - 5.20 M/uL    Hemoglobin 10.8 (L) 11.5 - 16.0 g/dL    Hematocrit 35.0  Time Provider Department Center   6/19/2025  9:30 AM [facility]IDE[facility] LAB CHAIR 1 [facility]   6/19/2025  9:45 AM Carolyn Anna APRN - NP [facility]   6/19/2025 11:00 AM GIDEON SO CHAIR 12 [facility]         ESTHER BLANCO, AMEENA  May 29, 2025

## 2025-05-29 NOTE — PROGRESS NOTES
Pharmacy Note- Immunotherapy Education    Stephie Michaud is a  64 y.o.female  diagnosed with NSCLC. Ms. Michaud is being treated with ramucirumab and pembrolizumab.        Potassium level 3.2 mmol/L. Replacement ordered in [facility] with Potassium chloride 40 mEq oral      Francisca Marquez, PharmD, BCPS, BCOP    For Pharmacy Admin Tracking Only    Program: Medical Group  CPA in place:  Yes  Recommendation Provided To: Patient/Caregiver: 1 via In person  Intervention Detail: New Rx: 1, reason: Needs Additional Therapy  Intervention Accepted By: Patient/Caregiver: 1    Time Spent (min): 15

## 2025-05-29 NOTE — PROGRESS NOTES
Patient identified with two identification factors, Name and Date of Birth.    Chief Complaint   Patient presents with    Follow-up       BP (!) 142/88 (BP Site: Right Upper Arm, Patient Position: Sitting, BP Cuff Size: Medium Adult)   Pulse 92   Temp 98.4 °F (36.9 °C) (Temporal)   Resp 18   Ht 1.715 m (5' 7.5\")   Wt 95.3 kg (210 lb)   SpO2 94%   BMI 32.41 kg/m²       1. \"Have you been to the ER, urgent care clinic since your last visit?  Hospitalized since your last visit?\" No    2. \"Have you seen or consulted any other health care providers outside of the [facility] since your last visit?\" No

## 2025-06-03 DIAGNOSIS — C34.90 NSCLC METASTATIC TO ADRENAL GLAND (HCC): ICD-10-CM

## 2025-06-03 DIAGNOSIS — C79.70 NSCLC METASTATIC TO ADRENAL GLAND (HCC): ICD-10-CM

## 2025-06-03 RX ORDER — OXYCODONE HYDROCHLORIDE 5 MG/1
5 TABLET ORAL NIGHTLY
Qty: 30 TABLET | Refills: 0 | Status: SHIPPED | OUTPATIENT
Start: 2025-06-03 | End: 2025-07-03

## 2025-06-11 RX ORDER — HEPARIN 100 UNIT/ML
500 SYRINGE INTRAVENOUS PRN
Status: CANCELLED | OUTPATIENT
Start: 2025-06-19

## 2025-06-11 RX ORDER — SODIUM CHLORIDE 9 MG/ML
5-250 INJECTION, SOLUTION INTRAVENOUS PRN
Status: CANCELLED | OUTPATIENT
Start: 2025-06-19

## 2025-06-11 RX ORDER — ONDANSETRON 2 MG/ML
8 INJECTION INTRAMUSCULAR; INTRAVENOUS
Status: CANCELLED | OUTPATIENT
Start: 2025-06-19

## 2025-06-11 RX ORDER — ALBUTEROL SULFATE 90 UG/1
4 INHALANT RESPIRATORY (INHALATION) PRN
Status: CANCELLED | OUTPATIENT
Start: 2025-06-19

## 2025-06-11 RX ORDER — SODIUM CHLORIDE 9 MG/ML
INJECTION, SOLUTION INTRAVENOUS CONTINUOUS
Status: CANCELLED | OUTPATIENT
Start: 2025-06-19

## 2025-06-11 RX ORDER — HYDROCORTISONE SODIUM SUCCINATE 100 MG/2ML
100 INJECTION INTRAMUSCULAR; INTRAVENOUS
Status: CANCELLED | OUTPATIENT
Start: 2025-06-19

## 2025-06-11 RX ORDER — ACETAMINOPHEN 325 MG/1
650 TABLET ORAL
Status: CANCELLED | OUTPATIENT
Start: 2025-06-19

## 2025-06-11 RX ORDER — DIPHENHYDRAMINE HYDROCHLORIDE 50 MG/ML
50 INJECTION, SOLUTION INTRAMUSCULAR; INTRAVENOUS
Status: CANCELLED | OUTPATIENT
Start: 2025-06-19

## 2025-06-11 RX ORDER — EPINEPHRINE 1 MG/ML
0.3 INJECTION, SOLUTION INTRAMUSCULAR; SUBCUTANEOUS PRN
Status: CANCELLED | OUTPATIENT
Start: 2025-06-19

## 2025-06-11 RX ORDER — ACETAMINOPHEN 325 MG/1
650 TABLET ORAL
Status: CANCELLED
Start: 2025-06-19

## 2025-06-16 ENCOUNTER — HOSPITAL ENCOUNTER (EMERGENCY)
Facility: HOSPITAL | Age: 65
Discharge: HOME | End: 2025-06-16
Attending: EMERGENCY MEDICINE
Payer: MEDICARE

## 2025-06-16 ENCOUNTER — APPOINTMENT (OUTPATIENT)
Facility: HOSPITAL | Age: 65
End: 2025-06-16
Payer: MEDICARE

## 2025-06-16 VITALS
HEART RATE: 86 BPM | BODY MASS INDEX: 33.56 KG/M2 | RESPIRATION RATE: 18 BRPM | HEIGHT: 67 IN | DIASTOLIC BLOOD PRESSURE: 84 MMHG | OXYGEN SATURATION: 99 % | SYSTOLIC BLOOD PRESSURE: 176 MMHG | TEMPERATURE: 97.9 F | WEIGHT: 213.85 LBS

## 2025-06-16 DIAGNOSIS — R07.89 CHEST WALL PAIN: Primary | ICD-10-CM

## 2025-06-16 DIAGNOSIS — R91.8 LUNG MASS: ICD-10-CM

## 2025-06-16 LAB
ALBUMIN SERPL-MCNC: 3.2 G/DL (ref 3.5–5)
ALBUMIN/GLOB SERPL: 0.7 (ref 1.1–2.2)
ALP SERPL-CCNC: 91 U/L (ref 45–117)
ALT SERPL-CCNC: 14 U/L (ref 12–78)
ANION GAP SERPL CALC-SCNC: 8 MMOL/L (ref 2–12)
AST SERPL-CCNC: 19 U/L (ref 15–37)
BASOPHILS # BLD: 0.03 K/UL (ref 0–0.1)
BASOPHILS NFR BLD: 0.4 % (ref 0–1)
BILIRUB SERPL-MCNC: 0.5 MG/DL (ref 0.2–1)
BUN SERPL-MCNC: 8 MG/DL (ref 6–20)
BUN/CREAT SERPL: 10 (ref 12–20)
CALCIUM SERPL-MCNC: 8.8 MG/DL (ref 8.5–10.1)
CHLORIDE SERPL-SCNC: 104 MMOL/L (ref 97–108)
CO2 SERPL-SCNC: 26 MMOL/L (ref 21–32)
COMMENT:: NORMAL
CREAT SERPL-MCNC: 0.79 MG/DL (ref 0.55–1.02)
DIFFERENTIAL METHOD BLD: ABNORMAL
EOSINOPHIL # BLD: 0.15 K/UL (ref 0–0.4)
EOSINOPHIL NFR BLD: 2 % (ref 0–7)
ERYTHROCYTE [DISTWIDTH] IN BLOOD BY AUTOMATED COUNT: 16.6 % (ref 11.5–14.5)
GLOBULIN SER CALC-MCNC: 4.4 G/DL (ref 2–4)
GLUCOSE SERPL-MCNC: 84 MG/DL (ref 65–100)
HCT VFR BLD AUTO: 37.6 % (ref 35–47)
HGB BLD-MCNC: 11 G/DL (ref 11.5–16)
IMM GRANULOCYTES # BLD AUTO: 0.02 K/UL (ref 0–0.04)
IMM GRANULOCYTES NFR BLD AUTO: 0.3 % (ref 0–0.5)
LYMPHOCYTES # BLD: 2.06 K/UL (ref 0.8–3.5)
LYMPHOCYTES NFR BLD: 28.1 % (ref 12–49)
MCH RBC QN AUTO: 24.9 PG (ref 26–34)
MCHC RBC AUTO-ENTMCNC: 29.3 G/DL (ref 30–36.5)
MCV RBC AUTO: 85.3 FL (ref 80–99)
MONOCYTES # BLD: 0.63 K/UL (ref 0–1)
MONOCYTES NFR BLD: 8.6 % (ref 5–13)
NEUTS SEG # BLD: 4.45 K/UL (ref 1.8–8)
NEUTS SEG NFR BLD: 60.6 % (ref 32–75)
NRBC # BLD: 0 K/UL (ref 0–0.01)
NRBC BLD-RTO: 0 PER 100 WBC
NT PRO BNP: 272 PG/ML
PLATELET # BLD AUTO: 289 K/UL (ref 150–400)
PMV BLD AUTO: 10.5 FL (ref 8.9–12.9)
POTASSIUM SERPL-SCNC: 3.3 MMOL/L (ref 3.5–5.1)
PROT SERPL-MCNC: 7.6 G/DL (ref 6.4–8.2)
RBC # BLD AUTO: 4.41 M/UL (ref 3.8–5.2)
SODIUM SERPL-SCNC: 138 MMOL/L (ref 136–145)
SPECIMEN HOLD: NORMAL
TROPONIN I SERPL HS-MCNC: 5 NG/L (ref 0–51)
WBC # BLD AUTO: 7.3 K/UL (ref 3.6–11)

## 2025-06-16 PROCEDURE — 71275 CT ANGIOGRAPHY CHEST: CPT

## 2025-06-16 PROCEDURE — 80053 COMPREHEN METABOLIC PANEL: CPT

## 2025-06-16 PROCEDURE — 99285 EMERGENCY DEPT VISIT HI MDM: CPT

## 2025-06-16 PROCEDURE — 83880 ASSAY OF NATRIURETIC PEPTIDE: CPT

## 2025-06-16 PROCEDURE — 84484 ASSAY OF TROPONIN QUANT: CPT

## 2025-06-16 PROCEDURE — 6360000004 HC RX CONTRAST MEDICATION: Performed by: EMERGENCY MEDICINE

## 2025-06-16 PROCEDURE — 93005 ELECTROCARDIOGRAM TRACING: CPT

## 2025-06-16 PROCEDURE — 85025 COMPLETE CBC W/AUTO DIFF WBC: CPT

## 2025-06-16 RX ORDER — IOPAMIDOL 755 MG/ML
100 INJECTION, SOLUTION INTRAVASCULAR
Status: COMPLETED | OUTPATIENT
Start: 2025-06-16 | End: 2025-06-16

## 2025-06-16 RX ADMIN — IOPAMIDOL 80 ML: 755 INJECTION, SOLUTION INTRAVENOUS at 18:35

## 2025-06-16 ASSESSMENT — PAIN DESCRIPTION - DESCRIPTORS: DESCRIPTORS: SHOOTING;ACHING

## 2025-06-16 ASSESSMENT — PAIN DESCRIPTION - FREQUENCY: FREQUENCY: INTERMITTENT

## 2025-06-16 ASSESSMENT — PAIN DESCRIPTION - ORIENTATION: ORIENTATION: LEFT;RIGHT

## 2025-06-16 ASSESSMENT — PAIN - FUNCTIONAL ASSESSMENT
PAIN_FUNCTIONAL_ASSESSMENT: PREVENTS OR INTERFERES WITH ALL ACTIVE AND SOME PASSIVE ACTIVITIES
PAIN_FUNCTIONAL_ASSESSMENT: NONE - DENIES PAIN
PAIN_FUNCTIONAL_ASSESSMENT: 0-10

## 2025-06-16 ASSESSMENT — PAIN SCALES - GENERAL
PAINLEVEL_OUTOF10: 7
PAINLEVEL_OUTOF10: 0

## 2025-06-16 ASSESSMENT — PAIN DESCRIPTION - PAIN TYPE: TYPE: ACUTE PAIN

## 2025-06-16 ASSESSMENT — PAIN DESCRIPTION - ONSET: ONSET: PROGRESSIVE

## 2025-06-16 ASSESSMENT — PAIN DESCRIPTION - LOCATION: LOCATION: RIB CAGE;CHEST

## 2025-06-16 NOTE — ED TRIAGE NOTES
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 Pt reports left sided rib/back pain and chest pain. Pt thought originally thought she pulled something. Pt reports certain movements increase pain and SOB increases pain. Pt went to extended care yesterday. Pt had xray this morning. Pt sent over for PE rule out and abnormal chest x-ray. Pt reports some new SOB. Denies cough, fevers. Denies increased ankle swelling.

## 2025-06-16 NOTE — ED NOTES
5:15 PM  I have evaluated the patient as the Provider in Rapid Medical Evaluation (RME). I have reviewed her vital signs and the triage nurse assessment. I have talked with the patient and any available family and advised that I am the provider in triage and have ordered the appropriate study to initiate their work up based on the clinical presentation during my assessment. I have advised that the patient will be accommodated in the Main ED as soon as possible. I have also requested to contact the triage nurse or myself immediately if the patient experiences any changes in their condition during this brief waiting period.  VINCENT Bansal    Stephie Michaud is a 64 y.o. female with metastatic adrenal cancer and lung adenocarcinoma, HTN, DM, COPD who presents to the emergency department due to referral from primary care doctor to rule out a PE.  She states for the past 2 weeks having discomfort in her chest that she thought was a muscle strain however saw her primary care doctor today who did a chest x-ray.  States x-ray was abnormal and to come to the ER to rule out a PE.  She reports the pain increases with deep breaths and movement and has been intermittent shortness of breath.  She denies any fevers, chills, nausea, vomiting, lower extremity swelling.  She denies any recent travels, surgeries or history of blood clots.  Denies any recent falls or injuries.  Denies medications prior to arrival.  Reports he is currently chemotherapy and rates the pain level 7-10.         Rosaura Jeter PA  06/16/25 [id number]

## 2025-06-16 NOTE — ED PROVIDER NOTES
[facility] EMERGENCY DEPARTMENT  EMERGENCY DEPARTMENT ENCOUNTER      Pt Name: Stephie Michaud  MRN: [id number]  Birthdate 1960  Date of evaluation: 6/16/2025  Provider: Jeannette Tan MD    CHIEF COMPLAINT       Chief Complaint   Patient presents with    Referral - General    Rib Pain         HISTORY OF PRESENT ILLNESS    Stephie Michaud is a 63 yo F with h/o metastatic adrenal cancer and lung adenocarcinoma, HTN, DM, COPD who had had chest pain and increasing shortness of breath for the past 2 weeks.  She spoke with her PCP and was referred to the ED to r/o PE.  Her pain increases when she moves, coughs or sneezes.            Additional history from independent historians:     Review of External Medical Records:     Nursing Notes were reviewed.    REVIEW OF SYSTEMS       Review of Systems    Except as noted above the remainder of the review of systems was reviewed and negative.       PAST MEDICAL HISTORY     Past Medical History:   Diagnosis Date    COPD (chronic obstructive pulmonary disease) (HCC)     Diabetes mellitus type 2, noninsulin dependent (HCC)     Generalized anxiety disorder     Hypercholesterolemia     Hypertension     Lung cancer (HCC) 05/04/2022    Neoplasm of major salivary gland          SURGICAL HISTORY       Past Surgical History:   Procedure Laterality Date    ANTERIOR CRUCIATE LIGAMENT REPAIR      COLONOSCOPY      COLONOSCOPY N/A 2/24/2021    .COLONOSCOPY  :- performed by Miguel Omalley MD at [facility] ENDOSCOPY    COLONOSCOPY N/A 4/2/2024    COLONOSCOPY performed by Miguel Omalley MD at [facility] ENDOSCOPY    HYSTERECTOMY (CERVIX STATUS UNKNOWN)      HYSTERECTOMY, VAGINAL      IR INSERT TUNNELED PLEURA CATH W CUFF  05/26/2022    IR INSERT TUNNELED PLEURA CATH W CUFF 5/26/2022 [facility] RAD ANGIO IR    IR INSERT TUNNELED PLEURA CATH W CUFF  5/26/2022    IR PORT PLACEMENT > 5 YEARS  06/06/2022    IR PORT PLACEMENT EQUAL OR GREATER THAN 5 YEARS 6/6/2022 [facility] RAD ANGIO IR    IR PORT PLACEMENT > 5

## 2025-06-17 LAB
EKG ATRIAL RATE: 75 BPM
EKG DIAGNOSIS: NORMAL
EKG P AXIS: 79 DEGREES
EKG P-R INTERVAL: 158 MS
EKG Q-T INTERVAL: 418 MS
EKG QRS DURATION: 90 MS
EKG QTC CALCULATION (BAZETT): 466 MS
EKG R AXIS: 34 DEGREES
EKG T AXIS: 77 DEGREES
EKG VENTRICULAR RATE: 75 BPM

## 2025-06-19 ENCOUNTER — HOSPITAL ENCOUNTER (OUTPATIENT)
Facility: HOSPITAL | Age: 65
Setting detail: INFUSION SERIES
Discharge: HOME | End: 2025-06-19
Payer: MEDICARE

## 2025-06-19 ENCOUNTER — OFFICE VISIT (OUTPATIENT)
Age: 65
End: 2025-06-19
Payer: MEDICARE

## 2025-06-19 VITALS
TEMPERATURE: 97.4 F | DIASTOLIC BLOOD PRESSURE: 68 MMHG | SYSTOLIC BLOOD PRESSURE: 137 MMHG | HEIGHT: 67 IN | BODY MASS INDEX: 33.02 KG/M2 | HEART RATE: 80 BPM | OXYGEN SATURATION: 98 % | WEIGHT: 210.4 LBS | RESPIRATION RATE: 18 BRPM

## 2025-06-19 VITALS
SYSTOLIC BLOOD PRESSURE: 137 MMHG | TEMPERATURE: 97.4 F | RESPIRATION RATE: 18 BRPM | DIASTOLIC BLOOD PRESSURE: 68 MMHG | HEART RATE: 80 BPM | BODY MASS INDEX: 32.88 KG/M2 | WEIGHT: 210 LBS | OXYGEN SATURATION: 98 %

## 2025-06-19 VITALS — DIASTOLIC BLOOD PRESSURE: 94 MMHG | SYSTOLIC BLOOD PRESSURE: 123 MMHG | HEART RATE: 82 BPM

## 2025-06-19 DIAGNOSIS — C34.90 NSCLC METASTATIC TO ADRENAL GLAND (HCC): ICD-10-CM

## 2025-06-19 DIAGNOSIS — C79.70 NSCLC METASTATIC TO ADRENAL GLAND (HCC): Primary | ICD-10-CM

## 2025-06-19 DIAGNOSIS — C34.90 NSCLC METASTATIC TO ADRENAL GLAND (HCC): Primary | ICD-10-CM

## 2025-06-19 DIAGNOSIS — Z79.899 OTHER LONG TERM (CURRENT) DRUG THERAPY: Primary | ICD-10-CM

## 2025-06-19 DIAGNOSIS — Z79.899 OTHER LONG TERM (CURRENT) DRUG THERAPY: ICD-10-CM

## 2025-06-19 DIAGNOSIS — C79.70 NSCLC METASTATIC TO ADRENAL GLAND (HCC): ICD-10-CM

## 2025-06-19 LAB
ALBUMIN SERPL-MCNC: 3.2 G/DL (ref 3.5–5)
ALBUMIN/GLOB SERPL: 0.7 (ref 1.1–2.2)
ALP SERPL-CCNC: 91 U/L (ref 45–117)
ALT SERPL-CCNC: 14 U/L (ref 12–78)
ANION GAP SERPL CALC-SCNC: 7 MMOL/L (ref 2–12)
APPEARANCE UR: CLEAR
AST SERPL-CCNC: 22 U/L (ref 15–37)
BACTERIA URNS QL MICRO: NEGATIVE /HPF
BASO+EOS+MONOS # BLD AUTO: 0.7 K/UL (ref 0.2–1.2)
BASO+EOS+MONOS NFR BLD AUTO: 9 % (ref 3.2–16.9)
BILIRUB SERPL-MCNC: 0.8 MG/DL (ref 0.2–1)
BILIRUB UR QL CFM: NEGATIVE
BUN SERPL-MCNC: 8 MG/DL (ref 6–20)
BUN/CREAT SERPL: 9 (ref 12–20)
CALCIUM SERPL-MCNC: 8.7 MG/DL (ref 8.5–10.1)
CHLORIDE SERPL-SCNC: 107 MMOL/L (ref 97–108)
CO2 SERPL-SCNC: 25 MMOL/L (ref 21–32)
COLOR UR: ABNORMAL
CREAT SERPL-MCNC: 0.85 MG/DL (ref 0.55–1.02)
DIFFERENTIAL METHOD BLD: ABNORMAL
EPITH CASTS URNS QL MICRO: ABNORMAL /LPF
ERYTHROCYTE [DISTWIDTH] IN BLOOD BY AUTOMATED COUNT: 17.2 % (ref 11.8–15.8)
GLOBULIN SER CALC-MCNC: 4.3 G/DL (ref 2–4)
GLUCOSE SERPL-MCNC: 94 MG/DL (ref 65–100)
GLUCOSE UR STRIP.AUTO-MCNC: NEGATIVE MG/DL
HCT VFR BLD AUTO: 34.8 % (ref 35–47)
HGB BLD-MCNC: 11 G/DL (ref 11.5–16)
HGB UR QL STRIP: NEGATIVE
KETONES UR QL STRIP.AUTO: ABNORMAL MG/DL
LEUKOCYTE ESTERASE UR QL STRIP.AUTO: ABNORMAL
LYMPHOCYTES # BLD: 2 K/UL (ref 0.8–3.5)
LYMPHOCYTES NFR BLD: 27.3 % (ref 12–49)
MCH RBC QN AUTO: 25.9 PG (ref 26–34)
MCHC RBC AUTO-ENTMCNC: 31.6 G/DL (ref 30–36.5)
MCV RBC AUTO: 82.1 FL (ref 80–99)
NEUTS SEG # BLD: 4.6 K/UL (ref 1.8–8)
NEUTS SEG NFR BLD: 63.5 % (ref 32–75)
NITRITE UR QL STRIP.AUTO: NEGATIVE
PH UR STRIP: 6 (ref 5–8)
PLATELET # BLD AUTO: 271 K/UL (ref 150–400)
POTASSIUM SERPL-SCNC: 3.5 MMOL/L (ref 3.5–5.1)
PROT SERPL-MCNC: 7.5 G/DL (ref 6.4–8.2)
PROT UR STRIP-MCNC: 30 MG/DL
RBC # BLD AUTO: 4.24 M/UL (ref 3.8–5.2)
RBC #/AREA URNS HPF: ABNORMAL /HPF (ref 0–5)
SODIUM SERPL-SCNC: 139 MMOL/L (ref 136–145)
SP GR UR REFRACTOMETRY: 1.03 (ref 1–1.03)
TSH SERPL DL<=0.05 MIU/L-ACNC: 3.6 UIU/ML (ref 0.36–3.74)
UROBILINOGEN UR QL STRIP.AUTO: 1 EU/DL (ref 0.2–1)
WBC # BLD AUTO: 7.3 K/UL (ref 3.6–11)
WBC URNS QL MICRO: ABNORMAL /HPF (ref 0–4)

## 2025-06-19 PROCEDURE — 85025 COMPLETE CBC W/AUTO DIFF WBC: CPT

## 2025-06-19 PROCEDURE — 1123F ACP DISCUSS/DSCN MKR DOCD: CPT

## 2025-06-19 PROCEDURE — 6360000002 HC RX W HCPCS: Performed by: INTERNAL MEDICINE

## 2025-06-19 PROCEDURE — 96417 CHEMO IV INFUS EACH ADDL SEQ: CPT

## 2025-06-19 PROCEDURE — 1090F PRES/ABSN URINE INCON ASSESS: CPT

## 2025-06-19 PROCEDURE — 99215 OFFICE O/P EST HI 40 MIN: CPT

## 2025-06-19 PROCEDURE — G8400 PT W/DXA NO RESULTS DOC: HCPCS

## 2025-06-19 PROCEDURE — 1125F AMNT PAIN NOTED PAIN PRSNT: CPT

## 2025-06-19 PROCEDURE — 96375 TX/PRO/DX INJ NEW DRUG ADDON: CPT

## 2025-06-19 PROCEDURE — 80053 COMPREHEN METABOLIC PANEL: CPT

## 2025-06-19 PROCEDURE — 96413 CHEMO IV INFUSION 1 HR: CPT

## 2025-06-19 PROCEDURE — 3017F COLORECTAL CA SCREEN DOC REV: CPT

## 2025-06-19 PROCEDURE — 1159F MED LIST DOCD IN RCRD: CPT

## 2025-06-19 PROCEDURE — 1160F RVW MEDS BY RX/DR IN RCRD: CPT

## 2025-06-19 PROCEDURE — 1036F TOBACCO NON-USER: CPT

## 2025-06-19 PROCEDURE — 84443 ASSAY THYROID STIM HORMONE: CPT

## 2025-06-19 PROCEDURE — 2580000003 HC RX 258: Performed by: INTERNAL MEDICINE

## 2025-06-19 PROCEDURE — G8417 CALC BMI ABV UP PARAM F/U: HCPCS

## 2025-06-19 PROCEDURE — G8427 DOCREV CUR MEDS BY ELIG CLIN: HCPCS

## 2025-06-19 PROCEDURE — 81001 URINALYSIS AUTO W/SCOPE: CPT

## 2025-06-19 RX ORDER — OXYCODONE HYDROCHLORIDE 10 MG/1
10 TABLET ORAL EVERY 8 HOURS PRN
Qty: 21 TABLET | Refills: 0 | Status: SHIPPED | OUTPATIENT
Start: 2025-06-19 | End: 2025-07-03

## 2025-06-19 RX ORDER — SODIUM CHLORIDE 0.9 % (FLUSH) 0.9 %
5-40 SYRINGE (ML) INJECTION PRN
Status: DISCONTINUED | OUTPATIENT
Start: 2025-06-19 | End: 2025-06-20 | Stop reason: HOSPADM

## 2025-06-19 RX ORDER — DIPHENHYDRAMINE HYDROCHLORIDE 50 MG/ML
50 INJECTION, SOLUTION INTRAMUSCULAR; INTRAVENOUS ONCE
Status: COMPLETED | OUTPATIENT
Start: 2025-06-19 | End: 2025-06-19

## 2025-06-19 RX ORDER — METHOCARBAMOL 500 MG/1
TABLET, FILM COATED ORAL
COMMUNITY
Start: 2025-06-15

## 2025-06-19 RX ORDER — PROCHLORPERAZINE MALEATE 10 MG
10 TABLET ORAL EVERY 6 HOURS PRN
COMMUNITY

## 2025-06-19 RX ORDER — PROCHLORPERAZINE MALEATE 5 MG/1
TABLET ORAL
COMMUNITY

## 2025-06-19 RX ORDER — SODIUM CHLORIDE 9 MG/ML
5-250 INJECTION, SOLUTION INTRAVENOUS PRN
Status: DISCONTINUED | OUTPATIENT
Start: 2025-06-19 | End: 2025-06-20 | Stop reason: HOSPADM

## 2025-06-19 RX ADMIN — SODIUM CHLORIDE 1000 MG: 0.9 INJECTION, SOLUTION INTRAVENOUS at 14:03

## 2025-06-19 RX ADMIN — DIPHENHYDRAMINE HYDROCHLORIDE 50 MG: 50 INJECTION INTRAMUSCULAR; INTRAVENOUS at 13:27

## 2025-06-19 RX ADMIN — SODIUM CHLORIDE 200 MG: 9 INJECTION, SOLUTION INTRAVENOUS at 12:40

## 2025-06-19 RX ADMIN — SODIUM CHLORIDE 50 ML/HR: 0.9 INJECTION, SOLUTION INTRAVENOUS at 12:04

## 2025-06-19 ASSESSMENT — PAIN SCALES - GENERAL: PAINLEVEL_OUTOF10: 0

## 2025-06-19 NOTE — PROGRESS NOTES
[facility] Chemo Progress Note    Date: 2025    Name: Stephie Michaud    MRN: [id number]         : 1960    Ms. Michaud Arrived ambulatory and in no distress for cycle 3 day 1 of Keytruda/Cyramza.      Assessment was completed and documented in flowsheets by access RN. Port accessed without difficulty by access RN, labs drawn and processed.    Ms. Michaud's vitals were reviewed. Please see \"lab encounter\" for pre-treatment vitals.  Patient Vitals for the past 12 hrs:   Pulse BP   25 1505 82 (!) 123/94         Lab results were obtained and reviewed.  Labs within parameter for treatment.   Recent Results (from the past 12 hours)   CBC with Partial Differential    Collection Time: 25  9:26 AM   Result Value Ref Range    WBC 7.3 3.6 - 11.0 K/uL    RBC 4.24 3.80 - 5.20 M/uL    Hemoglobin 11.0 (L) 11.5 - 16.0 g/dL    Hematocrit 34.8 (L) 35.0 - 47.0 %    MCV 82.1 80.0 - 99.0 FL    MCH 25.9 (L) 26.0 - 34.0 PG    MCHC 31.6 30.0 - 36.5 g/dL    RDW 17.2 (H) 11.8 - 15.8 %    Platelets 271 150 - 400 K/uL    Neutrophils % 63.5 32 - 75 %    Mixed Cells 9 3.2 - 16.9 %    Lymphocytes % 27.3 12 - 49 %    Neutrophils Absolute 4.60 1.8 - 8.0 K/UL    ABSOLUTE MIXED CELLS 0.7 0.2 - 1.2 K/uL    Lymphocytes Absolute 2.00 0.8 - 3.5 K/UL    Differential Type AUTOMATED     Comprehensive metabolic panel    Collection Time: 25  9:26 AM   Result Value Ref Range    Sodium 139 136 - 145 mmol/L    Potassium 3.5 3.5 - 5.1 mmol/L    Chloride 107 97 - 108 mmol/L    CO2 25 21 - 32 mmol/L    Anion Gap 7 2 - 12 mmol/L    Glucose 94 65 - 100 mg/dL    BUN 8 6 - 20 MG/DL    Creatinine 0.85 0.55 - 1.02 MG/DL    BUN/Creatinine Ratio 9 (L) 12 - 20      Est, Glom Filt Rate 76 >60 ml/min/1.73m2    Calcium 8.7 8.5 - 10.1 MG/DL    Total Bilirubin 0.8 0.2 - 1.0 MG/DL    ALT 14 12 - 78 U/L    AST 22 15 - 37 U/L    Alk Phosphatase 91 45 - 117 U/L    Total Protein 7.5 6.4 - 8.2 g/dL    Albumin 3.2 (L) 3.5 - 5.0 g/dL    Globulin 4.3

## 2025-07-01 ENCOUNTER — TELEPHONE (OUTPATIENT)
Age: 65
End: 2025-07-01

## 2025-07-01 NOTE — TELEPHONE ENCOUNTER
Patient called and stated that she would like to know if she is supposed to still be taking folic acid. She stated that if she is she needs a refill sent in but if not she would like a call back to be informed.     #[phone]

## 2025-07-01 NOTE — TELEPHONE ENCOUNTER
HIPAA x 3  SW pt to let her know that she no longer needs to take folic acid. Pt voiced understanding and was appreciative of my call.

## 2025-07-02 RX ORDER — SODIUM CHLORIDE 9 MG/ML
INJECTION, SOLUTION INTRAVENOUS CONTINUOUS
Status: CANCELLED | OUTPATIENT
Start: 2025-07-10

## 2025-07-02 RX ORDER — ONDANSETRON 2 MG/ML
8 INJECTION INTRAMUSCULAR; INTRAVENOUS
Status: CANCELLED | OUTPATIENT
Start: 2025-07-10

## 2025-07-02 RX ORDER — ACETAMINOPHEN 325 MG/1
650 TABLET ORAL
Status: CANCELLED | OUTPATIENT
Start: 2025-07-10

## 2025-07-02 RX ORDER — DIPHENHYDRAMINE HYDROCHLORIDE 50 MG/ML
50 INJECTION, SOLUTION INTRAMUSCULAR; INTRAVENOUS
Status: CANCELLED | OUTPATIENT
Start: 2025-07-10

## 2025-07-02 RX ORDER — EPINEPHRINE 1 MG/ML
0.3 INJECTION, SOLUTION INTRAMUSCULAR; SUBCUTANEOUS PRN
Status: CANCELLED | OUTPATIENT
Start: 2025-07-10

## 2025-07-02 RX ORDER — ACETAMINOPHEN 325 MG/1
650 TABLET ORAL
Status: CANCELLED
Start: 2025-07-10

## 2025-07-02 RX ORDER — HEPARIN 100 UNIT/ML
500 SYRINGE INTRAVENOUS PRN
Status: CANCELLED | OUTPATIENT
Start: 2025-07-10

## 2025-07-02 RX ORDER — HYDROCORTISONE SODIUM SUCCINATE 100 MG/2ML
100 INJECTION INTRAMUSCULAR; INTRAVENOUS
Status: CANCELLED | OUTPATIENT
Start: 2025-07-10

## 2025-07-02 RX ORDER — ALBUTEROL SULFATE 90 UG/1
4 INHALANT RESPIRATORY (INHALATION) PRN
Status: CANCELLED | OUTPATIENT
Start: 2025-07-10

## 2025-07-02 RX ORDER — SODIUM CHLORIDE 9 MG/ML
5-250 INJECTION, SOLUTION INTRAVENOUS PRN
Status: CANCELLED | OUTPATIENT
Start: 2025-07-10

## 2025-07-08 NOTE — PROGRESS NOTES
[facility] [place] at [facility]   [address] [facility], [address] [place] [address]   W: [phone]  F: [phone]          Reason for visit     Stephie Michaud is a 64 y.o.  female who is seen for follow up of stage IV NSCLC- PDL1- 15%, no  mutations           Treatment History:      5/4/2022: Left thoracentecis- Adenocarcinoma     6/9/2022- 8/11/2022- current: Caroplatin+ Alimta+Keytruda    8/2022- CT with response    9/1/2022: Alimta Keytruda    CT 11/2022: stable     CT 1/2023: stable     CAP CT 4/2023: stable , 8/2023: Stable per RECIST  4/2024: progressive disease- HSR 065231, SPYK04 1mg PO QD at [facility]: 6/12/24 - 3/27/25   3/2025- progressed   5/8/2025: Keytruda and Ramucurimab           History of Present Illness:     Patient is a 64-year-old female with diabetes mellitus, hypertension who presented to the [place] emergency room on 5/4/2022 with complaints of shortness of breath x 14 days which  has been progressively getting worse over several weeks with mild cough.  Denies any headaches, vision changes, falls trouble swallowing, chest pain, fevers, weight loss, hemoptysis.  CTA on admission showed no evidence of pulmonary embolism but she was  noted to have a moderate to large left-sided pleural effusion with a left apical mass measuring 28 x 20 mm, she had small pulmonary nodules on the right, possible hepatic hypodensity.  She had ultrasound-guided thoracentesis of the left side on 5/4/2022  and 1300 cc of fluid was aspirated.  This was sent for cytology which was positive.  Pleural fluid cytology showed more than 100 RBCs, total protein of 5.3, albumin 2.8, .  Labs were noted for a bilirubin of 1.1.  Dx with stage IV NSCLC. She is on palliative treatments. See above. Recently progressed on SPYK04 trial at [facility]    She comes to start Keytruda and ramucurimab. She had dry skin. Has had some floaters in her eye. . She has some L sided upper back ache. Iron has helped  good balance

## 2025-07-10 ENCOUNTER — OFFICE VISIT (OUTPATIENT)
Age: 65
End: 2025-07-10
Payer: MEDICARE

## 2025-07-10 ENCOUNTER — HOSPITAL ENCOUNTER (OUTPATIENT)
Facility: HOSPITAL | Age: 65
Setting detail: INFUSION SERIES
Discharge: HOME | End: 2025-07-10
Payer: MEDICARE

## 2025-07-10 VITALS
RESPIRATION RATE: 18 BRPM | HEART RATE: 83 BPM | BODY MASS INDEX: 32.65 KG/M2 | DIASTOLIC BLOOD PRESSURE: 88 MMHG | SYSTOLIC BLOOD PRESSURE: 136 MMHG | HEIGHT: 67 IN | TEMPERATURE: 97.4 F | WEIGHT: 208 LBS

## 2025-07-10 VITALS
TEMPERATURE: 97.4 F | DIASTOLIC BLOOD PRESSURE: 88 MMHG | WEIGHT: 208 LBS | OXYGEN SATURATION: 99 % | BODY MASS INDEX: 32.58 KG/M2 | SYSTOLIC BLOOD PRESSURE: 136 MMHG | HEART RATE: 83 BPM | RESPIRATION RATE: 18 BRPM

## 2025-07-10 VITALS — RESPIRATION RATE: 18 BRPM | DIASTOLIC BLOOD PRESSURE: 88 MMHG | SYSTOLIC BLOOD PRESSURE: 154 MMHG | HEART RATE: 86 BPM

## 2025-07-10 DIAGNOSIS — Z79.899 OTHER LONG TERM (CURRENT) DRUG THERAPY: Primary | ICD-10-CM

## 2025-07-10 DIAGNOSIS — C34.90 NSCLC METASTATIC TO ADRENAL GLAND (HCC): ICD-10-CM

## 2025-07-10 DIAGNOSIS — C79.70 NSCLC METASTATIC TO ADRENAL GLAND (HCC): ICD-10-CM

## 2025-07-10 DIAGNOSIS — C34.90 NSCLC METASTATIC TO ADRENAL GLAND (HCC): Primary | ICD-10-CM

## 2025-07-10 DIAGNOSIS — C79.70 NSCLC METASTATIC TO ADRENAL GLAND (HCC): Primary | ICD-10-CM

## 2025-07-10 LAB
ALBUMIN SERPL-MCNC: 3.2 G/DL (ref 3.5–5)
ALBUMIN/GLOB SERPL: 0.7 (ref 1.1–2.2)
ALP SERPL-CCNC: 82 U/L (ref 45–117)
ALT SERPL-CCNC: 18 U/L (ref 12–78)
ANION GAP SERPL CALC-SCNC: 6 MMOL/L (ref 2–12)
APPEARANCE UR: CLEAR
AST SERPL-CCNC: 23 U/L (ref 15–37)
BACTERIA URNS QL MICRO: NEGATIVE /HPF
BASO+EOS+MONOS # BLD AUTO: 0.7 K/UL (ref 0.2–1.2)
BASO+EOS+MONOS NFR BLD AUTO: 10 % (ref 3.2–16.9)
BILIRUB SERPL-MCNC: 0.9 MG/DL (ref 0.2–1)
BILIRUB UR QL CFM: NEGATIVE
BUN SERPL-MCNC: 8 MG/DL (ref 6–20)
BUN/CREAT SERPL: 10 (ref 12–20)
CALCIUM SERPL-MCNC: 9.1 MG/DL (ref 8.5–10.1)
CHLORIDE SERPL-SCNC: 108 MMOL/L (ref 97–108)
CO2 SERPL-SCNC: 25 MMOL/L (ref 21–32)
COLOR UR: ABNORMAL
COMMENT:: NORMAL
CREAT SERPL-MCNC: 0.84 MG/DL (ref 0.55–1.02)
DIFFERENTIAL METHOD BLD: ABNORMAL
EPITH CASTS URNS QL MICRO: ABNORMAL /LPF
ERYTHROCYTE [DISTWIDTH] IN BLOOD BY AUTOMATED COUNT: 18.2 % (ref 11.8–15.8)
GLOBULIN SER CALC-MCNC: 4.3 G/DL (ref 2–4)
GLUCOSE SERPL-MCNC: 95 MG/DL (ref 65–100)
GLUCOSE UR STRIP.AUTO-MCNC: NEGATIVE MG/DL
HCT VFR BLD AUTO: 37.7 % (ref 35–47)
HGB BLD-MCNC: 11.7 G/DL (ref 11.5–16)
HGB UR QL STRIP: NEGATIVE
HYALINE CASTS URNS QL MICRO: ABNORMAL /LPF (ref 0–5)
KETONES UR QL STRIP.AUTO: ABNORMAL MG/DL
LEUKOCYTE ESTERASE UR QL STRIP.AUTO: ABNORMAL
LYMPHOCYTES # BLD: 1.7 K/UL (ref 0.8–3.5)
LYMPHOCYTES NFR BLD: 24.5 % (ref 12–49)
MCH RBC QN AUTO: 25.8 PG (ref 26–34)
MCHC RBC AUTO-ENTMCNC: 31 G/DL (ref 30–36.5)
MCV RBC AUTO: 83.2 FL (ref 80–99)
NEUTS SEG # BLD: 4.4 K/UL (ref 1.8–8)
NEUTS SEG NFR BLD: 65.7 % (ref 32–75)
NITRITE UR QL STRIP.AUTO: NEGATIVE
PH UR STRIP: 6 (ref 5–8)
PLATELET # BLD AUTO: 236 K/UL (ref 150–400)
POTASSIUM SERPL-SCNC: 3.9 MMOL/L (ref 3.5–5.1)
PROT SERPL-MCNC: 7.5 G/DL (ref 6.4–8.2)
PROT UR STRIP-MCNC: 30 MG/DL
RBC # BLD AUTO: 4.53 M/UL (ref 3.8–5.2)
RBC #/AREA URNS HPF: ABNORMAL /HPF (ref 0–5)
SODIUM SERPL-SCNC: 139 MMOL/L (ref 136–145)
SP GR UR REFRACTOMETRY: 1.02 (ref 1–1.03)
SPECIMEN HOLD: NORMAL
UROBILINOGEN UR QL STRIP.AUTO: 1 EU/DL (ref 0.2–1)
WBC # BLD AUTO: 6.8 K/UL (ref 3.6–11)
WBC URNS QL MICRO: ABNORMAL /HPF (ref 0–4)

## 2025-07-10 PROCEDURE — 99215 OFFICE O/P EST HI 40 MIN: CPT | Performed by: INTERNAL MEDICINE

## 2025-07-10 PROCEDURE — G8428 CUR MEDS NOT DOCUMENT: HCPCS | Performed by: INTERNAL MEDICINE

## 2025-07-10 PROCEDURE — 1123F ACP DISCUSS/DSCN MKR DOCD: CPT | Performed by: INTERNAL MEDICINE

## 2025-07-10 PROCEDURE — 96375 TX/PRO/DX INJ NEW DRUG ADDON: CPT

## 2025-07-10 PROCEDURE — 81001 URINALYSIS AUTO W/SCOPE: CPT

## 2025-07-10 PROCEDURE — 1090F PRES/ABSN URINE INCON ASSESS: CPT | Performed by: INTERNAL MEDICINE

## 2025-07-10 PROCEDURE — 80053 COMPREHEN METABOLIC PANEL: CPT

## 2025-07-10 PROCEDURE — 96417 CHEMO IV INFUS EACH ADDL SEQ: CPT

## 2025-07-10 PROCEDURE — 1126F AMNT PAIN NOTED NONE PRSNT: CPT | Performed by: INTERNAL MEDICINE

## 2025-07-10 PROCEDURE — 6360000002 HC RX W HCPCS: Performed by: INTERNAL MEDICINE

## 2025-07-10 PROCEDURE — 96413 CHEMO IV INFUSION 1 HR: CPT

## 2025-07-10 PROCEDURE — 3017F COLORECTAL CA SCREEN DOC REV: CPT | Performed by: INTERNAL MEDICINE

## 2025-07-10 PROCEDURE — 1036F TOBACCO NON-USER: CPT | Performed by: INTERNAL MEDICINE

## 2025-07-10 PROCEDURE — G8400 PT W/DXA NO RESULTS DOC: HCPCS | Performed by: INTERNAL MEDICINE

## 2025-07-10 PROCEDURE — 85025 COMPLETE CBC W/AUTO DIFF WBC: CPT

## 2025-07-10 PROCEDURE — G8417 CALC BMI ABV UP PARAM F/U: HCPCS | Performed by: INTERNAL MEDICINE

## 2025-07-10 PROCEDURE — 2580000003 HC RX 258: Performed by: INTERNAL MEDICINE

## 2025-07-10 RX ORDER — SODIUM CHLORIDE 0.9 % (FLUSH) 0.9 %
5-40 SYRINGE (ML) INJECTION PRN
Status: DISCONTINUED | OUTPATIENT
Start: 2025-07-10 | End: 2025-07-11 | Stop reason: HOSPADM

## 2025-07-10 RX ORDER — ONDANSETRON 2 MG/ML
4 INJECTION INTRAMUSCULAR; INTRAVENOUS ONCE
Status: COMPLETED | OUTPATIENT
Start: 2025-07-10 | End: 2025-07-10

## 2025-07-10 RX ORDER — SODIUM CHLORIDE 9 MG/ML
5-250 INJECTION, SOLUTION INTRAVENOUS PRN
Status: DISCONTINUED | OUTPATIENT
Start: 2025-07-10 | End: 2025-07-11 | Stop reason: HOSPADM

## 2025-07-10 RX ORDER — DIPHENHYDRAMINE HYDROCHLORIDE 50 MG/ML
50 INJECTION, SOLUTION INTRAMUSCULAR; INTRAVENOUS ONCE
Status: COMPLETED | OUTPATIENT
Start: 2025-07-10 | End: 2025-07-10

## 2025-07-10 RX ADMIN — DIPHENHYDRAMINE HYDROCHLORIDE 50 MG: 50 INJECTION INTRAMUSCULAR; INTRAVENOUS at 13:30

## 2025-07-10 RX ADMIN — ONDANSETRON 4 MG: 2 INJECTION, SOLUTION INTRAMUSCULAR; INTRAVENOUS at 14:52

## 2025-07-10 RX ADMIN — SODIUM CHLORIDE 1000 MG: 0.9 INJECTION, SOLUTION INTRAVENOUS at 13:37

## 2025-07-10 RX ADMIN — SODIUM CHLORIDE 200 MG: 9 INJECTION, SOLUTION INTRAVENOUS at 13:00

## 2025-07-10 ASSESSMENT — PAIN SCALES - GENERAL: PAINLEVEL_OUTOF10: 0

## 2025-07-10 NOTE — PROGRESS NOTES
[facility] Progress Note    Ms. Michaud Arrived ambulatory and in no distress for cycle 4 day 1 of Keytruda / Ramicurimab regimen.  Assessment was completed, no acute issues at this time, no new complaints voiced.  port accessed without difficulty, labs drawn and processed.        Ms. Michaud's vitals were reviewed.  Patient Vitals for the past 12 hrs:   Pulse Resp BP   07/10/25 1500 86 18 (!) 154/88         Lab results were obtained and reviewed.  Recent Results (from the past 12 hours)   Extra Tubes Hold    Collection Time: 07/10/25 10:36 AM   Result Value Ref Range    Specimen HOld 1SST     Comment:        Add-on orders for these samples will be processed based on acceptable specimen integrity and analyte stability, which may vary by analyte.   Urinalysis    Collection Time: 07/10/25 10:36 AM   Result Value Ref Range    Color, UA DARK YELLOW      Appearance CLEAR CLEAR      Specific Gravity, UA 1.024 1.003 - 1.030      pH, Urine 6.0 5.0 - 8.0      Protein, UA 30 (A) NEG mg/dL    Glucose, Ur Negative NEG mg/dL    Ketones, Urine TRACE (A) NEG mg/dL    Blood, Urine Negative NEG      Urobilinogen, Urine 1.0 0.2 - 1.0 EU/dL    Nitrite, Urine Negative NEG      Leukocyte Esterase, Urine TRACE (A) NEG      WBC, UA 0-4 0 - 4 /hpf    RBC, UA 0-5 0 - 5 /hpf    Epithelial Cells, UA FEW FEW /lpf    BACTERIA, URINE Negative NEG /hpf    Hyaline Casts, UA 2-5 0 - 5 /lpf   Comprehensive metabolic panel    Collection Time: 07/10/25 10:36 AM   Result Value Ref Range    Sodium 139 136 - 145 mmol/L    Potassium 3.9 3.5 - 5.1 mmol/L    Chloride 108 97 - 108 mmol/L    CO2 25 21 - 32 mmol/L    Anion Gap 6 2 - 12 mmol/L    Glucose 95 65 - 100 mg/dL    BUN 8 6 - 20 MG/DL    Creatinine 0.84 0.55 - 1.02 MG/DL    BUN/Creatinine Ratio 10 (L) 12 - 20      Est, Glom Filt Rate 77 >60 ml/min/1.73m2    Calcium 9.1 8.5 - 10.1 MG/DL    Total Bilirubin 0.9 0.2 - 1.0 MG/DL    ALT 18 12 - 78 U/L    AST 23 15 - 37 U/L    Alk Phosphatase 82 45 - 117 U/L

## 2025-07-10 NOTE — PROGRESS NOTES
Stephie Michaud is a 65 y.o. female    Chief Complaint   Patient presents with    Follow-up      stage IV NSCLC- PDL1- 15%, no  mutations             1. Have you been to the ER, urgent care clinic since your last visit?  Hospitalized since your last visit?No    2. Have you seen or consulted any other health care providers outside of the [facility] since your last visit?  Include any pap smears or colon screening. No      
high risk disease         Plan:       Proceed with C4 of  Keytruda and Ramicurimab every 3 weeks   Continue on daily oral ferrous sulphate 325 mg  Oxycodone 10mg as needed   CBC, CMP, TSH prior to each treatment  Return to clinic in 3 weeks for office visit and [facility] for cycle 5. CT scans after cycle 6      Patient to call or MyChart message with any questions or concerns.     I appreciate the opportunity to participate in Ms. Stephie Michaud's care.    Signed By: Marlen Leo MD

## 2025-07-22 RX ORDER — ACETAMINOPHEN 325 MG/1
650 TABLET ORAL
Status: CANCELLED | OUTPATIENT
Start: 2025-07-31

## 2025-07-22 RX ORDER — SODIUM CHLORIDE 9 MG/ML
INJECTION, SOLUTION INTRAVENOUS CONTINUOUS
Status: CANCELLED | OUTPATIENT
Start: 2025-07-31

## 2025-07-22 RX ORDER — HYDROCORTISONE SODIUM SUCCINATE 100 MG/2ML
100 INJECTION INTRAMUSCULAR; INTRAVENOUS
Status: CANCELLED | OUTPATIENT
Start: 2025-07-31

## 2025-07-22 RX ORDER — ACETAMINOPHEN 325 MG/1
650 TABLET ORAL
Status: CANCELLED
Start: 2025-07-31

## 2025-07-22 RX ORDER — ALBUTEROL SULFATE 90 UG/1
4 INHALANT RESPIRATORY (INHALATION) PRN
Status: CANCELLED | OUTPATIENT
Start: 2025-07-31

## 2025-07-22 RX ORDER — DIPHENHYDRAMINE HYDROCHLORIDE 50 MG/ML
50 INJECTION, SOLUTION INTRAMUSCULAR; INTRAVENOUS
Status: CANCELLED | OUTPATIENT
Start: 2025-07-31

## 2025-07-22 RX ORDER — ONDANSETRON 2 MG/ML
8 INJECTION INTRAMUSCULAR; INTRAVENOUS
Status: CANCELLED | OUTPATIENT
Start: 2025-07-31

## 2025-07-22 RX ORDER — EPINEPHRINE 1 MG/ML
0.3 INJECTION, SOLUTION INTRAMUSCULAR; SUBCUTANEOUS PRN
Status: CANCELLED | OUTPATIENT
Start: 2025-07-31

## 2025-07-22 RX ORDER — HEPARIN 100 UNIT/ML
500 SYRINGE INTRAVENOUS PRN
Status: CANCELLED | OUTPATIENT
Start: 2025-07-31

## 2025-07-22 RX ORDER — SODIUM CHLORIDE 9 MG/ML
5-250 INJECTION, SOLUTION INTRAVENOUS PRN
Status: CANCELLED | OUTPATIENT
Start: 2025-07-31

## 2025-07-31 ENCOUNTER — OFFICE VISIT (OUTPATIENT)
Age: 65
End: 2025-07-31
Payer: MEDICARE

## 2025-07-31 ENCOUNTER — HOSPITAL ENCOUNTER (OUTPATIENT)
Facility: HOSPITAL | Age: 65
Setting detail: INFUSION SERIES
Discharge: HOME | End: 2025-07-31
Payer: MEDICARE

## 2025-07-31 VITALS
BODY MASS INDEX: 33.27 KG/M2 | WEIGHT: 212 LBS | HEIGHT: 67 IN | OXYGEN SATURATION: 95 % | SYSTOLIC BLOOD PRESSURE: 129 MMHG | RESPIRATION RATE: 16 BRPM | DIASTOLIC BLOOD PRESSURE: 88 MMHG | HEART RATE: 84 BPM | TEMPERATURE: 98.2 F

## 2025-07-31 VITALS — DIASTOLIC BLOOD PRESSURE: 87 MMHG | HEART RATE: 85 BPM | SYSTOLIC BLOOD PRESSURE: 149 MMHG

## 2025-07-31 VITALS
HEIGHT: 67 IN | BODY MASS INDEX: 33.21 KG/M2 | SYSTOLIC BLOOD PRESSURE: 128 MMHG | OXYGEN SATURATION: 98 % | TEMPERATURE: 97.3 F | RESPIRATION RATE: 18 BRPM | HEART RATE: 80 BPM | DIASTOLIC BLOOD PRESSURE: 72 MMHG | WEIGHT: 211.6 LBS

## 2025-07-31 DIAGNOSIS — C34.90 NSCLC METASTATIC TO ADRENAL GLAND (HCC): Primary | ICD-10-CM

## 2025-07-31 DIAGNOSIS — C34.90 NSCLC METASTATIC TO ADRENAL GLAND (HCC): ICD-10-CM

## 2025-07-31 DIAGNOSIS — C79.70 NSCLC METASTATIC TO ADRENAL GLAND (HCC): Primary | ICD-10-CM

## 2025-07-31 DIAGNOSIS — Z79.899 OTHER LONG TERM (CURRENT) DRUG THERAPY: ICD-10-CM

## 2025-07-31 DIAGNOSIS — Z79.899 OTHER LONG TERM (CURRENT) DRUG THERAPY: Primary | ICD-10-CM

## 2025-07-31 DIAGNOSIS — C79.70 NSCLC METASTATIC TO ADRENAL GLAND (HCC): ICD-10-CM

## 2025-07-31 LAB
ALBUMIN SERPL-MCNC: 3.2 G/DL (ref 3.5–5.2)
ALBUMIN/GLOB SERPL: 0.9 (ref 1.1–2.2)
ALP SERPL-CCNC: 75 U/L (ref 35–104)
ALT SERPL-CCNC: 8 U/L (ref 10–50)
ANION GAP SERPL CALC-SCNC: 12 MMOL/L (ref 2–14)
APPEARANCE UR: ABNORMAL
AST SERPL-CCNC: 21 U/L (ref 10–35)
BACTERIA URNS QL MICRO: ABNORMAL /HPF
BASO+EOS+MONOS # BLD AUTO: 0.7 K/UL (ref 0.2–1.2)
BASO+EOS+MONOS NFR BLD AUTO: 11 % (ref 3.2–16.9)
BILIRUB SERPL-MCNC: 0.7 MG/DL (ref 0–1.2)
BILIRUB UR QL: NEGATIVE
BUN SERPL-MCNC: 8 MG/DL (ref 8–23)
BUN/CREAT SERPL: 9 (ref 12–20)
CALCIUM SERPL-MCNC: 9.2 MG/DL (ref 8.8–10.2)
CHLORIDE SERPL-SCNC: 106 MMOL/L (ref 98–107)
CO2 SERPL-SCNC: 24 MMOL/L (ref 20–29)
COLOR UR: ABNORMAL
CREAT SERPL-MCNC: 0.86 MG/DL (ref 0.6–1)
DIFFERENTIAL METHOD BLD: ABNORMAL
EPITH CASTS URNS QL MICRO: ABNORMAL /LPF
ERYTHROCYTE [DISTWIDTH] IN BLOOD BY AUTOMATED COUNT: 18 % (ref 11.8–15.8)
GLOBULIN SER CALC-MCNC: 3.6 G/DL (ref 2–4)
GLUCOSE SERPL-MCNC: 95 MG/DL (ref 65–100)
GLUCOSE UR STRIP.AUTO-MCNC: NEGATIVE MG/DL
HCT VFR BLD AUTO: 37 % (ref 35–47)
HGB BLD-MCNC: 11.8 G/DL (ref 11.5–16)
HGB UR QL STRIP: NEGATIVE
HYALINE CASTS URNS QL MICRO: ABNORMAL /LPF (ref 0–5)
KETONES UR QL STRIP.AUTO: ABNORMAL MG/DL
LEUKOCYTE ESTERASE UR QL STRIP.AUTO: ABNORMAL
LYMPHOCYTES # BLD: 1.4 K/UL (ref 0.8–3.5)
LYMPHOCYTES NFR BLD: 21.3 % (ref 12–49)
MCH RBC QN AUTO: 26.9 PG (ref 26–34)
MCHC RBC AUTO-ENTMCNC: 31.9 G/DL (ref 30–36.5)
MCV RBC AUTO: 84.5 FL (ref 80–99)
NEUTS SEG # BLD: 4.5 K/UL (ref 1.8–8)
NEUTS SEG NFR BLD: 67.4 % (ref 32–75)
NITRITE UR QL STRIP.AUTO: NEGATIVE
PH UR STRIP: 6.5 (ref 5–8)
PLATELET # BLD AUTO: 220 K/UL (ref 150–400)
POTASSIUM SERPL-SCNC: 4 MMOL/L (ref 3.5–5.1)
PROT SERPL-MCNC: 6.8 G/DL (ref 6.4–8.3)
PROT UR STRIP-MCNC: ABNORMAL MG/DL
RBC # BLD AUTO: 4.38 M/UL (ref 3.8–5.2)
RBC #/AREA URNS HPF: ABNORMAL /HPF (ref 0–5)
SODIUM SERPL-SCNC: 141 MMOL/L (ref 136–145)
SP GR UR REFRACTOMETRY: 1.02 (ref 1–1.03)
TSH, 3RD GENERATION: 1.93 UIU/ML (ref 0.27–4.2)
UROBILINOGEN UR QL STRIP.AUTO: 1 EU/DL (ref 0.2–1)
WBC # BLD AUTO: 6.6 K/UL (ref 3.6–11)
WBC URNS QL MICRO: ABNORMAL /HPF (ref 0–4)

## 2025-07-31 PROCEDURE — G8417 CALC BMI ABV UP PARAM F/U: HCPCS | Performed by: INTERNAL MEDICINE

## 2025-07-31 PROCEDURE — G8400 PT W/DXA NO RESULTS DOC: HCPCS | Performed by: INTERNAL MEDICINE

## 2025-07-31 PROCEDURE — 96413 CHEMO IV INFUSION 1 HR: CPT

## 2025-07-31 PROCEDURE — 1036F TOBACCO NON-USER: CPT | Performed by: INTERNAL MEDICINE

## 2025-07-31 PROCEDURE — 1126F AMNT PAIN NOTED NONE PRSNT: CPT | Performed by: INTERNAL MEDICINE

## 2025-07-31 PROCEDURE — G8427 DOCREV CUR MEDS BY ELIG CLIN: HCPCS | Performed by: INTERNAL MEDICINE

## 2025-07-31 PROCEDURE — 84443 ASSAY THYROID STIM HORMONE: CPT

## 2025-07-31 PROCEDURE — 96417 CHEMO IV INFUS EACH ADDL SEQ: CPT

## 2025-07-31 PROCEDURE — 96375 TX/PRO/DX INJ NEW DRUG ADDON: CPT

## 2025-07-31 PROCEDURE — 2580000003 HC RX 258: Performed by: INTERNAL MEDICINE

## 2025-07-31 PROCEDURE — 1090F PRES/ABSN URINE INCON ASSESS: CPT | Performed by: INTERNAL MEDICINE

## 2025-07-31 PROCEDURE — 1159F MED LIST DOCD IN RCRD: CPT | Performed by: INTERNAL MEDICINE

## 2025-07-31 PROCEDURE — 99215 OFFICE O/P EST HI 40 MIN: CPT | Performed by: INTERNAL MEDICINE

## 2025-07-31 PROCEDURE — 81001 URINALYSIS AUTO W/SCOPE: CPT

## 2025-07-31 PROCEDURE — 85025 COMPLETE CBC W/AUTO DIFF WBC: CPT

## 2025-07-31 PROCEDURE — 1123F ACP DISCUSS/DSCN MKR DOCD: CPT | Performed by: INTERNAL MEDICINE

## 2025-07-31 PROCEDURE — 6360000002 HC RX W HCPCS: Performed by: INTERNAL MEDICINE

## 2025-07-31 PROCEDURE — 80053 COMPREHEN METABOLIC PANEL: CPT

## 2025-07-31 PROCEDURE — 3017F COLORECTAL CA SCREEN DOC REV: CPT | Performed by: INTERNAL MEDICINE

## 2025-07-31 RX ORDER — SODIUM CHLORIDE 9 MG/ML
5-250 INJECTION, SOLUTION INTRAVENOUS PRN
Status: DISCONTINUED | OUTPATIENT
Start: 2025-07-31 | End: 2025-08-01 | Stop reason: HOSPADM

## 2025-07-31 RX ORDER — DIPHENHYDRAMINE HYDROCHLORIDE 50 MG/ML
50 INJECTION, SOLUTION INTRAMUSCULAR; INTRAVENOUS ONCE
Status: COMPLETED | OUTPATIENT
Start: 2025-07-31 | End: 2025-07-31

## 2025-07-31 RX ORDER — SODIUM CHLORIDE 0.9 % (FLUSH) 0.9 %
5-40 SYRINGE (ML) INJECTION PRN
Status: DISCONTINUED | OUTPATIENT
Start: 2025-07-31 | End: 2025-08-01 | Stop reason: HOSPADM

## 2025-07-31 RX ADMIN — SODIUM CHLORIDE 200 MG: 9 INJECTION, SOLUTION INTRAVENOUS at 12:57

## 2025-07-31 RX ADMIN — SODIUM CHLORIDE 50 ML/HR: 0.9 INJECTION, SOLUTION INTRAVENOUS at 12:54

## 2025-07-31 RX ADMIN — SODIUM CHLORIDE 1000 MG: 0.9 INJECTION, SOLUTION INTRAVENOUS at 14:01

## 2025-07-31 RX ADMIN — DIPHENHYDRAMINE HYDROCHLORIDE 50 MG: 50 INJECTION INTRAMUSCULAR; INTRAVENOUS at 13:36

## 2025-07-31 ASSESSMENT — PATIENT HEALTH QUESTIONNAIRE - PHQ9
2. FEELING DOWN, DEPRESSED OR HOPELESS: SEVERAL DAYS
SUM OF ALL RESPONSES TO PHQ QUESTIONS 1-9: 1
1. LITTLE INTEREST OR PLEASURE IN DOING THINGS: NOT AT ALL
SUM OF ALL RESPONSES TO PHQ QUESTIONS 1-9: 1

## 2025-07-31 ASSESSMENT — PAIN SCALES - GENERAL: PAINLEVEL_OUTOF10: 0

## 2025-07-31 NOTE — PROGRESS NOTES
[facility] Chemotherapy Progress Note    Date: 2025    Name: tSephie Michaud    MRN: [id number]         : 1960       Pt admit to [facility] for C5 D1 Keytruda, Cyramza ambulatory in stable condition. Assessment completed. No new concerns voiced. Port with positive blood return. Labs sent for processing.                    Ms. Michaud's vitals were reviewed.  Patient Vitals for the past 12 hrs:   Pulse BP   25 1500 85 (!) 149/87         Lab results were obtained and reviewed.  Recent Results (from the past 12 hours)   CBC with Partial Differential    Collection Time: 25 10:26 AM   Result Value Ref Range    WBC 6.6 3.6 - 11.0 K/uL    RBC 4.38 3.80 - 5.20 M/uL    Hemoglobin 11.8 11.5 - 16.0 g/dL    Hematocrit 37.0 35.0 - 47.0 %    MCV 84.5 80.0 - 99.0 FL    MCH 26.9 26.0 - 34.0 PG    MCHC 31.9 30.0 - 36.5 g/dL    RDW 18.0 (H) 11.8 - 15.8 %    Platelets 220 150 - 400 K/uL    Neutrophils % 67.4 32 - 75 %    Mixed Cells 11 3.2 - 16.9 %    Lymphocytes % 21.3 12 - 49 %    Neutrophils Absolute 4.50 1.8 - 8.0 K/UL    ABSOLUTE MIXED CELLS 0.7 0.2 - 1.2 K/uL    Lymphocytes Absolute 1.40 0.8 - 3.5 K/UL    Differential Type AUTOMATED     Comprehensive metabolic panel    Collection Time: 25 10:26 AM   Result Value Ref Range    Sodium 141 136 - 145 mmol/L    Potassium 4.0 3.5 - 5.1 mmol/L    Chloride 106 98 - 107 mmol/L    CO2 24 20 - 29 mmol/L    Anion Gap 12 2 - 14 mmol/L    Glucose 95 65 - 100 mg/dL    BUN 8 8 - 23 MG/DL    Creatinine 0.86 0.60 - 1.00 MG/DL    BUN/Creatinine Ratio 9 (L) 12 - 20      Est, Glom Filt Rate 75 (L) >90 ml/min/1.73m2    Calcium 9.2 8.8 - 10.2 MG/DL    Total Bilirubin 0.7 0.0 - 1.2 MG/DL    ALT 8 (L) 10 - 50 U/L    AST 21 10 - 35 U/L    Alk Phosphatase 75 35 - 104 U/L    Total Protein 6.8 6.4 - 8.3 g/dL    Albumin 3.2 (L) 3.5 - 5.2 g/dL    Globulin 3.6 2.0 - 4.0 g/dL    Albumin/Globulin Ratio 0.9 (L) 1.1 - 2.2     Urinalysis    Collection Time: 25 10:26 AM   Result

## 2025-07-31 NOTE — PROGRESS NOTES
Identified pt with two pt identifiers(name and ). Reviewed record in preparation for visit and have obtained necessary documentation. All patient medications has been reviewed.  Chief Complaint   Patient presents with    Follow-up               Wt Readings from Last 3 Encounters:   25 96.2 kg (212 lb)   25 96 kg (211 lb 9.6 oz)   07/10/25 94.3 kg (208 lb)     Temp Readings from Last 3 Encounters:   25 98.2 °F (36.8 °C) (Temporal)   25 97.3 °F (36.3 °C) (Temporal)   07/10/25 97.4 °F (36.3 °C) (Temporal)     BP Readings from Last 3 Encounters:   25 129/88   25 128/72   07/10/25 (!) 154/88     Pulse Readings from Last 3 Encounters:   25 84   25 80   07/10/25 86       Have you been to the ER, urgent care clinic since your last visit?  Hospitalized since your last visit?   NO    Have you seen or consulted any other health care providers outside our system since your last visit?   NO        
metastases on left side and decreased in size. Left pleural effusion   has decreased and there is improved aeration in the left lung.   3. Left adrenal nodule is stable.   4. Linear irregular nodular densities in the anterior peritoneum in the right   lower quadrant and midline in the lower abdomen and upper pelvis are noted and   unchanged.       CT 11/2022:   IMPRESSION   1.  No evidence of pulmonary embolism.   2.  Essentially unchanged left upper lobe lung mass and subpleural thickening   compared to the recent chest CT.   3.  Prominent lower paratracheal mediastinal lymph nodes, none enlarged by size   criteria.   4.  Groundglass opacities in the lower lobes bilaterally may be infectious or   inflammatory. Correlate for infection.      CT 1/2023:   1. CT of the chest is unchanged. The spiculated pleural-based nodule left apex   is stable. Left pleural nodularity is stable.   2. CT of the abdomen and pelvis demonstrates no significant change. Bilateral   adrenal nodules are stable..   Peritoneal nodularity is stable. No acute abnormality identified.       CT 4/2023   IMPRESSION   Stable CT imaging appearance of chest, abdomen and pelvis with details above.   Note stable appearance of apical pleural-parenchymal left lung nodule,   additional left pulmonary and pleural nodularity, left larger than right   bilateral adrenal nodules, and peritoneal nodularity.    Ct 8/2023       IMPRESSION:     1. Slight increase in size of a couple small left lung nodules with new small  left lower lobe lung nodule. Other numerous bilateral pulmonary nodules and  nodular opacities appear grossly stable.  2. Stable anterior peritoneal nodularity.  3. Stable adrenal nodules.  4. Please refer to above findings for complete details.    CT 4/2024  IMPRESSION:     1. The extensive pleural nodularity consistent with pleural metastases in the  left hemithorax is again noted and overall the nodules have increased in size as  described

## 2025-08-13 RX ORDER — ONDANSETRON 2 MG/ML
8 INJECTION INTRAMUSCULAR; INTRAVENOUS
Status: CANCELLED | OUTPATIENT
Start: 2025-08-21

## 2025-08-13 RX ORDER — HYDROCORTISONE SODIUM SUCCINATE 100 MG/2ML
100 INJECTION INTRAMUSCULAR; INTRAVENOUS
Status: CANCELLED | OUTPATIENT
Start: 2025-08-21

## 2025-08-13 RX ORDER — SODIUM CHLORIDE 9 MG/ML
INJECTION, SOLUTION INTRAVENOUS CONTINUOUS
Status: CANCELLED | OUTPATIENT
Start: 2025-08-21

## 2025-08-13 RX ORDER — ALBUTEROL SULFATE 90 UG/1
4 INHALANT RESPIRATORY (INHALATION) PRN
Status: CANCELLED | OUTPATIENT
Start: 2025-08-21

## 2025-08-13 RX ORDER — EPINEPHRINE 1 MG/ML
0.3 INJECTION, SOLUTION INTRAMUSCULAR; SUBCUTANEOUS PRN
Status: CANCELLED | OUTPATIENT
Start: 2025-08-21

## 2025-08-13 RX ORDER — ACETAMINOPHEN 325 MG/1
650 TABLET ORAL
Status: CANCELLED
Start: 2025-08-21

## 2025-08-13 RX ORDER — DIPHENHYDRAMINE HYDROCHLORIDE 50 MG/ML
50 INJECTION, SOLUTION INTRAMUSCULAR; INTRAVENOUS
Status: CANCELLED | OUTPATIENT
Start: 2025-08-21

## 2025-08-13 RX ORDER — ACETAMINOPHEN 325 MG/1
650 TABLET ORAL
Status: CANCELLED | OUTPATIENT
Start: 2025-08-21

## 2025-08-21 ENCOUNTER — HOSPITAL ENCOUNTER (OUTPATIENT)
Facility: HOSPITAL | Age: 65
Setting detail: INFUSION SERIES
Discharge: HOME | End: 2025-08-21
Payer: MEDICARE

## 2025-08-21 ENCOUNTER — OFFICE VISIT (OUTPATIENT)
Age: 65
End: 2025-08-21
Payer: MEDICARE

## 2025-08-21 VITALS
WEIGHT: 203 LBS | RESPIRATION RATE: 18 BRPM | TEMPERATURE: 97.5 F | SYSTOLIC BLOOD PRESSURE: 158 MMHG | OXYGEN SATURATION: 98 % | BODY MASS INDEX: 31.79 KG/M2 | HEART RATE: 83 BPM | DIASTOLIC BLOOD PRESSURE: 85 MMHG

## 2025-08-21 VITALS
OXYGEN SATURATION: 98 % | HEART RATE: 83 BPM | SYSTOLIC BLOOD PRESSURE: 158 MMHG | TEMPERATURE: 97.5 F | RESPIRATION RATE: 18 BRPM | DIASTOLIC BLOOD PRESSURE: 85 MMHG | BODY MASS INDEX: 31.96 KG/M2 | HEIGHT: 67 IN | WEIGHT: 203.6 LBS

## 2025-08-21 DIAGNOSIS — C79.70 NSCLC METASTATIC TO ADRENAL GLAND (HCC): Primary | ICD-10-CM

## 2025-08-21 DIAGNOSIS — Z79.899 OTHER LONG TERM (CURRENT) DRUG THERAPY: Primary | ICD-10-CM

## 2025-08-21 DIAGNOSIS — C79.70 NSCLC METASTATIC TO ADRENAL GLAND (HCC): ICD-10-CM

## 2025-08-21 DIAGNOSIS — Z79.899 OTHER LONG TERM (CURRENT) DRUG THERAPY: ICD-10-CM

## 2025-08-21 DIAGNOSIS — C34.90 NSCLC METASTATIC TO ADRENAL GLAND (HCC): ICD-10-CM

## 2025-08-21 DIAGNOSIS — C34.90 NSCLC METASTATIC TO ADRENAL GLAND (HCC): Primary | ICD-10-CM

## 2025-08-21 LAB
ALBUMIN SERPL-MCNC: 3.3 G/DL (ref 3.5–5.2)
ALBUMIN/GLOB SERPL: 1 (ref 1.1–2.2)
ALP SERPL-CCNC: 75 U/L (ref 35–104)
ALT SERPL-CCNC: 10 U/L (ref 10–35)
ANION GAP SERPL CALC-SCNC: 12 MMOL/L (ref 2–14)
APPEARANCE UR: ABNORMAL
AST SERPL-CCNC: 22 U/L (ref 10–35)
BACTERIA URNS QL MICRO: ABNORMAL /HPF
BASO+EOS+MONOS # BLD AUTO: 0.5 K/UL (ref 0.2–1.2)
BASO+EOS+MONOS NFR BLD AUTO: 8 % (ref 3.2–16.9)
BILIRUB SERPL-MCNC: 0.7 MG/DL (ref 0–1.2)
BILIRUB UR QL: NEGATIVE
BUN SERPL-MCNC: 9 MG/DL (ref 8–23)
BUN/CREAT SERPL: 13 (ref 12–20)
CALCIUM SERPL-MCNC: 8.9 MG/DL (ref 8.8–10.2)
CHLORIDE SERPL-SCNC: 104 MMOL/L (ref 98–107)
CO2 SERPL-SCNC: 25 MMOL/L (ref 20–29)
COLOR UR: ABNORMAL
CREAT SERPL-MCNC: 0.66 MG/DL (ref 0.6–1)
DIFFERENTIAL METHOD BLD: ABNORMAL
EPITH CASTS URNS QL MICRO: ABNORMAL /LPF
ERYTHROCYTE [DISTWIDTH] IN BLOOD BY AUTOMATED COUNT: 17.7 % (ref 11.8–15.8)
GLOBULIN SER CALC-MCNC: 3.2 G/DL (ref 2–4)
GLUCOSE SERPL-MCNC: 97 MG/DL (ref 65–100)
GLUCOSE UR STRIP.AUTO-MCNC: NEGATIVE MG/DL
HCT VFR BLD AUTO: 38.1 % (ref 35–47)
HGB BLD-MCNC: 12.4 G/DL (ref 11.5–16)
HGB UR QL STRIP: NEGATIVE
KETONES UR QL STRIP.AUTO: ABNORMAL MG/DL
LEUKOCYTE ESTERASE UR QL STRIP.AUTO: ABNORMAL
LYMPHOCYTES # BLD: 1.3 K/UL (ref 0.8–3.5)
LYMPHOCYTES NFR BLD: 21.4 % (ref 12–49)
MCH RBC QN AUTO: 27.4 PG (ref 26–34)
MCHC RBC AUTO-ENTMCNC: 32.5 G/DL (ref 30–36.5)
MCV RBC AUTO: 84.3 FL (ref 80–99)
NEUTS SEG # BLD: 4.4 K/UL (ref 1.8–8)
NEUTS SEG NFR BLD: 70.8 % (ref 32–75)
NITRITE UR QL STRIP.AUTO: NEGATIVE
PH UR STRIP: 5.5 (ref 5–8)
PLATELET # BLD AUTO: 242 K/UL (ref 150–400)
POTASSIUM SERPL-SCNC: 3.5 MMOL/L (ref 3.5–5.1)
PROT SERPL-MCNC: 6.6 G/DL (ref 6.4–8.3)
PROT UR STRIP-MCNC: 30 MG/DL
RBC # BLD AUTO: 4.52 M/UL (ref 3.8–5.2)
RBC #/AREA URNS HPF: ABNORMAL /HPF (ref 0–5)
SODIUM SERPL-SCNC: 141 MMOL/L (ref 136–145)
SP GR UR REFRACTOMETRY: 1.02 (ref 1–1.03)
UROBILINOGEN UR QL STRIP.AUTO: 1 EU/DL (ref 0.2–1)
WBC # BLD AUTO: 6.2 K/UL (ref 3.6–11)
WBC URNS QL MICRO: ABNORMAL /HPF (ref 0–4)

## 2025-08-21 PROCEDURE — 85025 COMPLETE CBC W/AUTO DIFF WBC: CPT

## 2025-08-21 PROCEDURE — G8400 PT W/DXA NO RESULTS DOC: HCPCS | Performed by: INTERNAL MEDICINE

## 2025-08-21 PROCEDURE — 99215 OFFICE O/P EST HI 40 MIN: CPT | Performed by: INTERNAL MEDICINE

## 2025-08-21 PROCEDURE — 1036F TOBACCO NON-USER: CPT | Performed by: INTERNAL MEDICINE

## 2025-08-21 PROCEDURE — 1126F AMNT PAIN NOTED NONE PRSNT: CPT | Performed by: INTERNAL MEDICINE

## 2025-08-21 PROCEDURE — 1123F ACP DISCUSS/DSCN MKR DOCD: CPT | Performed by: INTERNAL MEDICINE

## 2025-08-21 PROCEDURE — 80053 COMPREHEN METABOLIC PANEL: CPT

## 2025-08-21 PROCEDURE — 6360000002 HC RX W HCPCS: Performed by: INTERNAL MEDICINE

## 2025-08-21 PROCEDURE — 96417 CHEMO IV INFUS EACH ADDL SEQ: CPT

## 2025-08-21 PROCEDURE — 96413 CHEMO IV INFUSION 1 HR: CPT

## 2025-08-21 PROCEDURE — 2580000003 HC RX 258: Performed by: INTERNAL MEDICINE

## 2025-08-21 PROCEDURE — G8417 CALC BMI ABV UP PARAM F/U: HCPCS | Performed by: INTERNAL MEDICINE

## 2025-08-21 PROCEDURE — G8428 CUR MEDS NOT DOCUMENT: HCPCS | Performed by: INTERNAL MEDICINE

## 2025-08-21 PROCEDURE — 3017F COLORECTAL CA SCREEN DOC REV: CPT | Performed by: INTERNAL MEDICINE

## 2025-08-21 PROCEDURE — 96375 TX/PRO/DX INJ NEW DRUG ADDON: CPT

## 2025-08-21 PROCEDURE — 1090F PRES/ABSN URINE INCON ASSESS: CPT | Performed by: INTERNAL MEDICINE

## 2025-08-21 PROCEDURE — 81001 URINALYSIS AUTO W/SCOPE: CPT

## 2025-08-21 RX ORDER — OXYCODONE HYDROCHLORIDE 10 MG/1
10 TABLET ORAL EVERY 8 HOURS PRN
Qty: 90 TABLET | Refills: 0 | Status: SHIPPED | OUTPATIENT
Start: 2025-08-21 | End: 2025-09-20

## 2025-08-21 RX ORDER — SODIUM CHLORIDE 9 MG/ML
5-250 INJECTION, SOLUTION INTRAVENOUS PRN
Status: DISCONTINUED | OUTPATIENT
Start: 2025-08-21 | End: 2025-08-22 | Stop reason: HOSPADM

## 2025-08-21 RX ORDER — SODIUM CHLORIDE 0.9 % (FLUSH) 0.9 %
5-40 SYRINGE (ML) INJECTION PRN
Status: DISCONTINUED | OUTPATIENT
Start: 2025-08-21 | End: 2025-08-22 | Stop reason: HOSPADM

## 2025-08-21 RX ORDER — HEPARIN 100 UNIT/ML
500 SYRINGE INTRAVENOUS PRN
Status: DISCONTINUED | OUTPATIENT
Start: 2025-08-21 | End: 2025-08-22 | Stop reason: HOSPADM

## 2025-08-21 RX ORDER — DIPHENHYDRAMINE HYDROCHLORIDE 50 MG/ML
50 INJECTION, SOLUTION INTRAMUSCULAR; INTRAVENOUS ONCE
Status: COMPLETED | OUTPATIENT
Start: 2025-08-21 | End: 2025-08-21

## 2025-08-21 RX ORDER — PREDNISONE 10 MG/1
10 TABLET ORAL DAILY
Qty: 30 TABLET | Refills: 0 | Status: SHIPPED | OUTPATIENT
Start: 2025-08-21 | End: 2025-09-20

## 2025-08-21 RX ADMIN — SODIUM CHLORIDE 1000 MG: 9 INJECTION, SOLUTION INTRAVENOUS at 13:24

## 2025-08-21 RX ADMIN — DIPHENHYDRAMINE HYDROCHLORIDE 50 MG: 50 INJECTION INTRAMUSCULAR; INTRAVENOUS at 13:11

## 2025-08-21 RX ADMIN — SODIUM CHLORIDE 200 MG: 9 INJECTION, SOLUTION INTRAVENOUS at 12:34

## 2025-08-21 RX ADMIN — SODIUM CHLORIDE 50 ML/HR: 0.9 INJECTION, SOLUTION INTRAVENOUS at 12:31

## 2025-08-21 ASSESSMENT — PAIN SCALES - GENERAL: PAINLEVEL_OUTOF10: 0

## 2025-08-26 ENCOUNTER — TELEPHONE (OUTPATIENT)
Age: 65
End: 2025-08-26

## 2025-08-26 DIAGNOSIS — C34.90 NSCLC METASTATIC TO ADRENAL GLAND (HCC): Primary | ICD-10-CM

## 2025-08-26 DIAGNOSIS — C79.70 NSCLC METASTATIC TO ADRENAL GLAND (HCC): Primary | ICD-10-CM

## 2025-09-05 ENCOUNTER — TELEPHONE (OUTPATIENT)
Age: 65
End: 2025-09-05

## 2025-09-05 ENCOUNTER — HOSPITAL ENCOUNTER (OUTPATIENT)
Age: 65
End: 2025-09-05
Payer: MEDICARE

## 2025-09-05 ENCOUNTER — HOSPITAL ENCOUNTER (OUTPATIENT)
Age: 65
Discharge: HOME | End: 2025-09-05
Payer: MEDICARE

## 2025-09-05 DIAGNOSIS — C79.70 NSCLC METASTATIC TO ADRENAL GLAND (HCC): ICD-10-CM

## 2025-09-05 DIAGNOSIS — C34.90 NSCLC METASTATIC TO ADRENAL GLAND (HCC): ICD-10-CM

## 2025-09-05 PROCEDURE — 6360000004 HC RX CONTRAST MEDICATION

## 2025-09-05 PROCEDURE — 70491 CT SOFT TISSUE NECK W/DYE: CPT

## 2025-09-05 PROCEDURE — 74177 CT ABD & PELVIS W/CONTRAST: CPT

## 2025-09-05 RX ORDER — IOPAMIDOL 755 MG/ML
100 INJECTION, SOLUTION INTRAVASCULAR
Status: COMPLETED | OUTPATIENT
Start: 2025-09-05 | End: 2025-09-05

## 2025-09-05 RX ADMIN — IOPAMIDOL 100 ML: 755 INJECTION, SOLUTION INTRAVENOUS at 08:20

## (undated) DEVICE — INTENDED FOR TISSUE SEPARATION, AND OTHER PROCEDURES THAT REQUIRE A SHARP SURGICAL BLADE TO PUNCTURE OR CUT.: Brand: BARD-PARKER ® CARBON RIB-BACK BLADES

## (undated) DEVICE — MAGNETIC INSTR DRAPE 20X16: Brand: MEDLINE INDUSTRIES, INC.

## (undated) DEVICE — SOLUTION IV 1000ML 0.9% SOD CHL

## (undated) DEVICE — TOWEL SURG W17XL27IN STD BLU COT NONFENESTRATED PREWASHED

## (undated) DEVICE — CODMAN® SURGICAL PATTIES 1/2" X 3" (1.27CM X 7.62CM): Brand: CODMAN®

## (undated) DEVICE — SURGICAL PROCEDURE PACK BASIN MAJ SET CUST NO CAUT

## (undated) DEVICE — PACKING 400141 50PK POPE EAR WICK: Brand: MEROCEL®

## (undated) DEVICE — SPONGE GZ W4XL4IN COT RADPQ HIGHLY ABSRB

## (undated) DEVICE — TUBING HYDR IRR --

## (undated) DEVICE — SUTURE PLN GUT SZ 5-0 L18IN ABSRB YELLOWISH TAN L13MM PC-1 1915G

## (undated) DEVICE — TAPE ADH CLTH SILK H2O REPELLENT CURAD

## (undated) DEVICE — 3M™ TEGADERM™ TRANSPARENT FILM DRESSING FRAME STYLE, 1624W, 2-3/8 IN X 2-3/4 IN (6 CM X 7 CM), 100/CT 4CT/CASE: Brand: 3M™ TEGADERM™

## (undated) DEVICE — GARMENT,MEDLINE,DVT,INT,CALF,MED, GEN2: Brand: MEDLINE

## (undated) DEVICE — SNARE ENDOSCP M L240CM W27MM SHTH DIA2.4MM CHN 2.8MM OVL

## (undated) DEVICE — SYR 10ML CTRL LR LCK NSAF LF --

## (undated) DEVICE — STERILE POLYISOPRENE POWDER-FREE SURGICAL GLOVES: Brand: PROTEXIS

## (undated) DEVICE — BIPOLAR FORCEPS CORD: Brand: VALLEYLAB

## (undated) DEVICE — NEEDLE HYPO 25GA L1.5IN BLU POLYPR HUB S STL REG BVL STR

## (undated) DEVICE — SUTURE VCRL SZ 3-0 L27IN ABSRB UD L26MM SH 1/2 CIR J416H

## (undated) DEVICE — TRAY PREP DRY W/ PREM GLV 2 APPL 6 SPNG 2 UNDPD 1 OVERWRAP

## (undated) DEVICE — ZINACTIVE USE 2641837 CLIP LIG M BLU TI HRT SHP WIRE HORZ 600 PER BX

## (undated) DEVICE — SUTURE NONABSORBABLE MONOFILAMENT 5-0 PS-2 18 IN BLK ETHILON 1666H

## (undated) DEVICE — PACK,EENT,TURBAN DRAPE,PK II: Brand: MEDLINE

## (undated) DEVICE — REM POLYHESIVE ADULT PATIENT RETURN ELECTRODE: Brand: VALLEYLAB

## (undated) DEVICE — SPONGE GZ W4XL4IN COT 12 PLY TYP VII WVN C FLD DSGN

## (undated) DEVICE — DRAIN SURG PENROSE 0.25X18 IN CLOSED WND DRAINAGE PREM SIL

## (undated) DEVICE — GOWN,SIRUS,FABRNF,XL,20/CS: Brand: MEDLINE

## (undated) DEVICE — SUT SLK 2-0SH 30IN BLK --

## (undated) DEVICE — 1010 S-DRAPE TOWEL DRAPE 10/BX: Brand: STERI-DRAPE™

## (undated) DEVICE — ELECTRODE 8227410 PAIRED 2 CH SET ROHS

## (undated) DEVICE — BANDAGE,GAUZE,CONFORMING,3"X75",STRL,LF: Brand: MEDLINE

## (undated) DEVICE — APPLICATOR COT-TIP 6IN WOOD -- 2/PK STRL

## (undated) DEVICE — BLADE ES L4IN INSUL EDGE

## (undated) DEVICE — INFECTION CONTROL KIT SYS

## (undated) DEVICE — SUT SLK 3-0 30IN SH BLK --

## (undated) DEVICE — Device

## (undated) DEVICE — ROCKER SWITCH PENCIL BLADE ELECTRODE, HOLSTER: Brand: EDGE

## (undated) DEVICE — HANDLE LT SNAP ON ULT DURABLE LENS FOR TRUMPF ALC DISPOSABLE

## (undated) DEVICE — STIMULATOR NERVE DISP

## (undated) DEVICE — RESERVOIR,SUCTION,100CC,SILICONE: Brand: MEDLINE

## (undated) DEVICE — SPONGE: SPECIALTY PEANUT XR 100/CS: Brand: MEDICAL ACTION INDUSTRIES

## (undated) DEVICE — DRAIN SURG W10XL20CM SIL SMOOTH FLAT 3/4 PERF DBL WRP